# Patient Record
Sex: FEMALE | Race: WHITE | NOT HISPANIC OR LATINO | Employment: OTHER | URBAN - NONMETROPOLITAN AREA
[De-identification: names, ages, dates, MRNs, and addresses within clinical notes are randomized per-mention and may not be internally consistent; named-entity substitution may affect disease eponyms.]

---

## 2017-04-12 ENCOUNTER — AMBULATORY - GICH (OUTPATIENT)
Dept: RADIOLOGY | Facility: OTHER | Age: 22
End: 2017-04-12

## 2017-04-12 DIAGNOSIS — G89.29 OTHER CHRONIC PAIN: ICD-10-CM

## 2017-04-12 DIAGNOSIS — M54.16 RADICULOPATHY OF LUMBAR REGION: ICD-10-CM

## 2017-04-12 DIAGNOSIS — M51.26 OTHER INTERVERTEBRAL DISC DISPLACEMENT, LUMBAR REGION: ICD-10-CM

## 2017-04-13 ENCOUNTER — HOSPITAL ENCOUNTER (OUTPATIENT)
Dept: RADIOLOGY | Facility: OTHER | Age: 22
End: 2017-04-13

## 2017-04-13 DIAGNOSIS — M51.26 OTHER INTERVERTEBRAL DISC DISPLACEMENT, LUMBAR REGION: ICD-10-CM

## 2017-04-13 DIAGNOSIS — M54.16 RADICULOPATHY OF LUMBAR REGION: ICD-10-CM

## 2017-04-13 DIAGNOSIS — G89.29 OTHER CHRONIC PAIN: ICD-10-CM

## 2017-05-03 ENCOUNTER — AMBULATORY - GICH (OUTPATIENT)
Dept: RADIOLOGY | Facility: OTHER | Age: 22
End: 2017-05-03

## 2017-05-03 DIAGNOSIS — M48.061 SPINAL STENOSIS OF LUMBAR REGION: ICD-10-CM

## 2017-05-04 ENCOUNTER — AMBULATORY - GICH (OUTPATIENT)
Dept: RADIOLOGY | Facility: OTHER | Age: 22
End: 2017-05-04

## 2017-05-04 ENCOUNTER — HOSPITAL ENCOUNTER (OUTPATIENT)
Dept: RADIOLOGY | Facility: OTHER | Age: 22
End: 2017-05-04

## 2017-05-04 DIAGNOSIS — M48.061 SPINAL STENOSIS OF LUMBAR REGION: ICD-10-CM

## 2017-07-12 ENCOUNTER — AMBULATORY - GICH (OUTPATIENT)
Dept: RADIOLOGY | Facility: OTHER | Age: 22
End: 2017-07-12

## 2017-07-12 DIAGNOSIS — M54.50 LOW BACK PAIN: ICD-10-CM

## 2017-07-12 DIAGNOSIS — M54.16 RADICULOPATHY OF LUMBAR REGION: ICD-10-CM

## 2018-01-04 NOTE — PROGRESS NOTES
Patient Information     Patient Name MRN Maribell Pires [id number] Female 1995      Progress Notes by Carina Ocampo at 2017 12:52 PM     Author:  Carina Ocampo Service:  (none) Author Type:  (none)     Filed:  2017 12:52 PM Date of Service:  2017 12:52 PM Status:  Signed     :  Carina Ocampo            Falls Risk Criteria:    Age 65 and older or under age 4        Sensory deficits    Poor vision    Use of ambulatory aides    Impaired judgment    Unable to walk independently    Meets High Risk criteria for falls:  no

## 2018-01-04 NOTE — PROGRESS NOTES
Patient Information     Patient Name MRN Sex Maribell JENNINGS [id number] Female 1995      Progress Notes by Carina Ocampo at 2017 12:52 PM     Author:  Carina Ocampo Service:  (none) Author Type:  (none)     Filed:  2017 12:52 PM Date of Service:  2017 12:52 PM Status:  Signed     :  Carina Ocampo            [place] Protocol    A. Pre-procedure verification complete yes  1-relevant information / documentation available, reviewed and properly matched to the patient; 2-consent accurate and complete, 3-equipment and supplies available    B. Site marking complete Yes  Site marked if not in continuous attendance with patient    C. TIME OUT completed yes  Time Out was conducted just prior to starting procedure to verify the eight required elements: 1-patient identity, 2-consent accurate and complete, 3-position, 4-correct side/site marked (if applicable), 5-procedure, 6-relevant images / results properly labeled and displayed (if applicable), 7-antibiotics / irrigation fluids (if applicable), 8-safety precautions.

## 2018-01-04 NOTE — PROGRESS NOTES
Patient Information     Patient Name MRN Maribell Pires [id number] Female 1995      Progress Notes by Carina Ocampo at 2017 12:52 PM     Author:  Carina Ocampo Service:  (none) Author Type:  (none)     Filed:  2017 12:52 PM Date of Service:  2017 12:52 PM Status:  Signed     :  Carina Ocampo            RECOVERY TIME  You may experience numbness and/or relief of your pain for up to 4-6 hours after the injection.  Your usual symptoms may return the night of the procedure and may possible be more severe than usual a day or two following.  Please keep track of your pain over the next several days and report how long the relief lasts to the doctor who referred you for this procedure.    The beneficial effects of the steroids usually require 2 to 3 days to take effect, buy may take as long as 5 to 7 days.  If there is no change in the pain, then investigation can be focused on other possible sources of your pain.  In either case, the information is useful to the doctor who referred you for this procedure.    POSSIBLE SIDE EFFECTS  Facial flushing (redness), occasional low grade fevers of 99.5F or less, hiccups, insomnia, headaches, increased heart rate, abdominal cramping, and/or a bloating feeling are side effects of the steroid medications and will go away 3 to 4 days after the injection.    Diabetic Patients  The steroids you have received may significantly increase your blood sugar levels.  Monitor your blood sugar level closely (4-6 times per day) for a period of 4 days or until your blood sugar level normalizes.  If your blood sugar level elevates significantly or you experience confusion, dizziness, sweating, please notify our primary physician and make him/her aware that you have received steroids.

## 2018-01-04 NOTE — PROGRESS NOTES
Patient Information     Patient Name MRN Maribell Pires [id number] Female 1995      Progress Notes by Whitney Estrada at 2017  5:44 PM     Author:  Whitney Estrada Service:  (none) Author Type:  Other Clinical Staff     Filed:  2017  5:44 PM Date of Service:  2017  5:44 PM Status:  Signed     :  Whitney Estrada (Other Clinical Staff)            Falls Risk Criteria:    Age 65 and older or under age 4        Sensory deficits    Poor vision    Use of ambulatory aides    Impaired judgment    Unable to walk independently    Meets High Risk criteria for falls:  no

## 2018-02-15 ENCOUNTER — DOCUMENTATION ONLY (OUTPATIENT)
Dept: FAMILY MEDICINE | Facility: OTHER | Age: 23
End: 2018-02-15

## 2018-02-15 RX ORDER — NORGESTIMATE AND ETHINYL ESTRADIOL 7DAYSX3 28
1 KIT ORAL DAILY
COMMUNITY
Start: 2015-03-13 | End: 2024-08-01

## 2018-02-16 ENCOUNTER — COMMUNICATION - GICH (OUTPATIENT)
Dept: CARDIAC REHAB | Facility: OTHER | Age: 23
End: 2018-02-16

## 2018-07-24 NOTE — PROGRESS NOTES
Patient Information     Patient Name  Maribell HAYS MRN  [id number] Sex  Female   1995      Letter by Carlito Guillen at      Author:  Carlito Guillen Service:  (none) Author Type:  (none)    Filed:   Encounter Date:  2018 Status:  (Other)       2018    Dear Ms. HAYS,    We have received your [employer] application. However, our records show you already have a [employer] account.    To access your existing [employer] account:    1. Retrieve your MyChart ID    Go to [employer]    Click on Forgot [employer] ID and enter the requested information.    Your [employer] ID will be sent to your email address on file.  2. Retrieve your password    Go to [employer]    Click on Forgot Password and enter the requested information.    Follow the steps to reset your password.    For questions or technical assistance, call [phone].     Thank you for choosing [employer]!

## 2021-05-25 ENCOUNTER — HOSPITAL ENCOUNTER (OUTPATIENT)
Dept: GENERAL RADIOLOGY | Facility: OTHER | Age: 26
Discharge: HOME | End: 2021-05-25
Attending: NURSE PRACTITIONER | Admitting: FAMILY MEDICINE
Payer: COMMERCIAL

## 2021-05-25 DIAGNOSIS — M46.1 SACROILIITIS (H): ICD-10-CM

## 2021-05-25 PROCEDURE — 255N000002 HC RX 255 OP 636: Performed by: RADIOLOGY

## 2021-05-25 PROCEDURE — 27096 INJECT SACROILIAC JOINT: CPT | Mod: LT

## 2021-05-25 PROCEDURE — 250N000011 HC RX IP 250 OP 636: Performed by: RADIOLOGY

## 2021-05-25 PROCEDURE — 250N000009 HC RX 250: Performed by: RADIOLOGY

## 2021-05-25 RX ORDER — LIDOCAINE HYDROCHLORIDE 10 MG/ML
2 INJECTION, SOLUTION INFILTRATION; PERINEURAL ONCE
Status: COMPLETED | OUTPATIENT
Start: 2021-05-25 | End: 2021-05-25

## 2021-05-25 RX ORDER — BUPIVACAINE HYDROCHLORIDE 5 MG/ML
3 INJECTION, SOLUTION EPIDURAL; INTRACAUDAL ONCE
Status: COMPLETED | OUTPATIENT
Start: 2021-05-25 | End: 2021-05-25

## 2021-05-25 RX ORDER — TRIAMCINOLONE ACETONIDE 40 MG/ML
40 INJECTION, SUSPENSION INTRA-ARTICULAR; INTRAMUSCULAR ONCE
Status: COMPLETED | OUTPATIENT
Start: 2021-05-25 | End: 2021-05-25

## 2021-05-25 RX ADMIN — LIDOCAINE HYDROCHLORIDE 2 ML: 10 INJECTION, SOLUTION INFILTRATION; PERINEURAL at 15:53

## 2021-05-25 RX ADMIN — TRIAMCINOLONE ACETONIDE 40 MG: 40 INJECTION, SUSPENSION INTRA-ARTICULAR; INTRAMUSCULAR at 15:53

## 2021-05-25 RX ADMIN — BUPIVACAINE HYDROCHLORIDE 3 ML: 5 INJECTION, SOLUTION EPIDURAL; INTRACAUDAL at 15:53

## 2021-05-25 RX ADMIN — IOHEXOL 2 ML: 240 INJECTION, SOLUTION INTRATHECAL; INTRAVASCULAR; INTRAVENOUS; ORAL at 15:53

## 2022-02-17 ENCOUNTER — HOSPITAL ENCOUNTER (OUTPATIENT)
Dept: GENERAL RADIOLOGY | Facility: OTHER | Age: 27
Discharge: HOME | End: 2022-02-17
Attending: SPECIALIST | Admitting: SPECIALIST
Payer: COMMERCIAL

## 2022-02-17 DIAGNOSIS — M54.50 LOW BACK PAIN: ICD-10-CM

## 2022-02-17 PROCEDURE — 255N000002 HC RX 255 OP 636: Performed by: RADIOLOGY

## 2022-02-17 PROCEDURE — 64483 NJX AA&/STRD TFRM EPI L/S 1: CPT

## 2022-02-17 PROCEDURE — 250N000011 HC RX IP 250 OP 636: Performed by: RADIOLOGY

## 2022-02-17 PROCEDURE — 250N000009 HC RX 250: Performed by: RADIOLOGY

## 2022-02-17 RX ORDER — LIDOCAINE HYDROCHLORIDE 10 MG/ML
1 INJECTION, SOLUTION INFILTRATION; PERINEURAL ONCE
Status: COMPLETED | OUTPATIENT
Start: 2022-02-17 | End: 2022-02-17

## 2022-02-17 RX ORDER — LIDOCAINE HYDROCHLORIDE 10 MG/ML
2 INJECTION, SOLUTION EPIDURAL; INFILTRATION; INTRACAUDAL; PERINEURAL ONCE
Status: COMPLETED | OUTPATIENT
Start: 2022-02-17 | End: 2022-02-17

## 2022-02-17 RX ORDER — DEXAMETHASONE SODIUM PHOSPHATE 10 MG/ML
10 INJECTION, SOLUTION INTRAMUSCULAR; INTRAVENOUS ONCE
Status: COMPLETED | OUTPATIENT
Start: 2022-02-17 | End: 2022-02-17

## 2022-02-17 RX ADMIN — DEXAMETHASONE SODIUM PHOSPHATE 10 MG: 10 INJECTION, SOLUTION INTRAMUSCULAR; INTRAVENOUS at 10:43

## 2022-02-17 RX ADMIN — LIDOCAINE HYDROCHLORIDE 1 ML: 10 INJECTION, SOLUTION EPIDURAL; INFILTRATION; INTRACAUDAL; PERINEURAL at 10:43

## 2022-02-17 RX ADMIN — LIDOCAINE HYDROCHLORIDE 1 ML: 10 INJECTION, SOLUTION INFILTRATION; PERINEURAL at 10:43

## 2022-02-17 RX ADMIN — IOHEXOL 1 ML: 240 INJECTION, SOLUTION INTRATHECAL; INTRAVASCULAR; INTRAVENOUS; ORAL at 10:42

## 2024-07-31 ENCOUNTER — APPOINTMENT (OUTPATIENT)
Dept: GENERAL RADIOLOGY | Facility: CLINIC | Age: 29
End: 2024-07-31
Attending: FAMILY MEDICINE
Payer: MEDICAID

## 2024-07-31 ENCOUNTER — APPOINTMENT (OUTPATIENT)
Dept: ULTRASOUND IMAGING | Facility: CLINIC | Age: 29
End: 2024-07-31
Payer: MEDICAID

## 2024-07-31 ENCOUNTER — HOSPITAL ENCOUNTER (INPATIENT)
Facility: CLINIC | Age: 29
LOS: 2 days | Discharge: HOME | End: 2024-08-02
Attending: FAMILY MEDICINE | Admitting: STUDENT IN AN ORGANIZED HEALTH CARE EDUCATION/TRAINING PROGRAM
Payer: MEDICAID

## 2024-07-31 DIAGNOSIS — E79.0 ELEVATED URIC ACID IN BLOOD: ICD-10-CM

## 2024-07-31 DIAGNOSIS — R53.1 WEAKNESS GENERALIZED: ICD-10-CM

## 2024-07-31 DIAGNOSIS — N17.9 AKI (ACUTE KIDNEY INJURY) (H): ICD-10-CM

## 2024-07-31 DIAGNOSIS — K70.31 ALCOHOLIC CIRRHOSIS OF LIVER WITH ASCITES (H): ICD-10-CM

## 2024-07-31 DIAGNOSIS — L03.115 CELLULITIS OF RIGHT LOWER EXTREMITY: ICD-10-CM

## 2024-07-31 DIAGNOSIS — D72.829 LEUKOCYTOSIS, UNSPECIFIED TYPE: ICD-10-CM

## 2024-07-31 DIAGNOSIS — K74.69 OTHER CIRRHOSIS OF LIVER (H): Primary | ICD-10-CM

## 2024-07-31 DIAGNOSIS — M79.604 PAIN OF RIGHT LOWER EXTREMITY: ICD-10-CM

## 2024-07-31 DIAGNOSIS — D64.9 ANEMIA, UNSPECIFIED TYPE: ICD-10-CM

## 2024-07-31 LAB
ALBUMIN SERPL BCG-MCNC: 2.9 G/DL (ref 3.5–5.2)
ALP SERPL-CCNC: 119 U/L (ref 40–150)
ALT SERPL W P-5'-P-CCNC: 7 U/L (ref 0–50)
AMMONIA PLAS-SCNC: 28 UMOL/L (ref 11–51)
AMMONIA PLAS-SCNC: NORMAL UMOL/L
ANION GAP SERPL CALCULATED.3IONS-SCNC: 14 MMOL/L (ref 7–15)
ANION GAP SERPL CALCULATED.3IONS-SCNC: 16 MMOL/L (ref 7–15)
APTT PPP: 37 SECONDS (ref 22–38)
AST SERPL W P-5'-P-CCNC: 87 U/L (ref 0–45)
ATRIAL RATE - MUSE: 59 BPM
BASOPHILS # BLD AUTO: 0.1 10E3/UL (ref 0–0.2)
BASOPHILS NFR BLD AUTO: 1 %
BILIRUB DIRECT SERPL-MCNC: 2.4 MG/DL (ref 0–0.3)
BILIRUB SERPL-MCNC: 3.7 MG/DL
BUN SERPL-MCNC: 47 MG/DL (ref 6–20)
BUN SERPL-MCNC: 50.1 MG/DL (ref 6–20)
CALCIUM SERPL-MCNC: 8 MG/DL (ref 8.8–10.4)
CALCIUM SERPL-MCNC: 8.3 MG/DL (ref 8.8–10.4)
CHLORIDE SERPL-SCNC: 106 MMOL/L (ref 98–107)
CHLORIDE SERPL-SCNC: 110 MMOL/L (ref 98–107)
CREAT SERPL-MCNC: 1.69 MG/DL (ref 0.51–0.95)
CREAT SERPL-MCNC: 1.92 MG/DL (ref 0.51–0.95)
CRP SERPL-MCNC: 38.7 MG/L
DIASTOLIC BLOOD PRESSURE - MUSE: NORMAL MMHG
EGFRCR SERPLBLD CKD-EPI 2021: 36 ML/MIN/1.73M2
EGFRCR SERPLBLD CKD-EPI 2021: 41 ML/MIN/1.73M2
EOSINOPHIL # BLD AUTO: 0.6 10E3/UL (ref 0–0.7)
EOSINOPHIL NFR BLD AUTO: 4 %
ERYTHROCYTE [DISTWIDTH] IN BLOOD BY AUTOMATED COUNT: 19.4 % (ref 10–15)
ETHANOL SERPL-MCNC: <0.01 G/DL
FERRITIN SERPL-MCNC: 254 NG/ML (ref 6–175)
FOLATE SERPL-MCNC: >40 NG/ML (ref 4.6–34.8)
GLUCOSE SERPL-MCNC: 117 MG/DL (ref 70–99)
GLUCOSE SERPL-MCNC: 207 MG/DL (ref 70–99)
HAPTOGLOB SERPL-MCNC: 70 MG/DL (ref 30–200)
HCG SERPL QL: NEGATIVE
HCO3 SERPL-SCNC: 16 MMOL/L (ref 22–29)
HCO3 SERPL-SCNC: 17 MMOL/L (ref 22–29)
HCT VFR BLD AUTO: 22.8 % (ref 35–47)
HGB BLD-MCNC: 7.3 G/DL (ref 11.7–15.7)
HOLD SPECIMEN: NORMAL
IMM GRANULOCYTES # BLD: 0.1 10E3/UL
IMM GRANULOCYTES NFR BLD: 1 %
INR PPP: 1.85 (ref 0.85–1.15)
INTERPRETATION ECG - MUSE: NORMAL
IRON BINDING CAPACITY (ROCHE): 141 UG/DL (ref 240–430)
IRON SATN MFR SERPL: 43 % (ref 15–46)
IRON SERPL-MCNC: 60 UG/DL (ref 37–145)
LACTATE SERPL-SCNC: 1.7 MMOL/L (ref 0.7–2)
LACTATE SERPL-SCNC: 2.3 MMOL/L (ref 0.7–2)
LACTATE SERPL-SCNC: 2.4 MMOL/L (ref 0.7–2)
LDH SERPL L TO P-CCNC: 332 U/L (ref 0–250)
LIPASE SERPL-CCNC: 35 U/L (ref 13–60)
LYMPHOCYTES # BLD AUTO: 2.6 10E3/UL (ref 0.8–5.3)
LYMPHOCYTES NFR BLD AUTO: 14 %
MAGNESIUM SERPL-MCNC: 1.9 MG/DL (ref 1.7–2.3)
MCH RBC QN AUTO: 32.9 PG (ref 26.5–33)
MCHC RBC AUTO-ENTMCNC: 32 G/DL (ref 31.5–36.5)
MCV RBC AUTO: 103 FL (ref 78–100)
MONOCYTES # BLD AUTO: 1.2 10E3/UL (ref 0–1.3)
MONOCYTES NFR BLD AUTO: 7 %
NEUTROPHILS # BLD AUTO: 13.8 10E3/UL (ref 1.6–8.3)
NEUTROPHILS NFR BLD AUTO: 73 %
NRBC # BLD AUTO: 0 10E3/UL
NRBC BLD AUTO-RTO: 0 /100
NT-PROBNP SERPL-MCNC: 5164 PG/ML (ref 0–450)
P AXIS - MUSE: 40 DEGREES
PHOSPHATE SERPL-MCNC: 5.5 MG/DL (ref 2.5–4.5)
PLATELET # BLD AUTO: 277 10E3/UL (ref 150–450)
POTASSIUM SERPL-SCNC: 3 MMOL/L (ref 3.4–5.3)
POTASSIUM SERPL-SCNC: 3.7 MMOL/L (ref 3.4–5.3)
PR INTERVAL - MUSE: 142 MS
PROT SERPL-MCNC: 5.4 G/DL (ref 6.4–8.3)
QRS DURATION - MUSE: 66 MS
QT - MUSE: 574 MS
QTC - MUSE: 568 MS
R AXIS - MUSE: 88 DEGREES
RBC # BLD AUTO: 2.22 10E6/UL (ref 3.8–5.2)
RETICS # AUTO: 0.11 10E6/UL (ref 0.03–0.1)
RETICS/RBC NFR AUTO: 5 % (ref 0.5–2)
SARS-COV-2 RNA RESP QL NAA+PROBE: NEGATIVE
SODIUM SERPL-SCNC: 139 MMOL/L (ref 135–145)
SODIUM SERPL-SCNC: 140 MMOL/L (ref 135–145)
SYSTOLIC BLOOD PRESSURE - MUSE: NORMAL MMHG
T AXIS - MUSE: 94 DEGREES
T4 FREE SERPL-MCNC: 1.42 NG/DL (ref 0.9–1.7)
TROPONIN T SERPL HS-MCNC: 16 NG/L
TROPONIN T SERPL HS-MCNC: 17 NG/L
TSH SERPL DL<=0.005 MIU/L-ACNC: 5.11 UIU/ML (ref 0.3–4.2)
URATE SERPL-MCNC: 11 MG/DL (ref 2.4–5.7)
VENTRICULAR RATE- MUSE: 59 BPM
VIT B12 SERPL-MCNC: 1404 PG/ML (ref 232–1245)
WBC # BLD AUTO: 18.5 10E3/UL (ref 4–11)

## 2024-07-31 PROCEDURE — 82728 ASSAY OF FERRITIN: CPT

## 2024-07-31 PROCEDURE — 99223 1ST HOSP IP/OBS HIGH 75: CPT | Mod: GC | Performed by: STUDENT IN AN ORGANIZED HEALTH CARE EDUCATION/TRAINING PROGRAM

## 2024-07-31 PROCEDURE — 93005 ELECTROCARDIOGRAM TRACING: CPT | Performed by: FAMILY MEDICINE

## 2024-07-31 PROCEDURE — 84484 ASSAY OF TROPONIN QUANT: CPT

## 2024-07-31 PROCEDURE — 83735 ASSAY OF MAGNESIUM: CPT | Performed by: FAMILY MEDICINE

## 2024-07-31 PROCEDURE — 120N000002 HC R&B MED SURG/OB UMMC

## 2024-07-31 PROCEDURE — 76705 ECHO EXAM OF ABDOMEN: CPT

## 2024-07-31 PROCEDURE — 83690 ASSAY OF LIPASE: CPT | Performed by: FAMILY MEDICINE

## 2024-07-31 PROCEDURE — 258N000003 HC RX IP 258 OP 636: Performed by: FAMILY MEDICINE

## 2024-07-31 PROCEDURE — 84484 ASSAY OF TROPONIN QUANT: CPT | Performed by: FAMILY MEDICINE

## 2024-07-31 PROCEDURE — 84100 ASSAY OF PHOSPHORUS: CPT

## 2024-07-31 PROCEDURE — 82746 ASSAY OF FOLIC ACID SERUM: CPT

## 2024-07-31 PROCEDURE — 84155 ASSAY OF PROTEIN SERUM: CPT | Performed by: FAMILY MEDICINE

## 2024-07-31 PROCEDURE — 86015 ACTIN ANTIBODY EACH: CPT

## 2024-07-31 PROCEDURE — 83880 ASSAY OF NATRIURETIC PEPTIDE: CPT | Performed by: FAMILY MEDICINE

## 2024-07-31 PROCEDURE — 83615 LACTATE (LD) (LDH) ENZYME: CPT

## 2024-07-31 PROCEDURE — 93970 EXTREMITY STUDY: CPT | Mod: 26 | Performed by: STUDENT IN AN ORGANIZED HEALTH CARE EDUCATION/TRAINING PROGRAM

## 2024-07-31 PROCEDURE — 93010 ELECTROCARDIOGRAM REPORT: CPT | Performed by: FAMILY MEDICINE

## 2024-07-31 PROCEDURE — 83010 ASSAY OF HAPTOGLOBIN QUANT: CPT

## 2024-07-31 PROCEDURE — 82140 ASSAY OF AMMONIA: CPT | Performed by: FAMILY MEDICINE

## 2024-07-31 PROCEDURE — 36415 COLL VENOUS BLD VENIPUNCTURE: CPT | Performed by: FAMILY MEDICINE

## 2024-07-31 PROCEDURE — 36415 COLL VENOUS BLD VENIPUNCTURE: CPT

## 2024-07-31 PROCEDURE — 82248 BILIRUBIN DIRECT: CPT

## 2024-07-31 PROCEDURE — 99285 EMERGENCY DEPT VISIT HI MDM: CPT | Performed by: FAMILY MEDICINE

## 2024-07-31 PROCEDURE — 85730 THROMBOPLASTIN TIME PARTIAL: CPT | Performed by: FAMILY MEDICINE

## 2024-07-31 PROCEDURE — 84439 ASSAY OF FREE THYROXINE: CPT

## 2024-07-31 PROCEDURE — 71046 X-RAY EXAM CHEST 2 VIEWS: CPT | Mod: 26 | Performed by: RADIOLOGY

## 2024-07-31 PROCEDURE — 86140 C-REACTIVE PROTEIN: CPT | Performed by: FAMILY MEDICINE

## 2024-07-31 PROCEDURE — 93970 EXTREMITY STUDY: CPT

## 2024-07-31 PROCEDURE — 84550 ASSAY OF BLOOD/URIC ACID: CPT | Performed by: FAMILY MEDICINE

## 2024-07-31 PROCEDURE — 82040 ASSAY OF SERUM ALBUMIN: CPT | Performed by: FAMILY MEDICINE

## 2024-07-31 PROCEDURE — 83550 IRON BINDING TEST: CPT

## 2024-07-31 PROCEDURE — 82077 ASSAY SPEC XCP UR&BREATH IA: CPT | Performed by: FAMILY MEDICINE

## 2024-07-31 PROCEDURE — 85045 AUTOMATED RETICULOCYTE COUNT: CPT | Performed by: FAMILY MEDICINE

## 2024-07-31 PROCEDURE — 73630 X-RAY EXAM OF FOOT: CPT | Mod: 26 | Performed by: RADIOLOGY

## 2024-07-31 PROCEDURE — 99418 PROLNG IP/OBS E/M EA 15 MIN: CPT | Mod: GC | Performed by: STUDENT IN AN ORGANIZED HEALTH CARE EDUCATION/TRAINING PROGRAM

## 2024-07-31 PROCEDURE — 250N000013 HC RX MED GY IP 250 OP 250 PS 637

## 2024-07-31 PROCEDURE — 83605 ASSAY OF LACTIC ACID: CPT | Performed by: FAMILY MEDICINE

## 2024-07-31 PROCEDURE — 96361 HYDRATE IV INFUSION ADD-ON: CPT | Performed by: FAMILY MEDICINE

## 2024-07-31 PROCEDURE — 83605 ASSAY OF LACTIC ACID: CPT

## 2024-07-31 PROCEDURE — 99285 EMERGENCY DEPT VISIT HI MDM: CPT | Mod: 25 | Performed by: FAMILY MEDICINE

## 2024-07-31 PROCEDURE — 96360 HYDRATION IV INFUSION INIT: CPT | Performed by: FAMILY MEDICINE

## 2024-07-31 PROCEDURE — 87040 BLOOD CULTURE FOR BACTERIA: CPT | Performed by: FAMILY MEDICINE

## 2024-07-31 PROCEDURE — 85025 COMPLETE CBC W/AUTO DIFF WBC: CPT | Performed by: FAMILY MEDICINE

## 2024-07-31 PROCEDURE — 85610 PROTHROMBIN TIME: CPT | Performed by: FAMILY MEDICINE

## 2024-07-31 PROCEDURE — 250N000013 HC RX MED GY IP 250 OP 250 PS 637: Performed by: FAMILY MEDICINE

## 2024-07-31 PROCEDURE — 73630 X-RAY EXAM OF FOOT: CPT | Mod: RT

## 2024-07-31 PROCEDURE — 84443 ASSAY THYROID STIM HORMONE: CPT | Performed by: FAMILY MEDICINE

## 2024-07-31 PROCEDURE — 87635 SARS-COV-2 COVID-19 AMP PRB: CPT

## 2024-07-31 PROCEDURE — 85060 BLOOD SMEAR INTERPRETATION: CPT | Performed by: STUDENT IN AN ORGANIZED HEALTH CARE EDUCATION/TRAINING PROGRAM

## 2024-07-31 PROCEDURE — 250N000011 HC RX IP 250 OP 636: Performed by: FAMILY MEDICINE

## 2024-07-31 PROCEDURE — 82607 VITAMIN B-12: CPT

## 2024-07-31 PROCEDURE — 84703 CHORIONIC GONADOTROPIN ASSAY: CPT | Performed by: FAMILY MEDICINE

## 2024-07-31 PROCEDURE — 76705 ECHO EXAM OF ABDOMEN: CPT | Mod: 26 | Performed by: STUDENT IN AN ORGANIZED HEALTH CARE EDUCATION/TRAINING PROGRAM

## 2024-07-31 PROCEDURE — 71046 X-RAY EXAM CHEST 2 VIEWS: CPT

## 2024-07-31 PROCEDURE — 86038 ANTINUCLEAR ANTIBODIES: CPT

## 2024-07-31 RX ORDER — OXYCODONE HYDROCHLORIDE 5 MG/1
5 TABLET ORAL EVERY 6 HOURS PRN
Status: DISCONTINUED | OUTPATIENT
Start: 2024-07-31 | End: 2024-08-01

## 2024-07-31 RX ORDER — ESCITALOPRAM OXALATE 10 MG/1
10 TABLET ORAL DAILY
Status: DISCONTINUED | OUTPATIENT
Start: 2024-08-01 | End: 2024-08-02 | Stop reason: HOSPADM

## 2024-07-31 RX ORDER — LIDOCAINE 40 MG/G
CREAM TOPICAL
Status: DISCONTINUED | OUTPATIENT
Start: 2024-07-31 | End: 2024-08-02 | Stop reason: HOSPADM

## 2024-07-31 RX ORDER — POTASSIUM CHLORIDE 1.5 G/1.58G
40 POWDER, FOR SOLUTION ORAL ONCE
Status: COMPLETED | OUTPATIENT
Start: 2024-07-31 | End: 2024-07-31

## 2024-07-31 RX ORDER — LIDOCAINE 40 MG/G
CREAM TOPICAL
Status: DISCONTINUED | OUTPATIENT
Start: 2024-07-31 | End: 2024-07-31

## 2024-07-31 RX ORDER — FOLIC ACID 1 MG/1
1 TABLET ORAL DAILY
Status: DISCONTINUED | OUTPATIENT
Start: 2024-08-01 | End: 2024-08-02 | Stop reason: HOSPADM

## 2024-07-31 RX ORDER — OXYCODONE HYDROCHLORIDE 5 MG/1
5 TABLET ORAL ONCE
Status: COMPLETED | OUTPATIENT
Start: 2024-07-31 | End: 2024-07-31

## 2024-07-31 RX ORDER — CALCIUM CARBONATE 500 MG/1
1000 TABLET, CHEWABLE ORAL 4 TIMES DAILY PRN
Status: DISCONTINUED | OUTPATIENT
Start: 2024-07-31 | End: 2024-08-02 | Stop reason: HOSPADM

## 2024-07-31 RX ADMIN — SODIUM CHLORIDE, POTASSIUM CHLORIDE, SODIUM LACTATE AND CALCIUM CHLORIDE 1000 ML: 600; 310; 30; 20 INJECTION, SOLUTION INTRAVENOUS at 15:48

## 2024-07-31 RX ADMIN — POTASSIUM CHLORIDE 40 MEQ: 1.5 POWDER, FOR SOLUTION ORAL at 13:07

## 2024-07-31 RX ADMIN — PANTOPRAZOLE SODIUM 80 MG: 40 INJECTION, POWDER, FOR SOLUTION INTRAVENOUS at 15:48

## 2024-07-31 RX ADMIN — SODIUM CHLORIDE 1000 ML: 9 INJECTION, SOLUTION INTRAVENOUS at 12:43

## 2024-07-31 RX ADMIN — OXYCODONE HYDROCHLORIDE 5 MG: 5 TABLET ORAL at 23:25

## 2024-07-31 ASSESSMENT — ACTIVITIES OF DAILY LIVING (ADL)
ADLS_ACUITY_SCORE: 35
ADLS_ACUITY_SCORE: 33
ADLS_ACUITY_SCORE: 35

## 2024-07-31 ASSESSMENT — COLUMBIA-SUICIDE SEVERITY RATING SCALE - C-SSRS
6. HAVE YOU EVER DONE ANYTHING, STARTED TO DO ANYTHING, OR PREPARED TO DO ANYTHING TO END YOUR LIFE?: NO
2. HAVE YOU ACTUALLY HAD ANY THOUGHTS OF KILLING YOURSELF IN THE PAST MONTH?: NO
1. IN THE PAST MONTH, HAVE YOU WISHED YOU WERE DEAD OR WISHED YOU COULD GO TO SLEEP AND NOT WAKE UP?: NO

## 2024-07-31 NOTE — H&P
[facility]    History and Physical - Medicine Service, [facility] TEAM 1       Date of Admission:  7/31/2024    Assessment & Plan      Maribell Leon is a 29 year old female admitted on 7/31/2024. She has a history of cirrhosis, reported alcohol use disorder, chronic pancreatitis who was sent to the ED after abnormal lab values at outpatient follow up visit.    # Cirrhosis  # Elevated INR  # Hypoalbuminemia   Work up during prior hospitalization largely negative. TTG, A1A, Hep B, Hep C all negative. Ceruloplasmin was low at 19.2. PETH negative during last hospitalization. She does endorse alcohol use and potential past acetaminophen overuse. No family history of liver disease. Has had past epistaxis but no current issues with bleeding. She has not had a paracentesis or EGD to assess for varices. No biopsy upon initial chart review. CT at outside hospital noted diffuse hepatic steatosis with 1.3cm hypodense lesion in R hepatic lobe. She is certainly volume up on exam but no signs of HE. We will repeat autoimmune hepatitis labs.    - Will hold off on lactulose  and spironolactone-HCTZ at this time  - Was given midodrine on discharge at last hospitalization, BP okay for now, will hold off   - GI consult, appreciate their recommendations as far as any further work up needed    # Anemia  Hg 7.3. Macrocytic anemia with elevated reticulocytes. Elevated total bili. Concern for past hemolysis but per chart review was prescribed B12/folate supplements although not recently taking. No indication for transfusion at this point.  - B12  - Folate  - Iron studies  - Haptoglobin  - LDH   - Direct Bili  - Peripheral smear    # CHANTELL   Need more information to fully evaluate but is already improving. Would favor pre-renal over hepatorenal syndrome or intrarenal causes at this point given recent serious infection. No clear GI losses but possible poor PO intake. No other evidence of  decompensated cirrhosis so would be surprising to have HRS. BUN/Cr is >20.    - CTM  - UA with microscopy    # Lower extremity edema  Likely due to cirrhosis. Will rule out cardiac contribution given elevated NT pro BNP and DVT.  - Echo ordered  - LE venous doppler bilateral    # Elevated bilirubin  Possibly due to hemolysis. Will eval for biliary disease.   - RUQ ultrasound    # Elevated uric acid  # Concern for gout  # RLE ankle pain  RLE xray negative. Given CHANTELL would not give colchicine/NSAIDs.   - Ultrasound will look for joint effusion  - If effusion, would give steroids    # Leukocytosis  No clinical signs of infection. Could be inflammation secondary to gout. CXR read below. Recent PNA, no respiratory symptoms or dyspnea.   -CTM with daily CBC  - UA pending        Diet:  NPO at midnight  DVT Prophylaxis: Pneumatic Compression Devices  [place] Catheter: Not present  Fluids: None  Lines: None     Cardiac Monitoring: None  Code Status:  Full    Clinically Significant Risk Factors Present on Admission        # Hypokalemia: Lowest K = 3 mmol/L in last 2 days, will replace as needed       # Hypoalbuminemia: Lowest albumin = 2.9 g/dL at 7/31/2024 10:53 AM, will monitor as appropriate  # Coagulation Defect: INR = 1.85 (Ref range: 0.85 - 1.15) and/or PTT = 37 Seconds (Ref range: 22 - 38 Seconds), will monitor for bleeding       # Anemia: based on hgb <11                  Disposition Plan      Expected Discharge Date: 08/02/2024                The patient's care was discussed with the Attending Physician, Dr. Douglas .      Aries Vasquez MD  Medicine Service, [facility] TEAM 1  [facility]  Securely message with [employer] (more info)  Text page via [facility] Paging/Directory   See signed in provider for up to date coverage information  ______________________________________________________________________    Chief Complaint   Sent after abnormal lab values concerning for  CHANTELL    History is obtained from the patient and the patient's parent(s)    History of Present Illness   Maribell Leon is a 29 year old female who presents with The patient was previously hospitalized in [facility] from 7/9/24 to 7/22. She initially presented with R foot pain, lower extremity swelling, epistaxis, and diarrhea. CT abdomen showed increasing ascites in the abdomen and pelvis. Not paracentesis was performed not enough fluid per patient. During work up was wound to have mycoplasma PNA and eventually developed AHRF requiring intubation and thoracentesis which showed a transudative pleural effusion. Upon discharge, she was sent out on a course of doxycycline, which she finished.    She presented at the request of the provider, as she has not developed any new symptoms that concern her. She denies URI symptoms, cough, change in bowel movements, shortness of breath, chest pain, urinary symptoms, recurrent epistaxis, or other episodes of bleeding. She says the swelling in her legs is not markedly different from discharge but does endorse swelling in her legs and abdomen.     Past Medical History    No past medical history on file.    Past Surgical History   Past Surgical History:   Procedure Laterality Date    TONSILLECTOMY      8/05       Prior to Admission Medications   Prior to Admission Medications   Prescriptions Last Dose Informant Patient Reported? Taking?   norgestim-eth estrad triphasic (ORTHO TRI-CYCLEN, TRI-SPRINTEC) 0.18/0.215/0.25 MG-35 MCG per tablet   Yes No   Sig: Take 1 tablet by mouth daily   omeprazole (PRILOSEC) 20 MG CR capsule   Yes No   Sig: Take 20 mg by mouth daily      Facility-Administered Medications: None           Physical Exam   Vital Signs: Temp: 98.2  F (36.8  C) Temp src: Oral BP: 98/55 Pulse: 60   Resp: 18 SpO2: 100 % O2 Device: None (Room air)    Weight: 0 lbs 0 oz    Constitutional: awake, alert, cooperative, no apparent distress, and appears stated age  Eyes:  icteric bilaterally  Respiratory: No increased work of breathing, decreased breath sounds at the base bilaterally  Cardiovascular: normal S1 and S2, no murmurs, pitting edema present bilaterally  GI: Soft, non-tender, distended  Skin: Jaundiced, no rashes  Musculoskeletal: Tenderness of RLE ankle  Neuo: no asterixis    Medical Decision Making             Data     I have personally reviewed the following data over the past 24 hrs:    18.5 (H)  \   7.3 (L)   / 277     139 106 50.1 (H) /  207 (H)   3.0 (L) 17 (L) 1.92 (H) \     ALT: 7 AST: 87 (H) AP: 119 TBILI: 3.7 (H)   ALB: 2.9 (L) TOT PROTEIN: 5.4 (L) LIPASE: 35     Trop: 17 (H) BNP: 5,164 (H)     TSH: 5.11 (H) T4: N/A A1C: N/A     Procal: N/A CRP: 38.70 (H) Lactic Acid: 1.7       INR:  1.85 (H) PTT:  37   D-dimer:  N/A Fibrinogen:  N/A     Ferritin:  N/A % Retic:  5.0 (H) LDH:  N/A       Imaging results reviewed over the past 24 hrs:   Recent Results (from the past 24 hour(s))   Foot  XR, G/E 3 views, right    Narrative    3 views right foot radiographs 7/31/2024 1:57 PM    History: pain and swelling  include ankle     Comparison: None    Findings:    Nonweightbearing AP, oblique, and lateral  views of the right foot  were obtained.     No acute osseous abnormality.      Lisfranc articulation alignment is congruent on this non-weight  bearing study.    No substantial degenerative change. Os peroneus.    Dorsal soft tissue swelling.      Impression    Impression:  1. No acute osseous abnormality.  2. No substantial degenerative change.    PAUL ONEILL MD (Joe)         SYSTEM ID:  [id number]   XR Chest 2 Views    Narrative    Exam: XR CHEST 2 VIEWS, 7/31/2024 1:46 PM    Comparison: None    History: weakness recent pneumonia    Findings:  PA and lateral views of the chest. Trachea is midline.  Cardiomediastinal silhouette is partially cleared. Elevation of the  right hemidiaphragm. Suspected small-moderate right pleural effusion.  Patchy perihilar and left  basilar opacities. Opacification of the  right lower lung field. No pneumothorax. No acute osseous abnormality.      Impression    Impression:   1. Opacification of the right lower lung field with patchy perihilar  and left basilar opacities. Most likely edema versus atelectasis.  Cannot exclude postinflammatory change.  2. Suspected small-moderate right pleural effusion.     I have personally reviewed the examination and initial interpretation  and I agree with the findings.    GRACIELA YU MD         SYSTEM ID:  [id number]

## 2024-07-31 NOTE — ED PROVIDER NOTES
[place] EMERGENCY DEPARTMENT ([facility])    7/31/24       ED PROVIDER NOTE    History     Chief Complaint   Patient presents with    Abnormal Labs     The history is provided by the patient and medical records.     Maribell Leon is a 29 year old female history of severe alcohol use disorder complicated by liver cirrhosis, alcohol related neuropathy, chronic pancreatitis who presents to the Emergency Department for further evaluation of abnormal labwork and further workup of possible gout or leukemia. Patient had significant hospitalization from 7/9-7/22/24 for severe sepsis due to Mycoplasma pneumonia, hospitalizations summarized in a section below. During that hospitalization she was noted to have elevated WBCs despite treatment with broad spectrum IV antibiotics. Hematology consult was obtained and plan was made for outpatient bone marrow biopsy. She spoke with her doctors today who reviewed her recent labwork. They were concerned given acutely elevated creatinine of 2.96 and GFR of 21 on labs yesterday (prior creatinine 0.81 and GFR of 101 from 7/22/24). She presented to triage today stating that she had an appointment with [facility] hematology/oncology Dr. Abran Cannon ([phone]). There were concerns for possible leukemia, gout and advised patient come to the Emergency Department for evaluation. Here patient reports generalized weakness, increasing right foot pain over past few days, as well as bilateral lower extremity swelling. She states she was up all night, feels sleepy.  Here looks pale and jaundiced. No abdominal pain, hematemesis, epistaxis.     Hospitalization [facility] 7/9/2024 to 7/13/24   Presented to [place] ED with right foot pain, bilateral lower extremity swelling, multiple episodes of epistaxis, multiple episodes of diarrhea and increased weight gain after she stopped drinking alcohol.     Labs: WBC 33.3, CRP 75.6, procalcitonin 5.06, Fungitell is positive, positive  mycoplasma IgM, Sodium on admission 130. Uric acid 6.1. Alpha 1 Antitrypsin was pending at time of discharge but resulted elevated to 217.     Imaging: CT chest showed new patchy consolidation throughout both lungs with enlarged mediastinal or hilar lymph nodes as well as bilateral pleural effusions.  CT abdomen showed increasing ascites in the abdomen and pelvis as well as worsening gastroenteritis and colitis. Nonspecific hepatomegaly with perihepatic free fluid. MRI RLE:Nonspecific subcutaneous edema at the dorsal forefoot. Negative for fluid collection. No gross acute findings or abnormal enhancement within the osseous structures.     Hospitalization [facility] from 7/13/2024 to 07/22/24. Was transferred from [facility] after she developed worsening respiratory status. Underwent thoracentesis on 7/14, transudative. She was intubated 7/16 and admitted to ICU for acute hypoxemic respiratory failure and severe sepsis secondary to mycoplasma pneumonia. Postintubation patient underwent emergent bronc with BAL from right middle lobe and left lower lobe. Was extubated on 7/18/24.     Labs: mycoplasma pneumoniae IgM antibody positive as well ad IgG.  MELD 3.0: 24. Was also noted to have worsening WBC, with left shift and bands, peripheral smear and Hematology sought. Sputum culture 7/14 with gram positive clusters.     Peripheral blood smear review:  - Moderate leukocytosis secondary to absolute neutrophilia, monocytosis, lymphocytosis and eosinophilia with no left shift, toxic granulation, dysplastic forms or circulating blasts consistent with leukemoid reaction.  - Moderate macrocytic normochromic anemia with anisopoikilocytosis including elliptocytes and target cells without polychromasia.    - Unremarkable platelet count, size and granularity.    Imaging: CT chest 07/15/2024 showed bilateral ground-glass and consolidative opacities with interlobular septal thickening, moderate bilateral  nonloculated pleural effusions, diffuse hepatic steatosis.   Consults: Pulmonology, infectious disease, Hematology, Gastroenterology  Treatment: Levaquin, vancomycin, cefepime, doxycycline, zosyn, Bactrim, metronidazole, albumin, Bumex and Lasix for pleural effusion, multiple doses of diazepam for agitation, oxycodone, gabapentin and Fentanyl for severe pain, methylprednisolone, norepinephrine.     Past Medical History  No past medical history on file.  Past Surgical History:   Procedure Laterality Date    TONSILLECTOMY      8/05     norgestim-eth estrad triphasic (ORTHO TRI-CYCLEN, TRI-SPRINTEC) 0.18/0.215/0.25 MG-35 MCG per tablet  omeprazole (PRILOSEC) 20 MG CR capsule      No Known Allergies  Family History  Family History   Problem Relation Age of Onset    Allergy (Severe) Mother         Allergies,Environmental allergies, celiac disease    Family History Negative Father         Good Health    Cancer Maternal Grandfather         Cancer,Kidney cancer    Diabetes Maternal Grandmother         Diabetes,Type 2    Heart Disease Paternal Grandfather         Heart Disease,Cardiomyopathy    Asthma Brother         Asthma,history of intermittent asthma    Other - See Comments Brother         Enuresis    Family History Negative Sister         Good Health    Family History Negative Sister         Good Health     Social History   Social History     Tobacco Use    Smoking status: Some Days     Current packs/day: 0.10     Types: Cigarettes    Smokeless tobacco: Never   Substance Use Topics    Alcohol use: No    Drug use: Unknown     Types: Other     Comment: Drug use: No      A complete review of systems was performed with pertinent positives and negatives noted in the HPI, and all other systems negative.    Physical Exam   BP: 92/57  Pulse: 79  Temp: 98.4  F (36.9  C)  Resp: 16  SpO2: 95 %  Physical Exam  Vitals and nursing note reviewed.   Constitutional:       General: She is in acute distress.      Appearance: Normal  appearance. She is well-developed. She is ill-appearing. She is not toxic-appearing.      Comments: Patient ill-appearing here mother present also gives history on the patient.   HENT:      Head: Normocephalic and atraumatic.      Nose: Nose normal. No congestion or rhinorrhea.      Comments: No bleeding noted     Mouth/Throat:      Mouth: Mucous membranes are moist.      Pharynx: Oropharynx is clear.   Eyes:      General: Scleral icterus present.      Extraocular Movements: Extraocular movements intact.      Pupils: Pupils are equal, round, and reactive to light.   Cardiovascular:      Rate and Rhythm: Normal rate and regular rhythm.   Pulmonary:      Effort: Pulmonary effort is normal. No respiratory distress.      Breath sounds: No stridor. Rales present. No wheezing.      Comments: Few crackles in the bases no respiratory distress  Abdominal:      General: Abdomen is flat. There is distension.      Palpations: Abdomen is soft.      Tenderness: There is no abdominal tenderness. There is no guarding.   Musculoskeletal:         General: Swelling and tenderness present.      Cervical back: Normal range of motion and neck supple. No rigidity.      Comments: Edema of the lower extremities bilateral slight increasing on the right but no erythema noted.  Patient had ongoing evaluation MRI x-rays etc. at outside hospital without acute findings   Skin:     General: Skin is warm and dry.      Capillary Refill: Capillary refill takes less than 2 seconds.      Coloration: Skin is jaundiced and pale.      Findings: No bruising, lesion or rash.   Neurological:      General: No focal deficit present.      Mental Status: She is alert and oriented to person, place, and time. Mental status is at baseline.   Psychiatric:      Comments: Flat affect here in the ER           ED Course, Procedures, & Data     Records reviewed in epic.  As noted conversation with outside oncologist concerning for possible CML.  Chart reviewed see HPI  also.    In the ER history per mother also.  Patient had an IV established.  He received a liter normal saline here in the ER.  Had an EKG done  Chest x-ray done interpreted by myself also reveals right elevated hemidiaphragm some haziness in the left lower lung field.  No pneumothorax seen.  EKG sinus bradycardia noted.    Labs reviewed with an INR 1.85.  Lipase was 35.  CRP is 38.7 uric acid 11 TSH 5.11.  White count compared to previous is down to 18.5.  Hemoglobin is down from 9.1 down to 7.3.  Platelets noted to be 277.  Lactic acid was 2.3 and 2.2.  Ammonia 28.  BNP is 5100.  Sodium 139 potassium is 3 bicarb 17 gap is 16.  BUN is 50 creatinine 1.92 which is improved from previous yesterday.  Glucose 207 LFTs reveal AST of 87.  Albumin noted to be 2.9.  Alcohol negative.  Troponin 16.    As noted patient with anemia down I did type and screen the patient at this point.  Had talked to medicine regarding patient's finding we will hold off on transfusion patient I had talked to malignant heme also initially concern for CML after I talk to the patient heme-onc doctor at an outside clinic but at this point white count is coming down feel less likely this is CML can be consulted if needed with hospitalization.    Patient received a liter of normal saline here in the ER.  Protonix 80 mg IV x 1 reviewing records there is been no EGD done yet as no reports of varices patient noted stool to be slightly darker but no gross blood noted.    Discussed with medicine will plan to admit to medical bed for ongoing management of patient's complex alcoholic cirrhosis with recent pneumonia ICU admission with anemia ongoing would recommend GI to see patient also while here in the hospital family agrees with plan patient to reassess stable      ED Course as of 07/31/24 2219 Wed Jul 31, 2024   1308 Case discussed with Heme Onc.   1315 Called and spoke to lab, they could not perform add-on as the purple top is already spun down.   This will be reordered as a new draw.  Peripheral smear also ordered.  Margarita will have lab collect.     Procedures            EKG Interpretation:      Interpreted by Malvin Rocha MD  Time reviewed: 1225  Symptoms at time of EKG: fatigue   Rhythm: normal sinus veronica  Rate: normal  Axis: normal  Ectopy: none  Conduction: normal  ST Segments/ T Waves: No hyperacute ST-T wave changes  Q Waves: none  Comparison to prior: No old EKG available    Clinical Impression: sinus bradycardia       Elevated Lactate of 2.4 and no sepsis note found - Delete this reminder and enter the sepsis note or '.edcms' before signing chart.>>>     Results for orders placed or performed during the hospital encounter of 07/31/24   XR Chest 2 Views     Status: None    Narrative    Exam: XR CHEST 2 VIEWS, 7/31/2024 1:46 PM    Comparison: None    History: weakness recent pneumonia    Findings:  PA and lateral views of the chest. Trachea is midline.  Cardiomediastinal silhouette is partially cleared. Elevation of the  right hemidiaphragm. Suspected small-moderate right pleural effusion.  Patchy perihilar and left basilar opacities. Opacification of the  right lower lung field. No pneumothorax. No acute osseous abnormality.      Impression    Impression:   1. Opacification of the right lower lung field with patchy perihilar  and left basilar opacities. Most likely edema versus atelectasis.  Cannot exclude postinflammatory change.  2. Suspected small-moderate right pleural effusion.     I have personally reviewed the examination and initial interpretation  and I agree with the findings.    GRACIELA YU MD         SYSTEM ID:  [id number]   Foot  XR, G/E 3 views, right     Status: None    Narrative    3 views right foot radiographs 7/31/2024 1:57 PM    History: pain and swelling  include ankle     Comparison: None    Findings:    Nonweightbearing AP, oblique, and lateral  views of the right foot  were obtained.     No acute osseous  abnormality.      Lisfranc articulation alignment is congruent on this non-weight  bearing study.    No substantial degenerative change. Os peroneus.    Dorsal soft tissue swelling.      Impression    Impression:  1. No acute osseous abnormality.  2. No substantial degenerative change.    PAUL ONEILL MD (Joe)         SYSTEM ID:  [id number]   US Abdomen Limited     Status: None (Preliminary result)    Impression    RESIDENT PRELIMINARY INTERPRETATION  IMPRESSION:   1.  Cirrhotic liver morphology, with perihepatic ascites.  2.  Cholelithiasis without cholecystitis  3.  Small right pleural effusion   US Lower Extremity Venous Duplex Bilateral     Status: None (Preliminary result)    Impression    RESIDENT PRELIMINARY INTERPRETATION  IMPRESSION:.  No deep vein thrombosis in the right or left lower extremity.   Anna Draw     Status: None    Narrative    The following orders were created for panel order Anna Draw.  Procedure                               Abnormality         Status                     ---------                               -----------         ------                     Extra Blue Top Tube[[id number]]                              Final result               Extra Red Top Tube[[id number]]                               Final result               Extra Green Top (Lithium...[[id number]]                      Final result               Extra Purple Top Tube[[id number]]                            Final result                 Please view results for these tests on the individual orders.   Extra Blue Top Tube     Status: None   Result Value Ref Range    Hold Specimen JIC    Extra Red Top Tube     Status: None   Result Value Ref Range    Hold Specimen JIC    Extra Green Top (Lithium Heparin) Tube     Status: None   Result Value Ref Range    Hold Specimen JIC    Extra Purple Top Tube     Status: None   Result Value Ref Range    Hold Specimen JIC    Partial thromboplastin time     Status: Normal   Result Value  Ref Range    aPTT 37 22 - 38 Seconds   INR     Status: Abnormal   Result Value Ref Range    INR 1.85 (H) 0.85 - 1.15   CRP inflammation     Status: Abnormal   Result Value Ref Range    CRP Inflammation 38.70 (H) <5.00 mg/L   Comprehensive metabolic panel     Status: Abnormal   Result Value Ref Range    Sodium 139 135 - 145 mmol/L    Potassium 3.0 (L) 3.4 - 5.3 mmol/L    Carbon Dioxide (CO2) 17 (L) 22 - 29 mmol/L    Anion Gap 16 (H) 7 - 15 mmol/L    Urea Nitrogen 50.1 (H) 6.0 - 20.0 mg/dL    Creatinine 1.92 (H) 0.51 - 0.95 mg/dL    GFR Estimate 36 (L) >60 mL/min/1.73m2    Calcium 8.3 (L) 8.8 - 10.4 mg/dL    Chloride 106 98 - 107 mmol/L    Glucose 207 (H) 70 - 99 mg/dL    Alkaline Phosphatase 119 40 - 150 U/L    AST 87 (H) 0 - 45 U/L    ALT 7 0 - 50 U/L    Protein Total 5.4 (L) 6.4 - 8.3 g/dL    Albumin 2.9 (L) 3.5 - 5.2 g/dL    Bilirubin Total 3.7 (H) <=1.2 mg/dL   Magnesium     Status: Normal   Result Value Ref Range    Magnesium 1.9 1.7 - 2.3 mg/dL   Lactic Acid Whole Blood with 1X Repeat in 2 HR when >2     Status: Abnormal   Result Value Ref Range    Lactic Acid, Initial 2.3 (H) 0.7 - 2.0 mmol/L   Troponin T, High Sensitivity     Status: Abnormal   Result Value Ref Range    Troponin T, High Sensitivity 17 (H) <=14 ng/L   Lipase     Status: Normal   Result Value Ref Range    Lipase 35 13 - 60 U/L   Nt probnp inpatient (BNP)     Status: Abnormal   Result Value Ref Range    N terminal Pro BNP Inpatient 5,164 (H) 0 - 450 pg/mL   TSH     Status: Abnormal   Result Value Ref Range    TSH 5.11 (H) 0.30 - 4.20 uIU/mL   HCG qualitative Blood     Status: Normal   Result Value Ref Range    hCG Serum Qualitative Negative Negative   Uric acid     Status: Abnormal   Result Value Ref Range    Uric Acid 11.0 (H) 2.4 - 5.7 mg/dL   Ammonia (on ice)     Status: None   Result Value Ref Range    Ammonia     Lactic acid whole blood     Status: Abnormal   Result Value Ref Range    Lactic Acid 2.4 (H) 0.7 - 2.0 mmol/L   CBC with  platelets and differential     Status: Abnormal   Result Value Ref Range    WBC Count 18.5 (H) 4.0 - 11.0 10e3/uL    RBC Count 2.22 (L) 3.80 - 5.20 10e6/uL    Hemoglobin 7.3 (L) 11.7 - 15.7 g/dL    Hematocrit 22.8 (L) 35.0 - 47.0 %     (H) 78 - 100 fL    MCH 32.9 26.5 - 33.0 pg    MCHC 32.0 31.5 - 36.5 g/dL    RDW 19.4 (H) 10.0 - 15.0 %    Platelet Count 277 150 - 450 10e3/uL    % Neutrophils 73 %    % Lymphocytes 14 %    % Monocytes 7 %    % Eosinophils 4 %    % Basophils 1 %    % Immature Granulocytes 1 %    NRBCs per 100 WBC 0 <1 /100    Absolute Neutrophils 13.8 (H) 1.6 - 8.3 10e3/uL    Absolute Lymphocytes 2.6 0.8 - 5.3 10e3/uL    Absolute Monocytes 1.2 0.0 - 1.3 10e3/uL    Absolute Eosinophils 0.6 0.0 - 0.7 10e3/uL    Absolute Basophils 0.1 0.0 - 0.2 10e3/uL    Absolute Immature Granulocytes 0.1 <=0.4 10e3/uL    Absolute NRBCs 0.0 10e3/uL   Reticulocyte count     Status: Abnormal   Result Value Ref Range    % Reticulocyte 5.0 (H) 0.5 - 2.0 %    Absolute Reticulocyte 0.111 (H) 0.025 - 0.095 10e6/uL   Alcohol     Status: Normal   Result Value Ref Range    Alcohol ethyl <0.01 <=0.01 g/dL   Ammonia     Status: Normal   Result Value Ref Range    Ammonia 28 11 - 51 umol/L   Bilirubin direct     Status: Abnormal   Result Value Ref Range    Bilirubin Direct 2.40 (H) 0.00 - 0.30 mg/dL   Extra Tube     Status: None    Narrative    The following orders were created for panel order Extra Tube.  Procedure                               Abnormality         Status                     ---------                               -----------         ------                     Extra Green Top (Lithium...[[id number]]                      Final result                 Please view results for these tests on the individual orders.   Extra Green Top (Lithium Heparin) Tube     Status: None   Result Value Ref Range    Hold Specimen [facility]    Phosphorus     Status: Abnormal   Result Value Ref Range    Phosphorus 5.5 (H) 2.5 - 4.5 mg/dL    Vitamin B12     Status: Abnormal   Result Value Ref Range    Vitamin B12 1,404 (H) 232 - 1,245 pg/mL   Folate     Status: Abnormal   Result Value Ref Range    Folic Acid >40.0 (H) 4.6 - 34.8 ng/mL   Iron and iron binding capacity     Status: Abnormal   Result Value Ref Range    Iron 60 37 - 145 ug/dL    Iron Binding Capacity 141 (L) 240 - 430 ug/dL    Iron Sat Index 43 15 - 46 %   Ferritin     Status: Abnormal   Result Value Ref Range    Ferritin 254 (H) 6 - 175 ng/mL   Lactic acid whole blood     Status: Normal   Result Value Ref Range    Lactic Acid 1.7 0.7 - 2.0 mmol/L   Basic metabolic panel     Status: Abnormal   Result Value Ref Range    Sodium 140 135 - 145 mmol/L    Potassium 3.7 3.4 - 5.3 mmol/L    Chloride 110 (H) 98 - 107 mmol/L    Carbon Dioxide (CO2) 16 (L) 22 - 29 mmol/L    Anion Gap 14 7 - 15 mmol/L    Urea Nitrogen 47.0 (H) 6.0 - 20.0 mg/dL    Creatinine 1.69 (H) 0.51 - 0.95 mg/dL    GFR Estimate 41 (L) >60 mL/min/1.73m2    Calcium 8.0 (L) 8.8 - 10.4 mg/dL    Glucose 117 (H) 70 - 99 mg/dL   Lactate Dehydrogenase     Status: Abnormal   Result Value Ref Range    Lactate Dehydrogenase 332 (H) 0 - 250 U/L   Haptoglobin     Status: Normal   Result Value Ref Range    Haptoglobin 70 30 - 200 mg/dL   T4 free     Status: Normal   Result Value Ref Range    Free T4 1.42 0.90 - 1.70 ng/dL   Troponin T, High Sensitivity     Status: Abnormal   Result Value Ref Range    Troponin T, High Sensitivity 16 (H) <=14 ng/L   Asymptomatic COVID-19 Virus (Coronavirus) by PCR Nose     Status: Normal    Specimen: Nose; Swab   Result Value Ref Range    SARS CoV2 PCR Negative Negative    Narrative    Testing was performed using the [employer] Xpress SARS-CoV-2 Assay on the Cepheid Gene-Xpert Instrument Systems. Additional information about this assay can be found via the Test Directory. This [place] FDA cleared test should be ordered for the detection of SARS-CoV-2 in individuals with signs and symptoms of respiratory tract infection.  This test is for in vitro diagnostic use under the [place] FDA for laboratories certified under CLIA to perform high complexity testing. A negative result does not rule out the presence of PCR inhibitors in the specimen or target RNA concentration below the limit of detection for the assay. The possibility of a false negative should be considered if the patient's recent exposure or clinical presentation suggests COVID-19. This test was validated by [facility]. These Laboratories are certified under the  Clinical Laboratory Improvement Amendments (CLIA) as qualified to perform high complexity testing.   EKG 12-lead, tracing only     Status: None   Result Value Ref Range    Systolic Blood Pressure  mmHg    Diastolic Blood Pressure  mmHg    Ventricular Rate 59 BPM    Atrial Rate 59 BPM    WI Interval 142 ms    QRS Duration 66 ms     ms    QTc 568 ms    P Axis 40 degrees    R AXIS 88 degrees    T Axis 94 degrees    Interpretation ECG       Sinus bradycardia  Low voltage QRS  Cannot rule out Anterior infarct , age undetermined  Abnormal ECG    Unconfirmed report - interpretation of this ECG is computer generated - see medical record for final interpretation  Confirmed by - EMERGENCY ROOM, PHYSICIAN ([id number]),  EMBER BOUCHER ([id number]) on 7/31/2024 3:04:24 PM     CBC with platelets differential *Canceled*     Status: None ()    Narrative    The following orders were created for panel order CBC with platelets differential.  Procedure                               Abnormality         Status                     ---------                               -----------         ------                       Please view results for these tests on the individual orders.   ABO/Rh type and screen *Canceled*     Status: None ()    Narrative    The following orders were created for panel order ABO/Rh type and screen.  Procedure                               Abnormality         Status                     ---------                                -----------         ------                       Please view results for these tests on the individual orders.   ABO/Rh type and screen *Canceled*     Status: None ()    Narrative    The following orders were created for panel order ABO/Rh type and screen.  Procedure                               Abnormality         Status                     ---------                               -----------         ------                     Adult Type and Screen[[id number]]                                                         Please view results for these tests on the individual orders.   Lab Blood Morphology Pathologist Review     Status: Abnormal (In process)    Narrative    The following orders were created for panel order Lab Blood Morphology Pathologist Review.  Procedure                               Abnormality         Status                     ---------                               -----------         ------                     Bld morphology pathology...[[id number]]                      In process                 CBC with platelets and d...[[id number]]  Abnormal            Final result               Reticulocyte count[[id number]]           Abnormal            Final result               Morphology Tracking[[id number]]                              Final result                 Please view results for these tests on the individual orders.     Medications   sodium chloride (PF) 0.9% PF flush 3 mL (3 mLs Intracatheter $Given 7/31/24 1918)   sodium chloride (PF) 0.9% PF flush 3 mL (has no administration in time range)   lidocaine 1 % 0.1-1 mL (has no administration in time range)   lidocaine (LMX4) cream (has no administration in time range)   sodium chloride (PF) 0.9% PF flush 3 mL (3 mLs Intracatheter $Given 7/31/24 2009)   sodium chloride (PF) 0.9% PF flush 3 mL (has no administration in time range)   calcium carbonate (TUMS) chewable tablet 1,000 mg (has no administration in time range)    oxyCODONE (ROXICODONE) tablet 5 mg (has no administration in time range)   melatonin tablet 5 mg (has no administration in time range)   folic acid (FOLVITE) tablet 1 mg (has no administration in time range)   escitalopram (LEXAPRO) tablet 10 mg (has no administration in time range)   sodium chloride 0.9% BOLUS 1,000 mL (0 mLs Intravenous Stopped 7/31/24 1423)   potassium chloride (KLOR-CON) Packet 40 mEq (40 mEq Oral $Given 7/31/24 1307)   oxyCODONE (ROXICODONE) tablet 5 mg (5 mg Oral Not Given 7/31/24 1530)   pantoprazole (PROTONIX) IV push injection 80 mg (80 mg Intravenous $Given 7/31/24 1548)   lactated ringers BOLUS 1,000 mL (0 mLs Intravenous Stopped 7/31/24 1758)     Labs Ordered and Resulted from Time of ED Arrival to Time of ED Departure   INR - Abnormal       Result Value    INR 1.85 (*)    CRP INFLAMMATION - Abnormal    CRP Inflammation 38.70 (*)    COMPREHENSIVE METABOLIC PANEL - Abnormal    Sodium 139      Potassium 3.0 (*)     Carbon Dioxide (CO2) 17 (*)     Anion Gap 16 (*)     Urea Nitrogen 50.1 (*)     Creatinine 1.92 (*)     GFR Estimate 36 (*)     Calcium 8.3 (*)     Chloride 106      Glucose 207 (*)     Alkaline Phosphatase 119      AST 87 (*)     ALT 7      Protein Total 5.4 (*)     Albumin 2.9 (*)     Bilirubin Total 3.7 (*)    LACTIC ACID WHOLE BLOOD WITH 1X REPEAT IN 2 HR WHEN >2 - Abnormal    Lactic Acid, Initial 2.3 (*)    TROPONIN T, HIGH SENSITIVITY - Abnormal    Troponin T, High Sensitivity 17 (*)    NT PROBNP INPATIENT - Abnormal    N terminal Pro BNP Inpatient 5,164 (*)    TSH - Abnormal    TSH 5.11 (*)    URIC ACID - Abnormal    Uric Acid 11.0 (*)    LACTIC ACID WHOLE BLOOD - Abnormal    Lactic Acid 2.4 (*)    CBC WITH PLATELETS AND DIFFERENTIAL - Abnormal    WBC Count 18.5 (*)     RBC Count 2.22 (*)     Hemoglobin 7.3 (*)     Hematocrit 22.8 (*)      (*)     MCH 32.9      MCHC 32.0      RDW 19.4 (*)     Platelet Count 277      % Neutrophils 73      % Lymphocytes 14      %  Monocytes 7      % Eosinophils 4      % Basophils 1      % Immature Granulocytes 1      NRBCs per 100 WBC 0      Absolute Neutrophils 13.8 (*)     Absolute Lymphocytes 2.6      Absolute Monocytes 1.2      Absolute Eosinophils 0.6      Absolute Basophils 0.1      Absolute Immature Granulocytes 0.1      Absolute NRBCs 0.0     RETICULOCYTE COUNT - Abnormal    % Reticulocyte 5.0 (*)     Absolute Reticulocyte 0.111 (*)    BILIRUBIN DIRECT - Abnormal    Bilirubin Direct 2.40 (*)    PHOSPHORUS - Abnormal    Phosphorus 5.5 (*)    VITAMIN B12 - Abnormal    Vitamin B12 1,404 (*)    FOLATE - Abnormal    Folic Acid >40.0 (*)    IRON AND IRON BINDING CAPACITY - Abnormal    Iron 60      Iron Binding Capacity 141 (*)     Iron Sat Index 43     FERRITIN - Abnormal    Ferritin 254 (*)    BASIC METABOLIC PANEL - Abnormal    Sodium 140      Potassium 3.7      Chloride 110 (*)     Carbon Dioxide (CO2) 16 (*)     Anion Gap 14      Urea Nitrogen 47.0 (*)     Creatinine 1.69 (*)     GFR Estimate 41 (*)     Calcium 8.0 (*)     Glucose 117 (*)    LACTATE DEHYDROGENASE - Abnormal    Lactate Dehydrogenase 332 (*)    TROPONIN T, HIGH SENSITIVITY - Abnormal    Troponin T, High Sensitivity 16 (*)    PARTIAL THROMBOPLASTIN TIME - Normal    aPTT 37     MAGNESIUM - Normal    Magnesium 1.9     LIPASE - Normal    Lipase 35     HCG QUALITATIVE PREGNANCY - Normal    hCG Serum Qualitative Negative     ETHYL ALCOHOL LEVEL - Normal    Alcohol ethyl <0.01     AMMONIA - Normal    Ammonia 28     LACTIC ACID WHOLE BLOOD - Normal    Lactic Acid 1.7     HAPTOGLOBIN - Normal    Haptoglobin 70     T4 FREE - Normal    Free T4 1.42     COVID-19 VIRUS (CORONAVIRUS) BY PCR - Normal    SARS CoV2 PCR Negative     AMMONIA    Ammonia       MORPHOLOGY TRACKING   F ACTIN EIA WITH REFLEX   ANTI NUCLEAR AARON IGG BY IFA WITH REFLEX   ROUTINE UA WITH MICROSCOPIC REFLEX TO CULTURE   BLOOD MORPHOLOGY PATHOLOGIST REVIEW   BLOOD CULTURE   LAB BLOOD MORPHOLOGY PATHOLOGIST REVIEW      US Lower Extremity Venous Duplex Bilateral   Preliminary Result   RESIDENT PRELIMINARY INTERPRETATION   IMPRESSION:.   No deep vein thrombosis in the right or left lower extremity.      US Abdomen Limited   Preliminary Result   RESIDENT PRELIMINARY INTERPRETATION   IMPRESSION:    1.  Cirrhotic liver morphology, with perihepatic ascites.   2.  Cholelithiasis without cholecystitis   3.  Small right pleural effusion      XR Chest 2 Views   Final Result   Impression:    1. Opacification of the right lower lung field with patchy perihilar   and left basilar opacities. Most likely edema versus atelectasis.   Cannot exclude postinflammatory change.   2. Suspected small-moderate right pleural effusion.       I have personally reviewed the examination and initial interpretation   and I agree with the findings.      GRACIELA YU MD            SYSTEM ID:  [id number]      Foot  XR, G/E 3 views, right   Final Result   Impression:   1. No acute osseous abnormality.   2. No substantial degenerative change.      PAUL ONEILL MD (Joe)            SYSTEM ID:  [id number]      Echo Complete    (Results Pending)          Critical care was not performed.     Medical Decision Making  The patient's presentation was of high complexity (an acute health issue posing potential threat to life or bodily function).    The patient's evaluation involved:  an assessment requiring an independent historian (see separate area of note for details)  review of external note(s) from 3+ sources (see separate area of note for details)  review of 3+ test result(s) ordered prior to this encounter (see separate area of note for details)  ordering and/or review of 3+ test(s) in this encounter (see separate area of note for details)  discussion of management or test interpretation with another health professional (see separate area of note for details)    The patient's management necessitated high risk (a decision regarding  hospitalization).    Assessment & Plan   29-year-old female alcoholic cirrhosis recent hospitalization in [place] ICU required intubation with multifocal pneumonia along with marked elevation of white cells etc.  Some increasing creatinine also concerns.  Patient seen in clinic yesterday recommended to come to the hospital today as concerned about possible CML etc.  I talked to the heme-onc doctor also and then we reviewed labs.  Talk to our malignant heme people and medicine.  Her white count is down patient's hemoglobin though is 7.3 type and screen done given Protonix no history of EGD at this point patient unclear if GI blood loss versus other etiology.  Patient will be admitted though to medicine with GI consultation they can consult malignant heme but white count now down to 18 less likely that this is CML.  Patient does have swelling of the right lower foot greater than the left x-rays are negative.  Further evaluation no sign of infection seen.  Patient family agree       I have reviewed the nursing notes. I have reviewed the findings, diagnosis, plan and need for follow up with the patient.    New Prescriptions    No medications on file       Final diagnoses:   Anemia, unspecified type   CHANTELL (acute kidney injury) (H24)   Alcoholic cirrhosis of liver with ascites (H)   Leukocytosis, unspecified type   Weakness generalized   Elevated uric acid in blood   Pain of right lower extremity     I, Holly Palomares, am serving as a trained medical scribe to document services personally performed by Malvin Rocha MD based on the provider's statements to me on July 31, 2024.  This document has been checked and approved by the attending provider.    IMalvin MD, was physically present and have reviewed and verified the accuracy of this note documented by Holly Palomares, medical scribe.      Malvin Rocha MD   [facility] EMERGENCY DEPARTMENT  7/31/2024    This note was created at  least in part by the use of dragon voice dictation system. Inadvertent typographical errors may still exist.  Malvin Rocha MD.  Patient evaluated in the emergency department during the COVID-19 pandemic period. Careful attention to patients safety was addressed throughout the evaluation. Evaluation and treatment management was initiated with disposition made efficiently and appropriate as possible to minimize any risk of potential exposure to patient during this evaluation.                Malvin Rocha MD  07/31/24 6556

## 2024-07-31 NOTE — ED TRIAGE NOTES
PT arrives in w/c to triage c/o abnormal labs on follow up appointment with hematology/oncology Dr. Cannon ([phone]). PT was recently hospitalized in [place], [place] and intubated with discharge last Monday. Upon receiving labs hematology/oncology had concerns of leukemia dx and advised pt to be seen.     Triage Assessment (Adult)       Row Name 07/31/24 1049          Triage Assessment    Airway WDL WDL        Respiratory WDL    Respiratory WDL WDL        Skin Circulation/Temperature WDL    Skin Circulation/Temperature WDL WDL        Cardiac WDL    Cardiac WDL WDL        Peripheral/Neurovascular WDL    Peripheral Neurovascular WDL WDL        Cognitive/Neuro/Behavioral WDL    Cognitive/Neuro/Behavioral WDL WDL

## 2024-08-01 ENCOUNTER — APPOINTMENT (OUTPATIENT)
Dept: CT IMAGING | Facility: CLINIC | Age: 29
End: 2024-08-01
Payer: MEDICAID

## 2024-08-01 ENCOUNTER — APPOINTMENT (OUTPATIENT)
Dept: PHYSICAL THERAPY | Facility: CLINIC | Age: 29
End: 2024-08-01
Payer: MEDICAID

## 2024-08-01 ENCOUNTER — APPOINTMENT (OUTPATIENT)
Dept: CARDIOLOGY | Facility: CLINIC | Age: 29
End: 2024-08-01
Payer: MEDICAID

## 2024-08-01 LAB
ALBUMIN SERPL BCG-MCNC: 2.9 G/DL (ref 3.5–5.2)
ALBUMIN UR-MCNC: 30 MG/DL
ALP SERPL-CCNC: 114 U/L (ref 40–150)
ALT SERPL W P-5'-P-CCNC: 7 U/L (ref 0–50)
ANION GAP SERPL CALCULATED.3IONS-SCNC: 11 MMOL/L (ref 7–15)
APPEARANCE FLD: CLEAR
APPEARANCE UR: ABNORMAL
AST SERPL W P-5'-P-CCNC: 114 U/L (ref 0–45)
BASOPHILS # BLD AUTO: 0.2 10E3/UL (ref 0–0.2)
BASOPHILS # BLD AUTO: 0.2 10E3/UL (ref 0–0.2)
BASOPHILS NFR BLD AUTO: 1 %
BASOPHILS NFR BLD AUTO: 1 %
BILIRUB DIRECT SERPL-MCNC: 2.57 MG/DL (ref 0–0.3)
BILIRUB SERPL-MCNC: 4.1 MG/DL
BILIRUB UR QL STRIP: NEGATIVE
BUN SERPL-MCNC: 43.5 MG/DL (ref 6–20)
CALCIUM SERPL-MCNC: 8.4 MG/DL (ref 8.8–10.4)
CELL COUNT BODY FLUID SOURCE: NORMAL
CHLORIDE SERPL-SCNC: 110 MMOL/L (ref 98–107)
COLOR FLD: YELLOW
COLOR UR AUTO: YELLOW
CREAT SERPL-MCNC: 1.51 MG/DL (ref 0.51–0.95)
CRP SERPL-MCNC: 35.7 MG/L
CRYSTALS SNV MICRO: NORMAL
EGFRCR SERPLBLD CKD-EPI 2021: 47 ML/MIN/1.73M2
EOSINOPHIL # BLD AUTO: 0.8 10E3/UL (ref 0–0.7)
EOSINOPHIL # BLD AUTO: 1 10E3/UL (ref 0–0.7)
EOSINOPHIL NFR BLD AUTO: 5 %
EOSINOPHIL NFR BLD AUTO: 5 %
EOSINOPHIL NFR FLD MANUAL: 1 %
ERYTHROCYTE [DISTWIDTH] IN BLOOD BY AUTOMATED COUNT: 19.3 % (ref 10–15)
ERYTHROCYTE [DISTWIDTH] IN BLOOD BY AUTOMATED COUNT: 19.4 % (ref 10–15)
ERYTHROCYTE [SEDIMENTATION RATE] IN BLOOD BY WESTERGREN METHOD: 53 MM/HR (ref 0–20)
GLUCOSE SERPL-MCNC: 114 MG/DL (ref 70–99)
GLUCOSE UR STRIP-MCNC: NEGATIVE MG/DL
HCO3 SERPL-SCNC: 19 MMOL/L (ref 22–29)
HCT VFR BLD AUTO: 22.8 % (ref 35–47)
HCT VFR BLD AUTO: 23.5 % (ref 35–47)
HGB BLD-MCNC: 7 G/DL (ref 11.7–15.7)
HGB BLD-MCNC: 7.4 G/DL (ref 11.7–15.7)
HGB UR QL STRIP: NEGATIVE
IMM GRANULOCYTES # BLD: 0.1 10E3/UL
IMM GRANULOCYTES # BLD: 0.1 10E3/UL
IMM GRANULOCYTES NFR BLD: 1 %
IMM GRANULOCYTES NFR BLD: 1 %
INR PPP: 1.81 (ref 0.85–1.15)
KETONES UR STRIP-MCNC: NEGATIVE MG/DL
LEUKOCYTE ESTERASE UR QL STRIP: NEGATIVE
LVEF ECHO: NORMAL
LYMPHOCYTES # BLD AUTO: 3.1 10E3/UL (ref 0.8–5.3)
LYMPHOCYTES # BLD AUTO: 3.7 10E3/UL (ref 0.8–5.3)
LYMPHOCYTES NFR BLD AUTO: 17 %
LYMPHOCYTES NFR BLD AUTO: 20 %
LYMPHOCYTES NFR FLD MANUAL: 14 %
MCH RBC QN AUTO: 32 PG (ref 26.5–33)
MCH RBC QN AUTO: 32.6 PG (ref 26.5–33)
MCHC RBC AUTO-ENTMCNC: 30.7 G/DL (ref 31.5–36.5)
MCHC RBC AUTO-ENTMCNC: 31.5 G/DL (ref 31.5–36.5)
MCV RBC AUTO: 104 FL (ref 78–100)
MCV RBC AUTO: 104 FL (ref 78–100)
MONOCYTES # BLD AUTO: 1.3 10E3/UL (ref 0–1.3)
MONOCYTES # BLD AUTO: 1.3 10E3/UL (ref 0–1.3)
MONOCYTES NFR BLD AUTO: 7 %
MONOCYTES NFR BLD AUTO: 7 %
MONOS+MACROS NFR FLD MANUAL: 13 %
MRSA DNA SPEC QL NAA+PROBE: NEGATIVE
MUCOUS THREADS #/AREA URNS LPF: PRESENT /LPF
NEUTROPHILS # BLD AUTO: 12 10E3/UL (ref 1.6–8.3)
NEUTROPHILS # BLD AUTO: 12.7 10E3/UL (ref 1.6–8.3)
NEUTROPHILS NFR BLD AUTO: 66 %
NEUTROPHILS NFR BLD AUTO: 69 %
NEUTS BAND NFR FLD MANUAL: 72 %
NITRATE UR QL: NEGATIVE
NRBC # BLD AUTO: 0 10E3/UL
NRBC # BLD AUTO: 0 10E3/UL
NRBC BLD AUTO-RTO: 0 /100
NRBC BLD AUTO-RTO: 0 /100
PH UR STRIP: 5.5 [PH] (ref 5–7)
PLATELET # BLD AUTO: 239 10E3/UL (ref 150–450)
PLATELET # BLD AUTO: 239 10E3/UL (ref 150–450)
POTASSIUM SERPL-SCNC: 3.3 MMOL/L (ref 3.4–5.3)
PROT SERPL-MCNC: 5.5 G/DL (ref 6.4–8.3)
RBC # BLD AUTO: 2.19 10E6/UL (ref 3.8–5.2)
RBC # BLD AUTO: 2.27 10E6/UL (ref 3.8–5.2)
RBC URINE: 0 /HPF
SA TARGET DNA: NEGATIVE
SODIUM SERPL-SCNC: 140 MMOL/L (ref 135–145)
SP GR UR STRIP: 1.02 (ref 1–1.03)
SQUAMOUS EPITHELIAL: 8 /HPF
TRANSITIONAL EPI: 1 /HPF
UROBILINOGEN UR STRIP-MCNC: NORMAL MG/DL
WBC # BLD AUTO: 18.2 10E3/UL (ref 4–11)
WBC # BLD AUTO: 18.2 10E3/UL (ref 4–11)
WBC # FLD AUTO: 13 /UL
WBC URINE: 2 /HPF

## 2024-08-01 PROCEDURE — 85610 PROTHROMBIN TIME: CPT

## 2024-08-01 PROCEDURE — 81001 URINALYSIS AUTO W/SCOPE: CPT

## 2024-08-01 PROCEDURE — 89060 EXAM SYNOVIAL FLUID CRYSTALS: CPT

## 2024-08-01 PROCEDURE — 73700 CT LOWER EXTREMITY W/O DYE: CPT | Mod: 26 | Performed by: RADIOLOGY

## 2024-08-01 PROCEDURE — 250N000013 HC RX MED GY IP 250 OP 250 PS 637

## 2024-08-01 PROCEDURE — 87040 BLOOD CULTURE FOR BACTERIA: CPT

## 2024-08-01 PROCEDURE — 85025 COMPLETE CBC W/AUTO DIFF WBC: CPT

## 2024-08-01 PROCEDURE — 85652 RBC SED RATE AUTOMATED: CPT

## 2024-08-01 PROCEDURE — 97530 THERAPEUTIC ACTIVITIES: CPT | Mod: GP

## 2024-08-01 PROCEDURE — 97161 PT EVAL LOW COMPLEX 20 MIN: CPT | Mod: GP

## 2024-08-01 PROCEDURE — 80053 COMPREHEN METABOLIC PANEL: CPT

## 2024-08-01 PROCEDURE — 0S9F3ZZ DRAINAGE OF RIGHT ANKLE JOINT, PERCUTANEOUS APPROACH: ICD-10-PCS | Performed by: ORTHOPAEDIC SURGERY

## 2024-08-01 PROCEDURE — 82248 BILIRUBIN DIRECT: CPT

## 2024-08-01 PROCEDURE — 99255 IP/OBS CONSLTJ NEW/EST HI 80: CPT

## 2024-08-01 PROCEDURE — 99254 IP/OBS CNSLTJ NEW/EST MOD 60: CPT | Mod: GC | Performed by: INTERNAL MEDICINE

## 2024-08-01 PROCEDURE — 80321 ALCOHOLS BIOMARKERS 1OR 2: CPT | Performed by: NURSE PRACTITIONER

## 2024-08-01 PROCEDURE — 93306 TTE W/DOPPLER COMPLETE: CPT | Mod: 26 | Performed by: INTERNAL MEDICINE

## 2024-08-01 PROCEDURE — 89050 BODY FLUID CELL COUNT: CPT

## 2024-08-01 PROCEDURE — 36415 COLL VENOUS BLD VENIPUNCTURE: CPT

## 2024-08-01 PROCEDURE — 250N000011 HC RX IP 250 OP 636

## 2024-08-01 PROCEDURE — 87075 CULTR BACTERIA EXCEPT BLOOD: CPT

## 2024-08-01 PROCEDURE — 86140 C-REACTIVE PROTEIN: CPT

## 2024-08-01 PROCEDURE — 99222 1ST HOSP IP/OBS MODERATE 55: CPT | Mod: GC | Performed by: INTERNAL MEDICINE

## 2024-08-01 PROCEDURE — 93306 TTE W/DOPPLER COMPLETE: CPT

## 2024-08-01 PROCEDURE — 73700 CT LOWER EXTREMITY W/O DYE: CPT | Mod: RT

## 2024-08-01 PROCEDURE — 36415 COLL VENOUS BLD VENIPUNCTURE: CPT | Performed by: NURSE PRACTITIONER

## 2024-08-01 PROCEDURE — 120N000002 HC R&B MED SURG/OB UMMC

## 2024-08-01 PROCEDURE — 87641 MR-STAPH DNA AMP PROBE: CPT

## 2024-08-01 PROCEDURE — 97116 GAIT TRAINING THERAPY: CPT | Mod: GP

## 2024-08-01 PROCEDURE — 99233 SBSQ HOSP IP/OBS HIGH 50: CPT | Mod: GC | Performed by: STUDENT IN AN ORGANIZED HEALTH CARE EDUCATION/TRAINING PROGRAM

## 2024-08-01 PROCEDURE — 87205 SMEAR GRAM STAIN: CPT

## 2024-08-01 RX ORDER — ESCITALOPRAM OXALATE 10 MG/1
10 TABLET ORAL DAILY
Status: ON HOLD | COMMUNITY
End: 2024-08-21

## 2024-08-01 RX ORDER — ONDANSETRON 4 MG/1
4 TABLET, FILM COATED ORAL EVERY 8 HOURS PRN
COMMUNITY
Start: 2022-09-12 | End: 2024-08-02

## 2024-08-01 RX ORDER — MULTIVITAMIN
1 TABLET ORAL DAILY
COMMUNITY
End: 2024-08-02

## 2024-08-01 RX ORDER — METHOCARBAMOL 750 MG/1
750 TABLET, FILM COATED ORAL EVERY 6 HOURS PRN
COMMUNITY
Start: 2022-11-22 | End: 2024-08-02

## 2024-08-01 RX ORDER — DIAZEPAM 2 MG
2 TABLET ORAL PRN
COMMUNITY
End: 2024-08-02

## 2024-08-01 RX ORDER — DEXTROAMPHETAMINE SACCHARATE, AMPHETAMINE ASPARTATE, DEXTROAMPHETAMINE SULFATE AND AMPHETAMINE SULFATE 3.75; 3.75; 3.75; 3.75 MG/1; MG/1; MG/1; MG/1
15 TABLET ORAL 2 TIMES DAILY
COMMUNITY
End: 2024-08-02

## 2024-08-01 RX ORDER — CEFAZOLIN SODIUM 1 G/3ML
1 INJECTION, POWDER, FOR SOLUTION INTRAMUSCULAR; INTRAVENOUS EVERY 8 HOURS
Status: DISCONTINUED | OUTPATIENT
Start: 2024-08-01 | End: 2024-08-02 | Stop reason: HOSPADM

## 2024-08-01 RX ORDER — LANOLIN ALCOHOL/MO/W.PET/CERES
1000 CREAM (GRAM) TOPICAL DAILY
COMMUNITY
End: 2024-09-16

## 2024-08-01 RX ORDER — SPIRONOLACTONE AND HYDROCHLOROTHIAZIDE 25; 25 MG/1; MG/1
1 TABLET ORAL
COMMUNITY
Start: 2024-07-30 | End: 2024-08-02

## 2024-08-01 RX ORDER — LACTULOSE 10 G/15ML
30 SOLUTION ORAL 3 TIMES DAILY
COMMUNITY
Start: 2024-07-30 | End: 2024-08-02

## 2024-08-01 RX ORDER — POTASSIUM CHLORIDE 750 MG/1
40 TABLET, EXTENDED RELEASE ORAL ONCE
Status: COMPLETED | OUTPATIENT
Start: 2024-08-01 | End: 2024-08-01

## 2024-08-01 RX ORDER — MIDODRINE HYDROCHLORIDE 10 MG/1
10 TABLET ORAL 3 TIMES DAILY
COMMUNITY
Start: 2024-07-22 | End: 2024-08-02

## 2024-08-01 RX ORDER — OXYCODONE HYDROCHLORIDE 5 MG/1
5 TABLET ORAL EVERY 4 HOURS PRN
Status: DISCONTINUED | OUTPATIENT
Start: 2024-08-01 | End: 2024-08-02 | Stop reason: HOSPADM

## 2024-08-01 RX ORDER — CEPHALEXIN 500 MG/1
500 CAPSULE ORAL EVERY 6 HOURS SCHEDULED
Status: DISCONTINUED | OUTPATIENT
Start: 2024-08-01 | End: 2024-08-01

## 2024-08-01 RX ORDER — HYDROMORPHONE HYDROCHLORIDE 1 MG/ML
0.3 INJECTION, SOLUTION INTRAMUSCULAR; INTRAVENOUS; SUBCUTANEOUS ONCE
Status: COMPLETED | OUTPATIENT
Start: 2024-08-01 | End: 2024-08-01

## 2024-08-01 RX ORDER — GABAPENTIN 300 MG/1
2 CAPSULE ORAL 3 TIMES DAILY
COMMUNITY
Start: 2024-06-29 | End: 2024-08-02

## 2024-08-01 RX ORDER — SOD PHOS DI, MONO/K PHOS MONO 250 MG
250 TABLET ORAL 2 TIMES DAILY
COMMUNITY
Start: 2024-07-22 | End: 2024-08-02

## 2024-08-01 RX ORDER — POTASSIUM CHLORIDE 1.5 G/1.58G
40 POWDER, FOR SOLUTION ORAL ONCE
Status: DISCONTINUED | OUTPATIENT
Start: 2024-08-01 | End: 2024-08-01

## 2024-08-01 RX ORDER — MULTIVITAMIN WITH IRON
1 TABLET ORAL DAILY
COMMUNITY
End: 2024-08-02

## 2024-08-01 RX ORDER — PNV NO.95/FERROUS FUM/FOLIC AC 28MG-0.8MG
1 TABLET ORAL DAILY
COMMUNITY
End: 2024-08-02

## 2024-08-01 RX ADMIN — OXYCODONE HYDROCHLORIDE 5 MG: 5 TABLET ORAL at 03:28

## 2024-08-01 RX ADMIN — POTASSIUM CHLORIDE 40 MEQ: 750 TABLET, EXTENDED RELEASE ORAL at 10:06

## 2024-08-01 RX ADMIN — HYDROMORPHONE HYDROCHLORIDE 0.3 MG: 1 INJECTION, SOLUTION INTRAMUSCULAR; INTRAVENOUS; SUBCUTANEOUS at 16:19

## 2024-08-01 RX ADMIN — CEFAZOLIN 1 G: 1 INJECTION, POWDER, FOR SOLUTION INTRAMUSCULAR; INTRAVENOUS at 16:23

## 2024-08-01 RX ADMIN — OXYCODONE HYDROCHLORIDE 5 MG: 5 TABLET ORAL at 12:53

## 2024-08-01 RX ADMIN — ESCITALOPRAM OXALATE 10 MG: 10 TABLET ORAL at 08:03

## 2024-08-01 RX ADMIN — OXYCODONE HYDROCHLORIDE 5 MG: 5 TABLET ORAL at 18:17

## 2024-08-01 RX ADMIN — OXYCODONE HYDROCHLORIDE 5 MG: 5 TABLET ORAL at 22:23

## 2024-08-01 RX ADMIN — OXYCODONE HYDROCHLORIDE 5 MG: 5 TABLET ORAL at 08:03

## 2024-08-01 RX ADMIN — FOLIC ACID 1 MG: 1 TABLET ORAL at 08:03

## 2024-08-01 NOTE — PROCEDURES
PROCEDURE: Joint Aspiration, right ankle    After verbal informed consent was obtained, and the patient elected to proceed, a brief time out was held in accordance with hospital policy confirming the correct patient, procedure, site, and side. The right ankle was then prepped in the usual sterile fashion using chlorhexidine. The right ankle was then entered via an anteromedial approach with an 18 gauge needle. The joint was then aspirated with return of 1.5 cc of clear yellow fluid. The needle was withdrawn, and a sterile bandage was placed. The patient tolerated the procedure well and no immediate complications were observed. CMS was tested and intact following the aspiration.     Rogelio Serrano MD  Orthopaedic Surgery, PGY-4

## 2024-08-01 NOTE — MEDICATION SCRIBE - ADMISSION MEDICATION HISTORY
Medication Scribe Admission Medication History    Admission medication history is complete. The information provided in this note is only as accurate as the sources available at the time of the update.    Information Source(s): Patient via in-person    Pertinent Information: Pt reported taking medications on PTA medication list as directed, stated she has not started taking Lactulose 10 mg/15 ml and Spironolactone 25-25 mg tabs because she has not picked medications, but will do so upon discharged.     Changes made to PTA medication list:  Added: ALL  Deleted: Norgestim-eth estrad triphasic 0.18/0.215/0.25 mg mcg per tab, Omeprazole 20 mg caps.   Changed: None    Allergies reviewed with patient and updates made in EHR: yes    Medication History Completed By: Eleonora Au 8/1/2024 2:54 AM    PTA Med List   Medication Sig Last Dose    gabapentin (NEURONTIN) 300 MG capsule Take 2 capsules by mouth 3 times daily     K Phos [place]-Sod Phos Di & Mono (PHOSPHOROUS) 155-852-130 MG TABS Take 250 mg by mouth     lactulose (CHRONULAC) 10 GM/15ML solution Take 30 mLs by mouth     methocarbamol (ROBAXIN) 750 MG tablet Take 750 mg by mouth     midodrine (PROAMATINE) 10 MG tablet Take 10 mg by mouth     ondansetron (ZOFRAN) 4 MG tablet Take 4 mg by mouth     spironolactone-HCTZ (ALDACTAZIDE) 25-25 MG tablet Take 1 tablet by mouth

## 2024-08-01 NOTE — PROGRESS NOTES
[facility]    Progress Note - Medicine Service, [facility] TEAM 1       Date of Admission:  7/31/2024    Assessment & Plan   Maribell Leon is a 29 year old female admitted on 7/31/2024. She has a history of cirrhosis, reported alcohol use disorder, chronic pancreatitis who was sent to the ED after abnormal lab values at outpatient follow up visit concerning for recurrent CHANTELL.    Update:   - Lactate cleared, troponin downtrended  - UA unremarkable  - Free T4 WNL  - B12, folate okay. Iron studies most consistent with anemia of chronic disease  - LE ultrasound without DVT  - Echo normal  - RUQ showed cirrhotic liver w/ minimal perihepatic ascites, cholelithiasis without cholecystitis, small R pleural effusion  - CHANTELL continues to improve  - R ankle joint tap with ortho, will follow up labs, placed on cefazolin due to concerns for cellulitis       # Cirrhosis  # Elevated INR  # Hypoalbuminemia   Work up during prior hospitalization largely negative. TTG, A1A, Hep B, Hep C all negative. Ceruloplasmin was low at 19.2. PETH negative during last hospitalization. She does endorse alcohol use and potential past acetaminophen overuse. No family history of liver disease. Has had past epistaxis but no current issues with bleeding. She has not had a paracentesis or EGD to assess for varices. No biopsy upon initial chart review. CT at outside hospital noted diffuse hepatic steatosis with 1.3cm hypodense lesion in R hepatic lobe. She is certainly volume up on exam but no signs of HE. We will repeat autoimmune hepatitis labs.     - Will hold off on lactulose  and spironolactone-HCTZ at this time  - Was given midodrine on discharge at last hospitalization, BP okay for now, will hold off   - Follow up MIKEY, anti-F actin  - Will follow up GI recs     # Anemia  Hg 7.3. Macrocytic anemia with elevated reticulocytes. B12 and folate replete. Iron stores okay. Not consuming EtOH in recent past.  Haptoglobin normal and elevated direct bili not consistent with hemolysis. Consistent with anemia of chronic disease. No indication for transfusion at this point.  - Peripheral smear pending  - Follow up hematology recs    # CHANTELL   Need more information to fully evaluate but is already improving. Would favor pre-renal over hepatorenal syndrome or intrarenal causes at this point given recent serious infection. No clear GI losses but possible poor PO intake. No other evidence of decompensated cirrhosis so would be surprising to have HRS. BUN/Cr is >20.    - CTM   - Consider albumin pending GI recs     # Lower extremity edema  Secondary to cirrhosis. No DVT on bilateral LE ultrasound. Echo normal.  - Low sodium diet    # Elevated bilirubin  Elevated direct bili. RUQ showed cholelithiasis without cholecystitis. Likely secondary to cirrhosis  -CTM     # Elevated uric acid  # Concern for gout  # RLE ankle pain  RLE xray negative. Given CHANTELL would not give colchicine/NSAIDs.   - Will see if ortho can tap joint for diagnostic purposes  - Consider prednisone 40mg until symptoms resolve     # Leukocytosis  No clinical signs of infection. Could be inflammation secondary to gout. CXR read below. Recent PNA, no respiratory symptoms or dyspnea.   -CTM with daily CBC  - UA bland         Diet: 2 Gram Sodium Diet    DVT Prophylaxis: Pneumatic Compression Devices  [place] Catheter: Not present  Fluids: None  Lines: None     Cardiac Monitoring: None  Code Status: Full Code      Clinically Significant Risk Factors Present on Admission        # Hypokalemia: Lowest K = 3 mmol/L in last 2 days, will replace as needed       # Hypoalbuminemia: Lowest albumin = 2.9 g/dL at 8/1/2024  8:27 AM, will monitor as appropriate  # Coagulation Defect: INR = 1.81 (Ref range: 0.85 - 1.15) and/or PTT = 37 Seconds (Ref range: 22 - 38 Seconds), will monitor for bleeding       # Anemia: based on hgb <11                  Disposition Plan     Expected Discharge  Date: 08/02/2024                The patient's care was discussed with the Attending Physician, Dr. Douglas .    Aries Vasquez MD  Medicine Service, [facility] TEAM 1  [facility]  Securely message with "[employer]" (more info)  Text page via [email or web address] Paging/Directory   See signed in provider for up to date coverage information  ______________________________________________________________________    Interval History   No acute events overnight. Did received additional oxycodone for R ankle pain.     Physical Exam   Vital Signs: Temp: 98.5  F (36.9  C) Temp src: Oral BP: 111/67 Pulse: 90   Resp: 18 SpO2: 93 % O2 Device: None (Room air)      Constitutional: awake, alert, cooperative, no apparent distress, and appears stated age  Eyes: icteric   Respiratory: No increased work of breathing, decreased breath sounds at the base bilaterally  Cardiovascular: normal S1 and S2, no murmurs, pitting edema present bilaterally  GI: Soft, non-tender, distended  Skin: Jaundiced, no rashes  Musculoskeletal: Tenderness of RLE ankle with asymmetrical swelling, no overlying erythema  Neuro: no asterixis    Medical Decision Making       Please see A&P for additional details of medical decision making.      Data     I have personally reviewed the following data over the past 24 hrs:    18.2 (H)  \   7.0 (L)   / 239     140 110 (H) 43.5 (H) /  114 (H)   3.3 (L) 19 (L) 1.51 (H) \     ALT: 7 AST: 114 (H) AP: 114 TBILI: 4.1 (H)   ALB: 2.9 (L) TOT PROTEIN: 5.5 (L) LIPASE: N/A     Trop: 16 (H) BNP: N/A     TSH: N/A T4: 1.42 A1C: N/A     Procal: N/A CRP: N/A Lactic Acid: 1.7       INR:  1.81 (H) PTT:  N/A   D-dimer:  N/A Fibrinogen:  N/A     Ferritin:  254 (H) % Retic:  N/A LDH:  332 (H)

## 2024-08-01 NOTE — CONSULTS
[facility] Rheumatology IP Consultation    Assessment   Maribell Leon is a 29 year old female admitted on 7/31/2024. She has a history of AUD c/b cirrhosis and chronic pancreatitis with recent hospitalization for mycoplasma pneumonia requiring intubation and is admitted for further workup and management of right foot pain and leukocytosis.    Differential DIAGNOSIS:  Acute on chronic gout   Subcutaneous cellulitis  Complex Regional Pain Syndrome    RECOMMENDATIONS:  Patient with multiple month history of pain to right foot and ankle that has worsened in the past week since being discharged from the hospital for multifocal pneumonia. Patient struggling with ambulation and taking on and off compression socks due to this pain. Outpatient Heme/Onc provider had concern for gout given elevated uric acid to 11.0 on admission. Unclear if patient is having an acute on chronic flare of gout, though if so, it more likely involves the foot rather than the ankle, given no significant ankle effusion. Patient also reporting transient petechial rash in similar area to pain. Patient was prescribed diuretics from hospital on discharge on 7/22, but had not been taking, making this non-contributory to possible cause of gout. We ordered a dual energy CT scan of the right foot to evaluate for gout in the foot. This scan can demonstrate chronic crystal deposition in the affected area if it is present as arthrocentesis is not possible in this region. Orthopedics performed arthrocentesis of ankle joint with minimal fluid returned and no immediate concern for septic arthritis on visual inspection of the joint fluid. Septic joint also less likely given no fevers and chronic pain to this region. Previous CT and MRI's of the ankle without any concern for osteomyelitis.    Orthopedics had concern for cellulitis given previous MRI of foot. Would help to describe patient's intense sensations with this area and worsening pain. Agree with plan of  antibiotics to treat possible cellulitis. Less likely diagnosis of complex regional pain syndrome given history of trauma to foot (being stepped on) which seemed to start much of this foot pain. If other workup is negative, could consider getting a tri-phase bone scan for diagnosis. This could be accomplished outpatient as well.      Plan:  -- Dual Energy CT Scan of Right Foot (ordered for you)  -- Agree with antibiotics per orthopedics, appreciate their assistance with ankle arthrocentesis and management.  -- Could consider a tri-phase bone scan for workup of possible complex regional pain syndrome if workup for cellulitis or gout are non-contributory    I discussed the findings and recommendations with the patient.  I communicated the assessment and plan to the consulting team.    Case seen and discussed with Dr. Montaño, and the Fellow Dr. Reyes-Castro Justin Furedinson, MS4    The patient was discussed with the rheumatology team and the medical student and I agree with the findings and plan of care as documented in the note with the above changes/additions.       I have personally reviewed the labs and imaging reports. I was directly involved in the medical decision making and management of this patient.     Tiago Reyes-Castro, MD  PGY4 - Rheumatology Fellow.      Interval History   Patient reports that she had had ongoing pain in her right foot for the past few months. Says this mostly began after being stepped on. About a week after being stepped on she noticed intense pain to the area that is worse with activity or touch. Rates the pain very highly. Occasionally she will get shooting pain sensations from her knee down into the foot. She says that over time it has felt like this pain has spread from mid-leg into her ankle and now more so into her foot. She has also noticed a petechial rash that overlies the area, and goes away without any management. She was receiving massages to her foot which was helping the  edema, but now she cannot tolerate touch to the area. She has not noticed any fevers since leaving the hospital on the 22nd. She has no other significant joint pain. She completed her courses of antibiotics. She denies any oral ulcers, changes in hearing or vision, shortness of breath, chest pain, urinary changes, or any other significant joint pain.     Review of Systems    Associated symptoms:joint pain, muscle weakness, and rashes/photosensitive.   Denies: alopecia, fevers, oral ulcers, palpitations, and pleurisy.    Unless stated above, a 14-point review of systems was otherwise normal.      OBJECTIVE:  Physical Exam   PHYSICAL EXAM  /67   Pulse 90   Temp 98.5  F (36.9  C) (Oral)   Resp 18   SpO2 93%   Wt Readings from Last 4 Encounters:   03/30/15 48.8 kg (107 lb 8 oz)   03/10/14 49.2 kg (108 lb 6.4 oz) (14%, Z= -1.08)*   08/01/13 51.8 kg (114 lb 4 oz) (27%, Z= -0.60)*   07/10/13 51 kg (112 lb 6 oz) (24%, Z= -0.72)*     * Growth percentiles are based on CDC (Girls, 2-20 Years) data.     Physical Exam  Constitutional:       Appearance: Normal appearance.   HENT:      Head: Normocephalic.   Eyes:      General: Scleral icterus present.      Extraocular Movements: Extraocular movements intact.      Conjunctiva/sclera: Conjunctivae normal.   Cardiovascular:      Rate and Rhythm: Normal rate and regular rhythm.   Pulmonary:      Effort: Pulmonary effort is normal.   Musculoskeletal:         General: Tenderness present.      Comments: Exquisitely tender to palpation or soft touch on dorsal foot. Decreased ROM with ankle dorsiflexion, edema to bilateral feet and ankles with R > L. No significant joint effusion around R. Ankle or any other joint appreciated.  No tenderness to other joints.   Skin:     Coloration: Skin is jaundiced.      Findings: No rash.   Neurological:      General: No focal deficit present.      Mental Status: She is alert and oriented to person, place, and time.   Psychiatric:         Mood  and Affect: Mood normal.       Data:  Chart, labs and images reviewed and discussed with the rheumatology staff.

## 2024-08-01 NOTE — CONSULTS
Hematology Consult Note   Date of Service: 08/01/2024    Patient: Maribell Leon  MRN: [id number]  Admission Date: 7/31/2024  Hospital Day # Hospital Day: 2  Primary Outpatient Hematologist: Dr. Abran Hernandez ([facility])     Reason for Consult: Macrocytic anemia, persistent leukocytosis    Assessment & Plan:   Maribell Leon is a 29 year old female with a PMH of liver cirrhosis 2/2 alcohol use, alcohol-related polyneuropathy, chronic pancreatitis, and recent hospitalization (7/9-7/22) for severe sepsis 2/2 mycoplasma PNA c/b AHRF requiring intubation, admitted to [facility] on 7/31 at the recommendation of her outpatient hem/onc due to c/f acute gout and persistent abnormal cell counts for which hematology is consulted.     Recommendations:   - No additional workup of anemia or leukocytosis necessary from a hematology standpoint  - Monitor hemoglobin daily, transfuse hgb < 7  - Continue daily folate supplementation, can hold B12 and iron supplementation while admitted    Patient was seen and plan of care was discussed with attending physician Dr. Cogan.    We will continue to follow this patient. Please don't hesitate to contact the Fellow On-Call with questions.    # Acute on chronic macrocytic anemia   Hemoglobin today 7.0, down from 9.1 on 7/30. Baseline hemoglobin ~8-9 since 06/2024. Relatively stable throughout her most recent admission for PNA/AHRF. Suspect that her macrocytic anemia is primarily driven by bone marrow suppression from a combination of chronic illness, liver disease, and history of significant alcohol use; also consider frequent phlebotomy as source of blood loss. Peripheral smear on 7/30 with evidence of acanthocytes which is also indicative of liver-driven erythrocytic changes. Considered nutritional deficiencies, however B12 & folate are elevated this admission and she is on supplementation for both (Folate started 6/2024 d/t deficiency & B12 in 2021). Iron levels are also normal/she is on  iron supplement & would expect to cause a microcytic anemia. Her TSH was elevated however normal T4 makes anemia in hypothyroidism unlikely. She is also not on any medications that would impair DNA synthesis, such as zidovudine, 5-Follow-up, hydroxyurea. HIV negative. Negative hcg. Hemolysis labs unremarkable. She also denies any hematemesis, hematochezia, hematuria to suspect acute blood loss component. Would expect her anemia to gradually improve as she improves from both a medical & nutritional standpoint. Would continue iron/b12/folate supplementation at discharge.     # Decompensated EtOH liver cirrhosis c/b ascites  # Direct hyperbilirubinemia   # Factor XI deficiency   # Elevated INR   Per reports, hx of severe alcohol use disorder. MELD 3.0 is 24. Patient and mom report she was recently told she had cirrhosis on July admission. Recent PETH negative and EtOH on admission negative. Low factor 9 and elevated INR are c/w chronic liver disease.     # Leukocytosis  Persistent leukocytosis documented during [place] admission, high of 33. Had robust infectious workup completed, all negative except for positive mycoplasma IgG & IgM. Hematology consulted in [place] and set up with outpatient follow up d/t concern for possible leukemia & need for potential bone marrow biopsy given the persistently elevated leukocytosis. 7/22 peripheral smear negative for dysplastic wbcs or blasts, which points away from hematologic malignancy. Her differential is also reassuring and not consistent with malignancy. Further, her leukocytosis continues to downtrend over the last 2 weeks, now at 18.2. Elevation to 33 was likely iso of acute illness, pneumonia, intubation, and as evidenced by current downtrend, will improve with time though possibility of mild persistent elevation iso liver disease.     Anahi Sheppard, MS4  [employer]      Resident/Fellow Attestation   I, Evelyn Valerio DO, was present  with the medical/SABRINA student who participated in the service and in the documentation of the note.  I have verified the history and personally performed the physical exam and medical decision making.  I agree with the assessment and plan of care as documented in the note.      Evelyn Valerio DO  PGY2  Date of Service (when I saw the patient): 08/01/24     History of Present Illness:    Maribell Leon is a 29 year old female with a PMH of liver cirrhosis 2/2 alcohol use, alcohol-related polyneuropathy, chronic pancreatitis, and recent hospitalization (7/9-7/22) for severe sepsis 2/2 mycoplasma PNA c/b AHRF requiring intubation, admitted to [facility] on 7/31 as a direct referral from her outpatient hem/onc due to persistent abnormal cell counts, CHANTELL & c/f acute gout.     Per chart review, during recent hospitalization 7/9-7/22, patient was noted to have leukocytosis (elevated to 33) with left shift, normocytic/macrocytic anemia, intermittent thrombocytopenia. On discharge, she was set up with an outpatient hem/onc follow up for her leukocytosis with mention of possible bone marrow biopsy d/t leukemia concern. On 7/30 at hem/onc follow up with Shara Diaz, labs showing continued leukocytosis with left shift, normocytic anemia, hyperbilirubinemia, CHANTELL with significantly reduced GFR (21) & creatinine of 2.96, elevated uric acid. With her acute changes in kidney function, reports of R foot pain, elevated creatinine, and persistent leukocytosis/anemia, her hem/onc doc referred patient to ED for further workup. Patient & mom opted to travel to [facility] ED for comprehensive speciality care. Of note, she had a peripheral smear on 7/22 that showed leukocytosis 2/2 absolute neutrophilia, monocytosis, lymphocytosis, eosinophilia with NO left shift, toxic granulation, dysplastic forms or circulating blasts consistent with leukemoid reaction.     On presentation to [facility] ED, patient denied any new or overtly concerning symptoms. She  endorsed fatigue/generalized weakness, R foot pain, lower extremity swelling but otherwise felt OK.     Spoke with patient and mom in ED this afternoon. Discussed that her current labs, including CBC and peripheral smear from 7/22 are not consistent with leukemia & thus will not be pursuing biopsy. Reviewed causes of anemia, including nutritional, chronic illness, liver disease, medications. Informed that the most likely cause of her anemia is liver disease, chronic illness, alcohol use. During our visit, patient denies any hematuria, hematochezia, or other sources of active bleeding.     Review of Systems: Pertinent positive and negative systems described in HPI; the remainder of the 14 systems are negative    Past Medical History:  No past medical history on file.    Past Surgical History:  Past Surgical History:   Procedure Laterality Date    TONSILLECTOMY      8/05       Social History:  Social History     Socioeconomic History    Marital status:    Tobacco Use    Smoking status: Some Days     Current packs/day: 0.10     Types: Cigarettes    Smokeless tobacco: Never   Substance and Sexual Activity    Alcohol use: No    Drug use: Unknown     Types: Other     Comment: Drug use: No    Sexual activity: Yes     Partners: Male   Social History Narrative    No secondhand smoke exposure.      Attends [employer].  Dad is a counselor at [employer] and mom is .      Senior fall 2012    Works as PCA at [employer]    Involved in hockey cheerleading    Preloaded 03/22/2013     Social Determinants of Health     Financial Resource Strain: Patient Declined (7/12/2024)    Received from [facility]    Overall Financial Resource Strain (CARDIA)     Difficulty of Paying Living Expenses: Patient declined   Food Insecurity: No Food Insecurity (7/13/2024)    Received from [facility]    Hunger Vital Sign     Worried About Running Out of Food in the Last  Year: Never true     Ran Out of Food in the Last Year: Never true   Transportation Needs: No Transportation Needs (7/13/2024)    Received from [facility]    PRAPARE - Transportation     Lack of Transportation (Medical): No     Lack of Transportation (Non-Medical): No    Received from [facility]    Social Connections   Interpersonal Safety: Not At Risk (7/13/2024)    Received from [facility]    EH IP Custom IPV     Do you feel UNSAFE in any of your personal relationships with your family members or any other acquaintances?: No   Housing Stability: Low Risk  (7/13/2024)    Received from [facility]    Housing Stability Vital Sign     Unable to Pay for Housing in the Last Year: No     Number of Times Moved in the Last Year: 1     Homeless in the Last Year: No        Family History  Family History   Problem Relation Age of Onset    Allergy (Severe) Mother         Allergies,Environmental allergies, celiac disease    Family History Negative Father         Good Health    Cancer Maternal Grandfather         Cancer,Kidney cancer    Diabetes Maternal Grandmother         Diabetes,Type 2    Heart Disease Paternal Grandfather         Heart Disease,Cardiomyopathy    Asthma Brother         Asthma,history of intermittent asthma    Other - See Comments Brother         Enuresis    Family History Negative Sister         Good Health    Family History Negative Sister         Good Health       Outpatient Medications:  Current Facility-Administered Medications   Medication Dose Route Frequency Provider Last Rate Last Admin    calcium carbonate (TUMS) chewable tablet 1,000 mg  1,000 mg Oral 4x Daily PRN Aries Vasquez MD        escitalopram (LEXAPRO) tablet 10 mg  10 mg Oral Daily Aries Vasquez MD   10 mg at 08/01/24 0803    folic acid (FOLVITE) tablet 1 mg  1 mg Oral Daily Aries Vasquez MD   1  mg at 08/01/24 0803    lidocaine (LMX4) cream   Topical Q1H PRN Aries Vasquez MD        lidocaine 1 % 0.1-1 mL  0.1-1 mL Other Q1H PRN Aries Vasquez MD        melatonin tablet 5 mg  5 mg Oral At Bedtime PRN Aries Vasquez MD        oxyCODONE (ROXICODONE) tablet 5 mg  5 mg Oral Q4H PRN Kamryn Luis MD   5 mg at 08/01/24 0803    sodium chloride (PF) 0.9% PF flush 3 mL  3 mL Intracatheter Q8H Malvin Rocha MD   3 mL at 08/01/24 0551    sodium chloride (PF) 0.9% PF flush 3 mL  3 mL Intracatheter q1 min prn Malvin Rocha MD        sodium chloride (PF) 0.9% PF flush 3 mL  3 mL Intracatheter Q8H Aries Vasquez MD   3 mL at 08/01/24 0551    sodium chloride (PF) 0.9% PF flush 3 mL  3 mL Intracatheter q1 min prn Aries Vasquez MD         Current Outpatient Medications   Medication Sig Dispense Refill    amphetamine-dextroamphetamine (ADDERALL) 15 MG tablet Take 15 mg by mouth 2 times daily      cyanocobalamin (VITAMIN B-12) 1000 MCG tablet Take 1,000 mcg by mouth daily      diazepam (VALIUM) 2 MG tablet Take 2 mg by mouth as needed      escitalopram (LEXAPRO) 10 MG tablet Take 10 mg by mouth daily      gabapentin (NEURONTIN) 300 MG capsule Take 2 capsules by mouth 3 times daily      K Phos Phelps-Sod Phos Di & Mono (PHOSPHOROUS) 155-852-130 MG TABS Take 250 mg by mouth 2 times daily      lactulose (CHRONULAC) 10 GM/15ML solution Take 30 mLs by mouth 3 times daily      magnesium 250 MG tablet Take 1 tablet by mouth daily      methocarbamol (ROBAXIN) 750 MG tablet Take 750 mg by mouth every 6 hours as needed      midodrine (PROAMATINE) 10 MG tablet Take 10 mg by mouth 3 times daily      Multiple Vitamin (ONE-A-DAY ESSENTIAL) TABS Take 1 tablet by mouth daily      ondansetron (ZOFRAN) 4 MG tablet Take 4 mg by mouth every 8 hours as needed for vomiting or nausea      POTASSIUM PO Take 99 mg by mouth daily      Prenatal Vit-Fe Fumarate-FA (PRENATAL VITAMIN) 27-0.8 MG TABS Take 1 tablet by mouth daily       spironolactone-HCTZ (ALDACTAZIDE) 25-25 MG tablet Take 1 tablet by mouth      tiZANidine (ZANAFLEX) 4 MG tablet TAKE 1 TABLET BY MOUTH 3 TIMES A DAY AS NEEDED FOR PAIN OR MUSCLE SPASMS          Physical Exam:    /67   Pulse 90   Temp 98.5  F (36.9  C) (Oral)   Resp 18   SpO2 93%   Gen: NAD, sitting up in ED bed, chronically ill-appearing   HEENT: EOMI, PERRL, anicteric sclera   CV: Normal rate, regular rhythm   Pulm: Diminished right lower lobe breath sounds, normal work of breathing   Ext: Warm and well perfused. Generalized lower extremity edema, 1-2+ pitting edema.  Skin: No rash, cyanosis or petechial lesion on exposed skin. Poor skin turgor.  Neuro: Alert and answering all questions. Slower when responding to questions. CNII-XII grossly intact. Moving all extremities without issue or focal neurologic deficits.    Labs & Studies: I personally reviewed the following studies:  ROUTINE LABS (Last four results):  CMP  Recent Labs   Lab 08/01/24  0827 07/31/24  1913 07/31/24  1718 07/31/24  1053    140  --  139   POTASSIUM 3.3* 3.7  --  3.0*   CHLORIDE 110* 110*  --  106   CO2 19* 16*  --  17*   ANIONGAP 11 14  --  16*   * 117*  --  207*   BUN 43.5* 47.0*  --  50.1*   CR 1.51* 1.69*  --  1.92*   GFRESTIMATED 47* 41*  --  36*   SOSA 8.4* 8.0*  --  8.3*   MAG  --   --   --  1.9   PHOS  --   --  5.5*  --    PROTTOTAL 5.5*  --   --  5.4*   ALBUMIN 2.9*  --   --  2.9*   BILITOTAL 4.1*  --   --  3.7*   ALKPHOS 114  --   --  119   *  --   --  87*   ALT 7  --   --  7     CBC  Recent Labs   Lab 08/01/24  0551 07/31/24  1356   WBC 18.2* 18.5*   RBC 2.19* 2.22*   HGB 7.0* 7.3*   HCT 22.8* 22.8*   * 103*   MCH 32.0 32.9   MCHC 30.7* 32.0   RDW 19.4* 19.4*    277     INR  Recent Labs   Lab 08/01/24  0827 07/31/24  1053   INR 1.81* 1.85*

## 2024-08-01 NOTE — PROGRESS NOTES
"ED PT Eval     08/01/24 1700   Appointment Info   Signing Clinician's Name / Credentials (PT) Darryl Carvalho, PT, DPT   Rehab Comments (PT) R ankle pain, WBAT   Living Environment   People in Home parent(s)   Current Living Arrangements house   Living Environment Comments Will be going to stay with her mom after d/c with all needs met on main level of home. Endorses minimal stairs into home.   Self-Care   Usual Activity Tolerance good   Current Activity Tolerance fair   Activity/Exercise/Self-Care Comment IND baseline, ~1.5 month of ankle pain after foot was stepped on, recent ICU admission in [place]   General Information   Onset of Illness/Injury or Date of Surgery 08/01/24   Referring Physician Aries Vasquez MD   Patient/Family Therapy Goals Statement (PT) To return home   Pertinent History of Current Problem (include personal factors and/or comorbidities that impact the POC) Per EMR \"Maribell Leon is a 29 year old female admitted on 7/31/2024. She has a history of cirrhosis, reported alcohol use disorder, chronic pancreatitis who was sent to the ED after abnormal lab values at outpatient follow up visit concerning for recurrent CHANTELL.\"   Existing Precautions/Restrictions fall   Weight-Bearing Status - RLE weight-bearing as tolerated   General Observations activity: up ad estela   Cognition   Affect/Mental Status (Cognition) WFL   Range of Motion (ROM)   Range of Motion ROM is WFL   Strength (Manual Muscle Testing)   Strength (Manual Muscle Testing) strength is WFL   Bed Mobility   Bed Mobility sit-supine;supine-sit   Supine-Sit [place] (Bed Mobility) independent   Sit-Supine [place] (Bed Mobility) independent   Transfers   Transfers sit-stand transfer   Sit-Stand Transfer   Sit-Stand [place] (Transfers) modified independence   Assistive Device (Sit-Stand Transfers) walker, front-wheeled   Gait/Stairs (Locomotion)   [place] Level (Gait) modified independence   Assistive Device (Gait) walker, " front-wheeled   Comment, (Gait/Stairs) R step-to, antalgic but tolerated   Balance   Balance Comments IND sitting, IND standing   Clinical Impression   Criteria for Skilled Therapeutic Intervention Yes, treatment indicated   PT Diagnosis (PT) impaired functional mobility   Influenced by the following impairments decreased activity tolerance   Functional limitations due to impairments transfers, gait, stairs   Clinical Presentation (PT Evaluation Complexity) stable   Clinical Presentation Rationale clinical judgement   Clinical Decision Making (Complexity) low complexity   Planned Therapy Interventions (PT) balance training;bed mobility training;gait training;ROM (range of motion);stair training;stretching;strengthening;transfer training   Risk & Benefits of therapy have been explained evaluation/treatment results reviewed;care plan/treatment goals reviewed;risks/benefits reviewed;current/potential barriers reviewed;participants voiced agreement with care plan;participants included;patient   PT Total Evaluation Time   PT Eval, Low Complexity Minutes ([id number]) 10   Physical Therapy Goals   PT Frequency 3x/week   PT Predicted Duration/Target Date for Goal Attainment 08/15/24   PT Goals Bed Mobility;Transfers;Gait;Stairs   PT: Bed Mobility Independent;Supine to/from sit   PT: Transfers Independent;Sit to/from stand   PT: Gait Modified independent;Assistive device;Greater than 200 feet  (LRAD)   PT: Stairs Modified independent;Assistive device;Greater than 10 stairs   PT Discharge Planning   PT Plan gait with LRAD, follow-up on R ankle aspiration   PT Discharge Recommendation (DC Rec) home with assist   PT Rationale for DC Rec Patient mobilizing well despite deficits and will likely be appropraite for plan to return home with mother with support as needed.   PT Brief overview of current status mod I with FWW, encouraged ambulation in hallway   Total Session Time   Total Session Time (sum of timed and untimed services) 10          Darryl Carvalho, PT, DPT

## 2024-08-01 NOTE — CONSULTS
GASTROENTEROLOGY CONSULTATION      Date of Admission:  7/31/2024           Reason for Consultation:   We were asked  to evaluate this patient with new diagnosis of cirrhosis         ASSESSMENT AND RECOMMENDATIONS:   Assessment:  39-year-old female with history of alcohol use disorder and multiple previous episodes of pancreatitis, newly diagnosed decompensated cirrhosis presented for CHANTELL      # Decompensated cirrhosis  Etiology:Alcohol; Hep B/C negative  MELD 3.0: 24 at 8/1/2024  8:27 AM  MELD-Na: 22 at 8/1/2024  8:27 AM  Calculated from:  Serum Creatinine: 1.51 mg/dL at 8/1/2024  8:27 AM  Serum Sodium: 140 mmol/L (Using max of 137 mmol/L) at 8/1/2024  8:27 AM  Total Bilirubin: 4.1 mg/dL at 8/1/2024  8:27 AM  Serum Albumin: 2.9 g/dL at 8/1/2024  8:27 AM  INR(ratio): 1.81 at 8/1/2024  8:27 AM  Age at listing (hypothetical): 29 years  Sex: Female at 8/1/2024  8:27 AM    LFTs: albumin 2.9, , AALT 7, TB 4.1 (On 7/30: , TB 4.7)    Labs show CHANTELL w Cr 1.51 (downtrending)  Recommendations:  - Hepatic Encephalopathy: no concerns  - Esophageal Varices: Screening ordered at [facility] as patient lives closer to that area.   - Ascites: 2 gm sodium restricted   - Hepatocellular Carcinoma Screening             Imaging every 6 months with liver ultrasound: next due 1/11/2025             AFP every 3-6 months: next due 1/18/2025  -CHANTELL/HRS: likely from diuretics. Recommend albumin 1 gm/kg X 2 days  -Chem dependency consult while here      Gastroenterology outpatient follow up recommendations: to be scheduled on discharge  Needs Op EGD on discharge    Thank you for involving us in this patient's care. Please do not hesitate to contact the GI service with any questions or concerns.     Pt care plan discussed with Dr. Funk, GI staff physician.    Laron Sandoval MD  -------------------------------------------------------------------------------------------------------------------           History of Present  Illness:   39-year-old female with history of alcohol use disorder and multiple previous episodes of pancreatitis presented for further evaluation at the [facility].    She was previously hospitalized in [facility] from 7/9/24 to 7/22. She initially presented with mycoplasma pneumonia. She was diagnosed with alcohol associated liver disease likely cirrhosis MELD 3.0 of 27 based on serology from 7/14/2024 of with ascites and pleural effusions The patient was treated 7/9/2024-7/13/2024 for severe sepsis due to multifocal mycoplasma community-acquired pneumonia and acute hypoxemic respiratory failure.         She presented for outpatient labs showing CHANTELL and sent for further evaluation         Data:   Key relevant labs:     Key relevant imaging:           Previous Endoscopy:   None         Medications:     Current Facility-Administered Medications   Medication Dose Route Frequency Provider Last Rate Last Admin    calcium carbonate (TUMS) chewable tablet 1,000 mg  1,000 mg Oral 4x Daily PRN Aries Vasquez MD        escitalopram (LEXAPRO) tablet 10 mg  10 mg Oral Daily Aries Vasquez MD        folic acid (FOLVITE) tablet 1 mg  1 mg Oral Daily Aries Vasquez MD        lidocaine (LMX4) cream   Topical Q1H PRN Aries Vasquez MD        lidocaine 1 % 0.1-1 mL  0.1-1 mL Other Q1H PRN Aries Vasquez MD        melatonin tablet 5 mg  5 mg Oral At Bedtime PRN Aries Vasquez MD        oxyCODONE (ROXICODONE) tablet 5 mg  5 mg Oral Q4H PRN Kamryn Luis MD   5 mg at 08/01/24 0328    sodium chloride (PF) 0.9% PF flush 3 mL  3 mL Intracatheter Q8H Malvin Rocha MD   3 mL at 08/01/24 0551    sodium chloride (PF) 0.9% PF flush 3 mL  3 mL Intracatheter q1 min prn Malvin Rocha MD        sodium chloride (PF) 0.9% PF flush 3 mL  3 mL Intracatheter Q8H Aries Vasquez MD   3 mL at 08/01/24 0551    sodium chloride (PF) 0.9% PF flush 3 mL  3 mL Intracatheter q1 min prn Aries Vasquez MD         Current  Outpatient Medications   Medication Sig Dispense Refill    amphetamine-dextroamphetamine (ADDERALL) 15 MG tablet Take 15 mg by mouth 2 times daily      cyanocobalamin (VITAMIN B-12) 1000 MCG tablet Take 1,000 mcg by mouth daily      diazepam (VALIUM) 2 MG tablet Take 2 mg by mouth as needed      escitalopram (LEXAPRO) 10 MG tablet Take 10 mg by mouth daily      gabapentin (NEURONTIN) 300 MG capsule Take 2 capsules by mouth 3 times daily      [place]-Sod Phos Di & Mono (PHOSPHOROUS) 155-852-130 MG TABS Take 250 mg by mouth 2 times daily      lactulose (CHRONULAC) 10 GM/15ML solution Take 30 mLs by mouth 3 times daily      magnesium 250 MG tablet Take 1 tablet by mouth daily      methocarbamol (ROBAXIN) 750 MG tablet Take 750 mg by mouth every 6 hours as needed      midodrine (PROAMATINE) 10 MG tablet Take 10 mg by mouth 3 times daily      Multiple Vitamin (ONE-A-DAY ESSENTIAL) TABS Take 1 tablet by mouth daily      ondansetron (ZOFRAN) 4 MG tablet Take 4 mg by mouth every 8 hours as needed for vomiting or nausea      POTASSIUM PO Take 99 mg by mouth daily      Prenatal Vit-Fe Fumarate-FA (PRENATAL VITAMIN) 27-0.8 MG TABS Take 1 tablet by mouth daily      spironolactone-HCTZ (ALDACTAZIDE) 25-25 MG tablet Take 1 tablet by mouth      tiZANidine (ZANAFLEX) 4 MG tablet TAKE 1 TABLET BY MOUTH 3 TIMES A DAY AS NEEDED FOR PAIN OR MUSCLE SPASMS               Physical Exam:   /67   Pulse 78   Temp 98  F (36.7  C) (Oral)   Resp 18   SpO2 92%   Wt:   Wt Readings from Last 2 Encounters:   03/30/15 48.8 kg (107 lb 8 oz)   03/10/14 49.2 kg (108 lb 6.4 oz) (14%, Z= -1.08)*     * Growth percentiles are based on CDC (Girls, 2-20 Years) data.      Constitutional: cooperative, pleasant, not dyspneic/diaphoretic, no acute distress  Eyes: Sclera anicteric/injected  Ears/nose/mouth/throat: Normal oropharynx without ulcers or exudate, mucus membranes moist, hearing intact  Neck: supple, thyroid normal size  CV: No  edema  Respiratory: Unlabored breathing  Lymph: No axillary, submandibular, supraclavicular or inguinal lymphadenopathy  Abd:  Nondistended, +bs, no hepatosplenomegaly, nontender, no peritoneal signs  Skin: warm, perfused, no jaundice  Neuro: AAO x 3, No asterixis  Psych: Normal affect  MSK: No gross deformities          Past Medical History:   Reviewed and edited as appropriate  No past medical history on file.         Past Surgical History:   Reviewed and edited as appropriate   Past Surgical History:   Procedure Laterality Date    TONSILLECTOMY      8/05            Social History:   Alcohol use: see above  Smoking history: see above  Living situation: see above         Family History:   Reviewed and edited as appropriate  Family History   Problem Relation Age of Onset    Allergy (Severe) Mother         Allergies,Environmental allergies, celiac disease    Family History Negative Father         Good Health    Cancer Maternal Grandfather         Cancer,Kidney cancer    Diabetes Maternal Grandmother         Diabetes,Type 2    Heart Disease Paternal Grandfather         Heart Disease,Cardiomyopathy    Asthma Brother         Asthma,history of intermittent asthma    Other - See Comments Brother         Enuresis    Family History Negative Sister         Good Health    Family History Negative Sister         Good Health      No known history of colorectal cancer, liver disease, or inflammatory bowel disease.         Allergies:   Reviewed and edited as appropriate   No Known Allergies           Review of Systems:     A complete 10 point review of systems was performed and is negative except as noted in the HPI

## 2024-08-01 NOTE — PROGRESS NOTES
[facility]  CAPS NOTE  Date of Admission:  7/31/2024  Consult Requested by: Medicine  Reason for Consult: diagnostic evaluation of ascites    POC US GUIDE FOR PARACENTESIS     Impression  US Indication: decompensated liver disease    Limited abdominal ultrasound was performed to evaluate for ascites and need for paracentesis.    Views/Acquisition: hepatorenal/RUQ, right lower quadrant, parasplenic/LUQ, and left lower quadrant    Findings/Interpretation: No significant ascites    RONAK LOERA MD        Assessment and Plan:  Requested procedure was not performed.  No free fluids. Very very very minimal near the liver       MD LYDIA LEONE [facility]  Securely message with gopogo (more info)  Text page via [facility] Paging/Directory   See signed in provider for up to date coverage information

## 2024-08-01 NOTE — PROGRESS NOTES
S/B: Admitted 7/31 for right foot pain, workup for sepsis mycoplasma pneumonia      A: A&Ox4. VSS. SR. Afebrile. She c/o right foot pain prn Oxycodone 5 mg given every 4hrs Pt has been NPO since Midnight-Possible procedure today    No intake/output data recorded.    Temp:  [98  F (36.7  C)-98.7  F (37.1  C)] 98  F (36.7  C)  Pulse:  [] 78  Resp:  [16-18] 18  BP: ()/(55-78) 101/67  SpO2:  [91 %-100 %] 92 %     R: Continue with POC. Notify  Maroon 1 with changes.   -Possibly bronchoscopy today  -Manage pain   -

## 2024-08-01 NOTE — CONSULTS
[facility] Orthopedic Surgery Consultation    Maribell Leon MRN# [id number]   Age: 29 year old YOB: 1995   Date of Admission: 7/31/2024    Reason for consult: Right ankle pain   Requesting physician: Ricky Douglas MD   Level of consult: One-time consult to assist in determining a diagnosis, recommend an appropriate treatment plan and place orders          Assessment and Plan:   Assessment:  Maribell Leon is a 29 year old female with PMH significant for cirrhosis, alcohol use disorder and chronic pancreatitis who orthopedic surgery was consulted on regarding right ankle swelling and pain.      Patient history and clinical findings are less concerning for septic right ankle joint as she tolerates passive ROM of the ankle, tolerates bearing weight on the ankle, and her synovial fluid aspiration is clear with no signs of purulence. Gout is a possibility considering CHANTELL and high uric acid levels. Will follow synovial analysis results. If crystals positive, will defer management to rheumatology/primary teams.    Her history of right ankle trauma about a month ago does not seem concerning as she had an MRI done 7/10 with no acute findings and her foot XR on admission were unremarkable for bony injury.     Her MRI and clinical picture most consistent with cellulitis to the dorsum of the foot. There is no palpable fluctuance of fluid on MRI. The foot itself is benign appearing except for diffuse bilateral lower extremity edema related to her cirrhosis.     Complex Regional Pain Syndrome is on the differential, but less likely and would rule out the above first.       Recommendations:  - Follow up right ankle synovial fluid results  - Initiate broad spectrum abx per primary team for cellulitis and monitor response  - Plan for OR: No plan for operative intervention  - WBAT RLE.  - No orthopaedic follow up necessary at this time      Orthopaedic team will follow peripherally at this time pending aspiration  results.     --  Rogelio Serrano MD  Orthopedic Surgery PGY-4         History of Present Illness:   Maribell is a 29yoF with PMHx of cirrhosis, alcohol use disorder and chronic pancreatitis who was admitted on 7/31/24 after being sent from clinic with abnormal lab values. She was found to have elevated INR, anemia, CHANTELL, and leukocytosis. She reports that she had her right foot stepped on about a month ago and it has been hurting since then. She denies any pain in the left foot. She reports that the pain has gotten better at times, but overall not much different. She underwent workup in [place] and MRI was obtained of the right ankle/foot demonstrating soft tissue subcutaneous swelling along the dorsum of the foot with no other acute concerning findings. She has been able to bear weight and not requiring any assistive devices. Denies any numbness or tingling. Denies any other concerns at this time.       A 10 point review of systems was otherwise negative other than as noted in history above.         Past Medical History:   No past medical history on file.    Patient denies any personal history of bleeding disorders, clotting disorders, or adverse reactions to anesthesia.         Past Surgical History:     Past Surgical History:   Procedure Laterality Date    TONSILLECTOMY      8/05            Social History:     Social History     Socioeconomic History    Marital status:      Spouse name: Not on file    Number of children: Not on file    Years of education: Not on file    Highest education level: Not on file   Occupational History    Not on file   Tobacco Use    Smoking status: Some Days     Current packs/day: 0.10     Types: Cigarettes    Smokeless tobacco: Never   Substance and Sexual Activity    Alcohol use: No    Drug use: Unknown     Types: Other     Comment: Drug use: No    Sexual activity: Yes     Partners: Male   Other Topics Concern    Not on file   Social History Narrative    No secondhand smoke  exposure.      Attends [employer].  Dad is a counselor at [employer] and mom is .      Senior fall 2012    Works as PCA at [employer]    Involved in hockey cheerleading    Preloaded 03/22/2013     Social Determinants of Health     Financial Resource Strain: Patient Declined (7/12/2024)    Received from [facility]    Overall Financial Resource Strain (CARDIA)     Difficulty of Paying Living Expenses: Patient declined   Food Insecurity: No Food Insecurity (7/13/2024)    Received from [facility]    Hunger Vital Sign     Worried About Running Out of Food in the Last Year: Never true     Ran Out of Food in the Last Year: Never true   Transportation Needs: No Transportation Needs (7/13/2024)    Received from [facility]    PRAPARE - Transportation     Lack of Transportation (Medical): No     Lack of Transportation (Non-Medical): No   Physical Activity: Not on file   Stress: Not on file   Social Connections: Unknown (4/11/2023)    Received from [facility]    Social Connections     Frequency of Communication with Friends and Family: Not on file   Interpersonal Safety: Not At Risk (7/13/2024)    Received from [facility]    EH IP Custom IPV     Do you feel UNSAFE in any of your personal relationships with your family members or any other acquaintances?: No   Housing Stability: Low Risk  (7/13/2024)    Received from [facility]    Housing Stability Vital Sign     Unable to Pay for Housing in the Last Year: No     Number of Times Moved in the Last Year: 1     Homeless in the Last Year: No               Family History:     Family History   Problem Relation Age of Onset    Allergy (Severe) Mother         Allergies,Environmental allergies, celiac disease    Family History Negative Father         Good Health    Cancer  Maternal Grandfather         Cancer,Kidney cancer    Diabetes Maternal Grandmother         Diabetes,Type 2    Heart Disease Paternal Grandfather         Heart Disease,Cardiomyopathy    Asthma Brother         Asthma,history of intermittent asthma    Other - See Comments Brother         Enuresis    Family History Negative Sister         Good Health    Family History Negative Sister         Good Health       Patient denies known family history of bleeding, clotting, or anesthesia-related complications.            Allergies:   No Known Allergies          Medications:     Prior to Admission medications    Medication Sig Last Dose Taking? Auth Provider Long Term End Date   amphetamine-dextroamphetamine (ADDERALL) 15 MG tablet Take 15 mg by mouth 2 times daily Past Month Yes Reported, Patient     cyanocobalamin (VITAMIN B-12) 1000 MCG tablet Take 1,000 mcg by mouth daily 7/31/2024 Yes Reported, Patient     diazepam (VALIUM) 2 MG tablet Take 2 mg by mouth as needed Past Month Yes Reported, Patient     escitalopram (LEXAPRO) 10 MG tablet Take 10 mg by mouth daily 7/31/2024 Yes Reported, Patient     gabapentin (NEURONTIN) 300 MG capsule Take 2 capsules by mouth 3 times daily 7/31/2024 Yes Reported, Patient Yes    K Phos [place]-Sod Phos Di & Mono (PHOSPHOROUS) 155-852-130 MG TABS Take 250 mg by mouth 2 times daily 7/31/2024 Yes Reported, Patient     lactulose (CHRONULAC) 10 GM/15ML solution Take 30 mLs by mouth 3 times daily  at has not started Yes Reported, Patient     magnesium 250 MG tablet Take 1 tablet by mouth daily 7/31/2024 Yes Reported, Patient     methocarbamol (ROBAXIN) 750 MG tablet Take 750 mg by mouth every 6 hours as needed  at couple of wks ago Yes Reported, Patient     midodrine (PROAMATINE) 10 MG tablet Take 10 mg by mouth 3 times daily 7/31/2024 Yes Reported, Patient     Multiple Vitamin (ONE-A-DAY ESSENTIAL) TABS Take 1 tablet by mouth daily 7/31/2024 Yes Reported, Patient     ondansetron (ZOFRAN) 4 MG tablet  Take 4 mg by mouth every 8 hours as needed for vomiting or nausea 7/31/2024 Yes Reported, Patient     POTASSIUM PO Take 99 mg by mouth daily 7/31/2024 Yes Reported, Patient     Prenatal Vit-Fe Fumarate-FA (PRENATAL VITAMIN) 27-0.8 MG TABS Take 1 tablet by mouth daily 7/31/2024 Yes Reported, Patient     spironolactone-HCTZ (ALDACTAZIDE) 25-25 MG tablet Take 1 tablet by mouth  at has not started Yes Reported, Patient Yes 8/29/24   tiZANidine (ZANAFLEX) 4 MG tablet TAKE 1 TABLET BY MOUTH 3 TIMES A DAY AS NEEDED FOR PAIN OR MUSCLE SPASMS  at couple of wks ago  Reported, Patient                  Physical Exam:     Vitals:    08/01/24 0200 08/01/24 0400 08/01/24 0500 08/01/24 0800   BP: 137/78 113/63 101/67 111/67   Pulse: 94 74 78 90   Resp:       Temp:   98  F (36.7  C) 98.5  F (36.9  C)   TempSrc:   Oral Oral   SpO2: 93%  92% 93%       General: alert and oriented, answers questions appropriately,   Neuro: EOM grossly intact  HEENT: atraumatic  Lungs: breathing comfortably on RA  Heart/Cardiovascular: well perfused    Right Lower Extremity:   - No gross deformity, skin intact. No significant erythema noted. Diffuse swelling in the foot that is symmetric to contralateral side.  - No palpable fluctuance or concern for abscess  - TTP mainly to the dorsum of the midfoot. Mild TTP at the ankle joint  - No TTP over the lateral side of the foot or the base of the foot  -  Active ROM of the ankle with 10 PF. Neutral with no dorsiflexion.   - Passive ROM of ankle with 15 PF, 10 DF  - Motor intact distally TA, GSC, EHL, FHL   - SILT superficial peroneal, deep peroneal, saphenous, sural, and tibial nerve distributions.   - DP/PT pulses palpable, toes warm and well perfused.  -          Imaging:   All imaging independently reviewed.     Reviewed Right foot XR obtained on 7/31 and MRI of right foot obtained 7/10.     These demonstrate no acute bony abnormalities. There is cortical irregularity at the base of the 5th metatarsal.    Subcutaneous swelling to the dorsum of the foot. No ankle joint effusion.         Labs:   CBC:  Lab Results   Component Value Date    WBC 18.2 (H) 08/01/2024    HGB 7.0 (L) 08/01/2024     08/01/2024    INR 1.81 (H) 08/01/2024       BMP:  Lab Results   Component Value Date     08/01/2024    POTASSIUM 3.3 (L) 08/01/2024    CHLORIDE 110 (H) 08/01/2024    CO2 19 (L) 08/01/2024    BUN 43.5 (H) 08/01/2024    CR 1.51 (H) 08/01/2024    ANIONGAP 11 08/01/2024    SOSA 8.4 (L) 08/01/2024     (H) 08/01/2024       Inflammatory Markers:  Lab Results   Component Value Date    WBC 18.2 (H) 08/01/2024    SED 9 03/30/2015

## 2024-08-02 ENCOUNTER — APPOINTMENT (OUTPATIENT)
Dept: PHYSICAL THERAPY | Facility: CLINIC | Age: 29
End: 2024-08-02
Payer: MEDICAID

## 2024-08-02 VITALS
TEMPERATURE: 98.7 F | DIASTOLIC BLOOD PRESSURE: 79 MMHG | OXYGEN SATURATION: 99 % | HEART RATE: 93 BPM | SYSTOLIC BLOOD PRESSURE: 122 MMHG | RESPIRATION RATE: 18 BRPM

## 2024-08-02 PROBLEM — K74.69 OTHER CIRRHOSIS OF LIVER (H): Status: ACTIVE | Noted: 2024-08-02

## 2024-08-02 PROBLEM — L03.115 CELLULITIS OF RIGHT LOWER EXTREMITY: Status: ACTIVE | Noted: 2024-08-02

## 2024-08-02 LAB
ALBUMIN SERPL BCG-MCNC: 3.1 G/DL (ref 3.5–5.2)
ALP SERPL-CCNC: 114 U/L (ref 40–150)
ALT SERPL W P-5'-P-CCNC: 7 U/L (ref 0–50)
ANA SER QL IF: NEGATIVE
ANION GAP SERPL CALCULATED.3IONS-SCNC: 10 MMOL/L (ref 7–15)
AST SERPL W P-5'-P-CCNC: 124 U/L (ref 0–45)
BILIRUB SERPL-MCNC: 4.3 MG/DL
BUN SERPL-MCNC: 32.4 MG/DL (ref 6–20)
CALCIUM SERPL-MCNC: 8.7 MG/DL (ref 8.8–10.4)
CHLORIDE SERPL-SCNC: 111 MMOL/L (ref 98–107)
CREAT SERPL-MCNC: 1.14 MG/DL (ref 0.51–0.95)
EGFRCR SERPLBLD CKD-EPI 2021: 67 ML/MIN/1.73M2
ERYTHROCYTE [DISTWIDTH] IN BLOOD BY AUTOMATED COUNT: 19.6 % (ref 10–15)
GLUCOSE SERPL-MCNC: 110 MG/DL (ref 70–99)
HCO3 SERPL-SCNC: 20 MMOL/L (ref 22–29)
HCT VFR BLD AUTO: 25 % (ref 35–47)
HGB BLD-MCNC: 7.7 G/DL (ref 11.7–15.7)
MCH RBC QN AUTO: 32.6 PG (ref 26.5–33)
MCHC RBC AUTO-ENTMCNC: 30.8 G/DL (ref 31.5–36.5)
MCV RBC AUTO: 106 FL (ref 78–100)
PATH REPORT.COMMENTS IMP SPEC: NORMAL
PATH REPORT.COMMENTS IMP SPEC: NORMAL
PATH REPORT.FINAL DX SPEC: NORMAL
PATH REPORT.MICROSCOPIC SPEC OTHER STN: NORMAL
PATH REPORT.MICROSCOPIC SPEC OTHER STN: NORMAL
PATH REPORT.RELEVANT HX SPEC: NORMAL
PLATELET # BLD AUTO: 232 10E3/UL (ref 150–450)
POTASSIUM SERPL-SCNC: 3.8 MMOL/L (ref 3.4–5.3)
PROT SERPL-MCNC: 5.8 G/DL (ref 6.4–8.3)
RBC # BLD AUTO: 2.36 10E6/UL (ref 3.8–5.2)
SMA IGG SER-ACNC: 12 UNITS
SODIUM SERPL-SCNC: 141 MMOL/L (ref 135–145)
WBC # BLD AUTO: 18.8 10E3/UL (ref 4–11)

## 2024-08-02 PROCEDURE — 97116 GAIT TRAINING THERAPY: CPT | Mod: GP

## 2024-08-02 PROCEDURE — 250N000013 HC RX MED GY IP 250 OP 250 PS 637

## 2024-08-02 PROCEDURE — 85014 HEMATOCRIT: CPT

## 2024-08-02 PROCEDURE — L1930 AFO PLASTIC: HCPCS

## 2024-08-02 PROCEDURE — 36415 COLL VENOUS BLD VENIPUNCTURE: CPT

## 2024-08-02 PROCEDURE — 97530 THERAPEUTIC ACTIVITIES: CPT | Mod: GP

## 2024-08-02 PROCEDURE — 80053 COMPREHEN METABOLIC PANEL: CPT

## 2024-08-02 PROCEDURE — 250N000011 HC RX IP 250 OP 636

## 2024-08-02 PROCEDURE — P9047 ALBUMIN (HUMAN), 25%, 50ML: HCPCS

## 2024-08-02 PROCEDURE — 99239 HOSP IP/OBS DSCHRG MGMT >30: CPT | Mod: GC | Performed by: STUDENT IN AN ORGANIZED HEALTH CARE EDUCATION/TRAINING PROGRAM

## 2024-08-02 RX ORDER — OXYCODONE HYDROCHLORIDE 5 MG/1
5 TABLET ORAL EVERY 6 HOURS PRN
Qty: 12 TABLET | Refills: 0 | Status: SHIPPED | OUTPATIENT
Start: 2024-08-02 | End: 2024-08-05

## 2024-08-02 RX ORDER — FOLIC ACID 1 MG/1
1 TABLET ORAL DAILY
Qty: 30 TABLET | Refills: 0 | Status: SHIPPED | OUTPATIENT
Start: 2024-08-03 | End: 2024-09-16

## 2024-08-02 RX ORDER — ALBUMIN (HUMAN) 12.5 G/50ML
12.5 SOLUTION INTRAVENOUS ONCE
Status: COMPLETED | OUTPATIENT
Start: 2024-08-02 | End: 2024-08-02

## 2024-08-02 RX ORDER — OXYCODONE HYDROCHLORIDE 5 MG/1
5 TABLET ORAL EVERY 6 HOURS PRN
Qty: 6 TABLET | Refills: 0 | Status: CANCELLED | OUTPATIENT
Start: 2024-08-02 | End: 2024-08-05

## 2024-08-02 RX ORDER — METHOCARBAMOL 500 MG/1
500 TABLET, FILM COATED ORAL 4 TIMES DAILY PRN
Qty: 30 TABLET | Refills: 0 | Status: SHIPPED | OUTPATIENT
Start: 2024-08-02 | End: 2024-09-16

## 2024-08-02 RX ORDER — CEPHALEXIN 500 MG/1
500 CAPSULE ORAL 2 TIMES DAILY
Qty: 14 CAPSULE | Refills: 0 | Status: SHIPPED | OUTPATIENT
Start: 2024-08-02 | End: 2024-08-09

## 2024-08-02 RX ADMIN — ESCITALOPRAM OXALATE 10 MG: 10 TABLET ORAL at 07:47

## 2024-08-02 RX ADMIN — OXYCODONE HYDROCHLORIDE 5 MG: 5 TABLET ORAL at 06:42

## 2024-08-02 RX ADMIN — ALBUMIN HUMAN 12.5 G: 0.25 SOLUTION INTRAVENOUS at 13:39

## 2024-08-02 RX ADMIN — OXYCODONE HYDROCHLORIDE 5 MG: 5 TABLET ORAL at 02:46

## 2024-08-02 RX ADMIN — OXYCODONE HYDROCHLORIDE 5 MG: 5 TABLET ORAL at 10:50

## 2024-08-02 RX ADMIN — CEFAZOLIN 1 G: 1 INJECTION, POWDER, FOR SOLUTION INTRAMUSCULAR; INTRAVENOUS at 00:04

## 2024-08-02 RX ADMIN — FOLIC ACID 1 MG: 1 TABLET ORAL at 07:47

## 2024-08-02 RX ADMIN — CEFAZOLIN 1 G: 1 INJECTION, POWDER, FOR SOLUTION INTRAMUSCULAR; INTRAVENOUS at 07:47

## 2024-08-02 ASSESSMENT — ACTIVITIES OF DAILY LIVING (ADL)
ADLS_ACUITY_SCORE: 35

## 2024-08-02 NOTE — PROGRESS NOTES
[facility] Rheumatology IP Progress Note    Assessment   Maribell Leon is a 29 year old female admitted on 7/31/2024. She has a history of AUD c/b cirrhosis and chronic pancreatitis with recent hospitalization for mycoplasma pneumonia requiring intubation and is admitted for further workup and management of right foot pain and leukocytosis.     Given synovial fluid results showing 13 WBCs and no crystals we are not concerned for an acute gout of the ankle. Also with the DECT scan demonstrating no finding for acute gout in the foot. Overall no concern for gout being the underlying cause of her acute foot pain. Unclear significance of elevated uric acid, but it is unlikely that this is resulting in an acute gout flare in her R. Foot.    DIAGNOSIS:  Possible Cellulitis of R. Foot vs neuropathic pain     RECOMMENDATIONS:  -- No further management for gout  -- Rheumatology will sign off at this time. Ok to discharge from our perspective    I discussed the findings and recommendations with the patient.  I communicated the assessment and plan to the consulting team.    Case seen and discussed with Dr. Sabra Mahmood, MS4    The patient was discussed with the rheumatology team and the medical student and I agree with the findings and plan of care as documented in the note with the above changes/additions.       I have personally reviewed the labs and imaging reports. I was directly involved in the medical decision making and management of this patient.     Tiago Reyes-Castro, MD  PGY4 - Rheumatology Fellow.      Interval History   Still having pain in her foot. No new fevers.     Review of Systems    Associated symptoms:joint pain.   Denies: fatigue, fevers, and rashes/photosensitive.    Unless stated above, a 14-point review of systems was otherwise normal.      OBJECTIVE:  Physical Exam   PHYSICAL EXAM  /68   Pulse 102   Temp 98.7  F (37.1  C) (Oral)   Resp 18   SpO2 100%   Wt Readings from Last 4  Encounters:   03/30/15 48.8 kg (107 lb 8 oz)   03/10/14 49.2 kg (108 lb 6.4 oz) (14%, Z= -1.08)*   08/01/13 51.8 kg (114 lb 4 oz) (27%, Z= -0.60)*   07/10/13 51 kg (112 lb 6 oz) (24%, Z= -0.72)*     * Growth percentiles are based on CDC (Girls, 2-20 Years) data.     Physical Exam

## 2024-08-02 NOTE — PROGRESS NOTES
Brief orthopaedic progress note    Results of right ankle arthrocentesis demonstrating 13 cells with 72% neutrophils, which is inconsistent with a diagnosis of septic arthritis. Fluid analysis also negative for crystals, indicating acute gouty arthritis unlikely. No concern for septic or gouty arthritis. Orthopaedics will continue to follow cultures peripherally, however no further interventions indicated at this time.      Discussed with Rogelio Serrano MD, PGY-4    Val Beyer MD  N Orthopaedic Surgery, PGY-2  [phone]    Please page me with any questions between 6pm - 6am. If outside of those hours or weekends, please page orthopaedic resident on call.

## 2024-08-02 NOTE — PLAN OF CARE
Physical Therapy Discharge Summary    Reason for therapy discharge:    Discharged to home with outpatient therapy.    Progress towards therapy goal(s). See goals on Care Plan in [facility] electronic health record for goal details.  Goals partially met.  Barriers to achieving goals:   discharge from facility.    Therapy recommendation(s):    Continued therapy is recommended.  Rationale/Recommendations:  OP  PT at pain clinic for potential CRPS.

## 2024-08-02 NOTE — PROGRESS NOTES
S: patient seen today at  ED 52  for fitting of right medium Maramed AFO.   O/G: (prevent drop foot)(assist in ambulation)    A: patient seen today for fitting of Right Maramed AFO size medium. PATIENT DID NOT HAVE SHOES AT ED. Patient has been instructed on proper donning/doffing, use, care and break-in period.  Patient not observed in a walking trial But both the patient and I are satisfied with the fit and function of the AFO at this time.     P: patient has been instructed to contact us if any issues arise.   URBANO Parsons.

## 2024-08-02 NOTE — DISCHARGE SUMMARY
[facility]  Discharge Summary - Medicine & Pediatrics       Date of Admission:  7/31/2024  Date of Discharge:  8/2/2024  3:09 PM  Discharging Provider: Dr Shaw  Discharge Service: Medicine Service, SAVANNAH TEAM 1    Discharge Diagnoses   # Cirrhosis  # Hypoalbuminemia   # Anemia   # CHANTELL (Improving)  # Lower extremity edema   # Elevated bilirubin   # Elevated uric acid  # Concern for gout  # RLE ankle pain  # Leukocytosis     Clinically Significant Risk Factors          Follow-ups Needed After Discharge   Follow-up Appointments     Adult [facility] Follow-up and recommended labs and tests      Follow up with primary care provider as scheduled on 08/19/24 to evaluate   medication change, for hospital follow- up, and to follow up on results.    The following labs/tests are recommended: CBC, CMP .      Appointments on [place] and/or [facility] (with [facility] or [facility]   provider or service). Call [phone] if you haven't heard regarding   these appointments within 7 days of discharge.            Unresulted Labs Ordered in the Past 30 Days of this Admission       Date and Time Order Name Status Description    8/1/2024  3:17 PM Phosphatidylethanol (PEth), Whole Blood In process     8/1/2024  3:15 PM Blood Culture Peripheral Blood Preliminary     8/1/2024  2:26 PM Anaerobic Bacterial Culture Routine In process     8/1/2024  2:26 PM Synovial fluid Aerobic Bacterial Culture Routine With Gram Stain Preliminary     7/31/2024  6:52 PM F Actin EIA with reflex In process     7/31/2024 12:04 PM Blood Culture Peripheral Blood Preliminary         These results will be followed up by PCP (appointment scheduled for 08/19/24)     Discharge Disposition   Discharged to home  Condition at discharge: Stable    Hospital Course   Maribell Leon is a 29 year old female admitted on 7/31/2024. She has a history of cirrhosis, reported alcohol use disorder, chronic pancreatitis who was sent to the  ED after abnormal lab values at outpatient follow up visit concerning for recurrent CHANTELL.      # Cirrhosis  # Elevated INR  # Hypoalbuminemia   Work up during prior hospitalization largely negative. TTG, A1A, Hep B, Hep C all negative. Ceruloplasmin was low at 19.2. PETH negative during last hospitalization. She does endorse alcohol use and potential past acetaminophen overuse. No family history of liver disease. Has had past epistaxis but no current issues with bleeding. She has not had a paracentesis or EGD to assess for varices. No biopsy upon initial chart review. CT at outside hospital noted diffuse hepatic steatosis with 1.3cm hypodense lesion in R hepatic lobe. She is certainly volume up on exam but no signs of HE. We will repeat autoimmune hepatitis labs.     - Will hold off on lactulose  and spironolactone-HCTZ at this time  - Was given midodrine on discharge at last hospitalization, BP okay for now, therefore not started during this hospitalization   - Follow up MIKEY, anti-F actin with PCP  - Outpatient GI consulted, will need EGD for Variceal screening   - Echo normal      # Anemia  Hg 7.3. Macrocytic anemia with elevated reticulocytes. B12 and folate replete. Iron stores okay. Not consuming EtOH in recent past. Haptoglobin normal and elevated direct bili not consistent with hemolysis. Consistent with anemia of chronic disease. No indication for transfusion at this point.  - Peripheral smear with out concerns for malignancy   - Repeat CBC in 1 week with PCP follow up, if still elevated follow up with Heme onc   - Continue B12 and folate supplement     # CHANTELL   Need more information to fully evaluate but is already improving. Would favor pre-renal over hepatorenal syndrome or intrarenal causes at this point given recent serious infection. No clear GI losses but possible poor PO intake. No other evidence of decompensated cirrhosis so would be surprising to have HRS. BUN/Cr is >20.    - CTM  - Albumin x1 on  08/02/2024   - Repeat BMP in 1 week      # Lower extremity edema  Secondary to cirrhosis. No DVT on bilateral LE ultrasound. Echo normal.  - Low sodium diet  - LE ultrasound without DVT  - R ankle joint tap with ortho, will follow up labs, placed on cefazolin due to concerns for cellulitis transitioned to Keflex for 7 days treatment.   - Not concerning for septic joint or gout   - CT Foot No dual energy CT finding for right foot gout, Fx or dislocation       # Elevated bilirubin  Elevated direct bili. RUQ showed cholelithiasis without cholecystitis. Likely secondary to cirrhosis  -CTM     # Elevated uric acid  # RLE ankle pain  #Cellulitis   RLE xray negative. Given CHANTELL would not give colchicine/NSAIDs.   - R ankle joint tap with ortho, will follow up labs, placed on cefazolin due to concerns for cellulitis transitioned to Keflex for 7 days treatment.   - Not concerning for septic joint or gout      # Leukocytosis  No clinical signs of infection. Could be inflammation secondary to gout. CXR read below. Recent PNA, no respiratory symptoms or dyspnea.   -CTM with daily CBC  - UA bland   - 1 Week repeat CBC, follow up with Heme with still elevated         Diet: 2 Gram Sodium Diet    DVT Prophylaxis: Pneumatic Compression Devices  [place] Catheter: Not present  Fluids: None  Lines: None     Cardiac Monitoring: None  Code Status: Full Code      Consultations This Hospital Stay   GI HEPATOLOGY ADULT IP CONSULT  PHYSICAL THERAPY ADULT IP CONSULT  OCCUPATIONAL THERAPY ADULT IP CONSULT  HEMATOLOGY ADULT IP CONSULT  RHEUMATOLOGY IP CONSULT  INTERNAL MEDICINE PROCEDURE TEAM ADULT IP CONSULT [facility] - PARACENTESIS  ORTHOPAEDIC SURGERY ADULT/PEDS IP CONSULT    Code Status   Full Code       The patient was discussed with Dr. Valeria Nevarez MD  [address] EMERGENCY DEPARTMENT  [address] [facility] [address]  Phone:  [phone]  ______________________________________________________________________    Physical Exam   Vital Signs: Temp: 98.7  F (37.1  C) Temp src: Oral BP: 122/79 Pulse: 93   Resp: 18 SpO2: 99 % O2 Device: None (Room air)    Weight: 0 lbs 0 oz  Constitutional: awake, alert, cooperative, no apparent distress, and appears stated age  Eyes: icteric   Respiratory: No increased work of breathing, decreased breath sounds at the base bilaterally  Cardiovascular: normal S1 and S2, no murmurs, pitting edema present bilaterally  GI: Soft, non-tender, distended  Skin: Jaundiced, no rashes  Musculoskeletal: Tenderness of RLE ankle with asymmetrical swelling, no overlying erythema  Neuro: no asterixis      Primary Care Physician   Physician No Ref-Primary    Discharge Orders      CBC with platelets     Basic metabolic panel  (Ca, Cl, CO2, Creat, Gluc, K, Na, BUN)     Adult GI  Referral - Consult Only      Physical Therapy  Referral      Adult GI  Referral - Procedure Only      Reason for your hospital stay    You presented to the hospital due to concerns for abnormal labs and liver disease. You were seen by the GI doctors, Hematology (Blood) doctors, Rheumatology doctors and orthopedic doctors. You were started on antibiotic for possible skin infection. There were concerns for gout but lab work shows no concerns for it at this time. You will have a brace placed on the right ankle to help support the joint as well.     Activity    Your activity upon discharge: activity as tolerated     Monitor and record    weight every day     Discharge Instructions    You take Tylenol for pain but a maximum dose of 2000 mg in 24 hours. Please avoid excess tylenol use due to liver disease  Please follow up with the PCP appointment for any medication changes   Please complete lab work with in 1 week as scheduled   Please follow up with PCP as scheduled   Please follow up with GI doctors to establish care and  endoscopy for evaluation of varices   If kidney function is improved can potentially used NSAID (ibuprofen) for pain however would avoid atleast until the lab is completed and you have seen PCP.   Please continues oral antibiotics for skin infection as prescribed     Adult [facility] Follow-up and recommended labs and tests    Follow up with primary care provider as scheduled on 08/19/24 to evaluate medication change, for hospital follow- up, and to follow up on results.  The following labs/tests are recommended: CBC, CMP .      Appointments on [place] and/or [facility] (with [facility] or [facility] provider or service). Call [phone] if you haven't heard regarding these appointments within 7 days of discharge.     Diet    Follow this diet upon discharge: Orders Placed This Encounter      2 Gram Sodium Diet       Significant Results and Procedures   Most Recent 3 CBC's:  Recent Labs   Lab Test 08/02/24  0900 08/01/24  1527 08/01/24  0551   WBC 18.8* 18.2* 18.2*   HGB 7.7* 7.4* 7.0*   * 104* 104*    239 239     Most Recent 3 BMP's:  Recent Labs   Lab Test 08/02/24  0614 08/01/24  0827 07/31/24  1913    140 140   POTASSIUM 3.8 3.3* 3.7   CHLORIDE 111* 110* 110*   CO2 20* 19* 16*   BUN 32.4* 43.5* 47.0*   CR 1.14* 1.51* 1.69*   ANIONGAP 10 11 14   SOSA 8.7* 8.4* 8.0*   * 114* 117*     Most Recent 2 LFT's:  Recent Labs   Lab Test 08/02/24  0614 08/01/24  0827   * 114*   ALT 7 7   ALKPHOS 114 114   BILITOTAL 4.3* 4.1*     Most Recent 3 INR's:  Recent Labs   Lab Test 08/01/24  0827 07/31/24  1053   INR 1.81* 1.85*     7-Day Micro Results       Collected Updated Procedure Result Status      08/01/2024 1612 08/02/2024 0431 Blood Culture Peripheral Blood [[id number]]   Peripheral Blood    Preliminary result Component Value   Culture No growth after 12 hours  [P]                08/01/2024 1611 08/01/2024 1836 MRSA MSSA PCR, Nasal Swab [[id number]]    Swab from Nares, Bilateral    Final  result Component Value   MRSA Target DNA Negative   SA Target DNA Negative            08/01/2024 1447 08/01/2024 1816 Crystal ID Synovial Fluid [[id number]]   Synovial fluid from Ankle, Right    Final result Component Value   Crystals Analysis No clinically significant crystals seen.            08/01/2024 1447 08/01/2024 1816 Cell count with differential fluid [[id number]]    Synovial fluid from Ankle, Right    Final result Component Value   No component results            08/01/2024 1447 08/02/2024 1312 Synovial fluid Aerobic Bacterial Culture Routine With Gram Stain [[id number]]    Synovial fluid from Ankle, Right    Preliminary result Component Value   Culture No growth, less than 1 day  [P]    Gram Stain Result No organisms seen  [P]     No white blood cells seen  [P]                08/01/2024 1447 08/01/2024 1654 Anaerobic Bacterial Culture Routine [[id number]]   Synovial fluid from Ankle, Right    In process Component Value   No component results               08/01/2024 1447 08/01/2024 1816 Cell Count Body Fluid [[id number]]    Synovial fluid from Ankle, Right    Final result Component Value Units   Color Yellow    Clarity Clear    Cell Count Fluid Source Ankle, Right    Total Nucleated Cells 13 /uL            08/01/2024 1447 08/01/2024 1816 Differential Body Fluid [[id number]]    Synovial fluid from Ankle, Right    Final result Component Value Units   % Neutrophils 72 %   % Lymphocytes 14 %   % Monocyte/Macrophages 13 %   % Eosinophils 1 %            07/31/2024 1931 07/31/2024 2020 Asymptomatic COVID-19 Virus (Coronavirus) by PCR Nose [[id number]]    Swab from Nose    Final result Component Value   SARS CoV2 PCR Negative   NEGATIVE: SARS-CoV-2 (COVID-19) RNA not detected, presumed negative.            07/31/2024 1230 08/02/2024 1247 Blood Culture Peripheral Blood [[id number]]   Peripheral Blood    Preliminary result Component Value   Culture No growth after 2 days  [P]                       Most Recent Anemia Panel:  Recent Labs   Lab Test 08/02/24  0900 08/01/24  0551 07/31/24  1913 07/31/24  1718 07/31/24  1356   WBC 18.8*   < >  --   --  18.5*   HGB 7.7*   < >  --   --  7.3*   HCT 25.0*   < >  --   --  22.8*   *   < >  --   --  103*      < >  --   --  277   IRON  --   --   --  60  --    IRONSAT  --   --   --  43  --    RETICABSCT  --   --   --   --  0.111*   RETP  --   --   --   --  5.0*   FEB  --   --   --  141*  --    MARIE  --   --   --  254*  --    B12  --   --   --  1,404*  --    FOLIC  --   --  >40.0*  --   --     < > = values in this interval not displayed.   ,   Results for orders placed or performed during the hospital encounter of 07/31/24   XR Chest 2 Views    Narrative    Exam: XR CHEST 2 VIEWS, 7/31/2024 1:46 PM    Comparison: None    History: weakness recent pneumonia    Findings:  PA and lateral views of the chest. Trachea is midline.  Cardiomediastinal silhouette is partially cleared. Elevation of the  right hemidiaphragm. Suspected small-moderate right pleural effusion.  Patchy perihilar and left basilar opacities. Opacification of the  right lower lung field. No pneumothorax. No acute osseous abnormality.      Impression    Impression:   1. Opacification of the right lower lung field with patchy perihilar  and left basilar opacities. Most likely edema versus atelectasis.  Cannot exclude postinflammatory change.  2. Suspected small-moderate right pleural effusion.     I have personally reviewed the examination and initial interpretation  and I agree with the findings.    GRACIELA YU MD         SYSTEM ID:  [id number]   Foot  XR, G/E 3 views, right    Narrative    3 views right foot radiographs 7/31/2024 1:57 PM    History: pain and swelling  include ankle     Comparison: None    Findings:    Nonweightbearing AP, oblique, and lateral  views of the right foot  were obtained.     No acute osseous abnormality.      Lisfranc articulation alignment is congruent on  this non-weight  bearing study.    No substantial degenerative change. Os peroneus.    Dorsal soft tissue swelling.      Impression    Impression:  1. No acute osseous abnormality.  2. No substantial degenerative change.    PAUL ONEILL MD (Joe)         SYSTEM ID:  [id number]   US Abdomen Limited    Narrative    EXAMINATION: Limited Abdominal Ultrasound, 8/1/2024     COMPARISON: None.    HISTORY: Right upper quadrant evaluate for biliary disease/gallbladder    FINDINGS:     Fluid: Right pleural effusion and ascites    Liver: The liver demonstrates increased echogenicity, measuring 18.5  cm in craniocaudal dimension. There is no focal mass.     Gallbladder: Focal echogenicity in the gallbladder with questionable  shadowing. Mild circumferential wall thickening without gallbladder  distention There is no, pericholecystic fluid or reported positive  sonographic Martínez's sign     Bile Ducts: No intrahepatic biliary ductal dilatation demonstrated.  Visualized common bile duct measures 4 mm in diameter.    Pancreas: Pancreas not well seen.    Kidney: The right kidney measures 11.4 cm long. There is no  hydronephrosis or hydroureter, no shadowing renal calculi, cystic  lesion or mass.     Visualized proximal aorta and IVC are within normal limits.      Impression    IMPRESSION:     1. Increased hepatic echogenicity which may be seen with parenchymal  disease including steatosis.  2. Hepatomegaly.  3. Right pleural effusion  4. Ascites  5. Focal echogenicity in the gallbladder with questionable shadowing,  possibly calculus/sludge ball. Gallbladder wall thickening, without  additional signs of acute cholecystitis, nonspecific, likely secondary  to liver disease and ascites; however if clinical concern for  cholecystitis HIDA may be considered.    I have personally reviewed the examination and initial interpretation  and I agree with the findings.    SCOTT WATSON MD         SYSTEM ID:  [id number]   US Lower Extremity  Venous Duplex Bilateral    Narrative    EXAMINATION: US LOWER EXTREMITY VENOUS DUPLEX BILATERAL  7/31/2024  9:22 PM      CLINICAL HISTORY: Leg swelling    COMPARISON: None        PROCEDURE COMMENTS: Ultrasound was performed of the deep venous system  of the right and left lower extremity using grayscale, color, and  spectral Doppler.    FINDINGS:  The common femoral, greater saphenous origin, femoral, popliteal, and  deep calf veins are visualized and are patent. Venous waveforms are  normal. There is normal response to compression.      Impression    IMPRESSION:.  No deep vein thrombosis demonstrated in the right or left lower  extremity.    I have personally reviewed the examination and initial interpretation  and I agree with the findings.    SCOTT WATSON MD         SYSTEM ID:  [id number]   POC US GUIDE FOR PARACENTESIS    Impression    US Indication: decompensated liver disease    Limited abdominal ultrasound was performed to evaluate for ascites and need for paracentesis.     Views/Acquisition: hepatorenal/RUQ, right lower quadrant, parasplenic/LUQ, and left lower quadrant    Findings/Interpretation: No significant ascites    RONAK LOERA MD    CT Foot Right w/o Contrast    Narrative    EXAM: CT FOOT RIGHT W/O CONTRAST  LOCATION: [facility]  DATE: 8/1/2024    INDICATION: Right foot pain.  COMPARISON: None.  TECHNIQUE: Noncontrast. Dual energy CT. Axial, sagittal and coronal thin-section reconstruction. Dose reduction techniques were used.     FINDINGS:     BONES:  -Anatomic alignment right foot. No acute fracture identified. No significant joint space narrowing. No evidence for gout. No erosion. No sizable joint effusion.    SOFT TISSUES:  -Moderate distal leg, ankle and dorsal foot soft tissue swelling.      Impression    IMPRESSION:  1.  No dual energy CT finding for right foot gout.  2.  Moderate distal leg, ankle and dorsal foot soft tissue edema. No drainable  fluid collection.  3.  No acute fracture or dislocation.             Echo Complete     Value    LVEF  60-65%    [facility]    [id number]  [id number]  [id number]  [id number]^ANANDA^TRUE     [facility]  Echocardiography Laboratory  [address] [place] [address]     Name: VALORIE REYES  MRN: [id number]  : 1995  Study Date: 2024 09:14 AM  Age: 29 yrs  Gender: Female  Patient Location: [facility]  Reason For Study: Edema  Ordering Physician: TRUE MALAVE  Performed By: Charles Thayer     BSA: 1.4 m2  Height: 60 in  Weight: 107 lb  ______________________________________________________________________________  Procedure  Echocardiogram with two-dimensional, color and spectral Doppler performed.  ______________________________________________________________________________  Interpretation Summary  Left ventricular size, wall motion and function are normal. The ejection  fraction is 60-65%.  Right ventricular function, chamber size, wall motion, and thickness are  normal.  No significant valve abnormalities.  The inferior vena cava is normal.  No pericardial effusion.  There is no prior study for direct comparison.  ______________________________________________________________________________  Left Ventricle  Left ventricular size, wall motion and function are normal. The ejection  fraction is 60-65%. Left ventricular diastolic function is normal.     Right Ventricle  Right ventricular function, chamber size, wall motion, and thickness are  normal.     Atria  Mild biatrial enlargement is present.     Mitral Valve  The mitral valve is normal. Trace mitral insufficiency is present.     Aortic Valve  Aortic valve is normal in structure and function.     Tricuspid Valve  The tricuspid valve is normal. Trace tricuspid insufficiency is present.  Estimated pulmonary artery systolic pressure is 30 mmHg plus right atrial  pressure. Pulmonary artery systolic pressure is normal.      Pulmonic Valve  The pulmonic valve is normal.     Vessels  The aorta root is normal. The thoracic aorta cannot be assessed. The inferior  vena cava is normal.     Pericardium  No pericardial effusion is present.     Compared to Previous Study  There is no prior study for direct comparison.  ______________________________________________________________________________  Report approved by: Fabián Wilson 08/01/2024 12:23 PM     ______________________________________________________________________________          Discharge Medications   Discharge Medication List as of 8/2/2024  2:08 PM        START taking these medications    Details   cephALEXin (KEFLEX) 500 MG capsule Take 1 capsule (500 mg) by mouth 2 times daily for 7 days, Disp-14 capsule, R-0, E-Prescribe      folic acid (FOLVITE) 1 MG tablet Take 1 tablet (1 mg) by mouth daily, Disp-30 tablet, R-0, E-Prescribe      oxyCODONE (ROXICODONE) 5 MG tablet Take 1 tablet (5 mg) by mouth every 6 hours as needed for severe pain, Disp-6 tablet, R-0, E-Prescribe           CONTINUE these medications which have CHANGED    Details   methocarbamol (ROBAXIN) 500 MG tablet Take 1 tablet (500 mg) by mouth 4 times daily as needed for muscle spasms, Disp-30 tablet, R-0, E-Prescribe           CONTINUE these medications which have NOT CHANGED    Details   cyanocobalamin (VITAMIN B-12) 1000 MCG tablet Take 1,000 mcg by mouth daily, Historical      escitalopram (LEXAPRO) 10 MG tablet Take 10 mg by mouth daily, Historical           STOP taking these medications       amphetamine-dextroamphetamine (ADDERALL) 15 MG tablet Comments:   Reason for Stopping:         diazepam (VALIUM) 2 MG tablet Comments:   Reason for Stopping:         gabapentin (NEURONTIN) 300 MG capsule Comments:   Reason for Stopping:         K Phos [place]-Sod Phos Di & Mono (PHOSPHOROUS) 155-852-130 MG TABS Comments:   Reason for Stopping:         lactulose (CHRONULAC) 10 GM/15ML solution Comments:   Reason for  Stopping:         magnesium 250 MG tablet Comments:   Reason for Stopping:         midodrine (PROAMATINE) 10 MG tablet Comments:   Reason for Stopping:         Multiple Vitamin (ONE-A-DAY ESSENTIAL) TABS Comments:   Reason for Stopping:         ondansetron (ZOFRAN) 4 MG tablet Comments:   Reason for Stopping:         POTASSIUM PO Comments:   Reason for Stopping:         Prenatal Vit-Fe Fumarate-FA (PRENATAL VITAMIN) 27-0.8 MG TABS Comments:   Reason for Stopping:         spironolactone-HCTZ (ALDACTAZIDE) 25-25 MG tablet Comments:   Reason for Stopping:         tiZANidine (ZANAFLEX) 4 MG tablet Comments:   Reason for Stopping:             Allergies   No Known Allergies

## 2024-08-03 LAB
LABORATORY REPORT: NORMAL
PETH INTERPRETATION: NORMAL
PLPETH BLD-MCNC: <10 NG/ML
POPETH BLD-MCNC: <10 NG/ML

## 2024-08-05 LAB — BACTERIA BLD CULT: NO GROWTH

## 2024-08-06 LAB
BACTERIA BLD CULT: NO GROWTH
BACTERIA SNV CULT: NO GROWTH
GRAM STAIN RESULT: NORMAL
GRAM STAIN RESULT: NORMAL

## 2024-08-09 ENCOUNTER — APPOINTMENT (OUTPATIENT)
Dept: ULTRASOUND IMAGING | Facility: CLINIC | Age: 29
End: 2024-08-09
Attending: EMERGENCY MEDICINE
Payer: MEDICAID

## 2024-08-09 ENCOUNTER — LAB (OUTPATIENT)
Dept: LAB | Facility: CLINIC | Age: 29
End: 2024-08-09
Payer: MEDICAID

## 2024-08-09 ENCOUNTER — HOSPITAL ENCOUNTER (EMERGENCY)
Facility: CLINIC | Age: 29
Discharge: HOME | End: 2024-08-09
Attending: EMERGENCY MEDICINE | Admitting: EMERGENCY MEDICINE
Payer: MEDICAID

## 2024-08-09 ENCOUNTER — OFFICE VISIT (OUTPATIENT)
Dept: URGENT CARE | Facility: URGENT CARE | Age: 29
End: 2024-08-09
Payer: MEDICAID

## 2024-08-09 VITALS
RESPIRATION RATE: 16 BRPM | DIASTOLIC BLOOD PRESSURE: 84 MMHG | OXYGEN SATURATION: 96 % | SYSTOLIC BLOOD PRESSURE: 144 MMHG | TEMPERATURE: 98 F | HEART RATE: 62 BPM

## 2024-08-09 VITALS
SYSTOLIC BLOOD PRESSURE: 94 MMHG | TEMPERATURE: 98.3 F | DIASTOLIC BLOOD PRESSURE: 62 MMHG | OXYGEN SATURATION: 95 % | HEART RATE: 48 BPM

## 2024-08-09 DIAGNOSIS — N17.9 AKI (ACUTE KIDNEY INJURY) (H): ICD-10-CM

## 2024-08-09 DIAGNOSIS — R06.02 SOB (SHORTNESS OF BREATH): ICD-10-CM

## 2024-08-09 DIAGNOSIS — K74.69 OTHER CIRRHOSIS OF LIVER (H): ICD-10-CM

## 2024-08-09 DIAGNOSIS — J90 BILATERAL PLEURAL EFFUSION: ICD-10-CM

## 2024-08-09 DIAGNOSIS — R60.0 BILATERAL LEG EDEMA: Primary | ICD-10-CM

## 2024-08-09 DIAGNOSIS — R60.0 LOWER EXTREMITY EDEMA: ICD-10-CM

## 2024-08-09 DIAGNOSIS — D64.9 ANEMIA, UNSPECIFIED TYPE: ICD-10-CM

## 2024-08-09 DIAGNOSIS — K70.31 ALCOHOLIC CIRRHOSIS OF LIVER WITH ASCITES (H): ICD-10-CM

## 2024-08-09 LAB
ALBUMIN SERPL BCG-MCNC: 3.1 G/DL (ref 3.5–5.2)
ALP SERPL-CCNC: 114 U/L (ref 40–150)
ALT SERPL W P-5'-P-CCNC: 5 U/L (ref 0–50)
ANION GAP SERPL CALCULATED.3IONS-SCNC: 11 MMOL/L (ref 7–15)
ANION GAP SERPL CALCULATED.3IONS-SCNC: 11 MMOL/L (ref 7–15)
AST SERPL W P-5'-P-CCNC: ABNORMAL U/L
BASOPHILS # BLD AUTO: 0.1 10E3/UL (ref 0–0.2)
BASOPHILS NFR BLD AUTO: 1 %
BILIRUB SERPL-MCNC: 2.3 MG/DL
BUN SERPL-MCNC: 10.4 MG/DL (ref 6–20)
BUN SERPL-MCNC: 10.8 MG/DL (ref 6–20)
CALCIUM SERPL-MCNC: 8.3 MG/DL (ref 8.8–10.4)
CALCIUM SERPL-MCNC: 8.6 MG/DL (ref 8.8–10.4)
CHLORIDE SERPL-SCNC: 105 MMOL/L (ref 98–107)
CHLORIDE SERPL-SCNC: 107 MMOL/L (ref 98–107)
CREAT SERPL-MCNC: 0.58 MG/DL (ref 0.51–0.95)
CREAT SERPL-MCNC: 0.69 MG/DL (ref 0.51–0.95)
EGFRCR SERPLBLD CKD-EPI 2021: >90 ML/MIN/1.73M2
EGFRCR SERPLBLD CKD-EPI 2021: >90 ML/MIN/1.73M2
EOSINOPHIL # BLD AUTO: 0.8 10E3/UL (ref 0–0.7)
EOSINOPHIL NFR BLD AUTO: 5 %
ERYTHROCYTE [DISTWIDTH] IN BLOOD BY AUTOMATED COUNT: 18 % (ref 10–15)
ERYTHROCYTE [DISTWIDTH] IN BLOOD BY AUTOMATED COUNT: 18.4 % (ref 10–15)
GLUCOSE SERPL-MCNC: 146 MG/DL (ref 70–99)
GLUCOSE SERPL-MCNC: 188 MG/DL (ref 70–99)
HCO3 SERPL-SCNC: 14 MMOL/L (ref 22–29)
HCO3 SERPL-SCNC: 16 MMOL/L (ref 22–29)
HCT VFR BLD AUTO: 24.1 % (ref 35–47)
HCT VFR BLD AUTO: 26.3 % (ref 35–47)
HGB BLD-MCNC: 7.7 G/DL (ref 11.7–15.7)
HGB BLD-MCNC: 8.2 G/DL (ref 11.7–15.7)
IMM GRANULOCYTES # BLD: 0.1 10E3/UL
IMM GRANULOCYTES NFR BLD: 1 %
LYMPHOCYTES # BLD AUTO: 3.7 10E3/UL (ref 0.8–5.3)
LYMPHOCYTES NFR BLD AUTO: 23 %
MAGNESIUM SERPL-MCNC: 1.6 MG/DL (ref 1.7–2.3)
MCH RBC QN AUTO: 32.8 PG (ref 26.5–33)
MCH RBC QN AUTO: 33.2 PG (ref 26.5–33)
MCHC RBC AUTO-ENTMCNC: 31.2 G/DL (ref 31.5–36.5)
MCHC RBC AUTO-ENTMCNC: 32 G/DL (ref 31.5–36.5)
MCV RBC AUTO: 104 FL (ref 78–100)
MCV RBC AUTO: 105 FL (ref 78–100)
MONOCYTES # BLD AUTO: 1.1 10E3/UL (ref 0–1.3)
MONOCYTES NFR BLD AUTO: 7 %
NEUTROPHILS # BLD AUTO: 10.4 10E3/UL (ref 1.6–8.3)
NEUTROPHILS NFR BLD AUTO: 63 %
NRBC # BLD AUTO: 0 10E3/UL
NRBC BLD AUTO-RTO: 0 /100
NT-PROBNP SERPL-MCNC: 2232 PG/ML (ref 0–450)
PLATELET # BLD AUTO: 178 10E3/UL (ref 150–450)
PLATELET # BLD AUTO: 198 10E3/UL (ref 150–450)
POTASSIUM SERPL-SCNC: 3.2 MMOL/L (ref 3.4–5.3)
POTASSIUM SERPL-SCNC: 4.9 MMOL/L (ref 3.4–5.3)
PROT SERPL-MCNC: 6.2 G/DL (ref 6.4–8.3)
RBC # BLD AUTO: 2.32 10E6/UL (ref 3.8–5.2)
RBC # BLD AUTO: 2.5 10E6/UL (ref 3.8–5.2)
SODIUM SERPL-SCNC: 132 MMOL/L (ref 135–145)
SODIUM SERPL-SCNC: 132 MMOL/L (ref 135–145)
WBC # BLD AUTO: 13.3 10E3/UL (ref 4–11)
WBC # BLD AUTO: 16.3 10E3/UL (ref 4–11)

## 2024-08-09 PROCEDURE — 99283 EMERGENCY DEPT VISIT LOW MDM: CPT | Performed by: EMERGENCY MEDICINE

## 2024-08-09 PROCEDURE — 85025 COMPLETE CBC W/AUTO DIFF WBC: CPT

## 2024-08-09 PROCEDURE — 76705 ECHO EXAM OF ABDOMEN: CPT

## 2024-08-09 PROCEDURE — 83880 ASSAY OF NATRIURETIC PEPTIDE: CPT | Performed by: EMERGENCY MEDICINE

## 2024-08-09 PROCEDURE — 76705 ECHO EXAM OF ABDOMEN: CPT | Mod: 26 | Performed by: RADIOLOGY

## 2024-08-09 PROCEDURE — 80048 BASIC METABOLIC PNL TOTAL CA: CPT

## 2024-08-09 PROCEDURE — 36415 COLL VENOUS BLD VENIPUNCTURE: CPT | Performed by: EMERGENCY MEDICINE

## 2024-08-09 PROCEDURE — 99205 OFFICE O/P NEW HI 60 MIN: CPT | Performed by: PHYSICIAN ASSISTANT

## 2024-08-09 PROCEDURE — 93005 ELECTROCARDIOGRAM TRACING: CPT | Performed by: EMERGENCY MEDICINE

## 2024-08-09 PROCEDURE — 99284 EMERGENCY DEPT VISIT MOD MDM: CPT | Performed by: EMERGENCY MEDICINE

## 2024-08-09 PROCEDURE — 84155 ASSAY OF PROTEIN SERUM: CPT | Performed by: EMERGENCY MEDICINE

## 2024-08-09 PROCEDURE — 36415 COLL VENOUS BLD VENIPUNCTURE: CPT

## 2024-08-09 PROCEDURE — 93010 ELECTROCARDIOGRAM REPORT: CPT | Performed by: EMERGENCY MEDICINE

## 2024-08-09 PROCEDURE — 250N000013 HC RX MED GY IP 250 OP 250 PS 637: Performed by: EMERGENCY MEDICINE

## 2024-08-09 PROCEDURE — 85027 COMPLETE CBC AUTOMATED: CPT | Performed by: EMERGENCY MEDICINE

## 2024-08-09 PROCEDURE — 83735 ASSAY OF MAGNESIUM: CPT | Performed by: EMERGENCY MEDICINE

## 2024-08-09 RX ORDER — OXYCODONE HYDROCHLORIDE 5 MG/1
5 TABLET ORAL EVERY 6 HOURS PRN
Qty: 12 TABLET | Refills: 0 | Status: SHIPPED | OUTPATIENT
Start: 2024-08-09 | End: 2024-08-12

## 2024-08-09 RX ORDER — HYDROCHLOROTHIAZIDE 25 MG/1
25 TABLET ORAL ONCE
Status: COMPLETED | OUTPATIENT
Start: 2024-08-09 | End: 2024-08-09

## 2024-08-09 RX ORDER — SPIRONOLACTONE AND HYDROCHLOROTHIAZIDE 25; 25 MG/1; MG/1
1 TABLET ORAL DAILY
Qty: 30 TABLET | Refills: 0 | Status: ON HOLD | OUTPATIENT
Start: 2024-08-09 | End: 2024-08-21

## 2024-08-09 RX ORDER — SPIRONOLACTONE 25 MG/1
25 TABLET ORAL ONCE
Status: COMPLETED | OUTPATIENT
Start: 2024-08-09 | End: 2024-08-09

## 2024-08-09 RX ADMIN — HYDROCHLOROTHIAZIDE 25 MG: 25 TABLET ORAL at 21:04

## 2024-08-09 RX ADMIN — SPIRONOLACTONE 25 MG: 25 TABLET, FILM COATED ORAL at 21:04

## 2024-08-09 ASSESSMENT — COLUMBIA-SUICIDE SEVERITY RATING SCALE - C-SSRS
2. HAVE YOU ACTUALLY HAD ANY THOUGHTS OF KILLING YOURSELF IN THE PAST MONTH?: NO
1. IN THE PAST MONTH, HAVE YOU WISHED YOU WERE DEAD OR WISHED YOU COULD GO TO SLEEP AND NOT WAKE UP?: NO
6. HAVE YOU EVER DONE ANYTHING, STARTED TO DO ANYTHING, OR PREPARED TO DO ANYTHING TO END YOUR LIFE?: NO

## 2024-08-09 ASSESSMENT — ACTIVITIES OF DAILY LIVING (ADL)
ADLS_ACUITY_SCORE: 35
ADLS_ACUITY_SCORE: 33
ADLS_ACUITY_SCORE: 35
ADLS_ACUITY_SCORE: 35

## 2024-08-09 ASSESSMENT — ENCOUNTER SYMPTOMS
MYALGIAS: 0
WEAKNESS: 0
FEVER: 0
SHORTNESS OF BREATH: 1
NECK STIFFNESS: 0
VOMITING: 0
ALLERGIC/IMMUNOLOGIC NEGATIVE: 1
DIZZINESS: 0
RHINORRHEA: 0
CHILLS: 0
SORE THROAT: 0
BACK PAIN: 0
ARTHRALGIAS: 0
JOINT SWELLING: 0
MUSCULOSKELETAL NEGATIVE: 1
NECK PAIN: 0
DIARRHEA: 0
COUGH: 0
LIGHT-HEADEDNESS: 0
EYES NEGATIVE: 1
HEADACHES: 0
WOUND: 0
PALPITATIONS: 0
NAUSEA: 0
ENDOCRINE NEGATIVE: 1

## 2024-08-09 NOTE — ED PROVIDER NOTES
ED Provider Note  [facility]      History     Chief Complaint   Patient presents with    Leg Swelling     HPI  Maribell Leon is a 29 year old female with a history of severe alcohol use disorder complicated by liver cirrhosis, alcohol related neuropathy, chronic pancreatitis who presents for lower extremity swelling.  Patient was recently discharged from the hospital after a stay for pleural effusions, ascites, s/p intubation and thoracentesis and treatment for pneumonia, then referred to the emergency [place] with an upcoming appointment this upcoming Tuesday.  Patient had initially been prescribed combination diuretic with hydrochlorothiazide and spironolactone, however she was encouraged not to take it due to concerns for acute kidney injury.  She presents today with bilateral lower extremity edema, abdominal distention, concern for fluid overloaded state.  She denies difficulty breathing.  She denies fevers or chills.  Denies cough.  Denies chest pain.    Past Medical History  No past medical history on file.  Past Surgical History:   Procedure Laterality Date    TONSILLECTOMY      8/05     oxyCODONE (ROXICODONE) 5 MG tablet  spironolactone-HCTZ (ALDACTAZIDE) 25-25 MG tablet  cephALEXin (KEFLEX) 500 MG capsule  cyanocobalamin (VITAMIN B-12) 1000 MCG tablet  escitalopram (LEXAPRO) 10 MG tablet  folic acid (FOLVITE) 1 MG tablet  methocarbamol (ROBAXIN) 500 MG tablet      No Known Allergies  Family History  Family History   Problem Relation Age of Onset    Allergy (Severe) Mother         Allergies,Environmental allergies, celiac disease    Family History Negative Father         Good Health    Cancer Maternal Grandfather         Cancer,Kidney cancer    Diabetes Maternal Grandmother         Diabetes,Type 2    Heart Disease Paternal Grandfather         Heart Disease,Cardiomyopathy    Asthma Brother         Asthma,history of intermittent asthma    Other - See Comments Brother          Enuresis    Family History Negative Sister         Good Health    Family History Negative Sister         Good Health     Social History   Social History     Tobacco Use    Smoking status: Some Days     Current packs/day: 0.10     Types: Cigarettes    Smokeless tobacco: Never   Substance Use Topics    Alcohol use: No    Drug use: Unknown     Types: Other     Comment: Drug use: No      A medically appropriate review of systems was performed with pertinent positives and negatives noted in the HPI, and all other systems negative.    Physical Exam   BP: 99/65  Pulse: (!) 49  Temp: 98.1  F (36.7  C)  Resp: 16  SpO2: 94 %  Physical Exam  Vitals and nursing note reviewed.   Constitutional:       General: She is not in acute distress.     Appearance: Normal appearance. She is not diaphoretic.   HENT:      Head: Normocephalic and atraumatic.      Mouth/Throat:      Mouth: Mucous membranes are moist.      Pharynx: Oropharynx is clear.   Eyes:      General: No scleral icterus.     Conjunctiva/sclera: Conjunctivae normal.   Cardiovascular:      Rate and Rhythm: Normal rate and regular rhythm.   Pulmonary:      Effort: Pulmonary effort is normal. No respiratory distress.   Abdominal:      General: There is distension.      Palpations: Abdomen is soft.      Tenderness: There is no abdominal tenderness. There is no guarding or rebound.   Musculoskeletal:      Cervical back: Neck supple.      Right lower leg: Edema present.      Left lower leg: Edema present.   Skin:     General: Skin is warm and dry.      Coloration: Skin is pale.      Findings: No rash.   Neurological:      General: No focal deficit present.      Mental Status: She is alert and oriented to person, place, and time.   Psychiatric:         Mood and Affect: Mood normal.         Behavior: Behavior normal.           ED Course, Procedures, & Data      Procedures       A consult was attained from the gastroenterology service. The case was discussed with on call fellow  from that service. The consulting service's recommendations were provided promptly.          Results for orders placed or performed during the hospital encounter of 08/09/24   US Abdomen Limited     Status: None    Narrative    EXAMINATION: Limited Abdominal Ultrasound, 8/9/2024 5:51 PM     COMPARISON: Paracentesis ultrasound 8/1/2024, abdominal ultrasound  7/31/2024    HISTORY: RUQ pain, ascites, concern for liver failure    FINDINGS:   Fluid: Large right-sided pleural effusion and small to moderate  left-sided pleural effusion. Small volume ascites. Pericardial  effusion partially visualized.    Liver: The liver demonstrates mildly increased echogenicity, measuring  16.3 cm in craniocaudal dimension. There is no focal mass. The main  portal vein is patent with normal flow towards the liver.    Gallbladder: Diffuse wall thickening. Measuring 7 mm. Small  nonshadowing echogenic focus along the gallbladder wall, similar  compared to prior. Negative sonographic Martínez's sign.    Bile Ducts: Both the intra- and extrahepatic biliary system are of  normal caliber.  The common bile duct measures 6 mm in diameter.    Pancreas: Visualized portions of the head and body of the pancreas are  unremarkable.     Kidney: The right kidney measures 10.6 cm long. There is no  hydronephrosis or hydroureter, no shadowing renal calculi, cystic  lesion or mass. Prominent column of Emiliano, similar compared to prior.    Vessels: Normal appearance of the visualized IVC and aorta.      Impression    IMPRESSION:   1.  Large right and small-to-moderate left pleural effusions.  2.  Partially visualized pericardial effusion.  3.  Small volume ascites in the right lower quadrant.  4.  Similar appearance of gallbladder including gallbladder wall  thickening and echogenic focus along the wall, possibly adherent stone  versus small polyp.    Discussed with Dr. Leonard by Dr. Berry at 6:00 PM on 8/9/2024.    I have personally reviewed the  examination and initial interpretation  and I agree with the findings.    CONSUELO RUSSELL MD         SYSTEM ID:  [id number]   Comprehensive metabolic panel     Status: Abnormal   Result Value Ref Range    Sodium 132 (L) 135 - 145 mmol/L    Potassium 4.9 3.4 - 5.3 mmol/L    Carbon Dioxide (CO2) 14 (L) 22 - 29 mmol/L    Anion Gap 11 7 - 15 mmol/L    Urea Nitrogen 10.4 6.0 - 20.0 mg/dL    Creatinine 0.58 0.51 - 0.95 mg/dL    GFR Estimate >90 >60 mL/min/1.73m2    Calcium 8.3 (L) 8.8 - 10.4 mg/dL    Chloride 107 98 - 107 mmol/L    Glucose 146 (H) 70 - 99 mg/dL    Alkaline Phosphatase 114 40 - 150 U/L    AST      ALT 5 0 - 50 U/L    Protein Total 6.2 (L) 6.4 - 8.3 g/dL    Albumin 3.1 (L) 3.5 - 5.2 g/dL    Bilirubin Total 2.3 (H) <=1.2 mg/dL   BNP     Status: Abnormal   Result Value Ref Range    N terminal Pro BNP Inpatient 2,232 (H) 0 - 450 pg/mL   Magnesium     Status: Abnormal   Result Value Ref Range    Magnesium 1.6 (L) 1.7 - 2.3 mg/dL   CBC with platelets and differential     Status: Abnormal   Result Value Ref Range    WBC Count 16.3 (H) 4.0 - 11.0 10e3/uL    RBC Count 2.50 (L) 3.80 - 5.20 10e6/uL    Hemoglobin 8.2 (L) 11.7 - 15.7 g/dL    Hematocrit 26.3 (L) 35.0 - 47.0 %     (H) 78 - 100 fL    MCH 32.8 26.5 - 33.0 pg    MCHC 31.2 (L) 31.5 - 36.5 g/dL    RDW 18.0 (H) 10.0 - 15.0 %    Platelet Count 198 150 - 450 10e3/uL    % Neutrophils 63 %    % Lymphocytes 23 %    % Monocytes 7 %    % Eosinophils 5 %    % Basophils 1 %    % Immature Granulocytes 1 %    NRBCs per 100 WBC 0 <1 /100    Absolute Neutrophils 10.4 (H) 1.6 - 8.3 10e3/uL    Absolute Lymphocytes 3.7 0.8 - 5.3 10e3/uL    Absolute Monocytes 1.1 0.0 - 1.3 10e3/uL    Absolute Eosinophils 0.8 (H) 0.0 - 0.7 10e3/uL    Absolute Basophils 0.1 0.0 - 0.2 10e3/uL    Absolute Immature Granulocytes 0.1 <=0.4 10e3/uL    Absolute NRBCs 0.0 10e3/uL   EKG 12 lead     Status: None (Preliminary result)   Result Value Ref Range    Systolic Blood Pressure  mmHg     Diastolic Blood Pressure  mmHg    Ventricular Rate 46 BPM    Atrial Rate 300 BPM    UT Interval 152 ms    QRS Duration 72 ms     ms    QTc 491 ms    P Axis 22 degrees    R AXIS 32 degrees    T Axis 56 degrees    Interpretation ECG       Undetermined rhythm  Low voltage QRS  Cannot rule out Anterior infarct , age undetermined  Abnormal ECG     CBC with Platelets & Differential     Status: Abnormal    Narrative    The following orders were created for panel order CBC with Platelets & Differential.  Procedure                               Abnormality         Status                     ---------                               -----------         ------                     CBC with platelets and d...[[id number]]  Abnormal            Final result                 Please view results for these tests on the individual orders.   Results for orders placed or performed in visit on 08/09/24   CBC with platelets     Status: Abnormal   Result Value Ref Range    WBC Count 13.3 (H) 4.0 - 11.0 10e3/uL    RBC Count 2.32 (L) 3.80 - 5.20 10e6/uL    Hemoglobin 7.7 (LL) 11.7 - 15.7 g/dL    Hematocrit 24.1 (L) 35.0 - 47.0 %     (H) 78 - 100 fL    MCH 33.2 (H) 26.5 - 33.0 pg    MCHC 32.0 31.5 - 36.5 g/dL    RDW 18.4 (H) 10.0 - 15.0 %    Platelet Count 178 150 - 450 10e3/uL   Basic metabolic panel  (Ca, Cl, CO2, Creat, Gluc, K, Na, BUN)     Status: Abnormal   Result Value Ref Range    Sodium 132 (L) 135 - 145 mmol/L    Potassium 3.2 (L) 3.4 - 5.3 mmol/L    Chloride 105 98 - 107 mmol/L    Carbon Dioxide (CO2) 16 (L) 22 - 29 mmol/L    Anion Gap 11 7 - 15 mmol/L    Urea Nitrogen 10.8 6.0 - 20.0 mg/dL    Creatinine 0.69 0.51 - 0.95 mg/dL    GFR Estimate >90 >60 mL/min/1.73m2    Calcium 8.6 (L) 8.8 - 10.4 mg/dL    Glucose 188 (H) 70 - 99 mg/dL     Medications   hydrochlorothiazide (HYDRODIURIL) tablet 25 mg (25 mg Oral $Given 8/9/24 2104)   spironolactone (ALDACTONE) tablet 25 mg (25 mg Oral $Given 8/9/24 2104)     Labs Ordered and  Resulted from Time of ED Arrival to Time of ED Departure   COMPREHENSIVE METABOLIC PANEL - Abnormal       Result Value    Sodium 132 (*)     Potassium 4.9      Carbon Dioxide (CO2) 14 (*)     Anion Gap 11      Urea Nitrogen 10.4      Creatinine 0.58      GFR Estimate >90      Calcium 8.3 (*)     Chloride 107      Glucose 146 (*)     Alkaline Phosphatase 114      AST        ALT 5      Protein Total 6.2 (*)     Albumin 3.1 (*)     Bilirubin Total 2.3 (*)    NT PROBNP INPATIENT - Abnormal    N terminal Pro BNP Inpatient 2,232 (*)    MAGNESIUM - Abnormal    Magnesium 1.6 (*)    CBC WITH PLATELETS AND DIFFERENTIAL - Abnormal    WBC Count 16.3 (*)     RBC Count 2.50 (*)     Hemoglobin 8.2 (*)     Hematocrit 26.3 (*)      (*)     MCH 32.8      MCHC 31.2 (*)     RDW 18.0 (*)     Platelet Count 198      % Neutrophils 63      % Lymphocytes 23      % Monocytes 7      % Eosinophils 5      % Basophils 1      % Immature Granulocytes 1      NRBCs per 100 WBC 0      Absolute Neutrophils 10.4 (*)     Absolute Lymphocytes 3.7      Absolute Monocytes 1.1      Absolute Eosinophils 0.8 (*)     Absolute Basophils 0.1      Absolute Immature Granulocytes 0.1      Absolute NRBCs 0.0       US Abdomen Limited   Final Result   IMPRESSION:    1.  Large right and small-to-moderate left pleural effusions.   2.  Partially visualized pericardial effusion.   3.  Small volume ascites in the right lower quadrant.   4.  Similar appearance of gallbladder including gallbladder wall   thickening and echogenic focus along the wall, possibly adherent stone   versus small polyp.      Discussed with Dr. Leonard by Dr. Berry at 6:00 PM on 8/9/2024.      I have personally reviewed the examination and initial interpretation   and I agree with the findings.      CONSUELO RUSSELL MD            SYSTEM ID:  [id number]             Critical care was not performed.     Medical Decision Making  The patient's presentation was of moderate complexity (a chronic  illness mild to moderate exacerbation, progression, or side effect of treatment).    The patient's evaluation involved:  review of external note(s) from 1 sources (see separate area of note for details)  review of 3+ test result(s) ordered prior to this encounter (see separate area of note for details)  ordering and/or review of 3+ test(s) in this encounter (see separate area of note for details)  discussion of management or test interpretation with another health professional (gastroenterology)    The patient's management necessitated moderate risk (prescription drug management including medications given in the ED).    Assessment & Plan    Maribell Leon is a 29 year old female with a history of severe alcohol use disorder complicated by liver cirrhosis, alcohol related neuropathy, chronic pancreatitis who presents for lower extremity swelling.  Patient is not acutely toxic appearing on exam, though she is overall pale appearing and has bilateral lower extremity pitting edema as well as ascites on exam.  Chest x-ray obtained and showed bilateral pleural effusions.  Patient is not hypoxic and has no respiratory distress.  Lab work obtained and patient does not have CHANTELL at this time.  I did speak with the on-call gastroenterology fellow about patient's presentation, fluid overloaded state, and they recommended reinitiation of diuretic therapy which I gave today and prescribed again in case they were not able to find the prescription.  Patient has upcoming follow-up with gastroenterology on Tuesday.  We discussed follow-up plan, repeat labs prior to that visit and after initiation of the diuretic therapy, and strict return precautions.  Patient expressed understanding prior to discharge.    I have reviewed the nursing notes. I have reviewed the findings, diagnosis, plan and need for follow up with the patient.    Discharge Medication List as of 8/9/2024  9:10 PM        START taking these medications    Details    oxyCODONE (ROXICODONE) 5 MG tablet Take 1 tablet (5 mg) by mouth every 6 hours as needed for pain, Disp-12 tablet, R-0, Local Print      spironolactone-HCTZ (ALDACTAZIDE) 25-25 MG tablet Take 1 tablet by mouth daily for 30 days, Disp-30 tablet, R-0, Local Print             Final diagnoses:   Other cirrhosis of liver (H)   Lower extremity edema   Bilateral pleural effusion       Adria Ng MD  [facility] EMERGENCY DEPARTMENT  8/9/2024     Adria Ng MD  08/09/24 [id number]

## 2024-08-09 NOTE — PROGRESS NOTES
"Chief Complaint:    Chief Complaint   Patient presents with    Urgent Care    Leg Swelling     \"Severe edema\", both legs     Medical Decision Making:    Vital signs reviewed by Kenji Conner PA-C  BP 94/62 (BP Location: Right arm, Patient Position: Sitting, Cuff Size: Adult Regular)   Pulse (!) 48   Temp 98.3  F (36.8  C) (Oral)   SpO2 95%     Differential Diagnosis:  Cirrhosis of the liver, decompensated cirrhosis with ascites,       ASSESSMENT:     1. Bilateral leg edema    2. SOB (shortness of breath)    3. Alcoholic cirrhosis of liver with ascites (H)           PLAN:     Patient is in no acute distress.    Patient is borderline hypotensive with BP of 94/62 and bradycardic.    With worsening symptoms, can not rule out new onset CHF.    Patient instructed to go to the ED now for further evaluation, labs, imaging, and probable admission.    Patient instructed to follow up with PCP in the next week.  Patient and mother verbalized understanding and agreed with this plan.    Labs:     Results for orders placed or performed in visit on 08/09/24   CBC with platelets     Status: Abnormal   Result Value Ref Range    WBC Count 13.3 (H) 4.0 - 11.0 10e3/uL    RBC Count 2.32 (L) 3.80 - 5.20 10e6/uL    Hemoglobin 7.7 (LL) 11.7 - 15.7 g/dL    Hematocrit 24.1 (L) 35.0 - 47.0 %     (H) 78 - 100 fL    MCH 33.2 (H) 26.5 - 33.0 pg    MCHC 32.0 31.5 - 36.5 g/dL    RDW 18.4 (H) 10.0 - 15.0 %    Platelet Count 178 150 - 450 10e3/uL   Basic metabolic panel  (Ca, Cl, CO2, Creat, Gluc, K, Na, BUN)     Status: Abnormal   Result Value Ref Range    Sodium 132 (L) 135 - 145 mmol/L    Potassium 3.2 (L) 3.4 - 5.3 mmol/L    Chloride 105 98 - 107 mmol/L    Carbon Dioxide (CO2) 16 (L) 22 - 29 mmol/L    Anion Gap 11 7 - 15 mmol/L    Urea Nitrogen 10.8 6.0 - 20.0 mg/dL    Creatinine 0.69 0.51 - 0.95 mg/dL    GFR Estimate >90 >60 mL/min/1.73m2    Calcium 8.6 (L) 8.8 - 10.4 mg/dL    Glucose 188 (H) 70 - 99 mg/dL       Current " "Meds:    Current Outpatient Medications:     folic acid (FOLVITE) 1 MG tablet, Take 1 tablet (1 mg) by mouth daily, Disp: 30 tablet, Rfl: 0    methocarbamol (ROBAXIN) 500 MG tablet, Take 1 tablet (500 mg) by mouth 4 times daily as needed for muscle spasms, Disp: 30 tablet, Rfl: 0    cephALEXin (KEFLEX) 500 MG capsule, Take 1 capsule (500 mg) by mouth 2 times daily for 7 days (Patient not taking: Reported on 8/9/2024), Disp: 14 capsule, Rfl: 0    cyanocobalamin (VITAMIN B-12) 1000 MCG tablet, Take 1,000 mcg by mouth daily (Patient not taking: Reported on 8/9/2024), Disp: , Rfl:     escitalopram (LEXAPRO) 10 MG tablet, Take 10 mg by mouth daily (Patient not taking: Reported on 8/9/2024), Disp: , Rfl:     Allergies:  No Known Allergies    SUBJECTIVE    HPI: Maribell Leon is an 29 year old female with a history of alcoholic cirrhosis who presents for evaluation and treatment of  bilateral leg swelling. She has a history of severe alcohol use disorder complicated by liver cirrhosis, alcohol related neuropathy, and chronic pancreatitis.  Patient reports discharge from ED on 7/31 with close follow up with hepatology scheduled for 8/13. Over the course of the last several days, she has noticed increasing swelling to both of her feet and legs, today, she felt extremely uncomfortable as the skin on both her legs \"feels like it is going to split open.\" She has completed the course of antibiotics given to her on discharge, but has not been taking diuretics as she was instructed to wait until her hepatology visit. She endorses some SOB, mild abdominal swelling. Denies chest pain, palpitations, fever, cough, or any chills.     Patient is new to [facility].      ROS:      Review of Systems   Constitutional:  Negative for chills and fever.   HENT:  Negative for congestion, ear pain, rhinorrhea and sore throat.    Eyes: Negative.    Respiratory:  Positive for shortness of breath. Negative for cough.    Cardiovascular:  " Positive for leg swelling (Bilateral). Negative for chest pain and palpitations.   Gastrointestinal:  Negative for diarrhea, nausea and vomiting.   Endocrine: Negative.    Genitourinary: Negative.    Musculoskeletal: Negative.  Negative for arthralgias, back pain, joint swelling, myalgias, neck pain and neck stiffness.        Bilateral leg pain     Skin: Negative.  Negative for rash and wound.   Allergic/Immunologic: Negative.  Negative for immunocompromised state.   Neurological:  Negative for dizziness, weakness, light-headedness and headaches.        Family History   Family History   Problem Relation Age of Onset    Allergy (Severe) Mother         Allergies,Environmental allergies, celiac disease    Family History Negative Father         Good Health    Cancer Maternal Grandfather         Cancer,Kidney cancer    Diabetes Maternal Grandmother         Diabetes,Type 2    Heart Disease Paternal Grandfather         Heart Disease,Cardiomyopathy    Asthma Brother         Asthma,history of intermittent asthma    Other - See Comments Brother         Enuresis    Family History Negative Sister         Good Health    Family History Negative Sister         Good Health       Social History  Social History     Socioeconomic History    Marital status:      Spouse name: Not on file    Number of children: Not on file    Years of education: Not on file    Highest education level: Not on file   Occupational History    Not on file   Tobacco Use    Smoking status: Some Days     Current packs/day: 0.10     Types: Cigarettes    Smokeless tobacco: Never   Substance and Sexual Activity    Alcohol use: No    Drug use: Unknown     Types: Other     Comment: Drug use: No    Sexual activity: Yes     Partners: Male   Other Topics Concern    Not on file   Social History Narrative    No secondhand smoke exposure.      Attends [employer].  Dad is a counselor at [employer] and mom is .      Senior fall 2012    Works as PCA at  [employer]    Involved in hockey cheerleading    Preloaded 03/22/2013     Social Determinants of Health     Financial Resource Strain: Patient Declined (7/12/2024)    Received from [facility]    Overall Financial Resource Strain (CARDIA)     Difficulty of Paying Living Expenses: Patient declined   Food Insecurity: No Food Insecurity (7/13/2024)    Received from [facility]    Hunger Vital Sign     Worried About Running Out of Food in the Last Year: Never true     Ran Out of Food in the Last Year: Never true   Transportation Needs: No Transportation Needs (7/13/2024)    Received from [facility]    PRAPARE - Transportation     Lack of Transportation (Medical): No     Lack of Transportation (Non-Medical): No   Physical Activity: Not on file   Stress: Not on file   Social Connections: Unknown (4/11/2023)    Received from [facility]    Social Connections     Frequency of Communication with Friends and Family: Not on file   Interpersonal Safety: Not At Risk (7/13/2024)    Received from [facility]    EH IP Custom IPV     Do you feel UNSAFE in any of your personal relationships with your family members or any other acquaintances?: No   Housing Stability: Low Risk  (7/13/2024)    Received from [facility]    Housing Stability Vital Sign     Unable to Pay for Housing in the Last Year: No     Number of Times Moved in the Last Year: 1     Homeless in the Last Year: No        Surgical History:  Past Surgical History:   Procedure Laterality Date    TONSILLECTOMY      8/05        Problem List:  Patient Active Problem List   Diagnosis    Surveillance of previously prescribed contraceptive pill    Lyme disease    Menorrhagia    Weakness generalized    CHANTELL (acute kidney injury) (H24)    Alcoholic cirrhosis of liver with ascites (H)     Elevated uric acid in blood    Pain of right lower extremity    Anemia, unspecified type    Leukocytosis, unspecified type    Other cirrhosis of liver (H)    Cellulitis of right lower extremity      OBJECTIVE:     Vital signs noted and reviewed by Kenji Conner PA-C  BP 94/62 (BP Location: Right arm, Patient Position: Sitting, Cuff Size: Adult Regular)   Pulse (!) 48   Temp 98.3  F (36.8  C) (Oral)   SpO2 95%      PEFR:    Physical Exam  Vitals and nursing note reviewed.   Constitutional:       General: She is in acute distress.      Appearance: She is well-developed. She is toxic-appearing.   HENT:      Head: Normocephalic and atraumatic.      Right Ear: Tympanic membrane and external ear normal. No drainage, swelling or tenderness. Tympanic membrane is not perforated, erythematous, retracted or bulging.      Left Ear: Tympanic membrane and external ear normal. No drainage, swelling or tenderness. Tympanic membrane is not perforated, erythematous, retracted or bulging.      Nose: No mucosal edema, congestion or rhinorrhea.      Right Sinus: No maxillary sinus tenderness or frontal sinus tenderness.      Left Sinus: No maxillary sinus tenderness or frontal sinus tenderness.      Mouth/Throat:      Pharynx: No pharyngeal swelling, oropharyngeal exudate, posterior oropharyngeal erythema or uvula swelling.      Tonsils: No tonsillar abscesses.   Eyes:      General: Scleral icterus present.   Neck:      Trachea: Trachea normal.   Cardiovascular:      Rate and Rhythm: Bradycardia present.      Heart sounds: Normal heart sounds, S1 normal and S2 normal. No murmur heard.     No friction rub. No gallop.   Pulmonary:      Effort: Pulmonary effort is normal. No respiratory distress.      Breath sounds: Normal breath sounds. No decreased breath sounds, wheezing, rhonchi or rales.   Abdominal:      General: Bowel sounds are normal. There is distension (Mild).      Palpations: Abdomen is soft. Abdomen is not rigid. There  is fluid wave (Mild). There is no mass.      Tenderness: There is no abdominal tenderness. There is no guarding or rebound.   Musculoskeletal:      Cervical back: Full passive range of motion without pain, normal range of motion and neck supple.      Right lower leg: Edema present.      Left lower leg: Edema present.   Lymphadenopathy:      Cervical: No cervical adenopathy.   Skin:     General: Skin is warm.      Coloration: Skin is jaundiced.      Comments: Skin on bilateral legs appears tight to palpation. From both feet extending into the hips.    Neurological:      Mental Status: She is alert and oriented to person, place, and time.      Cranial Nerves: No cranial nerve deficit.      Deep Tendon Reflexes: Reflexes are normal and symmetric.   Psychiatric:         Behavior: Behavior normal. Behavior is cooperative.         Thought Content: Thought content normal.         Judgment: Judgment normal.             Kenji Conner PA-C  8/9/2024, 1:56 PM

## 2024-08-09 NOTE — ED NOTES
Lab called to notify CBC was clotted. Pt at US at the moment. Plan to redraw when pt. Returns.    Oumou Joe RN on 8/9/2024 at 5:21 PM

## 2024-08-09 NOTE — ED TRIAGE NOTES
Came in due to increased edema in legs where it is impeding with activity, bradycardic and hypotensive. Pt. States she is normally hypotensive.  Slight shortness of breath.     Triage Assessment (Adult)       Row Name 08/09/24 1511          Triage Assessment    Airway WDL WDL        Respiratory WDL    Respiratory WDL X;rhythm/pattern     Rhythm/Pattern, Respiratory shortness of breath        Skin Circulation/Temperature WDL    Skin Circulation/Temperature WDL WDL        Cardiac WDL    Cardiac WDL X  bradycardic, hypotensive, edema of lower legs        Peripheral/Neurovascular WDL    Peripheral Neurovascular WDL WDL        Cognitive/Neuro/Behavioral WDL    Cognitive/Neuro/Behavioral WDL WDL

## 2024-08-10 NOTE — DISCHARGE INSTRUCTIONS
Please follow up as scheduled with Hepatology. Return to the emergency department if you have worsening emergent concerns such as shortness of breath, fevers, or other concerns.

## 2024-08-12 ENCOUNTER — APPOINTMENT (OUTPATIENT)
Dept: CT IMAGING | Facility: CLINIC | Age: 29
End: 2024-08-12
Payer: MEDICAID

## 2024-08-12 ENCOUNTER — APPOINTMENT (OUTPATIENT)
Dept: GENERAL RADIOLOGY | Facility: CLINIC | Age: 29
End: 2024-08-12
Attending: PEDIATRICS
Payer: MEDICAID

## 2024-08-12 ENCOUNTER — HOSPITAL ENCOUNTER (INPATIENT)
Facility: CLINIC | Age: 29
LOS: 9 days | Discharge: HOME HEALTH | End: 2024-08-21
Attending: EMERGENCY MEDICINE | Admitting: STUDENT IN AN ORGANIZED HEALTH CARE EDUCATION/TRAINING PROGRAM
Payer: MEDICAID

## 2024-08-12 ENCOUNTER — APPOINTMENT (OUTPATIENT)
Dept: CT IMAGING | Facility: CLINIC | Age: 29
End: 2024-08-12
Attending: EMERGENCY MEDICINE
Payer: MEDICAID

## 2024-08-12 ENCOUNTER — APPOINTMENT (OUTPATIENT)
Dept: GENERAL RADIOLOGY | Facility: CLINIC | Age: 29
End: 2024-08-12
Attending: EMERGENCY MEDICINE
Payer: MEDICAID

## 2024-08-12 ENCOUNTER — DOCUMENTATION ONLY (OUTPATIENT)
Dept: GASTROENTEROLOGY | Facility: CLINIC | Age: 29
End: 2024-08-12

## 2024-08-12 DIAGNOSIS — R53.1 WEAKNESS GENERALIZED: ICD-10-CM

## 2024-08-12 DIAGNOSIS — F41.9 ANXIETY: ICD-10-CM

## 2024-08-12 DIAGNOSIS — J90 PLEURAL EFFUSION ON RIGHT: ICD-10-CM

## 2024-08-12 DIAGNOSIS — K70.31 ALCOHOLIC CIRRHOSIS OF LIVER WITH ASCITES (H): Primary | ICD-10-CM

## 2024-08-12 DIAGNOSIS — K70.31 ALCOHOLIC CIRRHOSIS OF LIVER WITH ASCITES (H): ICD-10-CM

## 2024-08-12 DIAGNOSIS — G59 MONONEUROPATHY DUE TO UNDERLYING DISEASE: ICD-10-CM

## 2024-08-12 DIAGNOSIS — M79.604 PAIN OF RIGHT LOWER EXTREMITY: ICD-10-CM

## 2024-08-12 DIAGNOSIS — J18.9 PNEUMONIA OF BOTH LUNGS DUE TO INFECTIOUS ORGANISM, UNSPECIFIED PART OF LUNG: Primary | ICD-10-CM

## 2024-08-12 LAB
ALBUMIN SERPL BCG-MCNC: 3.7 G/DL (ref 3.5–5.2)
ALBUMIN UR-MCNC: NEGATIVE MG/DL
ALP SERPL-CCNC: 126 U/L (ref 40–150)
ALT SERPL W P-5'-P-CCNC: 7 U/L (ref 0–50)
ANION GAP SERPL CALCULATED.3IONS-SCNC: 14 MMOL/L (ref 7–15)
APPEARANCE FLD: CLEAR
APPEARANCE UR: CLEAR
AST SERPL W P-5'-P-CCNC: ABNORMAL U/L
ATRIAL RATE - MUSE: 107 BPM
ATRIAL RATE - MUSE: 300 BPM
BACTERIA #/AREA URNS HPF: ABNORMAL /HPF
BASE EXCESS BLDV CALC-SCNC: -3 MMOL/L (ref -3–3)
BASE EXCESS BLDV CALC-SCNC: -3.9 MMOL/L (ref -3–3)
BASE EXCESS BLDV CALC-SCNC: -5.9 MMOL/L (ref -3–3)
BASOPHILS # BLD AUTO: 0.1 10E3/UL (ref 0–0.2)
BASOPHILS NFR BLD AUTO: 1 %
BILIRUB SERPL-MCNC: 2.7 MG/DL
BILIRUB UR QL STRIP: NEGATIVE
BUN SERPL-MCNC: 4 MG/DL (ref 6–20)
C PNEUM DNA SPEC QL NAA+PROBE: NOT DETECTED
CALCIUM SERPL-MCNC: 9.9 MG/DL (ref 8.8–10.4)
CELL COUNT BODY FLUID SOURCE: NORMAL
CHLORIDE SERPL-SCNC: 109 MMOL/L (ref 98–107)
COLOR FLD: YELLOW
COLOR UR AUTO: ABNORMAL
CREAT SERPL-MCNC: 0.5 MG/DL (ref 0.51–0.95)
DIASTOLIC BLOOD PRESSURE - MUSE: NORMAL MMHG
DIASTOLIC BLOOD PRESSURE - MUSE: NORMAL MMHG
EGFRCR SERPLBLD CKD-EPI 2021: >90 ML/MIN/1.73M2
EOSINOPHIL # BLD AUTO: 0.1 10E3/UL (ref 0–0.7)
EOSINOPHIL NFR BLD AUTO: 1 %
ERYTHROCYTE [DISTWIDTH] IN BLOOD BY AUTOMATED COUNT: 17.9 % (ref 10–15)
FLUAV H1 2009 PAND RNA SPEC QL NAA+PROBE: NOT DETECTED
FLUAV H1 RNA SPEC QL NAA+PROBE: NOT DETECTED
FLUAV H3 RNA SPEC QL NAA+PROBE: NOT DETECTED
FLUAV RNA SPEC QL NAA+PROBE: NEGATIVE
FLUAV RNA SPEC QL NAA+PROBE: NOT DETECTED
FLUBV RNA RESP QL NAA+PROBE: NEGATIVE
FLUBV RNA SPEC QL NAA+PROBE: NOT DETECTED
GLUCOSE SERPL-MCNC: 148 MG/DL (ref 70–99)
GLUCOSE UR STRIP-MCNC: NEGATIVE MG/DL
HADV DNA SPEC QL NAA+PROBE: NOT DETECTED
HCG UR QL: NEGATIVE
HCO3 BLDV-SCNC: 19 MMOL/L (ref 21–28)
HCO3 BLDV-SCNC: 21 MMOL/L (ref 21–28)
HCO3 BLDV-SCNC: 21 MMOL/L (ref 21–28)
HCO3 SERPL-SCNC: 19 MMOL/L (ref 22–29)
HCOV PNL SPEC NAA+PROBE: NOT DETECTED
HCT VFR BLD AUTO: 28.6 % (ref 35–47)
HGB BLD-MCNC: 8.8 G/DL (ref 11.7–15.7)
HGB UR QL STRIP: NEGATIVE
HMPV RNA SPEC QL NAA+PROBE: NOT DETECTED
HOLD SPECIMEN: NORMAL
HPIV1 RNA SPEC QL NAA+PROBE: NOT DETECTED
HPIV2 RNA SPEC QL NAA+PROBE: NOT DETECTED
HPIV3 RNA SPEC QL NAA+PROBE: NOT DETECTED
HPIV4 RNA SPEC QL NAA+PROBE: NOT DETECTED
IMM GRANULOCYTES # BLD: 0.2 10E3/UL
IMM GRANULOCYTES NFR BLD: 1 %
INR PPP: 1.74 (ref 0.85–1.15)
INTERPRETATION ECG - MUSE: NORMAL
INTERPRETATION ECG - MUSE: NORMAL
KETONES UR STRIP-MCNC: NEGATIVE MG/DL
L PNEUMO1 AG UR QL IA: NEGATIVE
LACTATE BLD-SCNC: 1.9 MMOL/L
LACTATE SERPL-SCNC: 1.8 MMOL/L (ref 0.7–2)
LD BODY BODY FLUID SOURCE: NORMAL
LDH FLD L TO P-CCNC: 87 U/L
LEUKOCYTE ESTERASE UR QL STRIP: NEGATIVE
LIPASE SERPL-CCNC: 16 U/L (ref 13–60)
LYMPHOCYTES # BLD AUTO: 2.2 10E3/UL (ref 0.8–5.3)
LYMPHOCYTES NFR BLD AUTO: 10 %
M PNEUMO DNA SPEC QL NAA+PROBE: NOT DETECTED
MCH RBC QN AUTO: 32.1 PG (ref 26.5–33)
MCHC RBC AUTO-ENTMCNC: 30.8 G/DL (ref 31.5–36.5)
MCV RBC AUTO: 104 FL (ref 78–100)
MONOCYTES # BLD AUTO: 1.2 10E3/UL (ref 0–1.3)
MONOCYTES NFR BLD AUTO: 5 %
MRSA DNA SPEC QL NAA+PROBE: NEGATIVE
NEUTROPHILS # BLD AUTO: 18.1 10E3/UL (ref 1.6–8.3)
NEUTROPHILS NFR BLD AUTO: 82 %
NITRATE UR QL: NEGATIVE
NRBC # BLD AUTO: 0 10E3/UL
NRBC BLD AUTO-RTO: 0 /100
NT-PROBNP SERPL-MCNC: ABNORMAL PG/ML (ref 0–450)
O2/TOTAL GAS SETTING VFR VENT: 0 %
O2/TOTAL GAS SETTING VFR VENT: 50 %
OXYHGB MFR BLDV: 61 % (ref 70–75)
OXYHGB MFR BLDV: 64 % (ref 70–75)
P AXIS - MUSE: 22 DEGREES
P AXIS - MUSE: 41 DEGREES
PCO2 BLDV: 30 MM HG (ref 40–50)
PCO2 BLDV: 33 MM HG (ref 40–50)
PCO2 BLDV: 38 MM HG (ref 40–50)
PH BLDV: 7.36 [PH] (ref 7.32–7.43)
PH BLDV: 7.37 [PH] (ref 7.32–7.43)
PH BLDV: 7.45 [PH] (ref 7.32–7.43)
PH UR STRIP: 5.5 [PH] (ref 5–7)
PLATELET # BLD AUTO: 227 10E3/UL (ref 150–450)
PO2 BLDV: 27 MM HG (ref 25–47)
PO2 BLDV: 36 MM HG (ref 25–47)
PO2 BLDV: 38 MM HG (ref 25–47)
POTASSIUM SERPL-SCNC: 3.3 MMOL/L (ref 3.4–5.3)
PR INTERVAL - MUSE: 152 MS
PR INTERVAL - MUSE: 154 MS
PROCALCITONIN SERPL IA-MCNC: 0.52 NG/ML
PROT FLD-MCNC: 2.3 G/DL
PROT SERPL-MCNC: 7.1 G/DL (ref 6.4–8.3)
PROTEIN BODY FLUID SOURCE: NORMAL
QRS DURATION - MUSE: 72 MS
QRS DURATION - MUSE: 72 MS
QT - MUSE: 298 MS
QT - MUSE: 562 MS
QTC - MUSE: 397 MS
QTC - MUSE: 491 MS
R AXIS - MUSE: 23 DEGREES
R AXIS - MUSE: 32 DEGREES
RBC # BLD AUTO: 2.74 10E6/UL (ref 3.8–5.2)
RBC URINE: <1 /HPF
RSV RNA SPEC NAA+PROBE: NEGATIVE
RSV RNA SPEC QL NAA+PROBE: NOT DETECTED
RSV RNA SPEC QL NAA+PROBE: NOT DETECTED
RV+EV RNA SPEC QL NAA+PROBE: NOT DETECTED
S PNEUM AG SPEC QL: NEGATIVE
SA TARGET DNA: NEGATIVE
SAO2 % BLDV: 56 % (ref 70–75)
SAO2 % BLDV: 62.2 % (ref 70–75)
SAO2 % BLDV: 65.5 % (ref 70–75)
SARS-COV-2 RNA RESP QL NAA+PROBE: NEGATIVE
SARS-COV-2 RNA RESP QL NAA+PROBE: NEGATIVE
SODIUM SERPL-SCNC: 142 MMOL/L (ref 135–145)
SP GR UR STRIP: 1.01 (ref 1–1.03)
SYSTOLIC BLOOD PRESSURE - MUSE: NORMAL MMHG
SYSTOLIC BLOOD PRESSURE - MUSE: NORMAL MMHG
T AXIS - MUSE: 40 DEGREES
T AXIS - MUSE: 56 DEGREES
TROPONIN T SERPL HS-MCNC: 52 NG/L
TROPONIN T SERPL HS-MCNC: 59 NG/L
TROPONIN T SERPL HS-MCNC: 61 NG/L
UROBILINOGEN UR STRIP-MCNC: NORMAL MG/DL
VENTRICULAR RATE- MUSE: 107 BPM
VENTRICULAR RATE- MUSE: 46 BPM
WBC # BLD AUTO: 21.9 10E3/UL (ref 4–11)
WBC # FLD AUTO: 503 /UL
WBC URINE: 1 /HPF

## 2024-08-12 PROCEDURE — 87385 HISTOPLASMA CAPSUL AG IA: CPT | Performed by: INTERNAL MEDICINE

## 2024-08-12 PROCEDURE — 94660 CPAP INITIATION&MGMT: CPT

## 2024-08-12 PROCEDURE — 120N000002 HC R&B MED SURG/OB UMMC

## 2024-08-12 PROCEDURE — 81025 URINE PREGNANCY TEST: CPT | Performed by: EMERGENCY MEDICINE

## 2024-08-12 PROCEDURE — 32555 ASPIRATE PLEURA W/ IMAGING: CPT | Performed by: EMERGENCY MEDICINE

## 2024-08-12 PROCEDURE — 84145 PROCALCITONIN (PCT): CPT

## 2024-08-12 PROCEDURE — 87040 BLOOD CULTURE FOR BACTERIA: CPT

## 2024-08-12 PROCEDURE — 99223 1ST HOSP IP/OBS HIGH 75: CPT | Mod: 25 | Performed by: STUDENT IN AN ORGANIZED HEALTH CARE EDUCATION/TRAINING PROGRAM

## 2024-08-12 PROCEDURE — 36415 COLL VENOUS BLD VENIPUNCTURE: CPT | Performed by: PEDIATRICS

## 2024-08-12 PROCEDURE — 74177 CT ABD & PELVIS W/CONTRAST: CPT

## 2024-08-12 PROCEDURE — 999N000157 HC STATISTIC RCP TIME EA 10 MIN

## 2024-08-12 PROCEDURE — 82803 BLOOD GASES ANY COMBINATION: CPT

## 2024-08-12 PROCEDURE — 82040 ASSAY OF SERUM ALBUMIN: CPT | Performed by: EMERGENCY MEDICINE

## 2024-08-12 PROCEDURE — 85025 COMPLETE CBC W/AUTO DIFF WBC: CPT | Performed by: EMERGENCY MEDICINE

## 2024-08-12 PROCEDURE — 250N000013 HC RX MED GY IP 250 OP 250 PS 637

## 2024-08-12 PROCEDURE — 71045 X-RAY EXAM CHEST 1 VIEW: CPT | Mod: 26 | Performed by: RADIOLOGY

## 2024-08-12 PROCEDURE — 32555 ASPIRATE PLEURA W/ IMAGING: CPT | Performed by: PEDIATRICS

## 2024-08-12 PROCEDURE — 83690 ASSAY OF LIPASE: CPT | Performed by: EMERGENCY MEDICINE

## 2024-08-12 PROCEDURE — 250N000011 HC RX IP 250 OP 636: Performed by: STUDENT IN AN ORGANIZED HEALTH CARE EDUCATION/TRAINING PROGRAM

## 2024-08-12 PROCEDURE — 250N000011 HC RX IP 250 OP 636

## 2024-08-12 PROCEDURE — 84145 PROCALCITONIN (PCT): CPT | Performed by: EMERGENCY MEDICINE

## 2024-08-12 PROCEDURE — 99291 CRITICAL CARE FIRST HOUR: CPT | Performed by: EMERGENCY MEDICINE

## 2024-08-12 PROCEDURE — 96375 TX/PRO/DX INJ NEW DRUG ADDON: CPT | Performed by: EMERGENCY MEDICINE

## 2024-08-12 PROCEDURE — 96365 THER/PROPH/DIAG IV INF INIT: CPT | Performed by: EMERGENCY MEDICINE

## 2024-08-12 PROCEDURE — 258N000003 HC RX IP 258 OP 636: Performed by: EMERGENCY MEDICINE

## 2024-08-12 PROCEDURE — 84484 ASSAY OF TROPONIN QUANT: CPT | Performed by: EMERGENCY MEDICINE

## 2024-08-12 PROCEDURE — 87205 SMEAR GRAM STAIN: CPT | Performed by: PEDIATRICS

## 2024-08-12 PROCEDURE — 87640 STAPH A DNA AMP PROBE: CPT | Performed by: EMERGENCY MEDICINE

## 2024-08-12 PROCEDURE — 87075 CULTR BACTERIA EXCEPT BLOOD: CPT | Performed by: EMERGENCY MEDICINE

## 2024-08-12 PROCEDURE — 250N000009 HC RX 250

## 2024-08-12 PROCEDURE — 71275 CT ANGIOGRAPHY CHEST: CPT | Mod: 26 | Performed by: RADIOLOGY

## 2024-08-12 PROCEDURE — 71045 X-RAY EXAM CHEST 1 VIEW: CPT

## 2024-08-12 PROCEDURE — 250N000009 HC RX 250: Performed by: EMERGENCY MEDICINE

## 2024-08-12 PROCEDURE — 87581 M.PNEUMON DNA AMP PROBE: CPT

## 2024-08-12 PROCEDURE — 84484 ASSAY OF TROPONIN QUANT: CPT

## 2024-08-12 PROCEDURE — 999N000065 XR CHEST PORT 1 VIEW

## 2024-08-12 PROCEDURE — 84157 ASSAY OF PROTEIN OTHER: CPT | Performed by: EMERGENCY MEDICINE

## 2024-08-12 PROCEDURE — 87635 SARS-COV-2 COVID-19 AMP PRB: CPT

## 2024-08-12 PROCEDURE — 94640 AIRWAY INHALATION TREATMENT: CPT | Mod: 76

## 2024-08-12 PROCEDURE — 93010 ELECTROCARDIOGRAM REPORT: CPT | Performed by: EMERGENCY MEDICINE

## 2024-08-12 PROCEDURE — 81001 URINALYSIS AUTO W/SCOPE: CPT | Performed by: EMERGENCY MEDICINE

## 2024-08-12 PROCEDURE — 82805 BLOOD GASES W/O2 SATURATION: CPT

## 2024-08-12 PROCEDURE — 83605 ASSAY OF LACTIC ACID: CPT

## 2024-08-12 PROCEDURE — 89050 BODY FLUID CELL COUNT: CPT | Performed by: EMERGENCY MEDICINE

## 2024-08-12 PROCEDURE — 36415 COLL VENOUS BLD VENIPUNCTURE: CPT

## 2024-08-12 PROCEDURE — 93005 ELECTROCARDIOGRAM TRACING: CPT | Performed by: EMERGENCY MEDICINE

## 2024-08-12 PROCEDURE — 83615 LACTATE (LD) (LDH) ENZYME: CPT | Performed by: EMERGENCY MEDICINE

## 2024-08-12 PROCEDURE — 0W9B3ZZ DRAINAGE OF LEFT PLEURAL CAVITY, PERCUTANEOUS APPROACH: ICD-10-PCS | Performed by: PEDIATRICS

## 2024-08-12 PROCEDURE — 83880 ASSAY OF NATRIURETIC PEPTIDE: CPT | Performed by: EMERGENCY MEDICINE

## 2024-08-12 PROCEDURE — 85610 PROTHROMBIN TIME: CPT | Performed by: PEDIATRICS

## 2024-08-12 PROCEDURE — 94640 AIRWAY INHALATION TREATMENT: CPT | Performed by: EMERGENCY MEDICINE

## 2024-08-12 PROCEDURE — 71275 CT ANGIOGRAPHY CHEST: CPT

## 2024-08-12 PROCEDURE — 36415 COLL VENOUS BLD VENIPUNCTURE: CPT | Performed by: EMERGENCY MEDICINE

## 2024-08-12 PROCEDURE — 99291 CRITICAL CARE FIRST HOUR: CPT | Mod: 25 | Performed by: EMERGENCY MEDICINE

## 2024-08-12 PROCEDURE — 250N000009 HC RX 250: Performed by: STUDENT IN AN ORGANIZED HEALTH CARE EDUCATION/TRAINING PROGRAM

## 2024-08-12 PROCEDURE — 87899 AGENT NOS ASSAY W/OPTIC: CPT

## 2024-08-12 PROCEDURE — 87641 MR-STAPH DNA AMP PROBE: CPT | Performed by: EMERGENCY MEDICINE

## 2024-08-12 PROCEDURE — 250N000011 HC RX IP 250 OP 636: Performed by: EMERGENCY MEDICINE

## 2024-08-12 PROCEDURE — 74177 CT ABD & PELVIS W/CONTRAST: CPT | Mod: 26 | Performed by: RADIOLOGY

## 2024-08-12 PROCEDURE — 87637 SARSCOV2&INF A&B&RSV AMP PRB: CPT | Performed by: EMERGENCY MEDICINE

## 2024-08-12 RX ORDER — MULTIVITAMIN WITH IRON
1 TABLET ORAL DAILY
COMMUNITY

## 2024-08-12 RX ORDER — OXYCODONE HYDROCHLORIDE 5 MG/1
5 TABLET ORAL ONCE
Status: DISCONTINUED | OUTPATIENT
Start: 2024-08-12 | End: 2024-08-12

## 2024-08-12 RX ORDER — METHYLPREDNISOLONE SODIUM SUCCINATE 125 MG/2ML
125 INJECTION, POWDER, LYOPHILIZED, FOR SOLUTION INTRAMUSCULAR; INTRAVENOUS ONCE
Status: COMPLETED | OUTPATIENT
Start: 2024-08-12 | End: 2024-08-12

## 2024-08-12 RX ORDER — GUAIFENESIN/DEXTROMETHORPHAN 100-10MG/5
10 SYRUP ORAL EVERY 4 HOURS PRN
Status: DISCONTINUED | OUTPATIENT
Start: 2024-08-12 | End: 2024-08-21 | Stop reason: HOSPADM

## 2024-08-12 RX ORDER — IOPAMIDOL 755 MG/ML
76 INJECTION, SOLUTION INTRAVASCULAR ONCE
Status: COMPLETED | OUTPATIENT
Start: 2024-08-12 | End: 2024-08-12

## 2024-08-12 RX ORDER — LIDOCAINE 40 MG/G
CREAM TOPICAL
Status: DISCONTINUED | OUTPATIENT
Start: 2024-08-12 | End: 2024-08-21 | Stop reason: HOSPADM

## 2024-08-12 RX ORDER — HYDROMORPHONE HYDROCHLORIDE 1 MG/ML
0.3 INJECTION, SOLUTION INTRAMUSCULAR; INTRAVENOUS; SUBCUTANEOUS EVERY 6 HOURS PRN
Status: DISCONTINUED | OUTPATIENT
Start: 2024-08-12 | End: 2024-08-13

## 2024-08-12 RX ORDER — ONDANSETRON 2 MG/ML
4 INJECTION INTRAMUSCULAR; INTRAVENOUS EVERY 30 MIN PRN
Status: COMPLETED | OUTPATIENT
Start: 2024-08-12 | End: 2024-08-12

## 2024-08-12 RX ORDER — FENTANYL CITRATE 50 UG/ML
75 INJECTION, SOLUTION INTRAMUSCULAR; INTRAVENOUS ONCE
Status: COMPLETED | OUTPATIENT
Start: 2024-08-12 | End: 2024-08-12

## 2024-08-12 RX ORDER — PIPERACILLIN SODIUM, TAZOBACTAM SODIUM 4; .5 G/20ML; G/20ML
4.5 INJECTION, POWDER, LYOPHILIZED, FOR SOLUTION INTRAVENOUS EVERY 6 HOURS
Status: DISCONTINUED | OUTPATIENT
Start: 2024-08-12 | End: 2024-08-19

## 2024-08-12 RX ORDER — HYDROMORPHONE HYDROCHLORIDE 1 MG/ML
0.3 INJECTION, SOLUTION INTRAMUSCULAR; INTRAVENOUS; SUBCUTANEOUS ONCE
Status: COMPLETED | OUTPATIENT
Start: 2024-08-12 | End: 2024-08-12

## 2024-08-12 RX ORDER — AMOXICILLIN 250 MG
2 CAPSULE ORAL 2 TIMES DAILY PRN
Status: DISCONTINUED | OUTPATIENT
Start: 2024-08-12 | End: 2024-08-21 | Stop reason: HOSPADM

## 2024-08-12 RX ORDER — FUROSEMIDE 10 MG/ML
40 INJECTION INTRAMUSCULAR; INTRAVENOUS ONCE
Status: COMPLETED | OUTPATIENT
Start: 2024-08-12 | End: 2024-08-12

## 2024-08-12 RX ORDER — IOPAMIDOL 755 MG/ML
52 INJECTION, SOLUTION INTRAVASCULAR ONCE
Status: COMPLETED | OUTPATIENT
Start: 2024-08-12 | End: 2024-08-12

## 2024-08-12 RX ORDER — HYDROMORPHONE HCL IN WATER/PF 6 MG/30 ML
0.2 PATIENT CONTROLLED ANALGESIA SYRINGE INTRAVENOUS ONCE
Status: COMPLETED | OUTPATIENT
Start: 2024-08-12 | End: 2024-08-12

## 2024-08-12 RX ORDER — PIPERACILLIN SODIUM, TAZOBACTAM SODIUM 4; .5 G/20ML; G/20ML
4.5 INJECTION, POWDER, LYOPHILIZED, FOR SOLUTION INTRAVENOUS ONCE
Status: COMPLETED | OUTPATIENT
Start: 2024-08-12 | End: 2024-08-12

## 2024-08-12 RX ORDER — LANOLIN ALCOHOL/MO/W.PET/CERES
3 CREAM (GRAM) TOPICAL
Status: DISCONTINUED | OUTPATIENT
Start: 2024-08-12 | End: 2024-08-21 | Stop reason: HOSPADM

## 2024-08-12 RX ORDER — OXYCODONE HYDROCHLORIDE 5 MG/1
5 TABLET ORAL ONCE
Status: COMPLETED | OUTPATIENT
Start: 2024-08-12 | End: 2024-08-12

## 2024-08-12 RX ORDER — IPRATROPIUM BROMIDE AND ALBUTEROL SULFATE 2.5; .5 MG/3ML; MG/3ML
3 SOLUTION RESPIRATORY (INHALATION) ONCE
Status: COMPLETED | OUTPATIENT
Start: 2024-08-12 | End: 2024-08-12

## 2024-08-12 RX ORDER — VANCOMYCIN HYDROCHLORIDE
1250 EVERY 12 HOURS SCHEDULED
Status: DISCONTINUED | OUTPATIENT
Start: 2024-08-12 | End: 2024-08-13

## 2024-08-12 RX ORDER — CALCIUM CARBONATE 500 MG/1
1000 TABLET, CHEWABLE ORAL 4 TIMES DAILY PRN
Status: DISCONTINUED | OUTPATIENT
Start: 2024-08-12 | End: 2024-08-21 | Stop reason: HOSPADM

## 2024-08-12 RX ORDER — AMOXICILLIN 250 MG
1 CAPSULE ORAL 2 TIMES DAILY PRN
Status: DISCONTINUED | OUTPATIENT
Start: 2024-08-12 | End: 2024-08-21 | Stop reason: HOSPADM

## 2024-08-12 RX ORDER — IPRATROPIUM BROMIDE AND ALBUTEROL SULFATE 2.5; .5 MG/3ML; MG/3ML
3 SOLUTION RESPIRATORY (INHALATION)
Status: DISCONTINUED | OUTPATIENT
Start: 2024-08-12 | End: 2024-08-14

## 2024-08-12 RX ORDER — METHYLPREDNISOLONE SODIUM SUCCINATE 125 MG/2ML
INJECTION, POWDER, LYOPHILIZED, FOR SOLUTION INTRAMUSCULAR; INTRAVENOUS
Status: COMPLETED
Start: 2024-08-12 | End: 2024-08-12

## 2024-08-12 RX ORDER — ENOXAPARIN SODIUM 100 MG/ML
40 INJECTION SUBCUTANEOUS EVERY 24 HOURS
Status: DISCONTINUED | OUTPATIENT
Start: 2024-08-12 | End: 2024-08-21 | Stop reason: HOSPADM

## 2024-08-12 RX ORDER — MAGNESIUM HYDROXIDE/ALUMINUM HYDROXICE/SIMETHICONE 120; 1200; 1200 MG/30ML; MG/30ML; MG/30ML
15 SUSPENSION ORAL ONCE
Status: COMPLETED | OUTPATIENT
Start: 2024-08-12 | End: 2024-08-12

## 2024-08-12 RX ADMIN — ENOXAPARIN SODIUM 40 MG: 40 INJECTION SUBCUTANEOUS at 20:18

## 2024-08-12 RX ADMIN — IOPAMIDOL 52 ML: 755 INJECTION, SOLUTION INTRAVENOUS at 14:01

## 2024-08-12 RX ADMIN — METHYLPREDNISOLONE SODIUM SUCCINATE 125 MG: 125 INJECTION INTRAMUSCULAR; INTRAVENOUS at 14:54

## 2024-08-12 RX ADMIN — IPRATROPIUM BROMIDE AND ALBUTEROL SULFATE 3 ML: .5; 3 SOLUTION RESPIRATORY (INHALATION) at 14:38

## 2024-08-12 RX ADMIN — GUAIFENESIN AND DEXTROMETHORPHAN 10 ML: 100; 10 SYRUP ORAL at 13:39

## 2024-08-12 RX ADMIN — VANCOMYCIN HYDROCHLORIDE 1250 MG: 10 INJECTION, POWDER, LYOPHILIZED, FOR SOLUTION INTRAVENOUS at 13:13

## 2024-08-12 RX ADMIN — IOPAMIDOL 76 ML: 755 INJECTION, SOLUTION INTRAVENOUS at 23:50

## 2024-08-12 RX ADMIN — SODIUM CHLORIDE, PRESERVATIVE FREE 69 ML: 5 INJECTION INTRAVENOUS at 23:57

## 2024-08-12 RX ADMIN — PIPERACILLIN AND TAZOBACTAM 4.5 G: 4; .5 INJECTION, POWDER, LYOPHILIZED, FOR SOLUTION INTRAVENOUS at 17:23

## 2024-08-12 RX ADMIN — ONDANSETRON 4 MG: 2 INJECTION INTRAMUSCULAR; INTRAVENOUS at 13:41

## 2024-08-12 RX ADMIN — HYDROMORPHONE HYDROCHLORIDE 0.3 MG: 1 INJECTION, SOLUTION INTRAMUSCULAR; INTRAVENOUS; SUBCUTANEOUS at 13:51

## 2024-08-12 RX ADMIN — PIPERACILLIN AND TAZOBACTAM 4.5 G: 4; .5 INJECTION, POWDER, FOR SOLUTION INTRAVENOUS at 10:52

## 2024-08-12 RX ADMIN — FUROSEMIDE 40 MG: 10 INJECTION, SOLUTION INTRAVENOUS at 15:32

## 2024-08-12 RX ADMIN — HYDROMORPHONE HYDROCHLORIDE 0.2 MG: 0.2 INJECTION, SOLUTION INTRAMUSCULAR; INTRAVENOUS; SUBCUTANEOUS at 15:32

## 2024-08-12 RX ADMIN — ONDANSETRON 4 MG: 2 INJECTION INTRAMUSCULAR; INTRAVENOUS at 09:52

## 2024-08-12 RX ADMIN — FENTANYL CITRATE 75 MCG: 50 INJECTION, SOLUTION INTRAMUSCULAR; INTRAVENOUS at 10:18

## 2024-08-12 RX ADMIN — IPRATROPIUM BROMIDE AND ALBUTEROL SULFATE 3 ML: .5; 3 SOLUTION RESPIRATORY (INHALATION) at 19:28

## 2024-08-12 RX ADMIN — IPRATROPIUM BROMIDE AND ALBUTEROL SULFATE 3 ML: .5; 3 SOLUTION RESPIRATORY (INHALATION) at 14:58

## 2024-08-12 RX ADMIN — HYDROMORPHONE HYDROCHLORIDE 0.3 MG: 1 INJECTION, SOLUTION INTRAMUSCULAR; INTRAVENOUS; SUBCUTANEOUS at 20:23

## 2024-08-12 RX ADMIN — IPRATROPIUM BROMIDE AND ALBUTEROL SULFATE 3 ML: .5; 3 SOLUTION RESPIRATORY (INHALATION) at 10:52

## 2024-08-12 RX ADMIN — AZITHROMYCIN 500 MG: 500 INJECTION, POWDER, LYOPHILIZED, FOR SOLUTION INTRAVENOUS at 12:05

## 2024-08-12 RX ADMIN — VANCOMYCIN HYDROCHLORIDE 1250 MG: 10 INJECTION, POWDER, LYOPHILIZED, FOR SOLUTION INTRAVENOUS at 22:02

## 2024-08-12 RX ADMIN — ONDANSETRON 4 MG: 2 INJECTION INTRAMUSCULAR; INTRAVENOUS at 17:23

## 2024-08-12 ASSESSMENT — ACTIVITIES OF DAILY LIVING (ADL)
ADLS_ACUITY_SCORE: 35
ADLS_ACUITY_SCORE: 35
ADLS_ACUITY_SCORE: 33
ADLS_ACUITY_SCORE: 35

## 2024-08-12 ASSESSMENT — COLUMBIA-SUICIDE SEVERITY RATING SCALE - C-SSRS
6. HAVE YOU EVER DONE ANYTHING, STARTED TO DO ANYTHING, OR PREPARED TO DO ANYTHING TO END YOUR LIFE?: NO
1. IN THE PAST MONTH, HAVE YOU WISHED YOU WERE DEAD OR WISHED YOU COULD GO TO SLEEP AND NOT WAKE UP?: NO
2. HAVE YOU ACTUALLY HAD ANY THOUGHTS OF KILLING YOURSELF IN THE PAST MONTH?: NO

## 2024-08-12 NOTE — H&P
[facility]    History and Physical - Hospitalist Service, GOLD TEAM        Date of Admission:  8/12/2024    Assessment & Plan      Maribell Leon is a 29 year old female admitted on 8/12/2024. She has a history of cirrhosis, alcohol use disorder, neuropathy, chronic pancreatitis who presented with acute dyspnea and cough found to have AHRF likely 2/2 CAP.      # Acute hypoxic respiratory failure secondary to community acquired PNA  Acute onset, not hypotensive or with other end-organ damage, lactate WNL, less suspicion for sepsis. Does have recent admission for AHRF 2/2 mycoplasma pneumoniae requiring intubation in last 30 days. Hypoxemia, without signs of hypercarbia on VBG. Unclear if pulmonary edema significant contributor. Got one dose of furosemide 40mg.     Work up:   RPP, flu, covid, RSV negative.   Legionella, strep pneumo antigen negative.   MRSA nares negative.     - Continue vanc, zosyn, azithro  - BiPAP, wean FiO2 as able  - Duonebs QID  - Consider repeat lasix tomorrow, holding off for now  - Follow up ID recs  - Follow up pulm rescs  - Follow up sputum, blood cultures  - Follow up thoracentesis labs and culture    # Chest pain  Associated with coughing, deep inspiration. CT PE negative. Troponin stable, will continue to trend. EKG with sinus tach. NT pro BNP elevated, hypervolemic on exam. Recent echo normal. POCUS with grossly normal systolic function.    - Trend troponin until peak    # RLE pain  RLE xray negative. R ankle joint tap with ortho,no septic arthritis. Dual energy CT without gout findings despite elevated uric acid. Was placed on cefazolin due to concerns for cellulitis transitioned to Keflex for 7 days treatment after last admission.     - Dilaudid 0.3mg q6h    # LE swelling  # Volume overload  S/p furosemide 40mg IV x1. Initiation of diuretic with significant recent weight loss and improvement in swelling. Recent LE ultrasound without  DVT.     - Hold home spironolactone-hydrochlorothiazide for now  - No further diuresis at this point, would consider tomorrow if clinically more stable  - Lymphedema consult    # Cirrhosis   # Hypoalbuminemia  # Elevated INR  Likely secondary to alcohol use. Not decompensated. Not enough ascites last week for paracentesis.     - Low sodium diet   - 2L fluid restriction  - Hold home spironolactone-hydrochlorothiazide for now    # Chronic anemia  Hg 7.3. Macrocytic anemia with elevated reticulocytes. B12 and folate replete. Iron stores okay. Not consuming EtOH in recent past. Haptoglobin normal and elevated direct bili not consistent with hemolysis. Consistent with anemia of chronic disease. No indication for transfusion at this point.     - Continue folate supplement when tolerating PO       Diet: Fluid restriction 2000 ML FLUID  2 Gram Sodium Diet    DVT Prophylaxis: Lovenox ppx  [place] Catheter: Not present  Fluids: N/A  Lines: None     Cardiac Monitoring: ACTIVE order. Indication: Electrolyte Imbalance (24 hours)- Magnesium <1.3 mg/ml; Potassium < =2.8 or > 5.5 mg/ml  Code Status: Full Code      Clinically Significant Risk Factors Present on Admission        # Hypokalemia: Lowest K = 3.3 mmol/L in last 2 days, will replace as needed        # Coagulation Defect: INR = 1.74 (Ref range: 0.85 - 1.15) and/or PTT = 37 Seconds (Ref range: 22 - 38 Seconds), will monitor for bleeding       # Anemia: based on hgb <11                    Disposition Plan      Expected Discharge Date: 08/14/2024                The patient's care was discussed with the Attending Physician, Dr. Daigle .      Aries Vasquez MD  Hospitalist Service, [facility]  Securely message with Weplay (more info)  Text page via [facility] Paging/Directory   See signed in provider for up to date coverage information  ______________________________________________________________________    Chief Complaint    Dyspnea, cough    History is obtained from the patient and the patient's mother at beside    History of Present Illness   Maribell Leon is a 29 year old female who presents with shortness of breath and cough of 1 days duration.     Of note, the patient has a recent hospital stay for AHRF 2/2 mycoplasma pneumonia in July with intubation. She was then admitted here briefly 7/31-8/2 for CHANTELL and lower extremity swelling. Work up for LE swelling was largely unremarkable including negative LE dopplers, normal echo, CT dual energy of foot negative for gout, RLE x ray negative. She had leukocytosis at that without signs of infeciton. She subsequently returned to the ED on 8/9 for lower extremity swelling. She was initiated on terry-hydrochlorothiazide 25-25 at that time. CXR at that time with bilateral pleural effusions. She was set to follow up with GI as outpatient on Tuesday.    Today she returns for new onset shortness of breath and cough which started last night/this morning. She states that she has been taking her diuretic and her weight is down significantly, about 10 pounds in last 3-4 days. She has not had measured fevers at home when checked. She does endorse a productive cough, with sputum that is thick and green. She has chest pain which is worsened with deep breaths and also tender when pressed. Denies chills. She feels better when upright due to coughing but does not feel like the shortness of breath is positional or associated with activity. Does have rhinorrhea but only after increased coughing fits. No sick contacts. No chills, myalgias. No new rashes, diarrhea. No blood in her sputum.     Upon arrival to the ED, her vitals were significant for tachycardia and new oxygen requirement. Labs notable for white count of 21/9 (chronic elevation last 16.3), Hg 8.8, Na 142, K 3.3, Cr 0.5, BUN 4, CO2 19. LFTs normal, Tbili 2.7. Lipase WNL. Procalcitonin elevated to 0.52. NTproBNP 16997 (last 2232), Troponin T 52 >  60. CXR showed moderate R pleural effusion, radiographic evidence of pulmonary edema. Thoracentesis performed with 1.6L clear yellow fluid removed. VBG with lactic acid 1.9, bicarb 21, pO2 27, O2 sat 56%, pCO2 30, pH 7.45. No major change in symptoms after thoracentesis. She was started on vanc/zosyn. CT PE negative for pulmonary embolus, showed pleural effusions bilaterally, dense consolidation of RLL, LLL, and RML with less dense consolidation sin both upper lobes. POCUS with grossly normal systolic function. UA negative. Legionella and strep pnuemo urinary antigen negative. COVID, flu, RSV negative. Sputum and blood cultures collected and pending. Beta HCG negative. RPP negative.    She continued to have worsening cough, increased work of breathing and began desatting into the 80s even on 9L NC. Mask without major improvement. Placed on BiPAP, given duonebs, methypred with improvement in her respiratory distress.       Past Medical History    Past Medical History:   Diagnosis Date    Acute pancreatitis     Cirrhosis of liver (H)        Past Surgical History   Past Surgical History:   Procedure Laterality Date    TONSILLECTOMY      8/05       Prior to Admission Medications   Prior to Admission Medications   Prescriptions Last Dose Informant Patient Reported? Taking?   cyanocobalamin (VITAMIN B-12) 1000 MCG tablet 8/12/2024 Self Yes Yes   Sig: Take 1,000 mcg by mouth daily   escitalopram (LEXAPRO) 10 MG tablet  Self Yes No   Sig: Take 10 mg by mouth daily   Patient not taking: Reported on 8/9/2024   folic acid (FOLVITE) 1 MG tablet 8/12/2024  No Yes   Sig: Take 1 tablet (1 mg) by mouth daily   magnesium 250 MG tablet 8/12/2024  Yes Yes   Sig: Take 1 tablet by mouth daily   methocarbamol (ROBAXIN) 500 MG tablet 8/11/2024  No Yes   Sig: Take 1 tablet (500 mg) by mouth 4 times daily as needed for muscle spasms   oxyCODONE (ROXICODONE) 5 MG tablet   No No   Sig: Take 1 tablet (5 mg) by mouth every 6 hours as needed for  pain   spironolactone-HCTZ (ALDACTAZIDE) 25-25 MG tablet 8/11/2024  No Yes   Sig: Take 1 tablet by mouth daily for 30 days      Facility-Administered Medications: None      2023 UPDATE: these sections are not required for  billing, but can be added when medically relevant     Physical Exam   Vital Signs: Temp: 98.3  F (36.8  C) Temp src: Oral BP: (!) 130/96 Pulse: (!) 130   Resp: 18 SpO2: 91 % O2 Device: None (Room air)    Weight: 123 lbs 0 oz    General Appearance: Pleasant, pallid, chronically-ill appearing in distress  Respiratory: Increased work of breathing, bilateral wheezing, decreased lung sounds at both bases  Cardiovascular: Tachycardiac, normal S1, S2, no murmurs  GI: Abdomen mildly distended, soft, non tender  Extremities: Bilateral 2+ pitting edema, tenderness of RLE with palpation    Medical Decision Making       Please see A&P for additional details of medical decision making.      Data     I have personally reviewed the following data over the past 24 hrs:    21.9 (H)  \   8.8 (L)   / 227     142 109 (H) 4.0 (L) /  148 (H)   3.3 (L) 19 (L) 0.50 (L) \     ALT: 7 AST: N/A AP: 126 TBILI: 2.7 (H)   ALB: 3.7 TOT PROTEIN: 7.1 LIPASE: 16     Trop: 61 (H) BNP: 13,100 (H)     Procal: 0.52 (H) CRP: N/A Lactic Acid: 1.9       INR:  1.74 (H) PTT:  N/A   D-dimer:  N/A Fibrinogen:  N/A       Imaging results reviewed over the past 24 hrs:   Recent Results (from the past 24 hour(s))   XR Chest Port 1 View    Narrative    EXAM: XR CHEST PORT 1 VIEW  8/12/2024 9:51 AM      HISTORY: sob, cough    COMPARISON: Chest x-ray 7/31/2024.    FINDINGS: Single view of the chest. The trachea is midline. No  pneumothorax. Right-sided costophrenic angle is blunted with fluid  tracking into the pulmonary fissures are representing a moderate-sized  pleural effusion. Left-sided costophrenic angle is well defined, no  pleural effusion is seen. Bilateral diffuse interstitial opacities  with mild peribronchial cuffing which could  represent pulmonary edema.  No acute bony abnormalities. Visualized upper abdomen is unremarkable.      Impression    IMPRESSION:   1. Moderate right-sided pleural effusion.  2. Radiographic evidence of pulmonary edema.    I have personally reviewed the examination and initial interpretation  and I agree with the findings.    GRACIELA YU MD         SYSTEM ID:  [id number]   POC US Guide for Thorcentesis    Impression    Limited chest ultrasound was performed and demonstrated an adequate fluid collection on the right hemithorax.     Thoracentesis Indication:pleural effusion and dyspnea    Doppler of the skin demonstrated an area at this site without significant vasculature.  A thoracentesis at this site was subsequently performed.    Ultrasound revealed normal lung sliding after the procedure.  Wellington Campbell,      XR Chest Port 1 View    Narrative    Portable chest 8/12/2024 at 1213 hours    INDICATION: Follow-up postthoracentesis    COMPARISON: 0945 hours earlier today    FINDINGS: Heart size normal. Decreased right costo phrenic angle  meniscus formation and edema. No evidence of pneumothorax. Left lung  appears unchanged. Bony structures appear grossly intact.      Impression    IMPRESSION: Decreased right pleural effusion and lung base edema  without pneumothorax.    GRACIELA YU MD         SYSTEM ID:  [id number]   CT Chest Pulmonary Embolism w Contrast    Narrative    CT chest pulmonary angiogram with contrast    INDICATION: Chest pain, short of breath, pleural effusion    COMPARISON: None    FINDINGS: Prominent upper thoracic kyphosis. Moderate bilateral  pleural effusions with consolidative opacities with air bronchograms  in the right middle lobe and bilateral lower lobes. No obviously  abnormal breast tissues. Calcifications of the pancreas may indicate  chronic calcified pancreatitis. Left atrium is mildly enlarged. No  pericardial effusion.  No enlarged lymph nodes.  Bone detail shows good  mineralization throughout no suspicious  sclerotic or lytic/destructive lesion.  Detail of the lungs shows no discrete solid nodule. There is mild  patchy upper lobe similar consolidative opacities just less dense than  the lower lobe and right middle lobe opacities. Major airways appear  nicely patent.  Pulmonary artery system was well-opacified with contrast. No hypodense  filling defect in the pulmonary artery system to indicate evidence of  embolus. No obviously anomalous pulmonary venous return to the left  atrium.      Impression    IMPRESSION:  1. No CT evidence of pulmonary embolus.  2. Pleural effusions bilaterally.  3. Dense consolidation right lower lobe, left lower lobe and right  middle lobe with less dense consolidations in both upper lobes.  Differential could include infection comparison dense pulmonary  hemorrhage. Cannot exclude subtle edema superimposed on the  consolidative/atelectatic opacities.    GRACIELA YU MD         SYSTEM ID:  [id number]

## 2024-08-12 NOTE — ED NOTES
Patient oxygen needs continued to increase after returning from CT. Providers at bedside. Placed to mask as noted. Patient transferred to STAB room and care transferred to new team as noted. William Hassan RN

## 2024-08-12 NOTE — PHARMACY-VANCOMYCIN DOSING SERVICE
"Pharmacy Vancomycin Initial Note  Date of Service 2024  Patient's  1995  29 year old, female    Indication: Healthcare-Associated Pneumonia    Current estimated CrCl = Estimated Creatinine Clearance: 112.1 mL/min (based on SCr of 0.58 mg/dL).    Creatinine for last 3 days  2024:  4:31 PM Creatinine 0.58 mg/dL    Recent Vancomycin Level(s) for last 3 days  No results found for requested labs within last 3 days.      Vancomycin IV Administrations (past 72 hours)        No vancomycin orders with administrations in past 72 hours.                    Nephrotoxins and other renal medications (From now, onward)      Start     Dose/Rate Route Frequency Ordered Stop    24 1130  vancomycin (VANCOCIN) 1,250 mg in 0.9% NaCl 250 mL intermittent infusion         1,250 mg  166.7 mL/hr over 90 Minutes Intravenous EVERY 12 HOURS SCHEDULED 24 1103      24 1100  piperacillin-tazobactam (ZOSYN) 4.5 g vial to attach to  mL bag        Note to Pharmacy: For [facility], [facility] and [facility]: For Zosyn-naive patients, use the \"Zosyn initial dose + extended infusion\" order panel.    4.5 g  over 30 Minutes Intravenous ONCE 24 1042              Contrast Orders - past 72 hours (72h ago, onward)      None        [employer] Prediction of Planned Initial Vancomycin Regimen  Loading dose: N/A  Regimen: 1250 mg IV every 12 hours.  Start time: 10:52 on 2024  Exposure target: AUC24 (range)400-600 mg/L.hr   AUC24,ss: 531 mg/L.hr  Probability of AUC24 > 400: 77 %  Ctrough,ss: 14.5 mg/L  Probability of Ctrough,ss > 20: 27 %  Probability of nephrotoxicity (Lodise LOLIS ): 10 %        Plan:  Start vancomycin  1250 mg IV q12h.   Vancomycin monitoring method: AUC  Vancomycin therapeutic monitoring goal: 400-600 mg*h/L  Pharmacy will check vancomycin levels as appropriate in 1-3 Days.    Serum creatinine levels will be ordered daily for the first week of therapy and at least twice weekly for subsequent weeks.  "     Goyo Correia, [facility]

## 2024-08-12 NOTE — ED NOTES
Thoracentesis completed, 1.5L of fluid removed. Team to send lab samples per verbal confirmation. William Hassan RN

## 2024-08-12 NOTE — PROCEDURES
[facility]  CAPS PROCEDURE NOTE  Date of Admission:  8/12/2024  Consult Requested by: Medicine  Reason for Consult: ----------Thoracentesis------------, diagnostic evaluation of pleural effusion, and management of symptomatic pleural effusion    Indication/HPI: pleural effusion, cough    Pre-Procedure Diagnosis: Right Pleural Effusion    Post-Procedure Diagnosis: Right Pleural Effusion    Risk Assessment: Average risk, Technically straightforward; patient's anticoagulation has been held according to guidelines based on the agent and platelets and coags are within guidelines    Procedure Outcome:  Diagnostic thoracentesis performed and Therapeutic thoracentesis performed with 1.1 liters removed.   Patient began to complain of chest pain so drainage stopped with some fluid left in pleural space. Quite a bit of coughing post procedure. Lung apex on right demonstrated good sliding with normal M Mode. Ordered follow up CXR.  See additional procedure details below.    The primary covering service should follow up and address any lab results as appropriate.    Wellington Campbell DO  [facility]  Securely message with Vocera (more info)  Text page via [employer] Paging/Directory   See signed in provider for up to date coverage information      [facility]    Thoracentesis at Bedside    Date/Time: 8/12/2024 12:08 PM    Performed by: Wellington Campbell DO  Authorized by: Wellington Campbell DO    Risks, benefits and alternatives discussed.    ANESTHESIA (see MAR for exact dosages):     Anesthesia method:  Local infiltration    Local anesthetic:  Lidocaine 1% w/o epi    PROCEDURE DETAILS      Patient position:  Sitting    Location:  R midscapular line    Puncture method:  Over-the-needle catheter    Ultrasound guidance: yes      Catheter size:  6 Fr    Number of attempts:  1    Drainage  characteristics:  Serosanguinous    POST PROCEDURE DETAILS     Chest x-ray performed: yes      Chest x-ray findings:  Pleural effusion improved    Patient tolerance of procedure:  Patient tolerated the procedure well with no immediate complications      PROCEDURE    Patient Tolerance:  Patient tolerated the procedure well with no immediate complications   Stopped short of complete drainage due to pain. May have left a few hundred mL in the chest cavity.      POC US Guide for Thorcentesis     Impression  Limited chest ultrasound was performed and demonstrated an adequate fluid collection on the right hemithorax.    Thoracentesis Indication:pleural effusion and dyspnea    Doppler of the skin demonstrated an area at this site without significant vasculature.  A thoracentesis at this site was subsequently performed.    Ultrasound revealed normal lung sliding after the procedure.  Wellington Campbell DO

## 2024-08-12 NOTE — ED TRIAGE NOTES
29 year old female with history of liver cirrhosis and pancreatitis, presents with concerns of chest pain, productive cough, and bilateral lower extremity edema and foot pain.  Patient was seen in this ED on Friday, given diuretics and sent home.  Renal functions looked better at that time.      Triage Assessment (Adult)       Row Name 08/12/24 0825          Triage Assessment    Airway WDL WDL        Respiratory WDL    Respiratory WDL cough;X     Cough Frequency frequent     Cough Type productive  yellow sputum        Skin Circulation/Temperature WDL    Skin Circulation/Temperature WDL WDL        Cardiac WDL    Cardiac WDL X;rhythm     Cardiac Rhythm ST        Peripheral/Neurovascular WDL    Peripheral Neurovascular WDL WDL        Cognitive/Neuro/Behavioral WDL    Cognitive/Neuro/Behavioral WDL WDL

## 2024-08-12 NOTE — ED PROVIDER NOTES
"    [place] EMERGENCY DEPARTMENT ([facility])    8/12/24       ED PROVIDER NOTE       History     Chief Complaint   Patient presents with    Shortness of Breath     HPI  Maribell Leon is a 29 year old female with a history of severe alcohol use disorder complicated by liver cirrhosis, alcohol related neuropathy, chronic pancreatitis who presents for shortness of breath.     Patient reports a productive cough since last night with gradual onset of chest pain that she noticed around 5 AM. Patient states the cough feels similar to when she had pneumonia. The chest pain has been constant since 5 AM. Patient states it feels like \"her heart is going to pound out of her chest\". Nothing makes it better or worse, it does not fluctuate with activity. She reports shortness of breath and nausea with this. Patient does not feel more fluid overloaded. Patient's mom reports the patient weighed 137 lbs on 8/9/24 and this morning was 127 lbs. She has been taking her diuretic since 8/9/24 and did not have her diuretic this morning.  Denies history of similar episode. Denies fevers, body aches, or chills. Denies history of asthma. Denies substance use.     Patient was recently discharged from the hospital on 8/2/24 after a stay for pleural effusions, ascites, s/p intubation and thoracentesis and treatment for pneumonia. Patient had initially been prescribed combination diuretic with hydrochlorothiazide and spironolactone, however she was encouraged not to take it due to concerns for acute kidney injury. She was seen in the Emergency Department on 8/9/21 for bilateral lower extremity edema, abdominal distention, concern for fluid overloaded state. At that time she denied difficulty breathing, fevers, cough, or chest pain. Imaging obtained showed bilateral pleural effusions. Lab work obtained did not show to have CHANTELL at that time. Spoke with the on-call gastroenterology fellow about the patient's presentation, fluid overloaded " state, and they recommended reinitiation of diuretic therapy which was given on 8/9/24 and prescribed again in case they were not able to find the prescription. Patient has a follow up with gastroenterology tomorrow.     Limited Abdominal Ultrasound, 8/9/2024  IMPRESSION:   1.  Large right and small-to-moderate left pleural effusions.  2.  Partially visualized pericardial effusion.  3.  Small volume ascites in the right lower quadrant.  4.  Similar appearance of gallbladder including gallbladder wall  thickening and echogenic focus along the wall, possibly adherent stone  versus small polyp.    Past Medical History  Past Medical History:   Diagnosis Date    Acute pancreatitis     Cirrhosis of liver (H)      Past Surgical History:   Procedure Laterality Date    TONSILLECTOMY      8/05     cyanocobalamin (VITAMIN B-12) 1000 MCG tablet  folic acid (FOLVITE) 1 MG tablet  magnesium 250 MG tablet  methocarbamol (ROBAXIN) 500 MG tablet  spironolactone-HCTZ (ALDACTAZIDE) 25-25 MG tablet  escitalopram (LEXAPRO) 10 MG tablet  oxyCODONE (ROXICODONE) 5 MG tablet      No Known Allergies  Family History  Family History   Problem Relation Age of Onset    Allergy (Severe) Mother         Allergies,Environmental allergies, celiac disease    Family History Negative Father         Good Health    Cancer Maternal Grandfather         Cancer,Kidney cancer    Diabetes Maternal Grandmother         Diabetes,Type 2    Heart Disease Paternal Grandfather         Heart Disease,Cardiomyopathy    Asthma Brother         Asthma,history of intermittent asthma    Other - See Comments Brother         Enuresis    Family History Negative Sister         Good Health    Family History Negative Sister         Good Health     Social History   Social History     Tobacco Use    Smoking status: Some Days     Current packs/day: 0.10     Types: Cigarettes    Smokeless tobacco: Never   Substance Use Topics    Alcohol use: No    Drug use: Unknown     Types: Other      Comment: Drug use: No      Past medical history, past surgical history, medications, allergies, family history, and social history were reviewed with the patient. No additional pertinent items.     A complete review of systems was performed with pertinent positives and negatives noted in the HPI, and all other systems negative.    Physical Exam   BP: 124/88  Pulse: 113  Temp: 98.3  F (36.8  C)  Resp: 18  Height: 152.4 cm (5')  Weight: 55.8 kg (123 lb)  SpO2: 97 %  Physical Exam  Vitals and nursing note reviewed.   Constitutional:       General: She is not in acute distress.     Appearance: She is well-developed. She is ill-appearing. She is not toxic-appearing or diaphoretic.   HENT:      Head: Normocephalic and atraumatic.      Nose: Nose normal.      Mouth/Throat:      Mouth: Mucous membranes are dry.   Eyes:      General: No scleral icterus.     Conjunctiva/sclera: Conjunctivae normal.   Cardiovascular:      Rate and Rhythm: Tachycardia present.   Pulmonary:      Effort: Pulmonary effort is normal. Tachypnea present. No respiratory distress.      Breath sounds: No stridor.   Abdominal:      General: Abdomen is protuberant. There is distension.      Palpations: Abdomen is soft. There is hepatomegaly.      Tenderness: There is no abdominal tenderness. There is no guarding or rebound.   Musculoskeletal:         General: No deformity or signs of injury. Normal range of motion.      Cervical back: Normal range of motion and neck supple. No rigidity.      Right lower leg: Edema present.      Left lower leg: Edema present.   Skin:     General: Skin is warm and dry.      Coloration: Skin is not jaundiced or pale.      Findings: No erythema or rash.   Neurological:      General: No focal deficit present.      Mental Status: She is alert and oriented to person, place, and time.   Psychiatric:         Attention and Perception: Attention normal.         Mood and Affect: Affect is tearful.         Behavior: Behavior normal.            ED Course, Procedures, & Data      Procedures         EKG Interpretation:      Interpreted by Tanisha Sanders MD  Time reviewed: 913  Symptoms at time of EKG: CP   Rhythm: sinus tachycardia  Rate: Tachycardia  Axis: Normal  Ectopy: none  Conduction: normal  ST Segments/ T Waves: No acute ischemic changes  Q Waves: nonspecific  Comparison to prior: increased rate    Clinical Impression: sinus tachycardia                  Results for orders placed or performed during the hospital encounter of 08/12/24   XR Chest Port 1 View     Status: None    Narrative    EXAM: XR CHEST PORT 1 VIEW  8/12/2024 9:51 AM      HISTORY: sob, cough    COMPARISON: Chest x-ray 7/31/2024.    FINDINGS: Single view of the chest. The trachea is midline. No  pneumothorax. Right-sided costophrenic angle is blunted with fluid  tracking into the pulmonary fissures are representing a moderate-sized  pleural effusion. Left-sided costophrenic angle is well defined, no  pleural effusion is seen. Bilateral diffuse interstitial opacities  with mild peribronchial cuffing which could represent pulmonary edema.  No acute bony abnormalities. Visualized upper abdomen is unremarkable.      Impression    IMPRESSION:   1. Moderate right-sided pleural effusion.  2. Radiographic evidence of pulmonary edema.    I have personally reviewed the examination and initial interpretation  and I agree with the findings.    GRACIELA YU MD         SYSTEM ID:  [id number]   POC US Guide for Thorcentesis     Status: None    Impression    Limited chest ultrasound was performed and demonstrated an adequate fluid collection on the right hemithorax.     Thoracentesis Indication:pleural effusion and dyspnea    Doppler of the skin demonstrated an area at this site without significant vasculature.  A thoracentesis at this site was subsequently performed.    Ultrasound revealed normal lung sliding after the procedure.  Wellington Campbell DO     Comprehensive metabolic  panel     Status: Abnormal   Result Value Ref Range    Sodium 142 135 - 145 mmol/L    Potassium 3.3 (L) 3.4 - 5.3 mmol/L    Carbon Dioxide (CO2) 19 (L) 22 - 29 mmol/L    Anion Gap 14 7 - 15 mmol/L    Urea Nitrogen 4.0 (L) 6.0 - 20.0 mg/dL    Creatinine 0.50 (L) 0.51 - 0.95 mg/dL    GFR Estimate >90 >60 mL/min/1.73m2    Calcium 9.9 8.8 - 10.4 mg/dL    Chloride 109 (H) 98 - 107 mmol/L    Glucose 148 (H) 70 - 99 mg/dL    Alkaline Phosphatase 126 40 - 150 U/L    AST      ALT 7 0 - 50 U/L    Protein Total 7.1 6.4 - 8.3 g/dL    Albumin 3.7 3.5 - 5.2 g/dL    Bilirubin Total 2.7 (H) <=1.2 mg/dL   Lipase     Status: Normal   Result Value Ref Range    Lipase 16 13 - 60 U/L   Procalcitonin     Status: Abnormal   Result Value Ref Range    Procalcitonin 0.52 (H) <0.50 ng/mL   Nt probnp inpatient (BNP)     Status: Abnormal   Result Value Ref Range    N terminal Pro BNP Inpatient 13,100 (H) 0 - 450 pg/mL   Troponin T, High Sensitivity     Status: Abnormal   Result Value Ref Range    Troponin T, High Sensitivity 52 (H) <=14 ng/L   CBC with platelets and differential     Status: Abnormal   Result Value Ref Range    WBC Count 21.9 (H) 4.0 - 11.0 10e3/uL    RBC Count 2.74 (L) 3.80 - 5.20 10e6/uL    Hemoglobin 8.8 (L) 11.7 - 15.7 g/dL    Hematocrit 28.6 (L) 35.0 - 47.0 %     (H) 78 - 100 fL    MCH 32.1 26.5 - 33.0 pg    MCHC 30.8 (L) 31.5 - 36.5 g/dL    RDW 17.9 (H) 10.0 - 15.0 %    Platelet Count 227 150 - 450 10e3/uL    % Neutrophils 82 %    % Lymphocytes 10 %    % Monocytes 5 %    % Eosinophils 1 %    % Basophils 1 %    % Immature Granulocytes 1 %    NRBCs per 100 WBC 0 <1 /100    Absolute Neutrophils 18.1 (H) 1.6 - 8.3 10e3/uL    Absolute Lymphocytes 2.2 0.8 - 5.3 10e3/uL    Absolute Monocytes 1.2 0.0 - 1.3 10e3/uL    Absolute Eosinophils 0.1 0.0 - 0.7 10e3/uL    Absolute Basophils 0.1 0.0 - 0.2 10e3/uL    Absolute Immature Granulocytes 0.2 <=0.4 10e3/uL    Absolute NRBCs 0.0 10e3/uL   iStat Gases (lactate) venous, POCT      Status: Abnormal   Result Value Ref Range    Lactic Acid POCT 1.9 <=2.0 mmol/L    Bicarbonate Venous POCT 21 21 - 28 mmol/L    O2 Sat, Venous POCT 56 (L) 70 - 75 %    pCO2 Venous POCT 30 (L) 40 - 50 mm Hg    pH Venous POCT 7.45 (H) 7.32 - 7.43    pO2 Venous POCT 27 25 - 47 mm Hg    Base Excess/Deficit (+/-) POCT -3.0 -3.0 - 3.0 mmol/L   Extra Tube (Mallie Draw)     Status: None    Narrative    The following orders were created for panel order Extra Tube (Mallie Draw).  Procedure                               Abnormality         Status                     ---------                               -----------         ------                     Extra Green Top (Lithium...[[id number]]                      Final result                 Please view results for these tests on the individual orders.   Extra Green Top (Lithium Heparin) Tube     Status: None   Result Value Ref Range    Hold Specimen [facility]    EKG 12-lead, tracing only     Status: None (Preliminary result)   Result Value Ref Range    Systolic Blood Pressure  mmHg    Diastolic Blood Pressure  mmHg    Ventricular Rate 107 BPM    Atrial Rate 107 BPM    CO Interval 154 ms    QRS Duration 72 ms     ms    QTc 397 ms    P Axis 41 degrees    R AXIS 23 degrees    T Axis 40 degrees    Interpretation ECG       Sinus tachycardia  Possible Left atrial enlargement  Low voltage QRS  Cannot rule out Inferior infarct , age undetermined  Cannot rule out Anterior infarct , age undetermined  Abnormal ECG     CBC with platelets differential     Status: Abnormal    Narrative    The following orders were created for panel order CBC with platelets differential.  Procedure                               Abnormality         Status                     ---------                               -----------         ------                     CBC with platelets and d...[[id number]]  Abnormal            Final result                 Please view results for these tests on the individual orders.    Cell count with differential fluid     Status: None (In process)    Narrative    The following orders were created for panel order Cell count with differential fluid.  Procedure                               Abnormality         Status                     ---------                               -----------         ------                     Cell Count Body Fluid[[id number]]                            In process                 Differential Body Fluid[[id number]]                          In process                   Please view results for these tests on the individual orders.     Medications   ondansetron (ZOFRAN) injection 4 mg (4 mg Intravenous $Given 8/12/24 9061)   azithromycin (ZITHROMAX) 500 mg in sodium chloride 0.9 % 250 mL intermittent infusion (500 mg Intravenous $New Bag 8/12/24 1205)   pharmacy alert - intermittent dosing (has no administration in time range)   vancomycin (VANCOCIN) 1,250 mg in 0.9% NaCl 250 mL intermittent infusion (has no administration in time range)   oxyCODONE (ROXICODONE) tablet 5 mg (has no administration in time range)   alum & mag hydroxide-simethicone (MAALOX) suspension 15 mL (has no administration in time range)   lidocaine 1 % 0.1-1 mL (has no administration in time range)   lidocaine (LMX4) cream (has no administration in time range)   sodium chloride (PF) 0.9% PF flush 3 mL (has no administration in time range)   sodium chloride (PF) 0.9% PF flush 3 mL (has no administration in time range)   senna-docusate (SENOKOT-S/PERICOLACE) 8.6-50 MG per tablet 1 tablet (has no administration in time range)     Or   senna-docusate (SENOKOT-S/PERICOLACE) 8.6-50 MG per tablet 2 tablet (has no administration in time range)   calcium carbonate (TUMS) chewable tablet 1,000 mg (has no administration in time range)   fentaNYL (PF) (SUBLIMAZE) injection 75 mcg (75 mcg Intravenous $Given 8/12/24 1018)   ipratropium - albuterol 0.5 mg/2.5 mg/3 mL (DUONEB) neb solution 3 mL (3 mLs Nebulization  $Given 8/12/24 1052)   piperacillin-tazobactam (ZOSYN) 4.5 g vial to attach to  mL bag (0 g Intravenous Stopped 8/12/24 1122)     Labs Ordered and Resulted from Time of ED Arrival to Time of ED Departure   COMPREHENSIVE METABOLIC PANEL - Abnormal       Result Value    Sodium 142      Potassium 3.3 (*)     Carbon Dioxide (CO2) 19 (*)     Anion Gap 14      Urea Nitrogen 4.0 (*)     Creatinine 0.50 (*)     GFR Estimate >90      Calcium 9.9      Chloride 109 (*)     Glucose 148 (*)     Alkaline Phosphatase 126      AST        ALT 7      Protein Total 7.1      Albumin 3.7      Bilirubin Total 2.7 (*)    PROCALCITONIN - Abnormal    Procalcitonin 0.52 (*)    NT PROBNP INPATIENT - Abnormal    N terminal Pro BNP Inpatient 13,100 (*)    TROPONIN T, HIGH SENSITIVITY - Abnormal    Troponin T, High Sensitivity 52 (*)    CBC WITH PLATELETS AND DIFFERENTIAL - Abnormal    WBC Count 21.9 (*)     RBC Count 2.74 (*)     Hemoglobin 8.8 (*)     Hematocrit 28.6 (*)      (*)     MCH 32.1      MCHC 30.8 (*)     RDW 17.9 (*)     Platelet Count 227      % Neutrophils 82      % Lymphocytes 10      % Monocytes 5      % Eosinophils 1      % Basophils 1      % Immature Granulocytes 1      NRBCs per 100 WBC 0      Absolute Neutrophils 18.1 (*)     Absolute Lymphocytes 2.2      Absolute Monocytes 1.2      Absolute Eosinophils 0.1      Absolute Basophils 0.1      Absolute Immature Granulocytes 0.2      Absolute NRBCs 0.0     ISTAT GASES LACTATE VENOUS POCT - Abnormal    Lactic Acid POCT 1.9      Bicarbonate Venous POCT 21      O2 Sat, Venous POCT 56 (*)     pCO2 Venous POCT 30 (*)     pH Venous POCT 7.45 (*)     pO2 Venous POCT 27      Base Excess/Deficit (+/-) POCT -3.0     LIPASE - Normal    Lipase 16     INFLUENZA A/B, RSV, & SARS-COV2 PCR   ROUTINE UA WITH MICROSCOPIC REFLEX TO CULTURE   HCG QUALITATIVE URINE   LACTATE DEHYDROGENASE FLUID   PROTEIN FLUID   INR   COVID-19 VIRUS (CORONAVIRUS) BY PCR   CELL COUNT BODY FLUID    DIFERENTIAL BODY FLUID   ANAEROBIC BACTERIAL CULTURE ROUTINE   MRSA MSSA PCR, NASAL SWAB   CELL COUNT WITH DIFFERENTIAL FLUID     POC US Guide for Thorcentesis   Final Result   Limited chest ultrasound was performed and demonstrated an adequate fluid collection on the right hemithorax.       Thoracentesis Indication:pleural effusion and dyspnea      Doppler of the skin demonstrated an area at this site without significant vasculature.   A thoracentesis at this site was subsequently performed.      Ultrasound revealed normal lung sliding after the procedure.   Wellington Campbell,          XR Chest Port 1 View   Final Result   IMPRESSION:    1. Moderate right-sided pleural effusion.   2. Radiographic evidence of pulmonary edema.      I have personally reviewed the examination and initial interpretation   and I agree with the findings.      GRACIELA YU MD            SYSTEM ID:  [id number]      CT Chest Pulmonary Embolism w Contrast    (Results Pending)   XR Chest Port 1 View    (Results Pending)          Critical care was not performed.     Medical Decision Making  The patient's presentation was of high complexity (an acute health issue posing potential threat to life or bodily function).    The patient's evaluation involved:  an assessment requiring an independent historian (see separate area of note for details)  review of external note(s) from 3+ sources (see separate area of note for details)  review of 3+ test result(s) ordered prior to this encounter (see separate area of note for details)  ordering and/or review of 3+ test(s) in this encounter (see separate area of note for details)  independent interpretation of testing performed by another health professional (see separate area of note for details)  discussion of management or test interpretation with another health professional (see separate area of note for details)    The patient's management necessitated moderate risk (prescription drug management  including medications given in the ED), moderate risk (a decision regarding minor procedure (thoracentesis) with risk factors of high risk features of the procedure site ), moderate risk (IV contrast administration), and high risk (a decision regarding hospitalization).    Assessment & Plan    Maribell Leon is a 29 year old female with a history of severe alcohol use disorder complicated by liver cirrhosis, alcohol related neuropathy, chronic pancreatitis who presents for shortness of breath.     Ddx: Pleural effusion, volume overload, progressive ascites, pneumonia, COVID, pericarditis, empyema, sepsis    Patient appears uncomfortable on arrival with rapid shallow breathing and complaint of chest pain.  Tachypneic but satting 97% on room air.  Normal blood pressure.  Afebrile.  Per the patient's mother, she does not appear more swollen in her legs or abdomen than her baseline and is actually been losing weight.  She is currently on an outpatient diuretic.  Patient given Zofran, 75 mcg fentanyl for chest pain, duo nebulization.  Procalcitonin resulted 0.52.   Lactate normal.  pCO2 30.Blood cultures sent.  BNP elevated to 13,000's up from 2000 on the ninth.  Troponin elevated to 52.  EKG with low voltage QRS but no acute ischemic changes.  It does show sinus tachycardia.  White count elevated to 21.9.  Hemoglobin 8.8 which is baseline.  Platelets normal.  Chest x-ray shows moderate right-sided pleural effusion and pulmonary edema.  I suspect this is the cause for patient's chest pain, and shortness of breath.  Patient will require admission so I put in an order for Thoracentesis for Both Diagnostic and Therapeutic Purposes Though This Does Not Need to Take Place Emergently.  The Patient's Creatinine Resulted 0.5.  Liver Enzymes and Alk Phos Normal but Bilirubin 2.7 Which Is Stable from the Ninth.  Will Order a CT PE Study to Rule out Pulmonary Embolism As Well As Further Evaluate the Pleural Effusion.  Patient Has a  History of Pneumonia and May Be Developing a Parapneumonic Effusion or Empyema which would be better evaluated on CT scan.  Since patient was recently hospitalized and intubated at an outside hospital we will cover patient for ventilator associated pneumonia.  Given vancomycin, Zosyn, azithromycin to cover for atypicals.  Patient appears to be primarily retaining fluid in the lungs so will defer diuretics to inpatient team.  Handoff provided to hospitalist.  Patient will stay on [facility] with inpatient hepatology consult.       I have reviewed the nursing notes. I have reviewed the findings, diagnosis, plan and need for follow up with the patient.    New Prescriptions    No medications on file       Final diagnoses:   Pleural effusion on right   Alcoholic cirrhosis of liver with ascites (H)   ILory, am serving as a trained medical scribe to document services personally performed by Tanisha Sanders MD based on the provider's statements to me on August 12, 2024.  This document has been checked and approved by the attending provider.    ITanisha MD, was physically present and have reviewed and verified the accuracy of this note documented by Lory Becerril, medical scribe.      Tanisha Sanders MD   [facility] EMERGENCY DEPARTMENT  8/12/2024     Tanisha Sanders MD  08/12/24 6396     107

## 2024-08-12 NOTE — ED PROVIDER NOTES
ED ADDENDUM:  Subsequent Critical Care Addendum (Modifier -25)  This patient had an Emergency Department evaluation and management performed by myself at an earlier time that the patient did not require critical care. Subsequently, the patient's state changed such that critical care was medically necessary. The critical care provided was separate and distinct from the Emergency Department evaluation and management performed prior.     Patient was seen and admitted but was still on our floor pending transfer upstairs. I was called to bedside by nursing staff when the patient became hypoxic, wheezing, and tachycardic. She had already received 2 breathing treatments at this time. Patient reports feeling tired. She was moved from room 22 into room 2, one of our stab rooms and given Solumedrol and a Duo neb. Spoke with inpatient medical team who is at bedside.  They had completed the diagnostic and therapeutic thoracentesis removing about a liter of pleural fluid from the right chest cavity.  The patient did not notice any sniffing and improvement after the procedure but also did not demonstrate any signs of decompensation at that time.  She subsequently went to CT scan which did not show a pneumothorax.  The final read showed no pulmonary embolus.  She had bilateral pleural effusions.  She also was noted to have a dense consolidation in the right lower lobe, left lower lobe, and right middle lobe with less dense consolidations in both upper lobes.  The differential included infection versus pulmonary hemorrhage.  Cannot exclude subtle edema superimposed on the consolidative or atelectatic opacities.  Patient was actively coughing up green sputum.  She did not look to be tiring out and looks similar on my evaluation to when I had initially seen her in the emergency department.  She was able to talk in complete sentences.  The inpatient team reported that they had noticed diffuse wheezing on their examination and a lot of  coughing.  I think it is possible that she has developed reexpansion pulmonary edema or is experiencing some shunting.  Since she is wheezing we will give her IV methylprednisolone and another duo nebulization.  We placed her on BiPAP with pressure to recruit some airspace.  The patient tolerated the BiPAP well and actually reported improvement in her work of breathing.  Her oxygen saturations went from the 80s on 6 L Ventimask to 100% on 60% FiO2 through the BiPAP.  We will start weaning the patient down on FiO2.  Inpatient team to order Lasix diuresis.  We will continue to follow closely but for now the patient can continue with her admission to intermediate level care on BiPAP.  She confirms that she is full code and would like to be intubated if she does not improve on BiPAP.  I have transitioned care back to the inpatient medicine team and will be available, as needed, if the patient decompensates further requiring advanced airway management.    Based on my evaluation of the patient, Maribell Leon has respiratory distress, which requires immediate intervention, and therefore she is critically ill.     The care team initiated medication therapy with methylprednisolone and initiated intensive non-invasive respiratory support to provide stabilization care. Due to the critical nature of this patient, I reassessed nursing observations, vital signs, physical exam, review of cardiac rhythm monitor, mental status, and respiratory status multiple times prior to her disposition.     Time also spent performing documentation, discussion with family to obtain medical information for decision making, reviewing test results, discussion with consultants, and coordination of care.     Critical care time (excluding teaching time and procedures): 30 minutes.      MD Tommy Posada Jill C, MD  08/12/24 0625

## 2024-08-12 NOTE — CONSULTS
See procedure note for CAPS consult.    Wellington Campbell DO  Hospitalist    Medicine and Pediatrics  [email or web address]  Pager: [phone]  Available on Alex and Ani

## 2024-08-12 NOTE — MEDICATION SCRIBE - ADMISSION MEDICATION HISTORY
Medication Scribe Admission Medication History    Admission medication history is complete. The information provided in this note is only as accurate as the sources available at the time of the update.    Information Source(s): Patient, Family member, and CareEverywhere/SureScripts via in-person    Pertinent Information:   -Pt's family member helped with medication list because Pt recently moved into her home  -family member stated that Pt has not had her lexapro 10 mg since she moved in because her prescription is where Pt was living    Changes made to PTA medication list:  Added: None  Deleted: Oxycodone 5 mg  Changed: None    Allergies reviewed with patient and updates made in EHR: yes    Medication History Completed By: Nelli Reese 8/12/2024 3:32 PM    PTA Med List   Medication Sig Last Dose    cyanocobalamin (VITAMIN B-12) 1000 MCG tablet Take 1,000 mcg by mouth daily 8/12/2024 at am    escitalopram (LEXAPRO) 10 MG tablet Take 10 mg by mouth daily Unknown at unknown    folic acid (FOLVITE) 1 MG tablet Take 1 tablet (1 mg) by mouth daily 8/12/2024 at am    magnesium 250 MG tablet Take 1 tablet by mouth daily 8/12/2024 at am    methocarbamol (ROBAXIN) 500 MG tablet Take 1 tablet (500 mg) by mouth 4 times daily as needed for muscle spasms 8/11/2024 at unknown    spironolactone-HCTZ (ALDACTAZIDE) 25-25 MG tablet Take 1 tablet by mouth daily for 30 days 8/11/2024 at unknown

## 2024-08-13 ENCOUNTER — APPOINTMENT (OUTPATIENT)
Dept: GENERAL RADIOLOGY | Facility: CLINIC | Age: 29
End: 2024-08-13
Payer: MEDICAID

## 2024-08-13 LAB
% LINING CELLS, BODY FLUID: 2 %
ALBUMIN SERPL BCG-MCNC: 3.1 G/DL (ref 3.5–5.2)
ALLEN'S TEST: YES
ALP SERPL-CCNC: 114 U/L (ref 40–150)
ALT SERPL W P-5'-P-CCNC: 10 U/L (ref 0–50)
ANION GAP SERPL CALCULATED.3IONS-SCNC: 14 MMOL/L (ref 7–15)
AST SERPL W P-5'-P-CCNC: 70 U/L (ref 0–45)
BASE EXCESS BLDA CALC-SCNC: -0.8 MMOL/L (ref -3–3)
BASE EXCESS BLDV CALC-SCNC: -1.3 MMOL/L (ref -3–3)
BASOPHILS # BLD AUTO: 0.1 10E3/UL (ref 0–0.2)
BASOPHILS NFR BLD AUTO: 0 %
BASOPHILS NFR FLD MANUAL: 1 %
BILIRUB SERPL-MCNC: 2.4 MG/DL
BUN SERPL-MCNC: 5.3 MG/DL (ref 6–20)
CALCIUM SERPL-MCNC: 8.6 MG/DL (ref 8.8–10.4)
CHLORIDE SERPL-SCNC: 111 MMOL/L (ref 98–107)
COHGB MFR BLD: 51 % (ref 96–97)
CREAT SERPL-MCNC: 0.59 MG/DL (ref 0.51–0.95)
EGFRCR SERPLBLD CKD-EPI 2021: >90 ML/MIN/1.73M2
EOSINOPHIL # BLD AUTO: 0 10E3/UL (ref 0–0.7)
EOSINOPHIL NFR BLD AUTO: 0 %
EOSINOPHIL NFR FLD MANUAL: 1 %
ERYTHROCYTE [DISTWIDTH] IN BLOOD BY AUTOMATED COUNT: 17.6 % (ref 10–15)
GLUCOSE SERPL-MCNC: 184 MG/DL (ref 70–99)
HCO3 BLD-SCNC: 24 MMOL/L (ref 21–28)
HCO3 BLDV-SCNC: 23 MMOL/L (ref 21–28)
HCO3 SERPL-SCNC: 18 MMOL/L (ref 22–29)
HCT VFR BLD AUTO: 29.2 % (ref 35–47)
HGB BLD-MCNC: 8.5 G/DL (ref 11.7–15.7)
IMM GRANULOCYTES # BLD: 0.3 10E3/UL
IMM GRANULOCYTES NFR BLD: 1 %
LYMPHOCYTES # BLD AUTO: 3.5 10E3/UL (ref 0.8–5.3)
LYMPHOCYTES NFR BLD AUTO: 11 %
LYMPHOCYTES NFR FLD MANUAL: 56 %
MCH RBC QN AUTO: 32.8 PG (ref 26.5–33)
MCHC RBC AUTO-ENTMCNC: 29.1 G/DL (ref 31.5–36.5)
MCV RBC AUTO: 113 FL (ref 78–100)
MONOCYTES # BLD AUTO: 1.3 10E3/UL (ref 0–1.3)
MONOCYTES NFR BLD AUTO: 4 %
MONOS+MACROS NFR FLD MANUAL: 31 %
NEUTROPHILS # BLD AUTO: 26.2 10E3/UL (ref 1.6–8.3)
NEUTROPHILS NFR BLD AUTO: 84 %
NEUTS BAND NFR FLD MANUAL: 9 %
NRBC # BLD AUTO: 0 10E3/UL
NRBC BLD AUTO-RTO: 0 /100
O2/TOTAL GAS SETTING VFR VENT: 30 %
O2/TOTAL GAS SETTING VFR VENT: 40 %
OTHER CELLS FLD MANUAL: 2 %
OXYHGB MFR BLDV: 50 % (ref 70–75)
PATH REV: NORMAL
PCO2 BLD: 39 MM HG (ref 35–45)
PCO2 BLDV: 38 MM HG (ref 40–50)
PH BLD: 7.4 [PH] (ref 7.35–7.45)
PH BLDV: 7.4 [PH] (ref 7.32–7.43)
PLATELET # BLD AUTO: 270 10E3/UL (ref 150–450)
PO2 BLD: 30 MM HG (ref 80–105)
PO2 BLDV: 31 MM HG (ref 25–47)
POTASSIUM SERPL-SCNC: 3.5 MMOL/L (ref 3.4–5.3)
PROCALCITONIN SERPL IA-MCNC: 0.28 NG/ML
PROT SERPL-MCNC: 6.3 G/DL (ref 6.4–8.3)
RBC # BLD AUTO: 2.59 10E6/UL (ref 3.8–5.2)
SAO2 % BLDA: 50 % (ref 92–100)
SAO2 % BLDV: 50.8 % (ref 70–75)
SODIUM SERPL-SCNC: 143 MMOL/L (ref 135–145)
WBC # BLD AUTO: 31.4 10E3/UL (ref 4–11)

## 2024-08-13 PROCEDURE — 85025 COMPLETE CBC W/AUTO DIFF WBC: CPT

## 2024-08-13 PROCEDURE — 999N000157 HC STATISTIC RCP TIME EA 10 MIN

## 2024-08-13 PROCEDURE — 99233 SBSQ HOSP IP/OBS HIGH 50: CPT | Mod: GC | Performed by: STUDENT IN AN ORGANIZED HEALTH CARE EDUCATION/TRAINING PROGRAM

## 2024-08-13 PROCEDURE — 94640 AIRWAY INHALATION TREATMENT: CPT | Mod: 76

## 2024-08-13 PROCEDURE — 82805 BLOOD GASES W/O2 SATURATION: CPT

## 2024-08-13 PROCEDURE — 99223 1ST HOSP IP/OBS HIGH 75: CPT | Mod: GC | Performed by: INTERNAL MEDICINE

## 2024-08-13 PROCEDURE — 120N000002 HC R&B MED SURG/OB UMMC

## 2024-08-13 PROCEDURE — 87449 NOS EACH ORGANISM AG IA: CPT | Performed by: STUDENT IN AN ORGANIZED HEALTH CARE EDUCATION/TRAINING PROGRAM

## 2024-08-13 PROCEDURE — 36415 COLL VENOUS BLD VENIPUNCTURE: CPT

## 2024-08-13 PROCEDURE — 94640 AIRWAY INHALATION TREATMENT: CPT

## 2024-08-13 PROCEDURE — 250N000011 HC RX IP 250 OP 636

## 2024-08-13 PROCEDURE — 250N000009 HC RX 250

## 2024-08-13 PROCEDURE — 258N000003 HC RX IP 258 OP 636: Performed by: EMERGENCY MEDICINE

## 2024-08-13 PROCEDURE — 94660 CPAP INITIATION&MGMT: CPT

## 2024-08-13 PROCEDURE — 84295 ASSAY OF SERUM SODIUM: CPT

## 2024-08-13 PROCEDURE — 36415 COLL VENOUS BLD VENIPUNCTURE: CPT | Performed by: STUDENT IN AN ORGANIZED HEALTH CARE EDUCATION/TRAINING PROGRAM

## 2024-08-13 PROCEDURE — 87205 SMEAR GRAM STAIN: CPT | Performed by: INTERNAL MEDICINE

## 2024-08-13 PROCEDURE — 87102 FUNGUS ISOLATION CULTURE: CPT | Performed by: INTERNAL MEDICINE

## 2024-08-13 PROCEDURE — 87449 NOS EACH ORGANISM AG IA: CPT | Performed by: INTERNAL MEDICINE

## 2024-08-13 PROCEDURE — 87106 FUNGI IDENTIFICATION YEAST: CPT | Performed by: INTERNAL MEDICINE

## 2024-08-13 PROCEDURE — 71045 X-RAY EXAM CHEST 1 VIEW: CPT

## 2024-08-13 PROCEDURE — 71045 X-RAY EXAM CHEST 1 VIEW: CPT | Mod: 26 | Performed by: RADIOLOGY

## 2024-08-13 PROCEDURE — 250N000011 HC RX IP 250 OP 636: Performed by: EMERGENCY MEDICINE

## 2024-08-13 PROCEDURE — 99255 IP/OBS CONSLTJ NEW/EST HI 80: CPT | Performed by: STUDENT IN AN ORGANIZED HEALTH CARE EDUCATION/TRAINING PROGRAM

## 2024-08-13 PROCEDURE — 84145 PROCALCITONIN (PCT): CPT | Performed by: INTERNAL MEDICINE

## 2024-08-13 PROCEDURE — 5A09357 ASSISTANCE WITH RESPIRATORY VENTILATION, LESS THAN 24 CONSECUTIVE HOURS, CONTINUOUS POSITIVE AIRWAY PRESSURE: ICD-10-PCS | Performed by: STUDENT IN AN ORGANIZED HEALTH CARE EDUCATION/TRAINING PROGRAM

## 2024-08-13 PROCEDURE — 250N000013 HC RX MED GY IP 250 OP 250 PS 637

## 2024-08-13 RX ORDER — HYDROMORPHONE HYDROCHLORIDE 1 MG/ML
0.3 INJECTION, SOLUTION INTRAMUSCULAR; INTRAVENOUS; SUBCUTANEOUS
Status: DISCONTINUED | OUTPATIENT
Start: 2024-08-13 | End: 2024-08-13

## 2024-08-13 RX ORDER — CARBOXYMETHYLCELLULOSE SODIUM 5 MG/ML
1 SOLUTION/ DROPS OPHTHALMIC
Status: DISCONTINUED | OUTPATIENT
Start: 2024-08-13 | End: 2024-08-21 | Stop reason: HOSPADM

## 2024-08-13 RX ORDER — HYDROMORPHONE HYDROCHLORIDE 1 MG/ML
0.5 INJECTION, SOLUTION INTRAMUSCULAR; INTRAVENOUS; SUBCUTANEOUS ONCE
Status: COMPLETED | OUTPATIENT
Start: 2024-08-13 | End: 2024-08-13

## 2024-08-13 RX ORDER — HYDROMORPHONE HYDROCHLORIDE 1 MG/ML
0.3 INJECTION, SOLUTION INTRAMUSCULAR; INTRAVENOUS; SUBCUTANEOUS
Status: DISCONTINUED | OUTPATIENT
Start: 2024-08-13 | End: 2024-08-14

## 2024-08-13 RX ORDER — ONDANSETRON 2 MG/ML
4 INJECTION INTRAMUSCULAR; INTRAVENOUS EVERY 6 HOURS PRN
Status: DISCONTINUED | OUTPATIENT
Start: 2024-08-13 | End: 2024-08-21 | Stop reason: HOSPADM

## 2024-08-13 RX ADMIN — AZITHROMYCIN 500 MG: 500 INJECTION, POWDER, LYOPHILIZED, FOR SOLUTION INTRAVENOUS at 12:54

## 2024-08-13 RX ADMIN — HYDROMORPHONE HYDROCHLORIDE 0.5 MG: 1 INJECTION, SOLUTION INTRAMUSCULAR; INTRAVENOUS; SUBCUTANEOUS at 20:07

## 2024-08-13 RX ADMIN — PIPERACILLIN AND TAZOBACTAM 4.5 G: 4; .5 INJECTION, POWDER, LYOPHILIZED, FOR SOLUTION INTRAVENOUS at 00:00

## 2024-08-13 RX ADMIN — ENOXAPARIN SODIUM 40 MG: 40 INJECTION SUBCUTANEOUS at 20:13

## 2024-08-13 RX ADMIN — IPRATROPIUM BROMIDE AND ALBUTEROL SULFATE 3 ML: .5; 3 SOLUTION RESPIRATORY (INHALATION) at 08:10

## 2024-08-13 RX ADMIN — HYDROMORPHONE HYDROCHLORIDE 0.3 MG: 1 INJECTION, SOLUTION INTRAMUSCULAR; INTRAVENOUS; SUBCUTANEOUS at 12:26

## 2024-08-13 RX ADMIN — PIPERACILLIN AND TAZOBACTAM 4.5 G: 4; .5 INJECTION, POWDER, LYOPHILIZED, FOR SOLUTION INTRAVENOUS at 10:36

## 2024-08-13 RX ADMIN — IPRATROPIUM BROMIDE AND ALBUTEROL SULFATE 3 ML: .5; 3 SOLUTION RESPIRATORY (INHALATION) at 13:10

## 2024-08-13 RX ADMIN — HYDROMORPHONE HYDROCHLORIDE 0.3 MG: 1 INJECTION, SOLUTION INTRAMUSCULAR; INTRAVENOUS; SUBCUTANEOUS at 15:59

## 2024-08-13 RX ADMIN — Medication 3 MG: at 20:13

## 2024-08-13 RX ADMIN — HYDROMORPHONE HYDROCHLORIDE 0.3 MG: 1 INJECTION, SOLUTION INTRAMUSCULAR; INTRAVENOUS; SUBCUTANEOUS at 03:52

## 2024-08-13 RX ADMIN — HYDROMORPHONE HYDROCHLORIDE 0.3 MG: 1 INJECTION, SOLUTION INTRAMUSCULAR; INTRAVENOUS; SUBCUTANEOUS at 22:56

## 2024-08-13 RX ADMIN — IPRATROPIUM BROMIDE AND ALBUTEROL SULFATE 3 ML: .5; 3 SOLUTION RESPIRATORY (INHALATION) at 19:43

## 2024-08-13 RX ADMIN — PIPERACILLIN AND TAZOBACTAM 4.5 G: 4; .5 INJECTION, POWDER, LYOPHILIZED, FOR SOLUTION INTRAVENOUS at 17:18

## 2024-08-13 RX ADMIN — GUAIFENESIN AND DEXTROMETHORPHAN 10 ML: 100; 10 SYRUP ORAL at 10:45

## 2024-08-13 RX ADMIN — HYDROMORPHONE HYDROCHLORIDE 0.3 MG: 1 INJECTION, SOLUTION INTRAMUSCULAR; INTRAVENOUS; SUBCUTANEOUS at 00:49

## 2024-08-13 RX ADMIN — HYDROMORPHONE HYDROCHLORIDE 0.3 MG: 1 INJECTION, SOLUTION INTRAMUSCULAR; INTRAVENOUS; SUBCUTANEOUS at 18:51

## 2024-08-13 RX ADMIN — VANCOMYCIN HYDROCHLORIDE 1250 MG: 10 INJECTION, POWDER, LYOPHILIZED, FOR SOLUTION INTRAVENOUS at 09:21

## 2024-08-13 RX ADMIN — IPRATROPIUM BROMIDE AND ALBUTEROL SULFATE 3 ML: .5; 3 SOLUTION RESPIRATORY (INHALATION) at 16:50

## 2024-08-13 RX ADMIN — HYDROMORPHONE HYDROCHLORIDE 0.3 MG: 1 INJECTION, SOLUTION INTRAMUSCULAR; INTRAVENOUS; SUBCUTANEOUS at 06:54

## 2024-08-13 RX ADMIN — PIPERACILLIN AND TAZOBACTAM 4.5 G: 4; .5 INJECTION, POWDER, LYOPHILIZED, FOR SOLUTION INTRAVENOUS at 04:23

## 2024-08-13 RX ADMIN — GUAIFENESIN AND DEXTROMETHORPHAN 10 ML: 100; 10 SYRUP ORAL at 14:32

## 2024-08-13 RX ADMIN — HYDROMORPHONE HYDROCHLORIDE 0.3 MG: 1 INJECTION, SOLUTION INTRAMUSCULAR; INTRAVENOUS; SUBCUTANEOUS at 09:36

## 2024-08-13 RX ADMIN — GUAIFENESIN AND DEXTROMETHORPHAN 10 ML: 100; 10 SYRUP ORAL at 00:05

## 2024-08-13 ASSESSMENT — ACTIVITIES OF DAILY LIVING (ADL)
ADLS_ACUITY_SCORE: 35
ADLS_ACUITY_SCORE: 35
ADLS_ACUITY_SCORE: 44
ADLS_ACUITY_SCORE: 44
ADLS_ACUITY_SCORE: 35
ADLS_ACUITY_SCORE: 44
ADLS_ACUITY_SCORE: 44
ADLS_ACUITY_SCORE: 35
ADLS_ACUITY_SCORE: 35
ADLS_ACUITY_SCORE: 44
ADLS_ACUITY_SCORE: 35
ADLS_ACUITY_SCORE: 44
ADLS_ACUITY_SCORE: 44
ADLS_ACUITY_SCORE: 35
ADLS_ACUITY_SCORE: 44
ADLS_ACUITY_SCORE: 35
ADLS_ACUITY_SCORE: 44
ADLS_ACUITY_SCORE: 44

## 2024-08-13 NOTE — CONSULTS
[facility] New Consult    Patient Name: Maribell Leon  YOB: 1995  Gender Identity: female  MRN: [id number]  Primary Attending: Jonathan Daigle    Today's Date: 08/13/24  Admission Date: 8/12/2024    Assessment:   Acute hypoxic respiratory failure  Multifocal pneumonia of unclear etiology, likely recurrrent from July  -She presented on 8/12 with new onset shortness of breath and cough which started the evening prior.  She had been taking her diuretic and her weight had been down significantly about 10 pounds in the prior 3 to 4 days.  She not measured fevers at home she did endorse a productive cough of sputum that is thick and green.  She noted chest pain that was worse with deep breathing and when palpated.  She denied any chills.  She felt better when upright in terms of her cough but shortness of breath did not improve or change with any particular position.  She did note rhinorrhea after she has been coughing particular.  She did not notice any sick contacts.  No rash, diarrhea, myalgias.  -WBC 21.9 on presentation, 31.4 on 8/13.  No eosinophils. 84% PMNs.   -Procalcitonin 0.52, on 8/13 0.28  -Chest x-ray moderate right-sided pleural effusion.  -CT chest: Negative for PE, dense consolidations of the right lower lobe, left lower lobe, right middle lobe with less dense consolidation of both upper lobes  -Started on vancomycin plus piperacillin/tazobactam  -Negative urine Legionella antigen and strep pneumo antigen  -Negative PCR for influenza, RSV, COVID.  -Negative full RVP  -Sputum and blood cultures pending  -S/p thoracentesis, not c/w infection  -only animal exposure healthy dogs, no outdoors activities, travel only back from [facility]  -States no h/o vaping    Recent hospitalization with acute hypoxic resp failure of unclear etiology (Mycoplasma pneumoniae and EVALI among considerations)  Recent persistent leukocytosis (June 2024)  In that July  hospitalization she was admitted from 7/13-7/22 at [facility] (initially at [facility] from 7/10-13). There was uncertainty about whether her respiratory failure was due to Mycoplasma vs EVALI vs other. She had positive M pneumoniae IgG/M. She had a positive beta D glucan during that hospitalization as well (1.78, positive is >0.74 by this assay). By 7/22 ID felt infection wasn't likely  and stopped antimicrobials.  She had a BAL that was negative for infection including fungal cultures on 7/16. Negative histo/blasto combination Ag, histo Ab's, tularemia Abs, blasto and cocci Abs, HIV, HCV, Syphilis, HBV Sag, RVP (including Mycoplasma), Aspergillus galactomannan, lyme Ab testing. Had WBCs >30 on a number of accasions, Abs Eos were generally >1000.  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 Sending beta D glucan.  -Agree with pulmonary consultation to consider non-infectious possibilities as well as well as to consider bronchoscopy if the patient is stable enough to undergo the procedure.   -Would stop vancomycin and azithromycin (ordered)  -Agree with pip/tazo  -Watch for antimicrobial toxicities     Thank you for the consultation.     Abran Sanches MD    Division of Infectious Diseases and International Medicine      History of Present Illness   Maribell Leon is a 29 year old F with history of cirrhosis, chronic pancreatitis, and a recent hospitalization in July 2024 for mycoplasma pneumonia that required intubation.      In that July hospitalization she was admitted from 7/13-7/22 at [facility] (initially at [facility]  from 7/10-13). There was uncertainty about whether her respiratory failure was due to Mycoplasma vs EVALI vs other. She had positive M pneumoniae IgG/M. She had a positive beta D glucan during that hospitalization as well (1.78, positive is >0.74 by this assay). By 7/22 ID felt infection wasn't likely  and stopped antimicrobials.  She had a BAL that was negative for infection including fungal cultures on 7/16. Negative histo/blasto combination Ag, histo Ab's, tularemia Abs, blasto and cocci Abs, HIV, HCV, Syphilis, HBV Sag, RVP (including Mycoplasma), Aspergillus galactomannan, lyme Ab testing. Had WBCs >30 on a number of accasions, Abs Eos were generally >1000.    She was admitted again from 7/31 - 8/2 for CHANTELL and lower extremity swelling.  Leukocytosis was noted at that time without any sign of infection otherwise.  She was in the emergency department on 8/9 with lower extremity swelling and bilateral pleural effusions    She presented on 8/12 with new onset shortness of breath and cough which started the evening prior.  She had been taking her diuretic and her weight had been down significantly about 10 pounds in the prior 3 to 4 days.  She not measured fevers at home she did endorse a productive cough of sputum that is thick and green.  She noted chest pain that was worse with deep breathing and when palpated.  She denied any chills.  She felt better when upright in terms of her cough but shortness of breath did not improve or change with any particular position.  She did note rhinorrhea after she has been coughing particular.  She did not notice any sick contacts.  No rash, diarrhea, myalgias.    WBC 21.9 on presentation, 31.4 on 8/13.  No eosinophils. 84% PMNs.   Of note, there is mention of chronic loose leukocytosis be possible in the chart however from the records I am finding this is only since mid June 2024, prior that when checked (last September 2023) she had normal white blood cell count  Procalcitonin  0.52, f/unit(s) on 8/13 0.28  Chest x-ray moderate right-sided pleural effusion.  CT chest: Negative for PE, dense consolidations of the right lower lobe, left lower lobe, right middle lobe with less dense consolidation of both upper lobes  Started on vancomycin plus piperacillin/tazobactam  Negative urine Legionella antigen and strep pneumo antigen  Negative PCR for influenza, RSV, COVID.  Negative full RVP  Sputum and blood cultures pending  S/p thoracentesis, not c/w infection     Bipap 40% FI02, 12/5  S/p 250 methylpred in ED on 8/12  Afebrile     Today she states that her breathing has improved a bit but still quite difficult, ongoing cough of yellow sputum.  No headache, no fevers chills diaphoresis.  She denies vaping at all including prior to the last hospitalization.  She notes that she has exposure to dogs who are healthy, no other animal closures.  No gardening or other outdoor activities involved to get injured or exposure to water sprayed at her.  She denies any new rash.  She denies any arthralgias or myalgias.  She has tattoos that are all professionally done.  Denies any neck stiffness photophobia.  Denies any nausea or vomiting. Having loose stools. No travel outside of [place] to the [facility] after her last hospitalization.        Antimicrobial Start date End date    pip/tazo 8/12 active   vanc 8/12 active   azithromycin 8/12 active                                         Past Medical History     Past Medical History:   Diagnosis Date    Acute pancreatitis     Cirrhosis of liver (H)      Past Surgical History     Past Surgical History:   Procedure Laterality Date    TONSILLECTOMY      8/05        Social History     Social History     Socioeconomic History    Marital status:      Spouse name: Not on file    Number of children: Not on file    Years of education: Not on file    Highest education level: Not on file   Occupational History    Not on file    Tobacco Use    Smoking status: Some Days     Current packs/day: 0.10     Types: Cigarettes    Smokeless tobacco: Never   Substance and Sexual Activity    Alcohol use: No    Drug use: Unknown     Types: Other     Comment: Drug use: No    Sexual activity: Yes     Partners: Male   Other Topics Concern    Not on file   Social History Narrative    No secondhand smoke exposure.      Attends [employer] [place].  Dad is a counselor at [employer] and mom is .      Senior fall 2012    Works as PCA at [employer]    Involved in [employer] cheerleading    Preloaded 03/22/2013     Social Determinants of Health     Financial Resource Strain: Patient Declined (7/12/2024)    Received from [facility]    Overall Financial Resource Strain (CARDIA)     Difficulty of Paying Living Expenses: Patient declined   Food Insecurity: No Food Insecurity (7/13/2024)    Received from [facility]    Hunger Vital Sign     Worried About Running Out of Food in the Last Year: Never true     Ran Out of Food in the Last Year: Never true   Transportation Needs: No Transportation Needs (7/13/2024)    Received from [facility]    PRAPARE - Transportation     Lack of Transportation (Medical): No     Lack of Transportation (Non-Medical): No   Physical Activity: Not on file   Stress: Not on file   Social Connections: Unknown (4/11/2023)    Received from [facility]    Social Connections     Frequency of Communication with Friends and Family: Not on file   Interpersonal Safety: Not At Risk (7/13/2024)    Received from [facility]    EH IP Custom IPV     Do you feel UNSAFE in any of your personal relationships with your family members or any other acquaintances?: No   Housing Stability: Low Risk  (7/13/2024)    Received from [facility]    Housing Stability Vital  Sign     Unable to Pay for Housing in the Last Year: No     Number of Times Moved in the Last Year: 1     Homeless in the Last Year: No         Family History     Family History   Problem Relation Age of Onset    Allergy (Severe) Mother         Allergies,Environmental allergies, celiac disease    Family History Negative Father         Good Health    Cancer Maternal Grandfather         Cancer,Kidney cancer    Diabetes Maternal Grandmother         Diabetes,Type 2    Heart Disease Paternal Grandfather         Heart Disease,Cardiomyopathy    Asthma Brother         Asthma,history of intermittent asthma    Other - See Comments Brother         Enuresis    Family History Negative Sister         Good Health    Family History Negative Sister         Good Health         Review of Systems    Full 10 system ROS completed and negative unless otherwise noted above.       Medications     Current Facility-Administered Medications:     azithromycin (ZITHROMAX) 500 mg in sodium chloride 0.9 % 250 mL intermittent infusion, 500 mg, Intravenous, Q24H, Tanisha Sanders MD, Stopped at 08/12/24 1504    calcium carbonate (TUMS) chewable tablet 1,000 mg, 1,000 mg, Oral, 4x Daily PRN, Aries Vasquez MD    carboxymethylcellulose PF (REFRESH PLUS) 0.5 % ophthalmic solution 1 drop, 1 drop, Both Eyes, Q1H PRN, Curry Small MD    enoxaparin ANTICOAGULANT (LOVENOX) injection 40 mg, 40 mg, Subcutaneous, Q24H, Aries Vasquez MD, 40 mg at 08/12/24 2018    guaiFENesin-dextromethorphan (ROBITUSSIN DM) 100-10 MG/5ML syrup 10 mL, 10 mL, Oral, Q4H PRN, Noreen Nevarez MD, 10 mL at 08/13/24 0005    HOLD: All Oral Medications, , Does not apply, HOLD, Curry Small MD    HYDROmorphone (PF) (DILAUDID) injection 0.3 mg, 0.3 mg, Intravenous, Q3H PRN, Curry Small MD, 0.3 mg at 08/13/24 0936    ipratropium - albuterol 0.5 mg/2.5 mg/3 mL (DUONEB) neb solution 3 mL, 3 mL, Nebulization, 4x daily, Noreen Nevarez MD, 3 mL at  08/13/24 0810    lidocaine (LMX4) cream, , Topical, Q1H PRN, Aries Vasquez MD    lidocaine 1 % 0.1-1 mL, 0.1-1 mL, Other, Q1H PRN, Aries Vasquez MD    melatonin tablet 3 mg, 3 mg, Oral, At Bedtime PRN, Aries Vasquez MD    No lozenges or gum should be given while patient on BIPAP/AVAPS/AVAPS AE, , Does not apply, Continuous PRN, Curry Small MD    piperacillin-tazobactam (ZOSYN) 4.5 g vial to attach to  mL bag, 4.5 g, Intravenous, Q6H, Aries Vasquez MD, Stopped at 08/13/24 0503    senna-docusate (SENOKOT-S/PERICOLACE) 8.6-50 MG per tablet 1 tablet, 1 tablet, Oral, BID PRN **OR** senna-docusate (SENOKOT-S/PERICOLACE) 8.6-50 MG per tablet 2 tablet, 2 tablet, Oral, BID PRN, Aries Vasquez MD    sodium chloride (PF) 0.9% PF flush 3 mL, 3 mL, Intracatheter, Q8H, Aries Vasquez MD    sodium chloride (PF) 0.9% PF flush 3 mL, 3 mL, Intracatheter, q1 min prn, Aries Vasquez MD    vancomycin (VANCOCIN) 1,250 mg in 0.9% NaCl 250 mL intermittent infusion, 1,250 mg, Intravenous, Q12H [facility] (08[facility]), Tanisha Sanders MD, Last Rate: 166.7 mL/hr at 08/13/24 0921, 1,250 mg at 08/13/24 0921    Current Outpatient Medications:     cyanocobalamin (VITAMIN B-12) 1000 MCG tablet, Take 1,000 mcg by mouth daily, Disp: , Rfl:     escitalopram (LEXAPRO) 10 MG tablet, Take 10 mg by mouth daily, Disp: , Rfl:     folic acid (FOLVITE) 1 MG tablet, Take 1 tablet (1 mg) by mouth daily, Disp: 30 tablet, Rfl: 0    magnesium 250 MG tablet, Take 1 tablet by mouth daily, Disp: , Rfl:     methocarbamol (ROBAXIN) 500 MG tablet, Take 1 tablet (500 mg) by mouth 4 times daily as needed for muscle spasms, Disp: 30 tablet, Rfl: 0    spironolactone-HCTZ (ALDACTAZIDE) 25-25 MG tablet, Take 1 tablet by mouth daily for 30 days, Disp: 30 tablet, Rfl: 0     Allergies    No Known Allergies        Physical Examination                           Vital Signs   Temp: 97.9  F (36.6  C) Temp  Min: 97.9  F (36.6  C)  Max: 98.8  F (37.1  C)  Resp: 18  Resp  Min: 12  Max: 40  SpO2: 100 % SpO2  Min: 80 %  Max: 100 %  Pulse: 103 Pulse  Min: 103  Max: 144    No data recorded  BP: 111/83 Systolic (24hrs), Av , Min:104 , Max:136   Diastolic (24hrs), Av, Min:73, Max:99       General appearance: alert, oriented to situation, NAD  HENT: normocephalic, atraumatic, no sinus tenderness, bipap mask on,unable to examine oral cavity  Eyes: EOM grossly intact, Conj nl  Neck: supple  Lungs: decreased throughout  Heart: regular rhythm, rtachycardic no murmur, rub, gallop  Abdomen: soft, non-tender, mildly distended  Ext: + LE edema bilaterally, L foot with a couple of healing abrasions  Skin: no rashes/lesions, multiple tattoos, all appear professional with defined borders and no s/o infection, multiple piercings on face, no s/o infection     Lines: PIV     Lab Review   Labs reviewed, brief summary below.     CBC RESULTS:   Recent Labs   Lab Test 24  0642   WBC 31.4*   RBC 2.59*   HGB 8.5*   HCT 29.2*   *   MCH 32.8   MCHC 29.1*   RDW 17.6*        Recent Labs   Lab Test 24  0642   NEUTROPHIL 84   LYMPH 11   MONOCYTE 4   EOSINOPHIL 0   BASOPHIL 0       Lab Results   Component Value Date    CR 0.59 2024    CR 0.62 2013     Serum creatinine: 0.59 mg/dL 24 0642  Estimated creatinine clearance: 110.2 mL/min  Liver Function Studies -   Recent Labs   Lab Test 24  0642   BILITOTAL 2.4*   ALKPHOS 114   AST 70*   ALT 10         Microbiology and Radiology Review   Microbiology data reviewed.     Pertinent radiology images viewed.

## 2024-08-13 NOTE — PROGRESS NOTES
[facility]    History and Physical - Medicine Service, MAROON TEAM 1       Date of Admission:  8/12/2024    Assessment & Plan      Maribell Leon is a 29 year old female admitted on 8/12/2024. She has a history of cirrhosis, alcohol use disorder, neuropathy, chronic pancreatitis who presented with acute dyspnea and cough found to have AHRF likely 2/2 CAP.      # Leukocytosis, worsening   Unclear etiology at this time. Ddx including worsening or atypical CAP vs intra-abdominal process such as SBP vs colitis vs appendicitis vs other infection including SSTI etc.   CT A/P last night with e/o inflammatory changes to ascending colitis and ileum without defined etiology. Did evaluate patient today with SBP in mind but on US she does not have a pocket appropriate for diagnostic paracentesis, nor is she having any further abdominal pain or fevering. Will CTM from this standpoint. Re: other etiologies, she has no TTP on exam including negative McBurney's, no significant increase in WoB or SoB, procalcitonin is wnl. ID and pulm are both following this pt -- BDglucan, Blastomyces, Histoplasmosis, fungal or yeast culture, and respiratory aerobic culture pending. I am concerned for possible colitis given inflammation in addition to recent antibiotic use with hospital stay in [place], so will add on C Diff and enteric stool panel. UA negative at admission.   -- ID recommended discontinue vancomycin + azithro, continue Zosyn  -- ID CTM and appreciate recommendations   -- qAM CBC   -- Follow up cultures     # Acute hypoxic respiratory failure secondary to community acquired PNA  Acute onset, not hypotensive or with other end-organ damage, lactate WNL, less suspicion for sepsis. Does have recent admission for AHRF 2/2 mycoplasma pneumoniae requiring intubation in last 30 days. Hypoxemia, without signs of hypercarbia on VBG. Unclear if pulmonary edema significant contributor. Got one  dose of furosemide 40mg.   Work up:   RPP, flu, covid, RSV negative.   Legionella, strep pneumo antigen negative.   MRSA nares negative.   -- Continue zosyn, azithromycin and vanc discontinued per ID   -- Transitioned to HFNC with no e/o CO2 retention on VBG today. Pulmonary in agreement. Follow up ABG pending.   -- Duonebs QID  -- Consider repeat lasix tomorrow, holding off for now  -- Follow up ID recs  -- Follow up pulm rescs  -- Follow up sputum, blood cultures  -- Follow up thoracentesis labs and culture   --> aerobic bacteria neg    --> anaerobic pending    --> 9 PMNs, LDH 87, protein 2.3    # Chest pain, resolving   # Elevated troponin, resolved   Associated with coughing, deep inspiration. CT PE negative. Troponin stable, will continue to trend. EKG with sinus tach. NT pro BNP elevated, hypervolemic on exam. Recent echo normal. POCUS with grossly normal systolic function.  -- Peak troponin at 61, came down to 59 on recheck ECG wnl. No further testing at this time. Likely 2/2 ischemic demand rather than cardiac in etiology given burden of pulmonary disease.     # RLE pain  RLE xray negative. R ankle joint tap with ortho,no septic arthritis. Dual energy CT without gout findings despite elevated uric acid. Was placed on cefazolin due to concerns for cellulitis transitioned to Keflex for 7 days treatment after last admission.   -- Dilaudid 0.3mg q6h, consider expanding medications in AM 8/14 -- was on Gabapentin at admission but dc'd 2/2 CHANTELL    # LE swelling  # Volume overload  S/p furosemide 40mg IV x1. Initiation of diuretic with significant recent weight loss and improvement in swelling. Recent LE ultrasound without DVT.   -- Hold home spironolactone-hydrochlorothiazide for now  -- No further diuresis at this point, would consider tomorrow if clinically more stable  -- Lymphedema consult    # Cirrhosis   # Hypoalbuminemia  # Elevated INR  Likely secondary to alcohol use. Not decompensated. Not enough ascites  8/13 for paracentesis.   -- Low sodium diet   -- 2L fluid restriction  -- Hold home spironolactone-hydrochlorothiazide for now    # Chronic anemia  Hg 7.3. Macrocytic anemia with elevated reticulocytes. B12 and folate replete. Iron stores okay. Not consuming EtOH in recent past. Haptoglobin normal and elevated direct bili not consistent with hemolysis. Consistent with anemia of chronic disease. No indication for transfusion at this point.   -- Continue folate supplement when tolerating PO       Diet: Fluid restriction 2000 ML FLUID  NPO for Medical/Clinical Reasons Except for: No Exceptions    DVT Prophylaxis: Lovenox ppx  [place] Catheter: Not present  Fluids: N/A  Lines: None     Cardiac Monitoring: None  Code Status: Full Code       The patient's care was discussed with the Attending Physician, Dr. Car .      Latonya Olivo MD  Medicine Service, [address]  Securely message with [employer] (more info)  Text page via [employer] Paging/Directory   See signed in provider for up to date coverage information  ______________________________________________________________________    Overnight events:   Pt has been experiencing ongoing pain in RLE without new swelling or overlying skin changes. Does have some wounds from her previous hospital stay to dorsal aspect of foot but no increase in warmth to the area, wounds are healing. Denies fevers, chills, n/v overnight. Is still having ongoing increased work of breathing and cough. Denies any abdominal pain today but confirms that most of her pain was in her RLQ and LLQ overnight. This has dissipated entirely today. No other acute concerns.       Physical Exam   Vital Signs: Temp: 98.1  F (36.7  C) Temp src: Axillary BP: 108/84 Pulse: 111   Resp: 18 SpO2: 98 % O2 Device: BiPAP/CPAP    Weight: 123 lbs 0 oz    General Appearance: Pleasant, pallid, chronically-ill appearing in distress  Respiratory: Increased work of  breathing, bilateral wheezing, decreased lung sounds at both bases, in BiPAP  Cardiovascular: Tachycardiac, normal S1, S2, no murmurs  GI: Abdomen mildly distended, soft, non tender. POC US without increased ascites on abdominal exam.   Extremities: Bilateral 2+ pitting edema, tenderness of RLE with palpation. Well healing small, circumferential wounds to dorsal aspect of RLE on foot and ankle. No surrounding erythema, warmth, drainage.       Please see A&P for additional details of medical decision making.      Data     I have personally reviewed the following data over the past 24 hrs:    31.4 (H)  \   8.5 (L)   / 270     143 111 (H) 5.3 (L) /  184 (H)   3.5 18 (L) 0.59 \     ALT: 10 AST: 70 (H) AP: 114 TBILI: 2.4 (H)   ALB: 3.1 (L) TOT PROTEIN: 6.3 (L) LIPASE: N/A     Trop: 59 (H) BNP: N/A     Procal: 0.28 CRP: N/A Lactic Acid: 1.8         Imaging results reviewed over the past 24 hrs:   Recent Results (from the past 24 hour(s))   CT Abdomen Pelvis w Contrast    Narrative    EXAM: CT ABDOMEN PELVIS W CONTRAST  LOCATION: [facility]  DATE: 8/13/2024    INDICATION: LLQ and RLQ pain and tenderness  COMPARISON: None.  TECHNIQUE: CT scan of the abdomen and pelvis was performed following injection of IV contrast. Multiplanar reformats were obtained. Dose reduction techniques were used.  CONTRAST: 76ml isovue 370    FINDINGS:   LOWER CHEST: Small bilateral pleural effusions. Near complete loss of volume right lung. Moderate left compressive atelectasis.    HEPATOBILIARY: Small low-attenuation focus in the right liver (series 3 image 16) has a benign appearance. No follow-up needed. No significant mass or bile duct dilatation. No calcified gallstones. Gallbladder wall thickening or pericholecystic fluid.   Mild ascites adjacent to the liver and extending in the pelvic cul-de-sac.    PANCREAS: Numerous pancreatic calcifications consistent with chronic pancreatitis changes. No  pancreatic masses. No significant pancreatic ductal dilatation.    SPLEEN: Normal.    ADRENAL GLANDS: Normal.    KIDNEYS/BLADDER: The bilateral kidneys enhance symmetrically without evidence for hydronephrosis or pyelonephritis. No renal masses or calculi noted. The bilateral ureters and urinary bladder are unremarkable.    BOWEL: Colonic diverticula without evidence for diverticulitis. Nonspecific nonobstructive bowel gas pattern. Thickening of the ascending colon and terminal ileum may be secondary to adjacent moderate ascites. Close clinical correlation recommended.   Appendix not seen.    LYMPH NODES: Normal.    VASCULATURE: Normal.    PELVIC ORGANS: Moderate ascites and free fluid in the pelvis. No pelvic masses. Uterus unremarkable.    MUSCULOSKELETAL: Postoperative changes of intervertebral strut graft placement at L5-S1.. Anasarca changes about the abdominal and pelvic walls. No inguinal or ventral hernias.      Impression    IMPRESSION:   1.  Moderate ascites and free fluid in the pelvis. Anasarca changes about the abdominal and pelvic walls.  2.  Small bilateral pleural effusions with near complete loss of volume right lung and moderate compressive atelectasis left lung base.  3.  Gallbladder wall thickening or pericholecystic fluid. No calcified gallstones.  4.  Numerous pancreatic calcifications consistent with chronic pancreatitis changes. No acute pancreatitis changes.  5.  Colonic diverticula without evidence for diverticulitis.  6.  Thickening of the ascending colon and terminal ileum may be secondary to adjacent moderate ascites.

## 2024-08-13 NOTE — CONSULTS
[facility]   Pulmonary Consult Note - Initial    Maribell Leon MRN: [id number]  Date of Admission:8/12/2024  Date of Service: 08/13/2024    Reason for consult: Management of AHRF and CAP requiring BiPAP in the context of a recent hospitalization in July 2/2 to mycoplasma pneumonia that requires intubation.   Primary service: Gold team      ASSESSMENT:   # Acute hypoxic respiratory failure secondary to community acquired PNA     Maribell Leon is a 29 year female with a past medical history of cirrhosis, alcohol use disorder, who presented with AHRF secondary to pneumonia.  The potential differentials for the pneumonia are that it could be bacterial, fungal, or aspiration pneumonitis.     Infectious disease recommendations regarding antibiotics and infectious disease workup are appreciated as the exact cause of this pneumonia is currently unknown. Fungal workup is recommended considering that she has a previous episode of potential mycoplasma pneumonia along with histo and blasto considering that the xray and CT imaging show a pattern of consolidation that matches the latter two organisms. Regarding bacterial workup, collecting a sputum sample would be recommended. At this moment, a bronchoscopy would not be recommended due to her high oxygen requirement.     It is recommended to change the BiPap to a high flow nasal cannula, as it has a higher safety profile.     Airway clearance and hygiene is also recommended. She should be continued on duonebs.     A daily chest x-ray is recommended to track the disease and make sure it is not worsening.     RECOMMENDATIONS:    - Daily chest x-ray  - Antibiotics and infectious workup per infectious disease recommendations. Fungal is still on the differential so pulm recommends a workup of potential fungal causes.   - Airway clearance and hygiene   - Change BiPAP to high flow   - Continue duonebs.   - Collect ABG    Pulmonary will follow     Patient seen and discussed  with Dr. Mann López, MS4      I was present with the medical student during the history and exam.  I discussed the case and have made changes to the assessment/plan above accordingly.     Latrice Meléndez MD   Pulm/Crit Fellow     Pulmonary Attending Attestation  I saw and examined the patient with Dr. Rika Glynn, confirming lou aspects of the history and exam.  I personally reviewed the recent Xrays and other labs.  The fellow s note reflects our detailed discussion of the findings, assessment and plan.    30 yo woman with underlying alcoholic cirrhosis presented to General Medicine on 8/12 (yesterday) with 1 day cough & SOB whom we were asked to see for hypoxemia, CAP and BiPAP requirement.  She was admitted 7/13 in [place] with acute hypoxic respiratory failure attributed to Mycoplasma pneumonia, requiring transfer to [place] with intubation x 2 days.  Had bronchoscopy during with PMN 78% and Macrophages 17%. Neg cytology with lipid laden macrophages. Blood Beta-D-Glucan positive x2 there.  Negative Histo / Blasto / PJP.  Thoracentesis revealed transudate.  She recovered within 3-4 days and was discharged.  Readmitted 7/31 for CHANTELL and RLE swelling with  patchy perihilar and left basilar opaicies plus small-mod R pleural effusion. Negative LE doppler and CT (?angio) said to exclude PE. Discharged 8/2 on antibiotic with uncertain cause of episode.  Readmitted again 8/9 for worsening RLE swelling with bilateral pleural effusion. She was diuresed with spironolactone and hydrochlorothiazide with 13 lb diuresis.  Past 24 hours has had increased SOB with low home O2 sats.  New cough is productive of white cloudy sputum.    PMH includes decompensated alcoholic cirrhosis, pancreatitis,  No known past GI bleeds; Past h/o ascites and lactulose use. Neuropathic LE pain.    Meds: spironolactone,  hydrochlorothiazide, oxycodon, robaxin, Mg, actelopram,  SH lives with mom; reportedly not drinking now and quit  smoking 6 months ago  Exam notable for nl temp  (with pain); RR 12; 120/91; BiPAP 12/5 with 92% O2 sat. Mod distress from leg pain while lying down.  Scant bibasilar posterior crackles with good air entry.  Tachy regular nl heart sounds. No m/g/r.  Woody edema and swelling on R  Hgb 8.5;  WBC 31.4; Na 143 K 3.5 HCO3 18  5/0.59  Bili 2.4 total; INR 1.74; troponin  VBG 7.36/38/38 peripheral;  Neg RVP / MRSA / Legionella / Pneumococcal Ag  CXR R pleural effusion improved post thoracentesis. R lung multiple areas of consolidatoin with R main air bronchogram.  A&P:    Two issues:  Acute hypoxic respiratory failure management:  Sincere there is no hypercapnia, High flow nasal cannula is a much better treatment than BiPAP.  Treatment with HFNC has lower likelihood of intubation requirement and lower mortality.  BiPAP has HIGHER mortality than oxygen by face mask and is not appropriate Rx. HFNC also has better patient comfort and less dyspnea ([place]ark TURCIOS 2015)  Pneumonia Diagnosis & Treatment:  The original picture is quite atypical for Mycoplasma (but can rarely cause ARDS).  Aspiration pnemonitis (with or without infection) is in the differential diagnosis also.  May not be safe to perform bronchoscopy with BAL @ present. Agree with empiric treatment for severe Community acquired pneumonia including steroid Rx    Recommend:  Switch to HFNC, not BiPAP.  Continue attempts to identify infectious organism in sputum & pleural fluid  Check ABG (not VBG).  IF PaO2:FiO2 ratio is < 300 with FiO2 > 50% then continued hydrocortisone 200 mgs iv/day for 4-7 days is reasonable.  Otherwise steroids could be discontinued.    Dre Darnell MD               HPI:   The patient started experiencing a cough that was productive of white sputum, and SOB one day before the current admission. The SOB is not associated with any change in position, and the patient has not had any contact with sick persons. She denies any chills, myalgias.      Since she has been in the hospital, she has been destatting to the 80's on 9L NC. She was placed on BiPap, duonebs, solumedrol which results in improvement of her breathing.     The patient has a recent medical history of multiple hospitalizations. On July 13, 2024, the patient was admitted for AHRF secondary to presumed mycoplasma pneumonia. She required intubation for approximately two days, and then became stable enough to discharge. She returned to her baseline. This hospitalization occurred in [place] and [place]. The patient was then hospitalized on 7/31-8/2 due to CHANTELL and LE swelling. She had a negative workup for gou, a negative LE doppler ruling out DVT, normal echo, and a normal CT. She discharged with her swelling returning near her baseline but not completely resolving. The hospitalization just previous to the most recent one occurred on 8/9. Her right LE started swelling and bilateral pleural effusions were found. She was discharged with hydrochlorothiazide and spironlactone.     ROS: As stated in HPI      PMHx/PSHx:  Past Medical History:   Diagnosis Date    Acute pancreatitis     Cirrhosis of liver (H)      Past Surgical History:   Procedure Laterality Date    TONSILLECTOMY      8/05       Social Hx:   - Tobacco: Quit smoking 1 month ago.   - EtOH: Former drinker. Quit 3 months ago  - Illicits: None    Outpatient Medications:   Current Facility-Administered Medications   Medication Dose Route Frequency Provider Last Rate Last Admin    calcium carbonate (TUMS) chewable tablet 1,000 mg  1,000 mg Oral 4x Daily PRN Aries Vasquez MD        carboxymethylcellulose PF (REFRESH PLUS) 0.5 % ophthalmic solution 1 drop  1 drop Both Eyes Q1H PRN Curry Small MD        enoxaparin ANTICOAGULANT (LOVENOX) injection 40 mg  40 mg Subcutaneous Q24H Aries Vasquez MD   40 mg at 08/12/24 2018    guaiFENesin-dextromethorphan (ROBITUSSIN DM) 100-10 MG/5ML syrup 10 mL  10 mL Oral Q4H PRN Noreen Nevarez MD    10 mL at 08/13/24 1432    HOLD: All Oral Medications   Does not apply HOLD Curry Small MD        HYDROmorphone (PF) (DILAUDID) injection 0.3 mg  0.3 mg Intravenous Q3H PRN Curry Small MD   0.3 mg at 08/13/24 1559    ipratropium - albuterol 0.5 mg/2.5 mg/3 mL (DUONEB) neb solution 3 mL  3 mL Nebulization 4x daily Noreen Nevarez MD   3 mL at 08/13/24 1650    lidocaine (LMX4) cream   Topical Q1H PRN Aries Vasquez MD        lidocaine 1 % 0.1-1 mL  0.1-1 mL Other Q1H PRN Aries Vasquez MD        melatonin tablet 3 mg  3 mg Oral At Bedtime PRN Aries Vasquez MD        No lozenges or gum should be given while patient on BIPAP/AVAPS/AVAPS AE   Does not apply Continuous PRN Curry Small MD        ondansetron (ZOFRAN) injection 4 mg  4 mg Intravenous Q6H PRN Latonya Olivo MD        piperacillin-tazobactam (ZOSYN) 4.5 g vial to attach to  mL bag  4.5 g Intravenous Q6H Aries Vasquez MD   Stopped at 08/13/24 1110    senna-docusate (SENOKOT-S/PERICOLACE) 8.6-50 MG per tablet 1 tablet  1 tablet Oral BID PRN Aries Vasquez MD        Or    senna-docusate (SENOKOT-S/PERICOLACE) 8.6-50 MG per tablet 2 tablet  2 tablet Oral BID PRN Aries Vasquez MD        sodium chloride (PF) 0.9% PF flush 3 mL  3 mL Intracatheter Q8H Aries Vasquez MD        sodium chloride (PF) 0.9% PF flush 3 mL  3 mL Intracatheter q1 min prn Aries Vasquez MD   3 mL at 08/13/24 1601     Current Outpatient Medications   Medication Sig Dispense Refill    cyanocobalamin (VITAMIN B-12) 1000 MCG tablet Take 1,000 mcg by mouth daily      escitalopram (LEXAPRO) 10 MG tablet Take 10 mg by mouth daily      folic acid (FOLVITE) 1 MG tablet Take 1 tablet (1 mg) by mouth daily 30 tablet 0    magnesium 250 MG tablet Take 1 tablet by mouth daily      methocarbamol (ROBAXIN) 500 MG tablet Take 1 tablet (500 mg) by mouth 4 times daily as needed for muscle spasms 30 tablet 0    spironolactone-HCTZ (ALDACTAZIDE) 25-25  MG tablet Take 1 tablet by mouth daily for 30 days 30 tablet 0       Allergies:   No Known Allergies    Family Hx:   Family History   Problem Relation Age of Onset    Allergy (Severe) Mother         Allergies,Environmental allergies, celiac disease    Family History Negative Father         Good Health    Cancer Maternal Grandfather         Cancer,Kidney cancer    Diabetes Maternal Grandmother         Diabetes,Type 2    Heart Disease Paternal Grandfather         Heart Disease,Cardiomyopathy    Asthma Brother         Asthma,history of intermittent asthma    Other - See Comments Brother         Enuresis    Family History Negative Sister         Good Health    Family History Negative Sister         Good Health         Physical Exam:   /84 (BP Location: Left arm)   Pulse 111   Temp 98.1  F (36.7  C) (Axillary)   Resp 18   Ht 1.524 m (5')   Wt 55.8 kg (123 lb)   SpO2 98%   BMI 24.02 kg/m    - Gen: Aox3, NAD   - CV: Tachycardic, no m/r/g  - Lung: Crackles in lower lobes, no increased WOB  - Abd: NTND, +BS  - Ext: No BLE swelling  - Skin: No major rashes or lesions  - Neuro: CN grossly intact     Images/Studies:   Xray chest(8/13)  IMPRESSION: Worsening probable consolidation versus increased pleural  effusion an atelectasis in the right lower lung zone    Labs:   ABG  No lab results found in last 7 days.  CBC  Recent Labs   Lab 08/13/24  0642 08/12/24  0931 08/09/24  1855 08/09/24  0902   WBC 31.4* 21.9* 16.3* 13.3*   HGB 8.5* 8.8* 8.2* 7.7*    227 198 178     BMP  Recent Labs   Lab 08/13/24  0642 08/12/24  0931 08/09/24  1631 08/09/24  0902    142 132* 132*   POTASSIUM 3.5 3.3* 4.9 3.2*   CHLORIDE 111* 109* 107 105   CO2 18* 19* 14* 16*   BUN 5.3* 4.0* 10.4 10.8   CR 0.59 0.50* 0.58 0.69   * 148* 146* 188*     LFT  Recent Labs   Lab 08/13/24  0642 08/12/24  1204 08/12/24  0931 08/09/24  1631   AST 70*  --   --   --    ALT 10  --  7 5   ALKPHOS 114  --  126 114   BILITOTAL 2.4*  --  2.7*  2.3*   ALBUMIN 3.1*  --  3.7 3.1*   INR  --  1.74*  --   --      Coagulation Profile  Recent Labs   Lab 08/12/24  1204   INR 1.74*

## 2024-08-13 NOTE — PLAN OF CARE
Goal Outcome Evaluation:         Pt has remained stable, alert and oriented, still on bipap with FiO2 at 40%. BGV done this shift. Per primary team, pt needs to remain on bipap and NPO for now. Dilaudid IV and Robitussin for leg pain and coughs. Pt still has beto LE edema. No diuretics given this shift. Pt still on multiple IV antibx, tolerating well. Up to commode with SBA. Pt reported LBM was on 08/12, normal stool. Unable to collect C-diff sample so far.

## 2024-08-14 ENCOUNTER — APPOINTMENT (OUTPATIENT)
Dept: GENERAL RADIOLOGY | Facility: CLINIC | Age: 29
End: 2024-08-14
Payer: MEDICAID

## 2024-08-14 LAB
ALBUMIN SERPL BCG-MCNC: 3.2 G/DL (ref 3.5–5.2)
ALLEN'S TEST: ABNORMAL
ALLEN'S TEST: YES
ALLEN'S TEST: YES
ALP SERPL-CCNC: 86 U/L (ref 40–150)
ALT SERPL W P-5'-P-CCNC: <5 U/L (ref 0–50)
ANION GAP SERPL CALCULATED.3IONS-SCNC: 14 MMOL/L (ref 7–15)
AST SERPL W P-5'-P-CCNC: 48 U/L (ref 0–45)
BASE EXCESS BLDA CALC-SCNC: -2.2 MMOL/L (ref -3–3)
BASE EXCESS BLDA CALC-SCNC: -2.3 MMOL/L (ref -3–3)
BASE EXCESS BLDA CALC-SCNC: -2.4 MMOL/L (ref -3–3)
BASE EXCESS BLDV CALC-SCNC: -1.7 MMOL/L (ref -3–3)
BASOPHILS # BLD AUTO: 0.1 10E3/UL (ref 0–0.2)
BASOPHILS NFR BLD AUTO: 0 %
BILIRUB SERPL-MCNC: 2.5 MG/DL
BUN SERPL-MCNC: 8.5 MG/DL (ref 6–20)
CALCIUM SERPL-MCNC: 8.3 MG/DL (ref 8.8–10.4)
CHLORIDE SERPL-SCNC: 112 MMOL/L (ref 98–107)
COHGB MFR BLD: 74.6 % (ref 96–97)
COHGB MFR BLD: 74.7 % (ref 96–97)
COHGB MFR BLD: 98.1 % (ref 96–97)
CREAT SERPL-MCNC: 0.6 MG/DL (ref 0.51–0.95)
CRP SERPL-MCNC: 27.9 MG/L
CRYPTOC AG SPEC QL: NEGATIVE
EGFRCR SERPLBLD CKD-EPI 2021: >90 ML/MIN/1.73M2
EOSINOPHIL # BLD AUTO: 0 10E3/UL (ref 0–0.7)
EOSINOPHIL NFR BLD AUTO: 0 %
ERYTHROCYTE [DISTWIDTH] IN BLOOD BY AUTOMATED COUNT: 17.5 % (ref 10–15)
ERYTHROCYTE [SEDIMENTATION RATE] IN BLOOD BY WESTERGREN METHOD: 28 MM/HR (ref 0–20)
GLUCOSE SERPL-MCNC: 140 MG/DL (ref 70–99)
H CAPSUL AG UR QL IA: NOT DETECTED
H CAPSUL AG UR-MCNC: NOT DETECTED NG/ML
HCO3 BLD-SCNC: 22 MMOL/L (ref 21–28)
HCO3 BLDV-SCNC: 23 MMOL/L (ref 21–28)
HCO3 SERPL-SCNC: 19 MMOL/L (ref 22–29)
HCT VFR BLD AUTO: 26.5 % (ref 35–47)
HGB BLD-MCNC: 8.2 G/DL (ref 11.7–15.7)
HOLD SPECIMEN: NORMAL
IMM GRANULOCYTES # BLD: 0.3 10E3/UL
IMM GRANULOCYTES NFR BLD: 1 %
LYMPHOCYTES # BLD AUTO: 3 10E3/UL (ref 0.8–5.3)
LYMPHOCYTES NFR BLD AUTO: 11 %
MAGNESIUM SERPL-MCNC: 1.5 MG/DL (ref 1.7–2.3)
MAGNESIUM SERPL-MCNC: 3.1 MG/DL (ref 1.7–2.3)
MCH RBC QN AUTO: 32.4 PG (ref 26.5–33)
MCHC RBC AUTO-ENTMCNC: 30.9 G/DL (ref 31.5–36.5)
MCV RBC AUTO: 105 FL (ref 78–100)
MONOCYTES # BLD AUTO: 1.8 10E3/UL (ref 0–1.3)
MONOCYTES NFR BLD AUTO: 7 %
NEUTROPHILS # BLD AUTO: 22.1 10E3/UL (ref 1.6–8.3)
NEUTROPHILS NFR BLD AUTO: 81 %
NRBC # BLD AUTO: 0 10E3/UL
NRBC BLD AUTO-RTO: 0 /100
O2/TOTAL GAS SETTING VFR VENT: 100 %
O2/TOTAL GAS SETTING VFR VENT: 100 %
O2/TOTAL GAS SETTING VFR VENT: 21 %
O2/TOTAL GAS SETTING VFR VENT: 70 %
OXYHGB MFR BLDV: 75 % (ref 70–75)
PCO2 BLD: 34 MM HG (ref 35–45)
PCO2 BLD: 34 MM HG (ref 35–45)
PCO2 BLD: 38 MM HG (ref 35–45)
PCO2 BLDV: 35 MM HG (ref 40–50)
PH BLD: 7.38 [PH] (ref 7.35–7.45)
PH BLD: 7.42 [PH] (ref 7.35–7.45)
PH BLD: 7.42 [PH] (ref 7.35–7.45)
PH BLDV: 7.42 [PH] (ref 7.32–7.43)
PHOSPHATE SERPL-MCNC: 2.7 MG/DL (ref 2.5–4.5)
PLATELET # BLD AUTO: 222 10E3/UL (ref 150–450)
PO2 BLD: 41 MM HG (ref 80–105)
PO2 BLD: 42 MM HG (ref 80–105)
PO2 BLD: 90 MM HG (ref 80–105)
PO2 BLDV: 42 MM HG (ref 25–47)
POTASSIUM BLD-SCNC: 2.6 MMOL/L (ref 3.4–5.3)
POTASSIUM SERPL-SCNC: 3 MMOL/L (ref 3.4–5.3)
POTASSIUM SERPL-SCNC: 3 MMOL/L (ref 3.4–5.3)
PROCALCITONIN SERPL IA-MCNC: 0.58 NG/ML
PROT SERPL-MCNC: 6 G/DL (ref 6.4–8.3)
RBC # BLD AUTO: 2.53 10E6/UL (ref 3.8–5.2)
SAO2 % BLDA: 73 % (ref 92–100)
SAO2 % BLDA: 73 % (ref 92–100)
SAO2 % BLDA: 96 % (ref 92–100)
SAO2 % BLDV: 76.7 % (ref 70–75)
SODIUM SERPL-SCNC: 145 MMOL/L (ref 135–145)
WBC # BLD AUTO: 27.3 10E3/UL (ref 4–11)

## 2024-08-14 PROCEDURE — 83735 ASSAY OF MAGNESIUM: CPT | Performed by: INTERNAL MEDICINE

## 2024-08-14 PROCEDURE — 82805 BLOOD GASES W/O2 SATURATION: CPT

## 2024-08-14 PROCEDURE — 250N000011 HC RX IP 250 OP 636

## 2024-08-14 PROCEDURE — 99233 SBSQ HOSP IP/OBS HIGH 50: CPT | Mod: GC | Performed by: STUDENT IN AN ORGANIZED HEALTH CARE EDUCATION/TRAINING PROGRAM

## 2024-08-14 PROCEDURE — 71045 X-RAY EXAM CHEST 1 VIEW: CPT

## 2024-08-14 PROCEDURE — 36415 COLL VENOUS BLD VENIPUNCTURE: CPT | Performed by: INTERNAL MEDICINE

## 2024-08-14 PROCEDURE — 71045 X-RAY EXAM CHEST 1 VIEW: CPT | Mod: 26 | Performed by: RADIOLOGY

## 2024-08-14 PROCEDURE — 36415 COLL VENOUS BLD VENIPUNCTURE: CPT

## 2024-08-14 PROCEDURE — 94640 AIRWAY INHALATION TREATMENT: CPT | Mod: 76

## 2024-08-14 PROCEDURE — 84100 ASSAY OF PHOSPHORUS: CPT | Performed by: INTERNAL MEDICINE

## 2024-08-14 PROCEDURE — 999N000157 HC STATISTIC RCP TIME EA 10 MIN

## 2024-08-14 PROCEDURE — 85652 RBC SED RATE AUTOMATED: CPT

## 2024-08-14 PROCEDURE — 94640 AIRWAY INHALATION TREATMENT: CPT

## 2024-08-14 PROCEDURE — 94660 CPAP INITIATION&MGMT: CPT

## 2024-08-14 PROCEDURE — 99233 SBSQ HOSP IP/OBS HIGH 50: CPT | Performed by: STUDENT IN AN ORGANIZED HEALTH CARE EDUCATION/TRAINING PROGRAM

## 2024-08-14 PROCEDURE — 250N000013 HC RX MED GY IP 250 OP 250 PS 637: Performed by: STUDENT IN AN ORGANIZED HEALTH CARE EDUCATION/TRAINING PROGRAM

## 2024-08-14 PROCEDURE — 87899 AGENT NOS ASSAY W/OPTIC: CPT | Performed by: STUDENT IN AN ORGANIZED HEALTH CARE EDUCATION/TRAINING PROGRAM

## 2024-08-14 PROCEDURE — 120N000002 HC R&B MED SURG/OB UMMC

## 2024-08-14 PROCEDURE — 250N000013 HC RX MED GY IP 250 OP 250 PS 637: Performed by: INTERNAL MEDICINE

## 2024-08-14 PROCEDURE — 80053 COMPREHEN METABOLIC PANEL: CPT

## 2024-08-14 PROCEDURE — 87385 HISTOPLASMA CAPSUL AG IA: CPT | Performed by: STUDENT IN AN ORGANIZED HEALTH CARE EDUCATION/TRAINING PROGRAM

## 2024-08-14 PROCEDURE — 86140 C-REACTIVE PROTEIN: CPT

## 2024-08-14 PROCEDURE — 36415 COLL VENOUS BLD VENIPUNCTURE: CPT | Performed by: STUDENT IN AN ORGANIZED HEALTH CARE EDUCATION/TRAINING PROGRAM

## 2024-08-14 PROCEDURE — 250N000013 HC RX MED GY IP 250 OP 250 PS 637

## 2024-08-14 PROCEDURE — 250N000009 HC RX 250: Performed by: INTERNAL MEDICINE

## 2024-08-14 PROCEDURE — 250N000011 HC RX IP 250 OP 636: Performed by: INTERNAL MEDICINE

## 2024-08-14 PROCEDURE — 99233 SBSQ HOSP IP/OBS HIGH 50: CPT | Mod: GC | Performed by: INTERNAL MEDICINE

## 2024-08-14 PROCEDURE — 85025 COMPLETE CBC W/AUTO DIFF WBC: CPT

## 2024-08-14 PROCEDURE — 86698 HISTOPLASMA ANTIBODY: CPT | Performed by: STUDENT IN AN ORGANIZED HEALTH CARE EDUCATION/TRAINING PROGRAM

## 2024-08-14 PROCEDURE — 83735 ASSAY OF MAGNESIUM: CPT

## 2024-08-14 PROCEDURE — 999N000185 HC STATISTIC TRANSPORT TIME EA 15 MIN

## 2024-08-14 PROCEDURE — 84132 ASSAY OF SERUM POTASSIUM: CPT | Performed by: INTERNAL MEDICINE

## 2024-08-14 PROCEDURE — 250N000009 HC RX 250

## 2024-08-14 RX ORDER — ALBUTEROL SULFATE 0.83 MG/ML
2.5 SOLUTION RESPIRATORY (INHALATION)
Status: DISCONTINUED | OUTPATIENT
Start: 2024-08-14 | End: 2024-08-19

## 2024-08-14 RX ORDER — POTASSIUM CHLORIDE 750 MG/1
20 TABLET, EXTENDED RELEASE ORAL ONCE
Status: COMPLETED | OUTPATIENT
Start: 2024-08-14 | End: 2024-08-14

## 2024-08-14 RX ORDER — HYDROMORPHONE HYDROCHLORIDE 1 MG/ML
0.5 INJECTION, SOLUTION INTRAMUSCULAR; INTRAVENOUS; SUBCUTANEOUS
Status: DISCONTINUED | OUTPATIENT
Start: 2024-08-14 | End: 2024-08-15

## 2024-08-14 RX ORDER — FUROSEMIDE 10 MG/ML
40 INJECTION INTRAMUSCULAR; INTRAVENOUS ONCE
Status: COMPLETED | OUTPATIENT
Start: 2024-08-14 | End: 2024-08-15

## 2024-08-14 RX ORDER — FUROSEMIDE 10 MG/ML
40 INJECTION INTRAMUSCULAR; INTRAVENOUS ONCE
Status: COMPLETED | OUTPATIENT
Start: 2024-08-14 | End: 2024-08-14

## 2024-08-14 RX ORDER — GUAIFENESIN 600 MG/1
600 TABLET, EXTENDED RELEASE ORAL 2 TIMES DAILY
Status: DISCONTINUED | OUTPATIENT
Start: 2024-08-14 | End: 2024-08-21 | Stop reason: HOSPADM

## 2024-08-14 RX ORDER — POTASSIUM CHLORIDE 750 MG/1
40 TABLET, EXTENDED RELEASE ORAL ONCE
Status: COMPLETED | OUTPATIENT
Start: 2024-08-14 | End: 2024-08-14

## 2024-08-14 RX ORDER — BENZONATATE 100 MG/1
100 CAPSULE ORAL 3 TIMES DAILY PRN
Status: DISCONTINUED | OUTPATIENT
Start: 2024-08-14 | End: 2024-08-21 | Stop reason: HOSPADM

## 2024-08-14 RX ORDER — MAGNESIUM SULFATE HEPTAHYDRATE 40 MG/ML
4 INJECTION, SOLUTION INTRAVENOUS ONCE
Status: COMPLETED | OUTPATIENT
Start: 2024-08-14 | End: 2024-08-14

## 2024-08-14 RX ORDER — POTASSIUM CHLORIDE 750 MG/1
20 TABLET, EXTENDED RELEASE ORAL ONCE
Status: COMPLETED | OUTPATIENT
Start: 2024-08-15 | End: 2024-08-14

## 2024-08-14 RX ADMIN — POTASSIUM CHLORIDE 40 MEQ: 750 TABLET, EXTENDED RELEASE ORAL at 11:37

## 2024-08-14 RX ADMIN — PIPERACILLIN AND TAZOBACTAM 4.5 G: 4; .5 INJECTION, POWDER, LYOPHILIZED, FOR SOLUTION INTRAVENOUS at 18:36

## 2024-08-14 RX ADMIN — HYDROMORPHONE HYDROCHLORIDE 0.5 MG: 1 INJECTION, SOLUTION INTRAMUSCULAR; INTRAVENOUS; SUBCUTANEOUS at 06:54

## 2024-08-14 RX ADMIN — GUAIFENESIN AND DEXTROMETHORPHAN 10 ML: 100; 10 SYRUP ORAL at 17:06

## 2024-08-14 RX ADMIN — GUAIFENESIN 600 MG: 600 TABLET ORAL at 19:58

## 2024-08-14 RX ADMIN — GUAIFENESIN 600 MG: 600 TABLET ORAL at 10:42

## 2024-08-14 RX ADMIN — HYDROMORPHONE HYDROCHLORIDE 0.3 MG: 1 INJECTION, SOLUTION INTRAMUSCULAR; INTRAVENOUS; SUBCUTANEOUS at 00:58

## 2024-08-14 RX ADMIN — GUAIFENESIN AND DEXTROMETHORPHAN 10 ML: 100; 10 SYRUP ORAL at 10:42

## 2024-08-14 RX ADMIN — HYDROCORTISONE SODIUM SUCCINATE 50 MG: 100 INJECTION, POWDER, FOR SOLUTION INTRAMUSCULAR; INTRAVENOUS at 18:20

## 2024-08-14 RX ADMIN — FUROSEMIDE 40 MG: 10 INJECTION, SOLUTION INTRAVENOUS at 15:45

## 2024-08-14 RX ADMIN — HYDROMORPHONE HYDROCHLORIDE 0.5 MG: 1 INJECTION, SOLUTION INTRAMUSCULAR; INTRAVENOUS; SUBCUTANEOUS at 18:19

## 2024-08-14 RX ADMIN — HYDROMORPHONE HYDROCHLORIDE 0.5 MG: 1 INJECTION, SOLUTION INTRAMUSCULAR; INTRAVENOUS; SUBCUTANEOUS at 20:46

## 2024-08-14 RX ADMIN — IPRATROPIUM BROMIDE AND ALBUTEROL SULFATE 3 ML: .5; 3 SOLUTION RESPIRATORY (INHALATION) at 07:46

## 2024-08-14 RX ADMIN — MAGNESIUM SULFATE IN WATER 4 G: 40 INJECTION, SOLUTION INTRAVENOUS at 19:53

## 2024-08-14 RX ADMIN — PIPERACILLIN AND TAZOBACTAM 4.5 G: 4; .5 INJECTION, POWDER, LYOPHILIZED, FOR SOLUTION INTRAVENOUS at 00:57

## 2024-08-14 RX ADMIN — PIPERACILLIN AND TAZOBACTAM 4.5 G: 4; .5 INJECTION, POWDER, LYOPHILIZED, FOR SOLUTION INTRAVENOUS at 22:37

## 2024-08-14 RX ADMIN — ALBUTEROL SULFATE 2.5 MG: 2.5 SOLUTION RESPIRATORY (INHALATION) at 20:18

## 2024-08-14 RX ADMIN — HYDROMORPHONE HYDROCHLORIDE 0.5 MG: 1 INJECTION, SOLUTION INTRAMUSCULAR; INTRAVENOUS; SUBCUTANEOUS at 10:38

## 2024-08-14 RX ADMIN — POTASSIUM CHLORIDE 40 MEQ: 750 TABLET, EXTENDED RELEASE ORAL at 22:35

## 2024-08-14 RX ADMIN — HYDROMORPHONE HYDROCHLORIDE 0.3 MG: 1 INJECTION, SOLUTION INTRAMUSCULAR; INTRAVENOUS; SUBCUTANEOUS at 03:30

## 2024-08-14 RX ADMIN — FUROSEMIDE 40 MG: 10 INJECTION, SOLUTION INTRAMUSCULAR; INTRAVENOUS at 23:57

## 2024-08-14 RX ADMIN — PIPERACILLIN AND TAZOBACTAM 4.5 G: 4; .5 INJECTION, POWDER, LYOPHILIZED, FOR SOLUTION INTRAVENOUS at 11:40

## 2024-08-14 RX ADMIN — PIPERACILLIN AND TAZOBACTAM 4.5 G: 4; .5 INJECTION, POWDER, LYOPHILIZED, FOR SOLUTION INTRAVENOUS at 05:10

## 2024-08-14 RX ADMIN — HYDROMORPHONE HYDROCHLORIDE 0.5 MG: 1 INJECTION, SOLUTION INTRAMUSCULAR; INTRAVENOUS; SUBCUTANEOUS at 15:45

## 2024-08-14 RX ADMIN — POTASSIUM CHLORIDE 20 MEQ: 750 TABLET, EXTENDED RELEASE ORAL at 23:58

## 2024-08-14 RX ADMIN — HYDROCORTISONE SODIUM SUCCINATE 50 MG: 100 INJECTION, POWDER, FOR SOLUTION INTRAMUSCULAR; INTRAVENOUS at 11:40

## 2024-08-14 RX ADMIN — HYDROMORPHONE HYDROCHLORIDE 0.5 MG: 1 INJECTION, SOLUTION INTRAMUSCULAR; INTRAVENOUS; SUBCUTANEOUS at 13:43

## 2024-08-14 RX ADMIN — ALBUTEROL SULFATE 2.5 MG: 2.5 SOLUTION RESPIRATORY (INHALATION) at 11:16

## 2024-08-14 RX ADMIN — ENOXAPARIN SODIUM 40 MG: 40 INJECTION SUBCUTANEOUS at 19:58

## 2024-08-14 ASSESSMENT — ACTIVITIES OF DAILY LIVING (ADL)
ADLS_ACUITY_SCORE: 44

## 2024-08-14 NOTE — PROGRESS NOTES
[facility]    Progress Note - Medicine Service, SAVANNAH TEAM 1       Date of Admission:  8/12/2024    Updates 8/14:  -- Moved to HFNC: ongoing c/f ARDS vs worsening CAP +/- hepatic hydrothorax   --> pt intermittently removing HFNC given discomfort. D/w pt that she does not need BiPAP and it is better for her improvement to be on HFNC. Understanding, but still intermittently removing which is impacting ABG results unfortunately.   -- Restarted solu-Cortef q6hrs per pulm  -- Started Lasix 40mg q6hrs x2 doses given c/f hepatic hydrothorax   -- Supportive cares for coughing ordered   -- Regular adult diet now that she is on HFNC, NPO while on BiPAP. Also with 2000mL fluid restriction   -- qAM CXR per pulm      Assessment & Plan      Maribell Leon is a 29 year old female admitted on 8/12/2024. She has a history of cirrhosis, alcohol use disorder, neuropathy, chronic pancreatitis who presented with acute dyspnea and cough found to have AHRF likely 2/2 CAP.      # Leukocytosis, improving   # Pleural effusions, bilateral   Likely iso worsening CAP vs increased pleural effusions (?hepatic hydrothorax). No other etiologies apparent at this time. XR today with almost entirely opaque R lung, worse from admission. Bedside US per pulm c/f worsening effusions. Medicine team bedside US of abdomen without good pocket of fluid to tap still, less concerned for SBP.   -- C. Diff test pending given recent ax, increased leukocytosis, intermittent abdominal pain  -- Diuretics: Lasix 40mg q6hrs x2 doses per pulm for effusions   -- qAM CBC   -- Follow up cultures     # Acute hypoxic respiratory failure secondary to community acquired PNA, worsening vs in conjunction with growing effusions  As noted above, c/f worsening of PNA burden vs effusions + known PNA. Diuresing per pulm as above, likely why leukocytosis increased yesterday 8/13 and is now improving.   Restarted solu-Cortef q6hrs per  pulm recommendations. Pt currently tolerating HFNC well but ABG with ongoing c/f ARDS given Arterial O2 to FiO2 ratio. On evaluation, pt repeatedly comfortable, sleeping, sating in the low to mid-90s on HFNC despite this. Per pt, mother, and ED RN caring for pt, she is intermittently removing HFNC as it causes her intranasal discomfort. D/w pt the importance of keeping this in place for accurate and effective treatment in addition to the fact that she did not require BiPAP per recent blood gases for hypercapnia; the BiPAP ultimately won't be of benefit to her at this time. She vocalized understanding and yet continues to intermittently remove HFNC given discomfort. Reviewed this with pulm team who is also aware. Overnight, recommending BiPAP prn for comfort but otherwise with goal to stay on HFNC as much as possible with instructions for STAT ABG, lactate, CXR and paging pulm with c/f worsening mentation, hypoxia, etc.   Work up:   RPP, flu, covid, RSV negative.   Legionella, strep pneumo antigen negative.   MRSA nares negative.   -- Continue zosyn   -- Continue HFNC, does not need BiPAP. May give overnight for comfort if necessary, however.   -- Duonebs QID  -- Lasix 40mg q6hrs x2 doses today with c/f hepatic hydrothorax worsening hypoxic respiratory failure and WoB   -- Follow up sputum, blood cultures  -- Follow up thoracentesis labs and culture   --> aerobic bacteria neg    --> anaerobic pending    --> 9 PMNs, LDH 87, protein 2.3    # Hypokalemia   Likely 2/2 diuresis in the ED and while inpatient given use of Lasix. No CHANTELL, no vomiting. Some diarrhea could be contributing as well.   -- Replete per protocol, CTM qAM     # RLE pain  RLE xray negative. R ankle joint tap with ortho,no septic arthritis. Dual energy CT without gout findings despite elevated uric acid. Was placed on cefazolin due to concerns for cellulitis transitioned to Keflex for 7 days treatment after last admission.   -- Dilaudid 0.3mg q6h,  consider expanding medications in AM 8/14 -- was on Gabapentin at admission but dc'd 2/2 decreased eGFR at admission    # LE swelling  # Volume overload  S/p furosemide 40mg IV x1 in ED with ~1L output. Given c/f re-collection of pleural effusions/hepatic hydrothorax, diuresing this PM with Lasix 40mg q6hrs x2 doses and will monitor output 8/15. Anticipate that this will also improve her respiratory status.   -- Hold home spironolactone-hydrochlorothiazide for now  -- Lymphedema consult    # Cirrhosis   # Hypoalbuminemia  # Elevated INR  Likely secondary to alcohol use. Not decompensated. Not enough ascites 8/13 for paracentesis.   -- Low sodium diet   -- 2L fluid restriction  -- Hold home spironolactone-hydrochlorothiazide for now    # Chronic anemia  Stable, no further cares.   -- Continue folate supplement when tolerating PO       Diet: Fluid restriction 2000 ML FLUID  Regular Diet Adult    DVT Prophylaxis: Lovenox ppx  [place] Catheter: Not present  Fluids: N/A  Lines: None     Cardiac Monitoring: None  Code Status: Full Code       The patient's care was discussed with the Attending Physician, Dr. Car .      Latonya Olivo MD  Medicine Service, [facility] TEAM [address] [place] [address]  Securely message with [employer] (more info)  Text page via [employer] Paging/Directory   See signed in provider for up to date coverage information  ______________________________________________________________________    Overnight events:   Pt with increased cough today and concerns about comfort while on HFNC. Is amenable to wearing but wants to work with team on comfort while doing so. Is wondering about possibility of BiPAP at night still. States that her pain is better controlled today with the increase in pain medications overnight. Denies chest pain, fevering, chills, nausea, increased abdominal pain, new leg pain.       Physical Exam   Vital Signs: Temp: 98  F (36.7  C) Temp src: Oral BP: 117/86  Pulse: 107   Resp: 20 SpO2: 91 % O2 Device: High Flow Nasal Cannula (HFNC) Oxygen Delivery: 35 LPM  Weight: 123 lbs 0 oz    General Appearance: Pleasant, pallid, chronically-ill appearing in distress  Respiratory: Increased work of breathing but less wheezing than previous. Intermittent cough, mucous rattling in upper airways. Still with decreased lung sounds at both bases, on HFNC  Cardiovascular: Tachycardiac, normal S1, S2, no murmurs  GI: Abdomen mildly distended, soft, non tender. POC US without increased ascites on repeat abdominal exam compared to 8/13.   Extremities: Bilateral 2+ pitting edema, tenderness of RLE with palpation. Well healing small, circumferential wounds to dorsal aspect of RLE on foot and ankle. No surrounding erythema, warmth, drainage.       Please see A&P for additional details of medical decision making.      Data     I have personally reviewed the following data over the past 24 hrs:    27.3 (H)  \   8.2 (L)   / 222     145 112 (H) 8.5 /  140 (H)   2.6 (LL) 19 (L) 0.60 \     ALT: <5 AST: 48 (H) AP: 86 TBILI: 2.5 (H)   ALB: 3.2 (L) TOT PROTEIN: 6.0 (L) LIPASE: N/A     Procal: N/A CRP: 27.90 (H) Lactic Acid: N/A         Imaging results reviewed over the past 24 hrs:   Recent Results (from the past 24 hour(s))   XR Chest Port 1 View    Narrative    Portable chest    INDICATION: Severe pneumonia    COMPARISON: 8/12/2024 plain films. CT pulmonary angiogram 8/12/2024.    Findings: Increased opacification right lower lung zone concerning for  worsening consolidation versus pleural effusion. No definite  pneumothorax. Right heart border remains mostly obscured by the dense  opacity in the right lower lung zone. Streaky an patchy opacities in  the left lower lung zone are also slightly increased. Air bronchograms  in the right lower lung zone.      Impression    IMPRESSION: Worsening probable consolidation versus increased pleural  effusion an atelectasis in the right lower lung zone    GRACIELA FREEMAN  MD G[facility]         SYSTEM ID:  [id number]   XR Chest Port 1 View    Narrative    EXAM: XR CHEST PORT 1 VIEW  LOCATION: [facility]  DATE: 8/14/2024    INDICATION: Severe PNA, orders per pulm  COMPARISON: 8/13/2024      Impression    IMPRESSION: Interval worsening of now near complete dense consolidation of the right lung. Similar patchy consolidation in the left lung. The cardiac silhouette is obscured. No pneumothorax. Bilateral pleural effusions.

## 2024-08-14 NOTE — PROGRESS NOTES
PROVIDER  Notified  (Dennise)  pt removing HFNC and probe.would they like a VBG. Education was provided about the risks.

## 2024-08-14 NOTE — PROGRESS NOTES
@1648 provider notified about pt being nauseated  @1704 provider notified about the pt states the dilaudid is not really helping. Its only having a short relief effect on pt. Sharp pain at a 8 right side, which was the same throbbing and burning pain.    @1827 RT was called because pt was desat to 78 since the switch from bipap to HFNC    Fi02 was increased to 50 and saturations went up to 96 and 97.

## 2024-08-14 NOTE — PROGRESS NOTES
[facility]   Pulmonary Consult Note - Follow Up     Maribell Leon MRN: [id number]  Date of Admission:8/12/2024  Date of Service: 08/14/2024    Reason for consult: Management of AHRF and CAP requiring BiPAP in the context of a recent hospitalization in July 2/2 to mycoplasma pneumonia that requires intubation.   Primary service: Gold team     Pulmonary Attending Attestation  I saw and examined the patient with Dr. Gretta Meléndez, confirming key aspects of the history and exam.  I personally reviewed the recent Xrays and other labs.  The fellow s note reflects our detailed discussion of the findings, assessment and plan.  Switched to HFNC 30L 60% last night and had some desaturations to low 80% range. She c/o some nostril burning on HFNC.  Did not tolerate mask due to desaturation.  Afebrile RR 20; SpO2 on HFNC 65%  35LPM with O2 sat most of the time in low 90% range. No increased work of breathing.  Diffuse crackles and R bronchial BS.  Tachycardic with nl HS. RLE  with edema.  Coughing a lot with some production with aerobika, metanebs and scheduled albuterol nebs.  Bedside U/S shows bilateral pleural fluid with patchy lung consolidation.  CXR shows some worsening consolidation at LLL cardiac apex and R upper lung zone.  Labs notable for  Hgb remains low in 8 range; WBC  27K from 31K 3.0;  HCO3 19; Mg TBD;  PO4 TBD;    ABG 7.38/38/90 on HFNC 65% but very low PaO2 on 2 subsequent gases.  Pleural fluid is a transudate with neg cells, and gram stain. Sputum gm stain = 2+ mixed karrie    A&P:    Acute Hypoxic Resp Failure - worsening in past 24 hours (by CXR & oxygenation requirement) in spite of Zosyn with addition of steroids for pneumonia severity this am.  Overall she is at high risk of worsening and requiring intubation  Per most recent ATS/IDSA Hospital-Acquired Pneumonia guidelines (Clin ID 2016) for patients with high risk of mortality or treatment with iv antibiotics in last 90 days  (Table 4; Page c64), would strongly consider double antibiotic coverage given major underlying morbidity.    Recommend:  Favor broader and more intense antibiotic Rx given density and extent of consolidation and uncertain etiology of presumed bacterial pneumonia.  She is too ill to tolerate bronchoscopy.    Dre Darnell MD      ASSESSMENT:     # AHRF 2/2 severe HAP v fungal - worsening   # B/l R > L effusions, hepatic hydrothorax  # Decompensated EtOH cirrhosis     29F with h/o ?mycoplasma PNA requiring intubation (7/16-7/18/24 at OSH) now admitted for AHRF 2/2 severe R side PNA. CT chest showed predominant RML and RLL consolidation. Ddx include HAP (recently hospitalized in July and August) v fungal (fungitell at OSH positive x2 but work up including BAL negative) v aspiration (heavy EtOH use). Sputum cultures and serologies are pending. ID is following as well.     In the past 24 hours, patient with worsening respiratory failure. O2 needs going up, the ABG PaO2 of 48 is likely real. She keeps pulling off her HFNC and unable to tolerate. We trialed her on oximaks 15L but her SpO2 was in the 80's. If she cannot tolerate HFNC then may need CPAP. CXR today with worsening R opacity, persistent air bronchogram, and new LLL opacity. Bedside US was performed which showed large R effusion and small L effusion, probably hepatic hydrothorax (again transudate on 8/12). She did receive a thora 8/12 with significantly improved post thora CXR that has now re accumulated. With her cirrhosis, would recommend diuresis rather than repeat thora's. Additionally, progressive atelectasis (not taking deep breaths) and infection are contributing.     Concern is that patient is headed toward ARDS/is at the beginning stages of it. She is optimized from a respiratory treatment standpoint, although could consider double gram negative coverage per IDSA guidelines.    RECOMMENDATIONS:      - Continue Solucortef 50 q6H for severe PNA  -  Aggressive IV diuresis for pleural effusion (hepatic hydrothorax). Goal of net negative 1 to 2L  - If not tolerate HFNC would switch to CPAP (does not need the bilevel)  - Airway clearance: Flutter valve, albuterol neb + volera BID   - Daily chest x-ray     Pulmonary will continue to follow    Patient seen and discussed with Dr. Mann López, MS4    I was present with the medical student during the history and exam.  I discussed the case and have made changes to the assessment/plan above accordingly.     Latrice Meléndez MD   Pulm/Crit Fellow            24H/SUBJECTIVE:    - having an easier time breathing  - complains of burning in her nostrils due to the high flow  - No acute events overnight     Physical Exam:   BP (!) 122/96   Pulse (!) 121   Temp 98.1  F (36.7  C) (Oral)   Resp 20   Ht 1.524 m (5')   Wt 55.8 kg (123 lb)   SpO2 96%   BMI 24.02 kg/m    - Gen: Aox3, NAD   - CV: RRR, no m/r/g  - Lung: Fine crackles diffusely, no increased work of breathing   - Abd: NTND, +BS  - Ext: Right BLE edema and tenderness  - Skin: No major rashes or lesions  - Neuro: CN grossly intact     Images/Studies:     Chest x-ray(1-view)  Interval worsening of now near complete dense consolidation of the right lung. Similar patchy consolidation in the left lung. The cardiac silhouette is obscured. No pneumothorax. Bilateral pleural effusions.     Labs:   ABG  Recent Labs   Lab 08/14/24  0753 08/13/24  1709   PH 7.38 7.40   PCO2 38 39   PO2 90 30*   HCO3 22 24     CBC  Recent Labs   Lab 08/14/24  0823 08/13/24  0642 08/12/24  0931 08/09/24  1855   WBC 27.3* 31.4* 21.9* 16.3*   HGB 8.2* 8.5* 8.8* 8.2*    270 227 198     BMP  Recent Labs   Lab 08/14/24  0823 08/13/24  0642 08/12/24  0931 08/09/24  1631    143 142 132*   POTASSIUM 3.0* 3.5 3.3* 4.9   CHLORIDE 112* 111* 109* 107   CO2 19* 18* 19* 14*   BUN 8.5 5.3* 4.0* 10.4   CR 0.60 0.59 0.50* 0.58   * 184* 148* 146*     LFT  Recent Labs   Lab  08/14/24  0823 08/13/24  0642 08/12/24  1204 08/12/24  0931 08/09/24  1631   AST 48* 70*  --   --   --    ALT <5 10  --  7 5   ALKPHOS 86 114  --  126 114   BILITOTAL 2.5* 2.4*  --  2.7* 2.3*   ALBUMIN 3.2* 3.1*  --  3.7 3.1*   INR  --   --  1.74*  --   --      Coagulation Profile  Recent Labs   Lab 08/12/24  1204   INR 1.74*       Medications:   Current Facility-Administered Medications   Medication Dose Route Frequency Provider Last Rate Last Admin    albuterol (PROVENTIL) neb solution 2.5 mg  2.5 mg Nebulization 2 times daily Latrice Farmer MD        benzonatate (TESSALON) capsule 100 mg  100 mg Oral TID PRN Latonya Olivo MD        calcium carbonate (TUMS) chewable tablet 1,000 mg  1,000 mg Oral 4x Daily PRN Aries Vasquez MD        carboxymethylcellulose PF (REFRESH PLUS) 0.5 % ophthalmic solution 1 drop  1 drop Both Eyes Q1H PRN Curry Small MD        enoxaparin ANTICOAGULANT (LOVENOX) injection 40 mg  40 mg Subcutaneous Q24H Aries Vasquez MD   40 mg at 08/13/24 2013    guaiFENesin (MUCINEX) 12 hr tablet 600 mg  600 mg Oral BID Latonya Olivo MD   600 mg at 08/14/24 1042    guaiFENesin-dextromethorphan (ROBITUSSIN DM) 100-10 MG/5ML syrup 10 mL  10 mL Oral Q4H PRN Noreen Nevarez MD   10 mL at 08/14/24 1042    HOLD: All Oral Medications   Does not apply HOLD Curry Small MD        hydrocortisone sodium succinate PF (solu-CORTEF) injection 50 mg  50 mg Intravenous Q6H [facility] Latrice Farmer MD        HYDROmorphone (PF) (DILAUDID) injection 0.5 mg  0.5 mg Intravenous Q2H PRN Del Shaw MD   0.5 mg at 08/14/24 1038    lidocaine (LMX4) cream   Topical Q1H PRN Aries Vasquez MD        lidocaine 1 % 0.1-1 mL  0.1-1 mL Other Q1H PRN Aries Vasquez MD        melatonin tablet 3 mg  3 mg Oral At Bedtime PRN Aries Vasquez MD   3 mg at 08/13/24 2013    No lozenges or gum should be given while patient on BIPAP/AVAPS/AVAPS AE   Does not apply Continuous PRN Geovanny,  Curry Edwards MD        ondansetron (ZOFRAN) injection 4 mg  4 mg Intravenous Q6H PRN Latonya Olivo MD        piperacillin-tazobactam (ZOSYN) 4.5 g vial to attach to  mL bag  4.5 g Intravenous Q6H Aries Vasquez MD   Stopped at 08/14/24 0752    senna-docusate (SENOKOT-S/PERICOLACE) 8.6-50 MG per tablet 1 tablet  1 tablet Oral BID PRN Aries Vasquez MD        Or    senna-docusate (SENOKOT-S/PERICOLACE) 8.6-50 MG per tablet 2 tablet  2 tablet Oral BID PRN Aries Vasquez MD        sodium chloride (PF) 0.9% PF flush 3 mL  3 mL Intracatheter Q8H Aries Vasquez MD   3 mL at 08/13/24 1852    sodium chloride (PF) 0.9% PF flush 3 mL  3 mL Intracatheter q1 min prn Aries Vasquez MD   3 mL at 08/13/24 1601     Current Outpatient Medications   Medication Sig Dispense Refill    cyanocobalamin (VITAMIN B-12) 1000 MCG tablet Take 1,000 mcg by mouth daily      escitalopram (LEXAPRO) 10 MG tablet Take 10 mg by mouth daily      folic acid (FOLVITE) 1 MG tablet Take 1 tablet (1 mg) by mouth daily 30 tablet 0    magnesium 250 MG tablet Take 1 tablet by mouth daily      methocarbamol (ROBAXIN) 500 MG tablet Take 1 tablet (500 mg) by mouth 4 times daily as needed for muscle spasms 30 tablet 0    spironolactone-HCTZ (ALDACTAZIDE) 25-25 MG tablet Take 1 tablet by mouth daily for 30 days 30 tablet 0

## 2024-08-14 NOTE — PROGRESS NOTES
[facility] Progress Note    Patient Name: Maribell Leon  YOB: 1995  Gender Identity: female  MRN: [id number]  Primary Attending: Jonathan Daigle    Today's Date: 08/13/24  Admission Date: 8/12/2024    Assessment:   Acute hypoxic respiratory failure  Multifocal pneumonia of unclear etiology, likely recurrrent from July  -She presented on 8/12 with new onset shortness of breath and cough which started the evening prior.  She had been taking her diuretic and her weight had been down significantly about 10 pounds in the prior 3 to 4 days.  She not measured fevers at home she did endorse a productive cough of sputum that is thick and green.  She noted chest pain that was worse with deep breathing and when palpated.  She denied any chills.  She felt better when upright in terms of her cough but shortness of breath did not improve or change with any particular position.  She did note rhinorrhea after she has been coughing particular.  She did not notice any sick contacts.  No rash, diarrhea, myalgias.  -WBC 21.9 on presentation, 31.4 on 8/13.  No eosinophils. 84% PMNs.   -Procalcitonin 0.52, on 8/13 0.28  -Chest x-ray moderate right-sided pleural effusion.  -CT chest: Negative for PE, dense consolidations of the right lower lobe, left lower lobe, right middle lobe with less dense consolidation of both upper lobes  -Started on vancomycin plus piperacillin/tazobactam  -Negative urine Legionella antigen and strep pneumo antigen  -Negative PCR for influenza, RSV, COVID.  -Negative full RVP  -Sputum and blood cultures pending  -S/p thoracentesis, not c/w infection  -only animal exposure healthy dogs, no outdoors activities, travel only back from [facility]  -States no h/o vaping    Discussion: Unclear etiology I suspect we have underlying diagnosis that both caused the first episode of acute hypoxic respiratory failure and this current episode.  She states that she improved a  lot in between and then rapidly worsened.  This could be infectious or noninfectious.  Pulmonology brings up recurrent aspiration is a possibility, certainly possible, also fungal diseases would make sense potentially although it is interesting to see the improvement in between episodes if that is the case.  Could be possible that there is some effect from previous steroids that calm down inflammation and that those have now been off prior to this hospitalization and she worsened.  I would also keep in mind noninfectious causes and certainly having pulmonary on board to consider that was very important.  She is on quite a bit of oxygen right now and her breathing remains tenuous.  Understandably bronchoscopy was not deemed safe in the initial pulmonary consultation at that moment, hopefully in the future that can be done because I do think it has a chance to be helpful.    Recent hospitalization with acute hypoxic resp failure of unclear etiology (Mycoplasma pneumoniae and EVALI among considerations though pt notes no h/o vaping and no smoking in >6 mo)  Recent persistent leukocytosis (June 2024)  In that July hospitalization she was admitted from 7/13-7/22 at [facility] (initially at [facility] from 7/10-13). There was uncertainty about whether her respiratory failure was due to Mycoplasma vs EVALI vs other. She had positive M pneumoniae IgG/M. She had a positive beta D glucan during that hospitalization as well (1.78, positive is >0.74 by this assay). By 7/22 ID felt infection wasn't likely  and stopped antimicrobials.  She had a BAL that was negative for infection including fungal cultures on 7/16. Negative histo/blasto combination Ag, histo Ab's, tularemia Abs, blasto and cocci Abs, HIV, HCV, Syphilis, HBV Sag, RVP (including Mycoplasma), Aspergillus galactomannan, lyme Ab testing. Had WBCs >30 on a number of accasions, Abs Eos were generally >1000.  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   -Adding on histo serum Ag and Ab, crypto Ag  -Bronchoscopy could be helpful at some point when/if deemed safe to do by pulmonary   -Agree with pip/tazo  -Watch for antimicrobial toxicities     Thank you for the consultation.     Abran Sanches MD    Division of Infectious Diseases and International Medicine      History of Present Illness   Maribell Leon is a 29 year old F with history of cirrhosis, chronic pancreatitis, and a recent hospitalization in July 2024 for acute hypoxic respiratory failure of uncertain etiology that required intubation.    See in initial consultation for more detailed history (8/13)    Procalcitonin 0.52, f/unit(s) on 8/13 0.28  CT chest: Negative for PE, dense consolidations of the right lower lobe, left lower lobe, right middle lobe with less dense consolidation of both upper lobes  Negative urine Legionella antigen and strep pneumo antigen  Negative PCR for influenza, RSV, COVID.  Negative full RVP  Sputum and blood cultures pending  S/p thoracentesis, not c/w infection     Afebrile, tachycardic  Back on bipap, 60% Fi02, 12/5  WBC today slightly down to 27.3 from 31.4.  81% neutrophils.  She appears to be in a fair amount of respiratory distress but she states she actually feels okay breathing.  Ongoing cough with some sputum.  She does not have any chest pain.  No headache, no fevers chills diaphoresis. She denies any new rash.  She denies any arthralgias or myalgias.  Denies any neck stiffness photophobia.  Denies any nausea or vomiting.      Antimicrobial Start date End date    pip/tazo 8/12 active   vanc 8/12 8/13   azithromycin  8/12 8/13                                      Medications     Current Facility-Administered Medications:     calcium carbonate (TUMS) chewable tablet 1,000 mg, 1,000 mg, Oral, 4x Daily PRN, Aries Vasquez MD    carboxymethylcellulose PF (REFRESH PLUS) 0.5 % ophthalmic solution 1 drop, 1 drop, Both Eyes, Q1H PRN, Curry Small MD    enoxaparin ANTICOAGULANT (LOVENOX) injection 40 mg, 40 mg, Subcutaneous, Q24H, Aries Vasquez MD, 40 mg at 08/13/24 2013    guaiFENesin-dextromethorphan (ROBITUSSIN DM) 100-10 MG/5ML syrup 10 mL, 10 mL, Oral, Q4H PRN, Noreen Nevarez MD, 10 mL at 08/13/24 1432    HOLD: All Oral Medications, , Does not apply, HOLD, Curry Small MD    HYDROmorphone (PF) (DILAUDID) injection 0.5 mg, 0.5 mg, Intravenous, Q2H PRN, Del Shaw MD    ipratropium - albuterol 0.5 mg/2.5 mg/3 mL (DUONEB) neb solution 3 mL, 3 mL, Nebulization, 4x daily, Noreen Nevarez MD, 3 mL at 08/13/24 1943    lidocaine (LMX4) cream, , Topical, Q1H PRN, Aries Vasquez MD    lidocaine 1 % 0.1-1 mL, 0.1-1 mL, Other, Q1H PRN, Aries Vasquez MD    melatonin tablet 3 mg, 3 mg, Oral, At Bedtime PRN, Aries Vasquez MD, 3 mg at 08/13/24 2013    No lozenges or gum should be given while patient on BIPAP/AVAPS/AVAPS AE, , Does not apply, Continuous PRN, Curry Small MD    ondansetron (ZOFRAN) injection 4 mg, 4 mg, Intravenous, Q6H PRN, Latonya Olivo MD    piperacillin-tazobactam (ZOSYN) 4.5 g vial to attach to  mL bag, 4.5 g, Intravenous, Q6H, Aries Vasquez MD, Last Rate: 0 mL/hr at 08/13/24 1931, 4.5 g at 08/14/24 0510    senna-docusate (SENOKOT-S/PERICOLACE) 8.6-50 MG per tablet 1 tablet, 1 tablet, Oral, BID PRN **OR** senna-docusate (SENOKOT-S/PERICOLACE) 8.6-50 MG per tablet 2 tablet, 2 tablet, Oral, BID PRN, Aries Vasquez MD    sodium chloride (PF) 0.9% PF flush 3 mL, 3 mL, Intracatheter, Q8H, Aries Vasquez MD, 3 mL at 08/13/24 1852    sodium chloride (PF) 0.9% PF flush  3 mL, 3 mL, Intracatheter, q1 min prn, Aries Vasquez MD, 3 mL at 24 1601    Current Outpatient Medications:     cyanocobalamin (VITAMIN B-12) 1000 MCG tablet, Take 1,000 mcg by mouth daily, Disp: , Rfl:     escitalopram (LEXAPRO) 10 MG tablet, Take 10 mg by mouth daily, Disp: , Rfl:     folic acid (FOLVITE) 1 MG tablet, Take 1 tablet (1 mg) by mouth daily, Disp: 30 tablet, Rfl: 0    magnesium 250 MG tablet, Take 1 tablet by mouth daily, Disp: , Rfl:     methocarbamol (ROBAXIN) 500 MG tablet, Take 1 tablet (500 mg) by mouth 4 times daily as needed for muscle spasms, Disp: 30 tablet, Rfl: 0    spironolactone-HCTZ (ALDACTAZIDE) 25-25 MG tablet, Take 1 tablet by mouth daily for 30 days, Disp: 30 tablet, Rfl: 0     Allergies    No Known Allergies     Physical Examination                           Vital Signs   Temp: 98.1  F (36.7  C) Temp  Min: 97.9  F (36.6  C)  Max: 98.1  F (36.7  C)  Resp: 20 Resp  Min: 18  Max: 20  SpO2: 96 % SpO2  Min: 96 %  Max: 100 %  Pulse: 112 Pulse  Min: 111  Max: 112    No data recorded  BP: 108/84 Systolic (24hrs), Av , Min:108 , Max:111   Diastolic (24hrs), Av, Min:83, Max:84       General appearance: alert, oriented to situation, NAD  HENT: normocephalic, atraumatic, no sinus tenderness, bipap mask on,unable to examine oral cavity  Eyes: EOM grossly intact, Conj nl  Neck: supple  Lungs: decreased throughout, some crackles appreciated in the upper lobes, no wheezing  Heart: regular rhythm, tachycardic no murmur, rub, gallop  Abdomen: soft, non-tender, mildly distended  Ext: + LE edema bilaterally, L foot with a couple of healing abrasions  Skin: no rashes/lesions, multiple tattoos, all appear professional with defined borders and no s/o infection, multiple piercings on face, no s/o infection     Lines: PIV     Lab Review   Labs reviewed    Microbiology and Radiology Review   Microbiology data reviewed.     Pertinent radiology images viewed.

## 2024-08-14 NOTE — PLAN OF CARE
Hours of Care 2300 -0730    Neuro: A&Ox4.   Cardiac: SR/ST VSS.     Respiratory: Was on HFNC, 40-60% FiO2, 30-50 LPM. Now on BIPAP due to discomfort and desaturation    GI/: Adequate urine output. BM X1    Pain: Treated per MAR. Complains of constant severe pain. Provider notified and dose adjusted.  Skin: No new deficits noted.  LDA's: Bilateral PIVS    Drip IV Abx    Plan: Continue with POC. Notify primary team with changes.      Goal Outcome Evaluation:    Problem: Adult Inpatient Plan of Care  Goal: Optimal Comfort and Wellbeing  Outcome: Progressing  Intervention: Monitor Pain and Promote Comfort  Recent Flowsheet Documentation  Taken 8/14/2024 0108 by Jeovany Walker, RN  Pain Management Interventions: medication (see MAR)     Problem: Adult Inpatient Plan of Care  Goal: Absence of Hospital-Acquired Illness or Injury  Intervention: Prevent Skin Injury  Recent Flowsheet Documentation  Taken 8/14/2024 0108 by Jeovany Walker, RN  Body Position: position changed independently

## 2024-08-15 ENCOUNTER — APPOINTMENT (OUTPATIENT)
Dept: GENERAL RADIOLOGY | Facility: CLINIC | Age: 29
End: 2024-08-15
Payer: MEDICAID

## 2024-08-15 LAB
1,3 BETA GLUCAN SER-MCNC: 143 PG/ML
ALBUMIN SERPL BCG-MCNC: 3.5 G/DL (ref 3.5–5.2)
ALLEN'S TEST: YES
ALLEN'S TEST: YES
ALP SERPL-CCNC: 95 U/L (ref 40–150)
ALT SERPL W P-5'-P-CCNC: 8 U/L (ref 0–50)
ANION GAP SERPL CALCULATED.3IONS-SCNC: 15 MMOL/L (ref 7–15)
AST SERPL W P-5'-P-CCNC: 47 U/L (ref 0–45)
BACTERIA SNV CULT: NORMAL
BASE EXCESS BLDA CALC-SCNC: -0.6 MMOL/L (ref -3–3)
BASE EXCESS BLDA CALC-SCNC: 1.2 MMOL/L (ref -3–3)
BASOPHILS # BLD AUTO: 0 10E3/UL (ref 0–0.2)
BASOPHILS NFR BLD AUTO: 0 %
BILIRUB SERPL-MCNC: 2.5 MG/DL
BUN SERPL-MCNC: 8.9 MG/DL (ref 6–20)
CALCIUM SERPL-MCNC: 8.1 MG/DL (ref 8.8–10.4)
CHLORIDE SERPL-SCNC: 108 MMOL/L (ref 98–107)
COHGB MFR BLD: 28.3 % (ref 96–97)
COHGB MFR BLD: 99 % (ref 96–97)
CREAT SERPL-MCNC: 0.61 MG/DL (ref 0.51–0.95)
CRP SERPL-MCNC: 51.2 MG/L
EGFRCR SERPLBLD CKD-EPI 2021: >90 ML/MIN/1.73M2
EOSINOPHIL # BLD AUTO: 0 10E3/UL (ref 0–0.7)
EOSINOPHIL NFR BLD AUTO: 0 %
ERYTHROCYTE [DISTWIDTH] IN BLOOD BY AUTOMATED COUNT: 17.4 % (ref 10–15)
ERYTHROCYTE [SEDIMENTATION RATE] IN BLOOD BY WESTERGREN METHOD: 37 MM/HR (ref 0–20)
GLUCOSE SERPL-MCNC: 155 MG/DL (ref 70–99)
HCO3 BLD-SCNC: 23 MMOL/L (ref 21–28)
HCO3 BLD-SCNC: 26 MMOL/L (ref 21–28)
HCO3 SERPL-SCNC: 22 MMOL/L (ref 22–29)
HCT VFR BLD AUTO: 27.2 % (ref 35–47)
HGB BLD-MCNC: 8.2 G/DL (ref 11.7–15.7)
IMM GRANULOCYTES # BLD: 0.2 10E3/UL
IMM GRANULOCYTES NFR BLD: 1 %
LYMPHOCYTES # BLD AUTO: 2 10E3/UL (ref 0.8–5.3)
LYMPHOCYTES NFR BLD AUTO: 9 %
MAGNESIUM SERPL-MCNC: 2 MG/DL (ref 1.7–2.3)
MCH RBC QN AUTO: 31.9 PG (ref 26.5–33)
MCHC RBC AUTO-ENTMCNC: 30.1 G/DL (ref 31.5–36.5)
MCV RBC AUTO: 106 FL (ref 78–100)
MONOCYTES # BLD AUTO: 0.9 10E3/UL (ref 0–1.3)
MONOCYTES NFR BLD AUTO: 4 %
NEUTROPHILS # BLD AUTO: 18.2 10E3/UL (ref 1.6–8.3)
NEUTROPHILS NFR BLD AUTO: 86 %
NRBC # BLD AUTO: 0 10E3/UL
NRBC BLD AUTO-RTO: 0 /100
O2/TOTAL GAS SETTING VFR VENT: 90 %
O2/TOTAL GAS SETTING VFR VENT: 90 %
OBSERVATION IMP: POSITIVE
PCO2 BLD: 31 MM HG (ref 35–45)
PCO2 BLD: 41 MM HG (ref 35–45)
PH BLD: 7.41 [PH] (ref 7.35–7.45)
PH BLD: 7.47 [PH] (ref 7.35–7.45)
PLATELET # BLD AUTO: 208 10E3/UL (ref 150–450)
PO2 BLD: 22 MM HG (ref 80–105)
PO2 BLD: 90 MM HG (ref 80–105)
POTASSIUM SERPL-SCNC: 2.6 MMOL/L (ref 3.4–5.3)
POTASSIUM SERPL-SCNC: 2.9 MMOL/L (ref 3.4–5.3)
POTASSIUM SERPL-SCNC: 3.1 MMOL/L (ref 3.4–5.3)
POTASSIUM SERPL-SCNC: 3.1 MMOL/L (ref 3.4–5.3)
PROCALCITONIN SERPL IA-MCNC: 0.37 NG/ML
PROT SERPL-MCNC: 6.4 G/DL (ref 6.4–8.3)
RBC # BLD AUTO: 2.57 10E6/UL (ref 3.8–5.2)
SAO2 % BLDA: 28 % (ref 92–100)
SAO2 % BLDA: 97 % (ref 92–100)
SCANNED LAB RESULT: NORMAL
SODIUM SERPL-SCNC: 145 MMOL/L (ref 135–145)
WBC # BLD AUTO: 21.2 10E3/UL (ref 4–11)

## 2024-08-15 PROCEDURE — 250N000011 HC RX IP 250 OP 636

## 2024-08-15 PROCEDURE — 250N000013 HC RX MED GY IP 250 OP 250 PS 637: Performed by: STUDENT IN AN ORGANIZED HEALTH CARE EDUCATION/TRAINING PROGRAM

## 2024-08-15 PROCEDURE — 36415 COLL VENOUS BLD VENIPUNCTURE: CPT

## 2024-08-15 PROCEDURE — 250N000013 HC RX MED GY IP 250 OP 250 PS 637

## 2024-08-15 PROCEDURE — 36600 WITHDRAWAL OF ARTERIAL BLOOD: CPT

## 2024-08-15 PROCEDURE — 999N000157 HC STATISTIC RCP TIME EA 10 MIN

## 2024-08-15 PROCEDURE — 80053 COMPREHEN METABOLIC PANEL: CPT

## 2024-08-15 PROCEDURE — 94640 AIRWAY INHALATION TREATMENT: CPT | Mod: 76

## 2024-08-15 PROCEDURE — 120N000002 HC R&B MED SURG/OB UMMC

## 2024-08-15 PROCEDURE — 86140 C-REACTIVE PROTEIN: CPT

## 2024-08-15 PROCEDURE — 71045 X-RAY EXAM CHEST 1 VIEW: CPT

## 2024-08-15 PROCEDURE — 82805 BLOOD GASES W/O2 SATURATION: CPT

## 2024-08-15 PROCEDURE — 36415 COLL VENOUS BLD VENIPUNCTURE: CPT | Performed by: STUDENT IN AN ORGANIZED HEALTH CARE EDUCATION/TRAINING PROGRAM

## 2024-08-15 PROCEDURE — 84132 ASSAY OF SERUM POTASSIUM: CPT | Performed by: STUDENT IN AN ORGANIZED HEALTH CARE EDUCATION/TRAINING PROGRAM

## 2024-08-15 PROCEDURE — 85025 COMPLETE CBC W/AUTO DIFF WBC: CPT

## 2024-08-15 PROCEDURE — 99233 SBSQ HOSP IP/OBS HIGH 50: CPT | Mod: GC | Performed by: INTERNAL MEDICINE

## 2024-08-15 PROCEDURE — 83735 ASSAY OF MAGNESIUM: CPT | Performed by: STUDENT IN AN ORGANIZED HEALTH CARE EDUCATION/TRAINING PROGRAM

## 2024-08-15 PROCEDURE — 99232 SBSQ HOSP IP/OBS MODERATE 35: CPT | Mod: GC | Performed by: STUDENT IN AN ORGANIZED HEALTH CARE EDUCATION/TRAINING PROGRAM

## 2024-08-15 PROCEDURE — 71045 X-RAY EXAM CHEST 1 VIEW: CPT | Mod: 26 | Performed by: RADIOLOGY

## 2024-08-15 PROCEDURE — 99233 SBSQ HOSP IP/OBS HIGH 50: CPT | Performed by: STUDENT IN AN ORGANIZED HEALTH CARE EDUCATION/TRAINING PROGRAM

## 2024-08-15 PROCEDURE — 84145 PROCALCITONIN (PCT): CPT

## 2024-08-15 PROCEDURE — 250N000011 HC RX IP 250 OP 636: Performed by: INTERNAL MEDICINE

## 2024-08-15 PROCEDURE — 250N000009 HC RX 250: Performed by: INTERNAL MEDICINE

## 2024-08-15 PROCEDURE — 84132 ASSAY OF SERUM POTASSIUM: CPT

## 2024-08-15 PROCEDURE — 94640 AIRWAY INHALATION TREATMENT: CPT

## 2024-08-15 PROCEDURE — 85652 RBC SED RATE AUTOMATED: CPT

## 2024-08-15 RX ORDER — POTASSIUM CHLORIDE 750 MG/1
20 TABLET, EXTENDED RELEASE ORAL ONCE
Status: COMPLETED | OUTPATIENT
Start: 2024-08-15 | End: 2024-08-15

## 2024-08-15 RX ORDER — POTASSIUM CHLORIDE 1.5 G/1.58G
40 POWDER, FOR SOLUTION ORAL 2 TIMES DAILY
Status: DISCONTINUED | OUTPATIENT
Start: 2024-08-15 | End: 2024-08-19

## 2024-08-15 RX ORDER — NALOXONE HYDROCHLORIDE 0.4 MG/ML
0.4 INJECTION, SOLUTION INTRAMUSCULAR; INTRAVENOUS; SUBCUTANEOUS
Status: DISCONTINUED | OUTPATIENT
Start: 2024-08-15 | End: 2024-08-21 | Stop reason: HOSPADM

## 2024-08-15 RX ORDER — ACETAMINOPHEN 325 MG/1
975 TABLET ORAL EVERY 6 HOURS PRN
Status: DISCONTINUED | OUTPATIENT
Start: 2024-08-15 | End: 2024-08-16

## 2024-08-15 RX ORDER — NALOXONE HYDROCHLORIDE 0.4 MG/ML
0.2 INJECTION, SOLUTION INTRAMUSCULAR; INTRAVENOUS; SUBCUTANEOUS
Status: DISCONTINUED | OUTPATIENT
Start: 2024-08-15 | End: 2024-08-21 | Stop reason: HOSPADM

## 2024-08-15 RX ORDER — POTASSIUM CHLORIDE 750 MG/1
40 TABLET, EXTENDED RELEASE ORAL ONCE
Status: DISCONTINUED | OUTPATIENT
Start: 2024-08-15 | End: 2024-08-15

## 2024-08-15 RX ORDER — FUROSEMIDE 10 MG/ML
60 INJECTION INTRAMUSCULAR; INTRAVENOUS EVERY 6 HOURS
Status: COMPLETED | OUTPATIENT
Start: 2024-08-15 | End: 2024-08-15

## 2024-08-15 RX ORDER — HYDROMORPHONE HYDROCHLORIDE 1 MG/ML
0.3 INJECTION, SOLUTION INTRAMUSCULAR; INTRAVENOUS; SUBCUTANEOUS EVERY 4 HOURS PRN
Status: DISCONTINUED | OUTPATIENT
Start: 2024-08-15 | End: 2024-08-19

## 2024-08-15 RX ORDER — HYDROXYZINE HYDROCHLORIDE 25 MG/1
25 TABLET, FILM COATED ORAL EVERY 6 HOURS PRN
Status: DISCONTINUED | OUTPATIENT
Start: 2024-08-15 | End: 2024-08-21 | Stop reason: HOSPADM

## 2024-08-15 RX ORDER — HYDROMORPHONE HYDROCHLORIDE 1 MG/ML
.5-1 INJECTION, SOLUTION INTRAMUSCULAR; INTRAVENOUS; SUBCUTANEOUS
Status: DISCONTINUED | OUTPATIENT
Start: 2024-08-15 | End: 2024-08-15

## 2024-08-15 RX ORDER — HYDROXYZINE HYDROCHLORIDE 25 MG/1
50 TABLET, FILM COATED ORAL EVERY 6 HOURS PRN
Status: DISCONTINUED | OUTPATIENT
Start: 2024-08-15 | End: 2024-08-15

## 2024-08-15 RX ORDER — FUROSEMIDE 10 MG/ML
40 INJECTION INTRAMUSCULAR; INTRAVENOUS EVERY 6 HOURS
Status: CANCELLED | OUTPATIENT
Start: 2024-08-15 | End: 2024-08-15

## 2024-08-15 RX ORDER — POTASSIUM CHLORIDE 750 MG/1
40 TABLET, EXTENDED RELEASE ORAL ONCE
Status: COMPLETED | OUTPATIENT
Start: 2024-08-15 | End: 2024-08-15

## 2024-08-15 RX ORDER — OXYCODONE HYDROCHLORIDE 5 MG/1
5 TABLET ORAL EVERY 4 HOURS PRN
Status: DISCONTINUED | OUTPATIENT
Start: 2024-08-15 | End: 2024-08-19

## 2024-08-15 RX ORDER — GABAPENTIN 100 MG/1
200 CAPSULE ORAL 3 TIMES DAILY
Status: DISCONTINUED | OUTPATIENT
Start: 2024-08-15 | End: 2024-08-16

## 2024-08-15 RX ADMIN — HYDROCORTISONE SODIUM SUCCINATE 50 MG: 100 INJECTION, POWDER, FOR SOLUTION INTRAMUSCULAR; INTRAVENOUS at 20:04

## 2024-08-15 RX ADMIN — GUAIFENESIN AND DEXTROMETHORPHAN 10 ML: 100; 10 SYRUP ORAL at 02:32

## 2024-08-15 RX ADMIN — POTASSIUM CHLORIDE 40 MEQ: 1.5 POWDER, FOR SOLUTION ORAL at 09:02

## 2024-08-15 RX ADMIN — HYDROCORTISONE SODIUM SUCCINATE 50 MG: 100 INJECTION, POWDER, FOR SOLUTION INTRAMUSCULAR; INTRAVENOUS at 14:31

## 2024-08-15 RX ADMIN — ENOXAPARIN SODIUM 40 MG: 40 INJECTION SUBCUTANEOUS at 20:04

## 2024-08-15 RX ADMIN — OXYCODONE HYDROCHLORIDE 5 MG: 5 TABLET ORAL at 18:45

## 2024-08-15 RX ADMIN — FUROSEMIDE 60 MG: 10 INJECTION, SOLUTION INTRAMUSCULAR; INTRAVENOUS at 14:33

## 2024-08-15 RX ADMIN — OXYCODONE HYDROCHLORIDE 5 MG: 5 TABLET ORAL at 03:54

## 2024-08-15 RX ADMIN — HYDROMORPHONE HYDROCHLORIDE 0.3 MG: 1 INJECTION, SOLUTION INTRAMUSCULAR; INTRAVENOUS; SUBCUTANEOUS at 20:27

## 2024-08-15 RX ADMIN — PIPERACILLIN AND TAZOBACTAM 4.5 G: 4; .5 INJECTION, POWDER, LYOPHILIZED, FOR SOLUTION INTRAVENOUS at 16:30

## 2024-08-15 RX ADMIN — HYDROMORPHONE HYDROCHLORIDE 1 MG: 1 INJECTION, SOLUTION INTRAMUSCULAR; INTRAVENOUS; SUBCUTANEOUS at 04:50

## 2024-08-15 RX ADMIN — POTASSIUM CHLORIDE 40 MEQ: 1.5 POWDER, FOR SOLUTION ORAL at 20:05

## 2024-08-15 RX ADMIN — OXYCODONE HYDROCHLORIDE 5 MG: 5 TABLET ORAL at 12:24

## 2024-08-15 RX ADMIN — PIPERACILLIN AND TAZOBACTAM 4.5 G: 4; .5 INJECTION, POWDER, LYOPHILIZED, FOR SOLUTION INTRAVENOUS at 04:49

## 2024-08-15 RX ADMIN — HYDROMORPHONE HYDROCHLORIDE 0.5 MG: 1 INJECTION, SOLUTION INTRAMUSCULAR; INTRAVENOUS; SUBCUTANEOUS at 00:35

## 2024-08-15 RX ADMIN — ALBUTEROL SULFATE 2.5 MG: 2.5 SOLUTION RESPIRATORY (INHALATION) at 21:24

## 2024-08-15 RX ADMIN — POTASSIUM CHLORIDE 20 MEQ: 750 TABLET, EXTENDED RELEASE ORAL at 11:55

## 2024-08-15 RX ADMIN — FUROSEMIDE 60 MG: 10 INJECTION, SOLUTION INTRAMUSCULAR; INTRAVENOUS at 09:02

## 2024-08-15 RX ADMIN — OXYCODONE HYDROCHLORIDE 5 MG: 5 TABLET ORAL at 08:16

## 2024-08-15 RX ADMIN — GABAPENTIN 200 MG: 100 CAPSULE ORAL at 20:05

## 2024-08-15 RX ADMIN — POTASSIUM CHLORIDE 40 MEQ: 750 TABLET, EXTENDED RELEASE ORAL at 16:16

## 2024-08-15 RX ADMIN — ACETAMINOPHEN 975 MG: 325 TABLET ORAL at 08:16

## 2024-08-15 RX ADMIN — HYDROCORTISONE SODIUM SUCCINATE 50 MG: 100 INJECTION, POWDER, FOR SOLUTION INTRAMUSCULAR; INTRAVENOUS at 02:31

## 2024-08-15 RX ADMIN — PIPERACILLIN AND TAZOBACTAM 4.5 G: 4; .5 INJECTION, POWDER, LYOPHILIZED, FOR SOLUTION INTRAVENOUS at 10:32

## 2024-08-15 RX ADMIN — GABAPENTIN 200 MG: 100 CAPSULE ORAL at 09:02

## 2024-08-15 RX ADMIN — HYDROXYZINE HYDROCHLORIDE 50 MG: 25 TABLET ORAL at 08:16

## 2024-08-15 RX ADMIN — PIPERACILLIN AND TAZOBACTAM 4.5 G: 4; .5 INJECTION, POWDER, LYOPHILIZED, FOR SOLUTION INTRAVENOUS at 22:47

## 2024-08-15 RX ADMIN — HYDROCORTISONE SODIUM SUCCINATE 50 MG: 100 INJECTION, POWDER, FOR SOLUTION INTRAMUSCULAR; INTRAVENOUS at 08:18

## 2024-08-15 RX ADMIN — ALBUTEROL SULFATE 2.5 MG: 2.5 SOLUTION RESPIRATORY (INHALATION) at 08:31

## 2024-08-15 RX ADMIN — GABAPENTIN 200 MG: 100 CAPSULE ORAL at 14:31

## 2024-08-15 RX ADMIN — BENZONATATE 100 MG: 100 CAPSULE ORAL at 03:54

## 2024-08-15 RX ADMIN — HYDROMORPHONE HYDROCHLORIDE 0.5 MG: 1 INJECTION, SOLUTION INTRAMUSCULAR; INTRAVENOUS; SUBCUTANEOUS at 02:33

## 2024-08-15 RX ADMIN — GUAIFENESIN 600 MG: 600 TABLET ORAL at 20:05

## 2024-08-15 RX ADMIN — HYDROMORPHONE HYDROCHLORIDE 0.3 MG: 1 INJECTION, SOLUTION INTRAMUSCULAR; INTRAVENOUS; SUBCUTANEOUS at 16:30

## 2024-08-15 RX ADMIN — HYDROXYZINE HYDROCHLORIDE 50 MG: 25 TABLET ORAL at 14:57

## 2024-08-15 RX ADMIN — GUAIFENESIN 600 MG: 600 TABLET ORAL at 08:16

## 2024-08-15 RX ADMIN — HYDROMORPHONE HYDROCHLORIDE 1 MG: 1 INJECTION, SOLUTION INTRAMUSCULAR; INTRAVENOUS; SUBCUTANEOUS at 08:24

## 2024-08-15 RX ADMIN — HYDROMORPHONE HYDROCHLORIDE 0.3 MG: 1 INJECTION, SOLUTION INTRAMUSCULAR; INTRAVENOUS; SUBCUTANEOUS at 12:24

## 2024-08-15 ASSESSMENT — ACTIVITIES OF DAILY LIVING (ADL)
ADLS_ACUITY_SCORE: 26
ADLS_ACUITY_SCORE: 27
ADLS_ACUITY_SCORE: 26
ADLS_ACUITY_SCORE: 27
ADLS_ACUITY_SCORE: 26
ADLS_ACUITY_SCORE: 26
ADLS_ACUITY_SCORE: 44
ADLS_ACUITY_SCORE: 27
ADLS_ACUITY_SCORE: 26
ADLS_ACUITY_SCORE: 25
ADLS_ACUITY_SCORE: 26
ADLS_ACUITY_SCORE: 27
ADLS_ACUITY_SCORE: 25
ADLS_ACUITY_SCORE: 26
ADLS_ACUITY_SCORE: 27
ADLS_ACUITY_SCORE: 26
ADLS_ACUITY_SCORE: 27
ADLS_ACUITY_SCORE: 26

## 2024-08-15 NOTE — PROGRESS NOTES
[facility]    Progress Note - Medicine Service, [facility] TEAM 1       Date of Admission:  8/12/2024    Updates 8/15:  -- Still on HFNC 8/15, mentating appropriately and appears to be improving    --> AM ABG was a venous sample, PM ABG with arterial oxygen at 90 reassuringly    --> hematoma to R wrist following multiple ABGs   --> OK to discontinue ABGs after 8/15 unless worsening   -- solu-Cortef 50mg q6hrs + Zosyn + Vanc per pulm and ID   -- CXR improved   -- Diuresis 60mg q6hrs x2 doses   -- Per pulm, continue on HFNC. If not tolerating overnight, can try CPAP - does not need a bivalve. Preference is for HFNC, however.   -- Pulm signed off, final recs as below.       Assessment & Plan      Maribell Leon is a 29 year old female admitted on 8/12/2024. She has a history of cirrhosis, alcohol use disorder, neuropathy, chronic pancreatitis who presented with acute dyspnea and cough found to have AHRF likely 2/2 CAP.      # Leukocytosis, improving   # Pleural effusions, bilateral iso  # Acute hypoxic respiratory failure secondary to community acquired PNA, worsening vs in conjunction with growing effusions  See previous notes for summary of care to this point. Overall appears to be improving. Continues to improve with steroids, diuresis, breathing treatments, HFNC and antibiotics. CXR today with improvement in opacities. Pleural effusions c/w transudate, likely hepatic hydrothorax per pulm who recommended continued diuresis.   Overnight, if changes in mentation, oxygenation, or generally appears to be decompensating --> STAT ABG, lactate, CXR and paging pulm   Work up:   RPP, flu, covid, RSV negative.   Legionella, strep pneumo antigen negative.   Cryptococcus negative.   MRSA nares negative.   -- Continue zosyn + vanc per pulm and ID  -- Continue HFNC, does not need BiPAP. May give overnight for comfort if necessary, however.   -- Duonebs QID  -- Lasix 60mg q6hrs x2  doses; UoP of 1400cc on 40mg BID with stable kidney function   -- Follow up sputum, blood cultures  -- C. Diff test pending   -- Follow up thoracentesis labs and culture -- NGTD  Pulm recs 8/15, signing off:  Will complete steroid course (per CAPE COD trial):   - Solucortef 50 q6H through 8/16 then   - Solucortef 50 q12H through 8/18 then   - Solucortef 50 daily through 8/20 then stop   - If patient is off O2 and being dc'ed prior to completion of the steroids okay to stop early   -- Aggressive diuresis   -- trial oximasks/off HFNC   -- OP pulm follow up closer to home with repeat chest CT to assess resolution in 6-8 weeks    # Hypokalemia 2/2 daily diuresis   Likely 2/2 diuresis in the ED and while inpatient given use of Lasix. No CHANTELL, no vomiting. Some diarrhea could be contributing as well.   -- Replete per protocol but have changed to oral repletion instead of IV, CTM qAM     # RLE pain  RLE xray negative. R ankle joint tap with ortho,no septic arthritis. Dual energy CT without gout findings despite elevated uric acid. Was placed on cefazolin due to concerns for cellulitis transitioned to Keflex for 7 days treatment after last admission.   -- Dilaudid 0.3mg q6h, consider expanding medications in AM 8/14 -- was on Gabapentin at admission but dc'd 2/2 decreased eGFR at admission    # LE swelling  # Volume overload  S/p furosemide 40mg IV x1 in ED with ~1L output. Given c/f re-collection of pleural effusions/hepatic hydrothorax, diuresing this PM with Lasix 40mg q6hrs x2 doses and will monitor output 8/15. Anticipate that this will also improve her respiratory status.   -- Hold home spironolactone-hydrochlorothiazide for now  -- Lymphedema consult    # Cirrhosis   # Hypoalbuminemia  # Elevated INR  Likely secondary to alcohol use. Not decompensated. Not enough ascites 8/13 for paracentesis.   -- Low sodium diet   -- 2L fluid restriction  -- Hold home spironolactone-hydrochlorothiazide for now    # Chronic  anemia  Stable, no further cares.   -- Continue folate supplement when tolerating PO       Diet: Fluid restriction 2000 ML FLUID  Regular Diet Adult    DVT Prophylaxis: Lovenox ppx  [place] Catheter: Not present  Fluids: N/A  Lines: None     Cardiac Monitoring: ACTIVE order. Indication: Electrolyte Imbalance (24 hours)- Magnesium <1.3 mg/ml; Potassium < =2.8 or > 5.5 mg/ml  Code Status: Full Code       The patient's care was discussed with the Attending Physician, Dr. Car .      Latonya Olivo MD  Medicine Service, [facility] TEAM [address] [place] [address]  Securely message with [employer] (more info)  Text page via [facility] Paging/Directory   See signed in provider for up to date coverage information  ______________________________________________________________________    Overnight events:   Pt feeling significantly improved this AM but is having ongoing concerns with repeat ABGs given swelling in R wrist with pain with the draws. She notes that these also increase her anxiety and is wanting to continue medications for this.     Feels that her pain is being well managed, notes an itching sensation in bilateral lower extremities however.     No increase WoB, cough, chest pain, nausea. Is tolerating PO this AM well.      Physical Exam   Vital Signs: Temp: 97.6  F (36.4  C) Temp src: Oral BP: 108/80 Pulse: 97   Resp: 20 SpO2: 100 % O2 Device: High Flow Nasal Cannula (HFNC) Oxygen Delivery: 35 LPM  Weight: 113 lbs 8 oz    General Appearance: Pleasant, pallid, less ill-appearing on exam. Interactive and sitting up in bed, smiling.   Respiratory: Increased work of breathing but less wheezing than previous. Intermittent cough, mucous rattling in upper airways. Still with decreased lung sounds at both bases, on HFNC  Cardiovascular: Tachycardiac, normal S1, S2, no murmurs  GI: Abdomen mildly distended, soft, non tender. POC US without increased ascites on repeat abdominal exam compared to 8/13.    Extremities: Erythema and swelling on anterior L wrist in area of ABG draws c/w hematoma. Does not extend past mid-forearm. Bilateral 2+ pitting edema, tenderness of RLE with palpation. Well healing small, circumferential wounds to dorsal aspect of RLE on foot and ankle. No surrounding erythema, warmth, drainage.       Please see A&P for additional details of medical decision making.      Data     I have personally reviewed the following data over the past 24 hrs:    21.2 (H)  \   8.2 (L)   / 208     145 108 (H) 8.9 /  155 (H)   3.1 (L) 22 0.61 \     ALT: 8 AST: 47 (H) AP: 95 TBILI: 2.5 (H)   ALB: 3.5 TOT PROTEIN: 6.4 LIPASE: N/A     Procal: 0.37 CRP: 51.20 (H) Lactic Acid: N/A         Imaging results reviewed over the past 24 hrs:   Recent Results (from the past 24 hour(s))   XR Chest Port 1 View    Narrative    EXAM: XR CHEST PORT 1 VIEW  8/15/2024 6:00 AM      HISTORY: Severe PNA, orders per pulm    COMPARISON: 8/14/2024    FINDINGS: Single view of the chest. Trachea is midline. Ill-defined  cardiac silhouette. Dense right greater than left consolidative  opacities with air bronchograms. Right greater than left pleural  effusions. No discernible pneumothorax.      Impression    IMPRESSION:   1. Mildly improved dense consolidative opacities affecting the right  greater than left lungs.  2. Right greater than left pleural effusions, which appear similar to  prior.    I have personally reviewed the examination and initial interpretation  and I agree with the findings.    TRUE LORENZANA MD         SYSTEM ID:  [id number]

## 2024-08-15 NOTE — PROGRESS NOTES
[facility]   Pulmonary Consult Note - Follow Up     Maribell Leon MRN: [id number]  Date of Admission:8/12/2024  Date of Service: 08/15/2024    Reason for consult: Management of AHRF and CAP requiring BiPAP in the context of a recent hospitalization in July 2/2 to mycoplasma pneumonia that requires intubation.   Primary service: Gold team         ASSESSMENT:     # AHRF 2/2 severe HAP - improving   # B/l R > L effusions, hepatic hydrothorax  # Bibasilar atelectasis   # Decompensated EtOH cirrhosis     29F with h/o ?mycoplasma PNA requiring intubation (7/16-7/18/24 at OSH) now admitted for AHRF 2/2 severe R side PNA. CT chest showed predominant RML and RLL consolidation. Ddx include HAP (recently hospitalized in July and August) v fungal (fungitell at OSH positive x2 but work up including BAL negative) v aspiration (heavy EtOH use). Sputum cultures and serologies are pending. ID is following as well.     Patient now significantly improved. On HFNC 30L/100% although expect she can tolerate oximask at this point. She reports breathing feels better but still hurts taking a deep breath. CXR looks better today after diuresis/removal of pleural fluid.    RECOMMENDATIONS:      - Will complete steroid course (per CAPE COD trial):   - Solucortef 50 q6H through 8/16 then   - Solucortef 50 q12H through 8/18 then   - Solucortef 50 daily through 8/20 then stop   - If patient is off O2 and being dc'ed prior to completion of the steroids okay to stop early   - Abx per ID and primary team   - Aggressive IV diuresis for pleural effusion (hepatic hydrothorax) for at least net negative 1L daily. Will defer diureses and I/O goals going forward to primary team   - Continue airway clearance: Flutter valve, albuterol neb + volera BID   - Okay to stop daily chest x-ray   - Can trial oximaks/off HFNC   - Will need outpatient pulmonary follow up closer to home with repeat CT chest to assess resolution (given severity of PNA) in 6-8  weeks     Pulmonary will sign off    Patient seen and discussed with Dr. Mann Meléndez MD   Pulmonary/Critical Care Fellow  Pager: [phone]    Pulmonary Attending Attestation    I saw and examined the patient with Dr. Gretta Meléndez, confirming key aspects of the history and exam.  I personally reviewed the recent Xrays and other labs.  The fellow s note reflects our detailed discussion of the findings, assessment and plan.    Ms Leon continues to look clinically improved and is coughing up a bit more sputum (which is good for clearance).  Her consolidation is strikingly dense and although she is improving on current single antibiotic, I remain concerned that she could easily slip backwards - particularly as steroids are decreased.  There also is at least theoretical risk of her developing an empyema (although not present initially) and having this masked by steroids - so if improvement slows/reverses, then pleural fluid should have repeat diagnostic thoracentesis.  Agree with continued aggressive airway clearance Rx and would follow CXR approx each 3 days.  Please call us if worsens.     Dre Darnell MD         24H/SUBJECTIVE:    NAEO. Breathing much better. Hurts to take a deep breath     Physical Exam:   /80 (BP Location: Left arm)   Pulse 97   Temp 97.6  F (36.4  C) (Oral)   Resp 20   Ht 1.524 m (5')   Wt 51.5 kg (113 lb 8 oz)   SpO2 100%   BMI 22.17 kg/m    - Gen: Aox3, NAD   - CV: RRR, no m/r/g  - Lung: Fine crackles diffusely, no increased work of breathing   - Abd: NTND, +BS  - Ext: Right BLE edema and tenderness  - Skin: No major rashes or lesions  - Neuro: CN grossly intact     Images/Studies:   8/15 CXR: Improved opacity b/l. Air bronchogram on the R side still present     Labs:   ABG  Recent Labs   Lab 08/15/24  1311 08/15/24  0831 08/14/24  1254 08/14/24  1156   PH 7.47* 7.41 7.42 7.42   PCO2 31* 41 34* 34*   PO2 90 22* 42* 41*   HCO3 23 26 22 22     CBC  Recent Labs   Lab  08/15/24  0628 08/14/24  0823 08/13/24  0642 08/12/24  0931   WBC 21.2* 27.3* 31.4* 21.9*   HGB 8.2* 8.2* 8.5* 8.8*    222 270 227     BMP  Recent Labs   Lab 08/15/24  1405 08/15/24  1057 08/15/24  0628 08/14/24  1903 08/14/24  1156 08/14/24  0823 08/13/24  0642 08/12/24  0931   NA  --   --  145  --   --  145 143 142   POTASSIUM 3.1* 3.1* 2.6* 3.0*   < > 3.0* 3.5 3.3*   CHLORIDE  --   --  108*  --   --  112* 111* 109*   CO2  --   --  22  --   --  19* 18* 19*   BUN  --   --  8.9  --   --  8.5 5.3* 4.0*   CR  --   --  0.61  --   --  0.60 0.59 0.50*   GLC  --   --  155*  --   --  140* 184* 148*    < > = values in this interval not displayed.     LFT  Recent Labs   Lab 08/15/24  0628 08/14/24  0823 08/13/24  0642 08/12/24  1204 08/12/24  0931   AST 47* 48* 70*  --   --    ALT 8 <5 10  --  7   ALKPHOS 95 86 114  --  126   BILITOTAL 2.5* 2.5* 2.4*  --  2.7*   ALBUMIN 3.5 3.2* 3.1*  --  3.7   INR  --   --   --  1.74*  --      Coagulation Profile  Recent Labs   Lab 08/12/24  1204   INR 1.74*       Medications:   Current Facility-Administered Medications   Medication Dose Route Frequency Provider Last Rate Last Admin    acetaminophen (TYLENOL) tablet 975 mg  975 mg Oral Q6H PRN Del Shaw MD   975 mg at 08/15/24 0816    albuterol (PROVENTIL) neb solution 2.5 mg  2.5 mg Nebulization 2 times daily Latrice Farmer MD   2.5 mg at 08/15/24 0831    benzonatate (TESSALON) capsule 100 mg  100 mg Oral TID PRN Latonya Olivo MD   100 mg at 08/15/24 0354    calcium carbonate (TUMS) chewable tablet 1,000 mg  1,000 mg Oral 4x Daily PRN Aries Vasquez MD        carboxymethylcellulose PF (REFRESH PLUS) 0.5 % ophthalmic solution 1 drop  1 drop Both Eyes Q1H PRN Curry Small MD        enoxaparin ANTICOAGULANT (LOVENOX) injection 40 mg  40 mg Subcutaneous Q24H Aries Vasquez MD   40 mg at 08/14/24 1958    gabapentin (NEURONTIN) capsule 200 mg  200 mg Oral TID Noreen Nevarez MD   200 mg at 08/15/24 7451     guaiFENesin (MUCINEX) 12 hr tablet 600 mg  600 mg Oral BID Latonya Olivo MD   600 mg at 08/15/24 0816    guaiFENesin-dextromethorphan (ROBITUSSIN DM) 100-10 MG/5ML syrup 10 mL  10 mL Oral Q4H PRN Noreen Nevarez MD   10 mL at 08/15/24 0232    HOLD: All Oral Medications   Does not apply HOLD Curry Small MD        hydrocortisone sodium succinate PF (solu-CORTEF) injection 50 mg  50 mg Intravenous Q6H [facility] Latrice Meléndez MD   50 mg at 08/15/24 1431    HYDROmorphone (PF) (DILAUDID) injection 0.3 mg  0.3 mg Intravenous Q4H PRN Noreen Nevarez MD   0.3 mg at 08/15/24 1630    hydrOXYzine HCl (ATARAX) tablet 25 mg  25 mg Oral Q6H PRN Del Shaw MD        lidocaine (LMX4) cream   Topical Q1H PRN Aries Vasquez MD        lidocaine 1 % 0.1-1 mL  0.1-1 mL Other Q1H PRN Aries Vasquez MD        melatonin tablet 3 mg  3 mg Oral At Bedtime PRN Aries Vasquez MD   3 mg at 08/13/24 2013    naloxone (NARCAN) injection 0.2 mg  0.2 mg Intravenous Q2 Min PRN Mina Car MD        Or    naloxone (NARCAN) injection 0.4 mg  0.4 mg Intravenous Q2 Min PRN Mina Car MD        Or    naloxone (NARCAN) injection 0.2 mg  0.2 mg Intramuscular Q2 Min PRN Mina Car MD        Or    naloxone (NARCAN) injection 0.4 mg  0.4 mg Intramuscular Q2 Min PRN Mina Car MD        No lozenges or gum should be given while patient on BIPAP/AVAPS/AVAPS AE   Does not apply Continuous PRN Curry Small MD        ondansetron (ZOFRAN) injection 4 mg  4 mg Intravenous Q6H PRN Latonya Olivo MD        oxyCODONE (ROXICODONE) tablet 5 mg  5 mg Oral Q4H PRN Del Shaw MD   5 mg at 08/15/24 1224    piperacillin-tazobactam (ZOSYN) 4.5 g vial to attach to  mL bag  4.5 g Intravenous Q6H Aries Vasquez MD 0 mL/hr at 08/14/24 2210 4.5 g at 08/15/24 1630    potassium chloride (KLOR-CON) Packet 40 mEq  40 mEq Oral BID Noreen Nevarez MD   40 mEq at 08/15/24 0902    senna-docusate  (SENOKOT-S/PERICOLACE) 8.6-50 MG per tablet 1 tablet  1 tablet Oral BID PRN Aries Vasquez MD        Or    senna-docusate (SENOKOT-S/PERICOLACE) 8.6-50 MG per tablet 2 tablet  2 tablet Oral BID PRN Aries Vasquez MD        sodium chloride (PF) 0.9% PF flush 3 mL  3 mL Intracatheter Q8H Aries Vasquez MD   3 mL at 08/14/24 1958    sodium chloride (PF) 0.9% PF flush 3 mL  3 mL Intracatheter q1 min prn Aries Vasquez MD   3 mL at 08/13/24 1601

## 2024-08-15 NOTE — PLAN OF CARE
Goal Outcome Evaluation:      Plan of Care Reviewed With: patient      /80 (BP Location: Left arm)   Pulse 97   Temp 97.6  F (36.4  C) (Oral)   Resp 20   Ht 1.524 m (5')   Wt 51.5 kg (113 lb 8 oz)   SpO2 100%   BMI 22.17 kg/m        Neuro: Alert & Oriented  x4, calls appropriately  Cardiac: on telemetry, bilateral lower extremities edematous and tender, denies cardiac chest pain  Respiratory: On 35 LPM high flow, denies SOB  GI/: Voiding AUOP; last BM 8-15; BS audible  Diet/Appetite: regular, 2L FR; denies nausea   Line: R PIV infusing TKO with ABX  Activity: SBA to bed side commode  Pain: C/O pain 8-9/10, Dilaudid and Oxycodone given for pain management  Change: No new change this shift  Plan: continue with current POC

## 2024-08-15 NOTE — PLAN OF CARE
Goal Outcome Evaluation:      Plan of Care Reviewed With: patient    Overall Patient Progress: no changeOverall Patient Progress: no change     Temp: 98.2  F (36.8  C) Temp src: Oral BP: 107/75 Pulse: 98   Resp: 21 SpO2: 100 % O2 Device: High Flow Nasal Cannula (HFNC) Oxygen Delivery: 35 LPM    Neuro: Alert and Oriented  x4, let needs known  Cardiac: on telemetry, lowers edematous and tender  Respiratory: LS clear to uppers and coarse and dim to lowers; on 35 LPM high flow  GI/: Voiding AUOP; last BM 8-14; BS audible  Diet/Appetite: regular, 2L FR; denies nausea presently  Skin:See skin note  LDA: R PIV tko   Activity: Up SBA  Pain: gave dilaudid 0.5 mg IV x2; robitussin codeine x1; tessalon x1; 1 mg IV dilaudid x1  Plan: continue plan of care

## 2024-08-15 NOTE — PLAN OF CARE
"Goal Outcome Evaluation:      Plan of Care Reviewed With: patient    Overall Patient Progress: no changeOverall Patient Progress: no change    Admitted/transferred from:   2 RN full   skin assessment completed by Thalia Levine, RN and GARETT RN.  Skin assessment finding: issues found scattered scabs to calves bilaterally; blanchable redness to heels bilaterally; thoracentesis site on R side of back; L FA bruise; R PIV    Interventions/actions: other not needed      Bedside Emergency Equipment Present:  Suction Regulator: Yes  Suction Canister: Yes  Tubing between Regulator and Canister: Yes  O2 Regulator with Tree: Yes  Ambu Bag: Yes    Documentation of Language Proficiency Assessment:  Does the patient speak a language other than English at home? No   Have they had difficulty understanding or being understood in previous admissions or doctor visits? No   Do they have difficulty clearly explaining what brought them into the hospital? No   Do they show difficulty providing clear teach back about call light use? No     If the answer was \"yes,\" to more than one question, was an  utilized for the remainder of the admission assessment? N/A    If no, please explain:          "

## 2024-08-15 NOTE — PLAN OF CARE
Goal Outcome Evaluation:       Pt has improved significantly. Alert and oriented X4 and can get panicky/anxious easily, Atarax given for anxiety. VSS on high flow of 90% FiO2 and 35 LPM Oxygen delivery. Tolerated reg diet well. K 2.6 this am, replaced with 60 mEq po. Lab jonnathan K level for recheck too early, prior to second dose of K given. Will have K recheck at 1400. Pt still has BLE edema, pain mainly in R leg. Oxycodone and IV dilaudid given for pain. Lasix and Hydrocortisone IV given. Pt has been up in room independently, stable, able to make needs known.

## 2024-08-15 NOTE — PROVIDER NOTIFICATION
"Pt has already had 2 ABGs drawn today. Pt states that it was painful and \"almost lost it\" during those blood draws. She is refusing ABG draws for the rest of the night. Currently she is breathing comfortably with saturations of 100% on HFNC 100%/35L. No cyanosis observed. Pt call light within reach. No other orders at this time. Cares to continue.     ,Dayday Jay, RT on 8/14/2024 at 11:24 PM     "

## 2024-08-15 NOTE — PROVIDER NOTIFICATION
Pt is able to deep breath and cough. Her breath sounds are diminished on the right side and clear on the left. She is able to utilize the aerobika properly and has a strong non-productive cough. CPT is not indicated at this time. Per [facility] protocol, RT will discontinue the order.    Dayday Jay, RT on 8/14/2024 at 11:54 PM

## 2024-08-15 NOTE — PLAN OF CARE
[id number]:  Neuro: A&Ox4.   Cardiac: ST,AVSS.   Respiratory: HFNC, 100% FiO2, 35LPM. O2 sats low 90s when lying down, upper 90s while sitting up in bed.    GI/: Adequate urine output. BM X1, but not seen by writer. Cdiff specimen still needed.   Pain: Dilaudid 0.5mg IV about q 2 hrs. Rates pain 8/10.  Skin: No new deficits noted.  LDAs: L & R PIV.  Labs: K+ level 3.0 this a.m., replaced with KCl 40mEq PO. 20 minutes later K+ level was drawn (2.6). MDs notified, and all agreed that previously given KCl was enough. Next lab schedued for 1830.  Mag level 1.5 at 1500 (add-on to morning labs). Mag sulfate to be infused after antibiotic.  Plan: Continue POC. Notify primary team with changes/concerns.

## 2024-08-15 NOTE — PROGRESS NOTES
Rt attempted to do Sputum induction, but was unsuccessful. Pt has non productive cough and left Sputum cup at bedside. RN notified. Pt on HF NC 90% 35 LPM; SPO2 92%    RT will continue to follow and monitor.     Aure Flores. RT

## 2024-08-15 NOTE — PROGRESS NOTES
[facility] Progress Note    Patient Name: Maribell Leon  YOB: 1995  Gender Identity: female  MRN: [id number]  Primary Attending: Jonathan Daigle    Today's Date: 08/13/24  Admission Date: 8/12/2024    Assessment:   Acute hypoxic respiratory failure  Multifocal pneumonia of unclear etiology, likely recurrrent from July  -She presented on 8/12 with new onset shortness of breath and cough which started the evening prior.  She had been taking her diuretic and her weight had been down significantly about 10 pounds in the prior 3 to 4 days.  She not measured fevers at home she did endorse a productive cough of sputum that is thick and green.  She noted chest pain that was worse with deep breathing and when palpated.  She denied any chills.  She felt better when upright in terms of her cough but shortness of breath did not improve or change with any particular position.  She did note rhinorrhea after she has been coughing particular.  She did not notice any sick contacts.  No rash, diarrhea, myalgias.  -WBC 21.9 on presentation, 31.4 on 8/13.  No eosinophils. 84% PMNs.   -Procalcitonin 0.52, on 8/13 0.28  -Chest x-ray moderate right-sided pleural effusion.  -CT chest: Negative for PE, dense consolidations of the right lower lobe, left lower lobe, right middle lobe with less dense consolidation of both upper lobes  -Started on vancomycin plus piperacillin/tazobactam  -Negative urine Legionella antigen and strep pneumo antigen  -Negative PCR for influenza, RSV, COVID.  -Negative full RVP  -Sputum and blood cultures pending  -S/p thoracentesis, not c/w infection  -only animal exposure healthy dogs, no outdoors activities, travel only back from [facility]  -States no h/o vaping    Discussion: Unclear etiology I suspect we have underlying diagnosis that both caused the first episode of acute hypoxic respiratory failure and this current episode.  She states that she improved a  lot in between and then rapidly worsened.  This could be infectious or noninfectious.  Pulmonology brings up recurrent aspiration is a possibility, certainly possible, also fungal diseases would make sense potentially although it is interesting to see the improvement in between episodes if that is the case.  Could be possible that there is some effect from previous steroids that calm down inflammation and that those have now been off prior to this hospitalization and she worsened.  I would also keep in mind noninfectious causes and certainly having pulmonary on board to consider that was very important.  She is on quite a bit of oxygen right now and her breathing remains tenuous.  Understandably bronchoscopy deemed safe currently, hopefully in the future that can be done because I do think it has a chance to be helpful.     Re: double gram negative coverage. Very rarely additive in terms of coverage and no evidence for synergy by adding FQ. If desired generally prefer an aminoglycoside as there tends to be more of a chance to add coverage for resistant microbes in that case. In either case there would still be gram negatives that are missed and our antibiogram would be consistent with this. If a change is desired, rather than adding a 2nd agent, I'd use meropenem to expand coverage although that's all empiric. That said, she's improving now so I wouldn't make any changes now.    Recent hospitalization with acute hypoxic resp failure of unclear etiology (Mycoplasma pneumoniae and EVALI among considerations though pt notes no h/o vaping and no smoking in >6 mo)  Recent persistent leukocytosis (June 2024)  In that July hospitalization she was admitted from 7/13-7/22 at [facility] (initially at [facility] from 7/10-13). There was uncertainty about whether her respiratory failure was due to Mycoplasma vs EVALI vs other. She had positive M pneumoniae IgG/M. She had a positive beta D glucan during that  hospitalization as well (1.78, positive is >0.74 by this assay). By 7/22 ID felt infection wasn't likely  and stopped antimicrobials.  She had a BAL that was negative for infection including fungal cultures on 7/16. Negative histo/blasto combination Ag, histo Ab's, tularemia Abs, blasto and cocci Abs, HIV, HCV, Syphilis, HBV Sag, RVP (including Mycoplasma), Aspergillus galactomannan, lyme Ab testing. Had WBCs >30 on a number of accasions, Abs Eos were generally >1000.  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   -Bronchoscopy could be helpful at some point when/if deemed safe to do by pulmonary   -Agree with pip/tazo, wouldn't add double gram negative coverage. See above.   -Watch for antimicrobial toxicities     Thank you for the consultation.     Abran Sanches MD    Division of Infectious Diseases and International Medicine      History of Present Illness   Maribell Leon is a 29 year old F with history of cirrhosis, chronic pancreatitis, and a recent hospitalization in July 2024 for acute hypoxic respiratory failure of uncertain etiology that required intubation.    See in initial consultation for more detailed history (8/13)    Procalcitonin 0.52, f/unit(s) on 8/13 0.28  CT chest: Negative for PE, dense consolidations of the right lower lobe, left lower lobe, right middle lobe with less dense consolidation of both upper lobes  Negative urine Legionella antigen and strep pneumo antigen  Negative PCR for influenza, RSV, COVID.  Negative full RVP  Sputum and blood cultures pending  S/p thoracentesis, not c/w infection  Crypto Ag negative  Urine histo Ag  negative  Sputum culture w/ yeast      Afebrile, tachycardic  HFNC 90% FI02 35L which is down somewhat but having some desaturations  WBC down to 21.2 from 27.3  PCT checked today was 0.37 (last check was 0.28 2 days prior)    Breathing feels much better today. Sitting up, states she has more energy.  Ongoing cough with some sputum.  She does not have any chest pain.  No headache, no fevers chills diaphoresis. She denies any new rash.  She denies any arthralgias or myalgias.  Denies any neck stiffness photophobia.  Denies any nausea or vomiting.      Antimicrobial Start date End date    pip/tazo 8/12 active   vanc 8/12 8/13   azithromycin 8/12 8/13                                      Medications     Current Facility-Administered Medications:     acetaminophen (TYLENOL) tablet 975 mg, 975 mg, Oral, Q6H PRN, Del Shaw MD, 975 mg at 08/15/24 0816    albuterol (PROVENTIL) neb solution 2.5 mg, 2.5 mg, Nebulization, 2 times daily, Latrice Farmer MD, 2.5 mg at 08/15/24 0831    benzonatate (TESSALON) capsule 100 mg, 100 mg, Oral, TID PRN, Latonya Olivo MD, 100 mg at 08/15/24 0354    calcium carbonate (TUMS) chewable tablet 1,000 mg, 1,000 mg, Oral, 4x Daily PRN, Aries Vasquez MD    carboxymethylcellulose PF (REFRESH PLUS) 0.5 % ophthalmic solution 1 drop, 1 drop, Both Eyes, Q1H PRN, Curry Small MD    enoxaparin ANTICOAGULANT (LOVENOX) injection 40 mg, 40 mg, Subcutaneous, Q24H, Aries Vasquez MD, 40 mg at 08/14/24 1958    furosemide (LASIX) injection 60 mg, 60 mg, Intravenous, Q6H, Noreen Nevarez MD, 60 mg at 08/15/24 0902    gabapentin (NEURONTIN) capsule 200 mg, 200 mg, Oral, TID, Noreen Nevarez MD, 200 mg at 08/15/24 0902    guaiFENesin (MUCINEX) 12 hr tablet 600 mg, 600 mg, Oral, BID, Latonya Olivo MD, 600 mg at 08/15/24 0816    guaiFENesin-dextromethorphan (ROBITUSSIN DM) 100-10 MG/5ML syrup 10 mL, 10 mL, Oral, Q4H PRN, Noreen Nevarez MD, 10 mL at 08/15/24 0232    HOLD: All Oral  Medications, , Does not apply, HOLD, Curry Small MD    hydrocortisone sodium succinate PF (solu-CORTEF) injection 50 mg, 50 mg, Intravenous, Q6H NATHNAAEL, Latrice Farmer MD, 50 mg at 08/15/24 0818    HYDROmorphone (PF) (DILAUDID) injection 0.3 mg, 0.3 mg, Intravenous, Q4H PRN, Noreen Nevarez MD    hydrOXYzine HCl (ATARAX) tablet 25 mg, 25 mg, Oral, Q6H PRN **OR** hydrOXYzine HCl (ATARAX) tablet 50 mg, 50 mg, Oral, Q6H PRN, eDl Shaw MD, 50 mg at 08/15/24 0816    lidocaine (LMX4) cream, , Topical, Q1H PRN, Aries Vasquez MD    lidocaine 1 % 0.1-1 mL, 0.1-1 mL, Other, Q1H PRN, Aries Vasquez MD    melatonin tablet 3 mg, 3 mg, Oral, At Bedtime PRN, Aries Vasquez MD, 3 mg at 08/13/24 2013    naloxone (NARCAN) injection 0.2 mg, 0.2 mg, Intravenous, Q2 Min PRN **OR** naloxone (NARCAN) injection 0.4 mg, 0.4 mg, Intravenous, Q2 Min PRN **OR** naloxone (NARCAN) injection 0.2 mg, 0.2 mg, Intramuscular, Q2 Min PRN **OR** naloxone (NARCAN) injection 0.4 mg, 0.4 mg, Intramuscular, Q2 Min PRN, Mina Car MD    No lozenges or gum should be given while patient on BIPAP/AVAPS/AVAPS AE, , Does not apply, Continuous PRN, Curry Small MD    ondansetron (ZOFRAN) injection 4 mg, 4 mg, Intravenous, Q6H PRN, Latonya Olivo MD    oxyCODONE (ROXICODONE) tablet 5 mg, 5 mg, Oral, Q4H PRN, Del Shaw MD, 5 mg at 08/15/24 0816    piperacillin-tazobactam (ZOSYN) 4.5 g vial to attach to  mL bag, 4.5 g, Intravenous, Q6H, Aries Vasquez MD, Last Rate: 0 mL/hr at 08/14/24 2210, 4.5 g at 08/15/24 0449    potassium chloride (KLOR-CON) Packet 40 mEq, 40 mEq, Oral, BID, Noreen Nevarez MD, 40 mEq at 08/15/24 0902    potassium chloride dru ER (KLOR-CON M10) CR tablet 20 mEq, 20 mEq, Oral, Once, Mina Car MD    senna-docusate (SENOKOT-S/PERICOLACE) 8.6-50 MG per tablet 1 tablet, 1 tablet, Oral, BID PRN **OR** senna-docusate (SENOKOT-S/PERICOLACE) 8.6-50 MG per tablet 2 tablet, 2 tablet,  Oral, BID PRN, Aries Vasquez MD    sodium chloride (PF) 0.9% PF flush 3 mL, 3 mL, Intracatheter, Q8H, Aries Vasquez MD, 3 mL at 24    sodium chloride (PF) 0.9% PF flush 3 mL, 3 mL, Intracatheter, q1 min prn, Aries Vasquez MD, 3 mL at 24 1601     Allergies    No Known Allergies     Physical Examination                           Vital Signs   Temp: 97.8  F (36.6  C) Temp  Min: 97.8  F (36.6  C)  Max: 98.3  F (36.8  C)  Resp: 21 Resp  Min: 18  Max: 21  SpO2: 95 % SpO2  Min: 91 %  Max: 100 %  Pulse: 100 Pulse  Min: 98  Max: 107    No data recorded  BP: (!) 118/91 Systolic (24hrs), Av , Min:107 , Max:119   Diastolic (24hrs), Av, Min:75, Max:91       General appearance: alert, oriented to situation, NAD  HENT: normocephalic, atraumatic, no sinus tenderness, bipap mask on,unable to examine oral cavity  Eyes: EOM grossly intact, Conj nl  Neck: supple  Lungs: Improved air movement in upper lung fields, still not much lower air fields. No wheezing appreciated. Some crackles on the L upper field.   Heart: regular rhythm, tachycardic no murmur, rub, gallop  Abdomen: soft, non-tender, mildly distended  Ext: + LE edema bilaterally, L foot with a couple of healing abrasions  Skin: no rashes/lesions, multiple tattoos, all appear professional with defined borders and no s/o infection, multiple piercings on face, no s/o infection     Lines: PIV     Lab Review   Labs reviewed    Microbiology and Radiology Review   Microbiology data reviewed.     Pertinent radiology images viewed.

## 2024-08-16 ENCOUNTER — APPOINTMENT (OUTPATIENT)
Dept: OCCUPATIONAL THERAPY | Facility: CLINIC | Age: 29
End: 2024-08-16
Payer: MEDICAID

## 2024-08-16 LAB
ADV 40+41 DNA STL QL NAA+NON-PROBE: NEGATIVE
ALBUMIN SERPL BCG-MCNC: 3.2 G/DL (ref 3.5–5.2)
ALP SERPL-CCNC: 77 U/L (ref 40–150)
ALT SERPL W P-5'-P-CCNC: <5 U/L (ref 0–50)
ANION GAP SERPL CALCULATED.3IONS-SCNC: 14 MMOL/L (ref 7–15)
AST SERPL W P-5'-P-CCNC: 36 U/L (ref 0–45)
ASTRO TYP 1-8 RNA STL QL NAA+NON-PROBE: NEGATIVE
BACTERIA SPT CULT: NORMAL
BASOPHILS # BLD AUTO: 0 10E3/UL (ref 0–0.2)
BASOPHILS NFR BLD AUTO: 0 %
BILIRUB DIRECT SERPL-MCNC: 1.18 MG/DL (ref 0–0.3)
BILIRUB SERPL-MCNC: 1.9 MG/DL
BUN SERPL-MCNC: 11.7 MG/DL (ref 6–20)
C CAYETANENSIS DNA STL QL NAA+NON-PROBE: NEGATIVE
CALCIUM SERPL-MCNC: 8.3 MG/DL (ref 8.8–10.4)
CAMPYLOBACTER DNA SPEC NAA+PROBE: NEGATIVE
CHLORIDE SERPL-SCNC: 109 MMOL/L (ref 98–107)
CREAT SERPL-MCNC: 0.73 MG/DL (ref 0.51–0.95)
CRP SERPL-MCNC: 21.1 MG/L
CRYPTOSP DNA STL QL NAA+NON-PROBE: NEGATIVE
E COLI O157 DNA STL QL NAA+NON-PROBE: NORMAL
E HISTOLYT DNA STL QL NAA+NON-PROBE: NEGATIVE
EAEC ASTA GENE ISLT QL NAA+PROBE: NEGATIVE
EC STX1+STX2 GENES STL QL NAA+NON-PROBE: NEGATIVE
EGFRCR SERPLBLD CKD-EPI 2021: >90 ML/MIN/1.73M2
EOSINOPHIL # BLD AUTO: 0 10E3/UL (ref 0–0.7)
EOSINOPHIL NFR BLD AUTO: 0 %
EPEC EAE GENE STL QL NAA+NON-PROBE: NEGATIVE
ERYTHROCYTE [DISTWIDTH] IN BLOOD BY AUTOMATED COUNT: 17.3 % (ref 10–15)
ETEC LTA+ST1A+ST1B TOX ST NAA+NON-PROBE: NEGATIVE
FERRITIN SERPL-MCNC: 88 NG/ML (ref 6–175)
FOLATE SERPL-MCNC: >40 NG/ML (ref 4.6–34.8)
G LAMBLIA DNA STL QL NAA+NON-PROBE: NEGATIVE
GLUCOSE SERPL-MCNC: 272 MG/DL (ref 70–99)
GRAM STAIN RESULT: NORMAL
HAPTOGLOB SERPL-MCNC: 118 MG/DL (ref 30–200)
HCO3 SERPL-SCNC: 21 MMOL/L (ref 22–29)
HCT VFR BLD AUTO: 25.6 % (ref 35–47)
HGB BLD-MCNC: 7.5 G/DL (ref 11.7–15.7)
HOLD SPECIMEN: NORMAL
IMM GRANULOCYTES # BLD: 0.2 10E3/UL
IMM GRANULOCYTES NFR BLD: 1 %
IRON BINDING CAPACITY (ROCHE): 182 UG/DL (ref 240–430)
IRON SATN MFR SERPL: 10 % (ref 15–46)
IRON SERPL-MCNC: 19 UG/DL (ref 37–145)
LYMPHOCYTES # BLD AUTO: 1.9 10E3/UL (ref 0.8–5.3)
LYMPHOCYTES NFR BLD AUTO: 11 %
MAGNESIUM SERPL-MCNC: 1.6 MG/DL (ref 1.7–2.3)
MCH RBC QN AUTO: 31.6 PG (ref 26.5–33)
MCHC RBC AUTO-ENTMCNC: 29.3 G/DL (ref 31.5–36.5)
MCV RBC AUTO: 108 FL (ref 78–100)
MONOCYTES # BLD AUTO: 1 10E3/UL (ref 0–1.3)
MONOCYTES NFR BLD AUTO: 5 %
NEUTROPHILS # BLD AUTO: 14.9 10E3/UL (ref 1.6–8.3)
NEUTROPHILS NFR BLD AUTO: 83 %
NOROVIRUS GI+II RNA STL QL NAA+NON-PROBE: NEGATIVE
NRBC # BLD AUTO: 0 10E3/UL
NRBC BLD AUTO-RTO: 0 /100
P SHIGELLOIDES DNA STL QL NAA+NON-PROBE: NEGATIVE
PLATELET # BLD AUTO: 165 10E3/UL (ref 150–450)
POTASSIUM SERPL-SCNC: 3.4 MMOL/L (ref 3.4–5.3)
POTASSIUM SERPL-SCNC: 3.7 MMOL/L (ref 3.4–5.3)
PROT SERPL-MCNC: 6 G/DL (ref 6.4–8.3)
RBC # BLD AUTO: 2.37 10E6/UL (ref 3.8–5.2)
RVA RNA STL QL NAA+NON-PROBE: NEGATIVE
SALMONELLA SP RPOD STL QL NAA+PROBE: NEGATIVE
SAPO I+II+IV+V RNA STL QL NAA+NON-PROBE: NEGATIVE
SHIGELLA SP+EIEC IPAH ST NAA+NON-PROBE: NEGATIVE
SODIUM SERPL-SCNC: 144 MMOL/L (ref 135–145)
V CHOLERAE DNA SPEC QL NAA+PROBE: NEGATIVE
VIBRIO DNA SPEC NAA+PROBE: NEGATIVE
VIT B12 SERPL-MCNC: 1366 PG/ML (ref 232–1245)
WBC # BLD AUTO: 17.9 10E3/UL (ref 4–11)
Y ENTEROCOL DNA STL QL NAA+PROBE: NEGATIVE

## 2024-08-16 PROCEDURE — 120N000002 HC R&B MED SURG/OB UMMC

## 2024-08-16 PROCEDURE — 250N000013 HC RX MED GY IP 250 OP 250 PS 637

## 2024-08-16 PROCEDURE — 250N000011 HC RX IP 250 OP 636: Performed by: INTERNAL MEDICINE

## 2024-08-16 PROCEDURE — 83921 ORGANIC ACID SINGLE QUANT: CPT

## 2024-08-16 PROCEDURE — 94640 AIRWAY INHALATION TREATMENT: CPT

## 2024-08-16 PROCEDURE — 250N000011 HC RX IP 250 OP 636

## 2024-08-16 PROCEDURE — 84132 ASSAY OF SERUM POTASSIUM: CPT | Performed by: STUDENT IN AN ORGANIZED HEALTH CARE EDUCATION/TRAINING PROGRAM

## 2024-08-16 PROCEDURE — 86140 C-REACTIVE PROTEIN: CPT

## 2024-08-16 PROCEDURE — 82728 ASSAY OF FERRITIN: CPT

## 2024-08-16 PROCEDURE — 82746 ASSAY OF FOLIC ACID SERUM: CPT

## 2024-08-16 PROCEDURE — 99233 SBSQ HOSP IP/OBS HIGH 50: CPT | Mod: GC | Performed by: STUDENT IN AN ORGANIZED HEALTH CARE EDUCATION/TRAINING PROGRAM

## 2024-08-16 PROCEDURE — 250N000009 HC RX 250: Performed by: INTERNAL MEDICINE

## 2024-08-16 PROCEDURE — 83735 ASSAY OF MAGNESIUM: CPT | Performed by: STUDENT IN AN ORGANIZED HEALTH CARE EDUCATION/TRAINING PROGRAM

## 2024-08-16 PROCEDURE — 83550 IRON BINDING TEST: CPT

## 2024-08-16 PROCEDURE — 97165 OT EVAL LOW COMPLEX 30 MIN: CPT | Mod: GO | Performed by: OCCUPATIONAL THERAPIST

## 2024-08-16 PROCEDURE — 999N000157 HC STATISTIC RCP TIME EA 10 MIN

## 2024-08-16 PROCEDURE — 80048 BASIC METABOLIC PNL TOTAL CA: CPT

## 2024-08-16 PROCEDURE — 82607 VITAMIN B-12: CPT

## 2024-08-16 PROCEDURE — 87507 IADNA-DNA/RNA PROBE TQ 12-25: CPT

## 2024-08-16 PROCEDURE — 85025 COMPLETE CBC W/AUTO DIFF WBC: CPT

## 2024-08-16 PROCEDURE — 94640 AIRWAY INHALATION TREATMENT: CPT | Mod: 76

## 2024-08-16 PROCEDURE — 99233 SBSQ HOSP IP/OBS HIGH 50: CPT | Performed by: STUDENT IN AN ORGANIZED HEALTH CARE EDUCATION/TRAINING PROGRAM

## 2024-08-16 PROCEDURE — 82248 BILIRUBIN DIRECT: CPT

## 2024-08-16 PROCEDURE — 999N000215 HC STATISTIC HFNC ADULT NON-CPAP

## 2024-08-16 PROCEDURE — 97140 MANUAL THERAPY 1/> REGIONS: CPT | Mod: GO | Performed by: OCCUPATIONAL THERAPIST

## 2024-08-16 PROCEDURE — 36415 COLL VENOUS BLD VENIPUNCTURE: CPT

## 2024-08-16 PROCEDURE — 83010 ASSAY OF HAPTOGLOBIN QUANT: CPT

## 2024-08-16 RX ORDER — ACETAMINOPHEN 325 MG/1
650 TABLET ORAL EVERY 6 HOURS PRN
Status: DISCONTINUED | OUTPATIENT
Start: 2024-08-16 | End: 2024-08-20

## 2024-08-16 RX ORDER — POTASSIUM CHLORIDE 750 MG/1
40 TABLET, EXTENDED RELEASE ORAL ONCE
Status: DISCONTINUED | OUTPATIENT
Start: 2024-08-16 | End: 2024-08-16

## 2024-08-16 RX ORDER — FUROSEMIDE 10 MG/ML
60 INJECTION INTRAMUSCULAR; INTRAVENOUS EVERY 6 HOURS
Status: COMPLETED | OUTPATIENT
Start: 2024-08-16 | End: 2024-08-16

## 2024-08-16 RX ORDER — GABAPENTIN 300 MG/1
300 CAPSULE ORAL 3 TIMES DAILY
Status: DISCONTINUED | OUTPATIENT
Start: 2024-08-16 | End: 2024-08-21 | Stop reason: HOSPADM

## 2024-08-16 RX ADMIN — HYDROMORPHONE HYDROCHLORIDE 0.3 MG: 1 INJECTION, SOLUTION INTRAMUSCULAR; INTRAVENOUS; SUBCUTANEOUS at 13:29

## 2024-08-16 RX ADMIN — FUROSEMIDE 60 MG: 10 INJECTION, SOLUTION INTRAMUSCULAR; INTRAVENOUS at 16:53

## 2024-08-16 RX ADMIN — ENOXAPARIN SODIUM 40 MG: 40 INJECTION SUBCUTANEOUS at 20:34

## 2024-08-16 RX ADMIN — GABAPENTIN 300 MG: 300 CAPSULE ORAL at 13:29

## 2024-08-16 RX ADMIN — PIPERACILLIN AND TAZOBACTAM 4.5 G: 4; .5 INJECTION, POWDER, LYOPHILIZED, FOR SOLUTION INTRAVENOUS at 04:48

## 2024-08-16 RX ADMIN — GUAIFENESIN 600 MG: 600 TABLET ORAL at 20:34

## 2024-08-16 RX ADMIN — ALBUTEROL SULFATE 2.5 MG: 2.5 SOLUTION RESPIRATORY (INHALATION) at 20:50

## 2024-08-16 RX ADMIN — HYDROMORPHONE HYDROCHLORIDE 0.3 MG: 1 INJECTION, SOLUTION INTRAMUSCULAR; INTRAVENOUS; SUBCUTANEOUS at 04:03

## 2024-08-16 RX ADMIN — ACETAMINOPHEN 975 MG: 325 TABLET ORAL at 06:46

## 2024-08-16 RX ADMIN — PIPERACILLIN AND TAZOBACTAM 4.5 G: 4; .5 INJECTION, POWDER, LYOPHILIZED, FOR SOLUTION INTRAVENOUS at 11:38

## 2024-08-16 RX ADMIN — DICLOFENAC SODIUM 4 G: 10 GEL TOPICAL at 15:14

## 2024-08-16 RX ADMIN — GABAPENTIN 200 MG: 100 CAPSULE ORAL at 08:56

## 2024-08-16 RX ADMIN — GUAIFENESIN AND DEXTROMETHORPHAN 10 ML: 100; 10 SYRUP ORAL at 08:56

## 2024-08-16 RX ADMIN — HYDROCORTISONE SODIUM SUCCINATE 50 MG: 100 INJECTION, POWDER, FOR SOLUTION INTRAMUSCULAR; INTRAVENOUS at 15:13

## 2024-08-16 RX ADMIN — POTASSIUM CHLORIDE 40 MEQ: 1.5 POWDER, FOR SOLUTION ORAL at 20:34

## 2024-08-16 RX ADMIN — GUAIFENESIN 600 MG: 600 TABLET ORAL at 08:56

## 2024-08-16 RX ADMIN — POTASSIUM CHLORIDE 40 MEQ: 1.5 POWDER, FOR SOLUTION ORAL at 08:56

## 2024-08-16 RX ADMIN — PIPERACILLIN AND TAZOBACTAM 4.5 G: 4; .5 INJECTION, POWDER, LYOPHILIZED, FOR SOLUTION INTRAVENOUS at 16:52

## 2024-08-16 RX ADMIN — OXYCODONE HYDROCHLORIDE 5 MG: 5 TABLET ORAL at 20:35

## 2024-08-16 RX ADMIN — OXYCODONE HYDROCHLORIDE 5 MG: 5 TABLET ORAL at 15:13

## 2024-08-16 RX ADMIN — PIPERACILLIN AND TAZOBACTAM 4.5 G: 4; .5 INJECTION, POWDER, LYOPHILIZED, FOR SOLUTION INTRAVENOUS at 23:18

## 2024-08-16 RX ADMIN — HYDROXYZINE HYDROCHLORIDE 25 MG: 25 TABLET ORAL at 20:36

## 2024-08-16 RX ADMIN — OXYCODONE HYDROCHLORIDE 5 MG: 5 TABLET ORAL at 10:27

## 2024-08-16 RX ADMIN — ALBUTEROL SULFATE 2.5 MG: 2.5 SOLUTION RESPIRATORY (INHALATION) at 07:23

## 2024-08-16 RX ADMIN — GABAPENTIN 300 MG: 300 CAPSULE ORAL at 20:34

## 2024-08-16 RX ADMIN — HYDROMORPHONE HYDROCHLORIDE 0.3 MG: 1 INJECTION, SOLUTION INTRAMUSCULAR; INTRAVENOUS; SUBCUTANEOUS at 08:56

## 2024-08-16 RX ADMIN — GUAIFENESIN AND DEXTROMETHORPHAN 10 ML: 100; 10 SYRUP ORAL at 13:27

## 2024-08-16 RX ADMIN — ACETAMINOPHEN 650 MG: 325 TABLET ORAL at 18:26

## 2024-08-16 RX ADMIN — GUAIFENESIN AND DEXTROMETHORPHAN 10 ML: 100; 10 SYRUP ORAL at 18:26

## 2024-08-16 RX ADMIN — HYDROXYZINE HYDROCHLORIDE 25 MG: 25 TABLET ORAL at 04:59

## 2024-08-16 RX ADMIN — HYDROCORTISONE SODIUM SUCCINATE 50 MG: 100 INJECTION, POWDER, FOR SOLUTION INTRAMUSCULAR; INTRAVENOUS at 20:38

## 2024-08-16 RX ADMIN — OXYCODONE HYDROCHLORIDE 5 MG: 5 TABLET ORAL at 04:46

## 2024-08-16 RX ADMIN — FUROSEMIDE 60 MG: 10 INJECTION, SOLUTION INTRAMUSCULAR; INTRAVENOUS at 10:25

## 2024-08-16 RX ADMIN — HYDROCORTISONE SODIUM SUCCINATE 50 MG: 100 INJECTION, POWDER, FOR SOLUTION INTRAMUSCULAR; INTRAVENOUS at 10:26

## 2024-08-16 RX ADMIN — HYDROMORPHONE HYDROCHLORIDE 0.3 MG: 1 INJECTION, SOLUTION INTRAMUSCULAR; INTRAVENOUS; SUBCUTANEOUS at 18:26

## 2024-08-16 RX ADMIN — HYDROCORTISONE SODIUM SUCCINATE 50 MG: 100 INJECTION, POWDER, FOR SOLUTION INTRAMUSCULAR; INTRAVENOUS at 04:03

## 2024-08-16 RX ADMIN — HYDROXYZINE HYDROCHLORIDE 25 MG: 25 TABLET ORAL at 11:39

## 2024-08-16 ASSESSMENT — ACTIVITIES OF DAILY LIVING (ADL)
ADLS_ACUITY_SCORE: 24
ADLS_ACUITY_SCORE: 26
ADLS_ACUITY_SCORE: 24
ADLS_ACUITY_SCORE: 24
ADLS_ACUITY_SCORE: 26
ADLS_ACUITY_SCORE: 24
ADLS_ACUITY_SCORE: 24
ADLS_ACUITY_SCORE: 23
ADLS_ACUITY_SCORE: 24
ADLS_ACUITY_SCORE: 23
ADLS_ACUITY_SCORE: 24
ADLS_ACUITY_SCORE: 24
ADLS_ACUITY_SCORE: 26
ADLS_ACUITY_SCORE: 23
ADLS_ACUITY_SCORE: 26

## 2024-08-16 NOTE — PROGRESS NOTES
[facility] Progress Note    Patient Name: Maribell Leon  YOB: 1995  Gender Identity: female  MRN: [id number]  Primary Attending: Jonathan Daigle    Today's Date: 08/16/24  Admission Date: 8/12/2024    Assessment:   Acute hypoxic respiratory failure  Multifocal pneumonia of unclear etiology, new episode vs recurrrent from July  -She presented on 8/12 with new onset shortness of breath and cough which started the evening prior.  She had been taking her diuretic and her weight had been down significantly about 10 pounds in the prior 3 to 4 days.  She not measured fevers at home she did endorse a productive cough of sputum that is thick and green.  She noted chest pain that was worse with deep breathing and when palpated.  She denied any chills.  She felt better when upright in terms of her cough but shortness of breath did not improve or change with any particular position.  She did note rhinorrhea after she has been coughing particular.  She did not notice any sick contacts.  No rash, diarrhea, myalgias.  -WBC 21.9 on presentation, 31.4 on 8/13.  No eosinophils. 84% PMNs.   -Procalcitonin 0.52, on 8/13 0.28  -Chest x-ray moderate right-sided pleural effusion.  -CT chest: Negative for PE, dense consolidations of the right lower lobe, left lower lobe, right middle lobe with less dense consolidation of both upper lobes  -Started on vancomycin plus piperacillin/tazobactam  -Negative urine Legionella antigen and strep pneumo antigen  -Negative PCR for influenza, RSV, COVID.  -Negative full RVP  -Sputum and blood cultures pending  -S/p thoracentesis, not c/w infection  -only animal exposure healthy dogs, no outdoors activities, travel only back from [facility]  -States no h/o vaping  -1,3 beta D glucan 143, histo urine negative, blasto Ag negative  -C albicans on sputum culture      Recent hospitalization with acute hypoxic resp failure of unclear etiology (Mycoplasma  pneumoniae and EVALI among considerations though pt notes no h/o vaping and no smoking in >6 mo)  Recent persistent leukocytosis (June 2024)  In that July hospitalization she was admitted from 7/13-7/22 at [facility] (initially at [facility] from 7/10-13). There was uncertainty about whether her respiratory failure was due to Mycoplasma vs EVALI vs other. She had positive M pneumoniae IgG/M. She had a positive beta D glucan during that hospitalization as well (1.78, positive is >0.74 by this assay). By 7/22 ID felt infection wasn't likely  and stopped antimicrobials.  She had a BAL that was negative for infection including fungal cultures on 7/16. Negative histo/blasto combination Ag, histo Ab's, tularemia Abs, blasto and cocci Abs, HIV, HCV, Syphilis, HBV Sag, RVP (including Mycoplasma), Aspergillus galactomannan, lyme Ab testing. Had WBCs >30 on a number of accasions, Abs Eos were generally >1000.  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 Once safe bronchoscopy would be useful. She certainly could have a fungal etiology including blasto or histo, even with negative antigen testing so far.  Steroids could temporarily improve her status with many fungal pneumonias and so it is necessary to determine if we do have a fungal infection despite her improvement.  -Agree with sending PJP sputum PCR  -Agree with pip/tazo, tentatively would give 7 days total.   -Watch for antimicrobial toxicities     Thank you for the consultation.     Abran Sanches MD    Division of Infectious Diseases and International Medicine    Dr. Tian sands  round on Monday.  For any questions over the weekend please page the ID team on call.        History of Present Illness   Maribell Leon is a 29 year old F with history of cirrhosis, chronic pancreatitis, and a recent hospitalization in July 2024 for acute hypoxic respiratory failure of uncertain etiology that required intubation.    See in initial consultation for more detailed history (8/13)    Procalcitonin 0.52, f/unit(s) on 8/13 0.28  CT chest: Negative for PE, dense consolidations of the right lower lobe, left lower lobe, right middle lobe with less dense consolidation of both upper lobes  Negative urine Legionella antigen and strep pneumo antigen  Negative PCR for influenza, RSV, COVID.  Negative full RVP  Sputum and blood cultures pending  S/p thoracentesis, not c/w infection  Crypto Ag negative  Urine histo Ag negative  Sputum culture w/ yeast   Blasto Ag negative, histo urine and serum Ag's negative  BDG +, 143     Afebrile, mild tachycardia, low but seemingly baseline blood pressures  High flow nasal cannula down 70% FiO2, 40 L/min  WBC down to 21.2 from 27.3      Breathing feels much better today again today. Sitting up, states she has more energy.  Ongoing cough with some sputum.  She does not have any chest pain.  No headache, no fevers chills diaphoresis. She denies any new rash.  She denies any arthralgias or myalgias.  Denies any neck stiffness photophobia.  Denies any nausea or vomiting.      Antimicrobial Start date End date    pip/tazo 8/12 active   vanc 8/12 8/13   azithromycin 8/12 8/13                                      Medications     Current Facility-Administered Medications:     acetaminophen (TYLENOL) tablet 975 mg, 975 mg, Oral, Q6H PRN, Del Shaw MD, 975 mg at 08/16/24 0646    albuterol (PROVENTIL) neb solution 2.5 mg, 2.5 mg, Nebulization, 2 times daily, Latrice Farmer MD, 2.5 mg at 08/16/24 0723    benzonatate (TESSALON) capsule 100 mg, 100 mg, Oral, TID PRN, Geovani  MD Latonya, 100 mg at 08/15/24 0354    calcium carbonate (TUMS) chewable tablet 1,000 mg, 1,000 mg, Oral, 4x Daily PRN, Aries Vasquez MD    carboxymethylcellulose PF (REFRESH PLUS) 0.5 % ophthalmic solution 1 drop, 1 drop, Both Eyes, Q1H PRN, Curry Small MD    enoxaparin ANTICOAGULANT (LOVENOX) injection 40 mg, 40 mg, Subcutaneous, Q24H, Aries Vasquez MD, 40 mg at 08/15/24 2004    gabapentin (NEURONTIN) capsule 200 mg, 200 mg, Oral, TID, Noreen Nevarez MD, 200 mg at 08/15/24 2005    guaiFENesin (MUCINEX) 12 hr tablet 600 mg, 600 mg, Oral, BID, GeovaniLatonya MD, 600 mg at 08/15/24 2005    guaiFENesin-dextromethorphan (ROBITUSSIN DM) 100-10 MG/5ML syrup 10 mL, 10 mL, Oral, Q4H PRN, Noreen Nevarez MD, 10 mL at 08/15/24 0232    HOLD: All Oral Medications, , Does not apply, HOLD, Curry Small MD    hydrocortisone sodium succinate PF (solu-CORTEF) injection 50 mg, 50 mg, Intravenous, Q6H NATHANAEL, Latrice Farmer MD, 50 mg at 08/16/24 0403    HYDROmorphone (PF) (DILAUDID) injection 0.3 mg, 0.3 mg, Intravenous, Q4H PRN, Noreen Nevarez MD, 0.3 mg at 08/16/24 0403    hydrOXYzine HCl (ATARAX) tablet 25 mg, 25 mg, Oral, Q6H PRN, 25 mg at 08/16/24 0459 **OR** [DISCONTINUED] hydrOXYzine HCl (ATARAX) tablet 50 mg, 50 mg, Oral, Q6H PRN, Del Shaw MD, 50 mg at 08/15/24 1347    lidocaine (LMX4) cream, , Topical, Q1H PRN, Aries Vasquez MD    lidocaine 1 % 0.1-1 mL, 0.1-1 mL, Other, Q1H PRN, Aries Vasquez MD    melatonin tablet 3 mg, 3 mg, Oral, At Bedtime PRN, Aries Vasquez MD, 3 mg at 08/13/24 2013    naloxone (NARCAN) injection 0.2 mg, 0.2 mg, Intravenous, Q2 Min PRN **OR** naloxone (NARCAN) injection 0.4 mg, 0.4 mg, Intravenous, Q2 Min PRN **OR** naloxone (NARCAN) injection 0.2 mg, 0.2 mg, Intramuscular, Q2 Min PRN **OR** naloxone (NARCAN) injection 0.4 mg, 0.4 mg, Intramuscular, Q2 Min PRN, Mina Car MD    No lozenges or gum should be given while patient on  BIPAP/AVAPS/AVAPS AE, , Does not apply, Continuous PRN, Curry Small MD    ondansetron (ZOFRAN) injection 4 mg, 4 mg, Intravenous, Q6H PRN, Latonya Olivo MD    oxyCODONE (ROXICODONE) tablet 5 mg, 5 mg, Oral, Q4H PRN, Del Shaw MD, 5 mg at 24    piperacillin-tazobactam (ZOSYN) 4.5 g vial to attach to  mL bag, 4.5 g, Intravenous, Q6H, Aries Vasquez MD, Last Rate: 0 mL/hr at 240, 4.5 g at 24    potassium chloride (KLOR-CON) Packet 40 mEq, 40 mEq, Oral, BID, Noreen Nevarez MD, 40 mEq at 08/15/24 2005    potassium chloride dru ER (KLOR-CON M10) CR tablet 40 mEq, 40 mEq, Oral, Once, Mina Car MD    senna-docusate (SENOKOT-S/PERICOLACE) 8.6-50 MG per tablet 1 tablet, 1 tablet, Oral, BID PRN **OR** senna-docusate (SENOKOT-S/PERICOLACE) 8.6-50 MG per tablet 2 tablet, 2 tablet, Oral, BID PRN, Aries Vasquez MD    sodium chloride (PF) 0.9% PF flush 3 mL, 3 mL, Intracatheter, Q8H, Aries Vasquez MD, 3 mL at 24    sodium chloride (PF) 0.9% PF flush 3 mL, 3 mL, Intracatheter, q1 min prn, Aries Vasquez MD, 3 mL at 24 1601     Allergies    No Known Allergies     Physical Examination                           Vital Signs   Temp: 98.3  F (36.8  C) Temp  Min: 97.5  F (36.4  C)  Max: 98.8  F (37.1  C)  Resp: 18 Resp  Min: 18  Max: 20  SpO2: 99 % SpO2  Min: 88 %  Max: 100 %  Pulse: 96 Pulse  Min: 89  Max: 119    No data recorded  BP: 117/86 Systolic (24hrs), Av , Min:108 , Max:119   Diastolic (24hrs), Av, Min:80, Max:91       General appearance: alert, oriented to situation, NAD  HENT: normocephalic, atraumatic, no sinus tenderness, bipap mask on,unable to examine oral cavity  Eyes: EOM grossly intact, Conj nl  Neck: supple  Lungs: Improved air movement in upper lung fields and L lower lung field, still not much R lower lung field. No wheezing appreciated. No crackles  Heart: regular rhythm, tachycardic no murmur, rub, gallop  Abdomen:  soft, non-tender, mildly distended  Ext: + LE edema bilaterally, L foot with a couple of healing abrasions  Skin: no rashes/lesions, multiple tattoos, all appear professional with defined borders and no s/o infection, multiple piercings on face, no s/o infection     Lines: PIV     Lab Review   Labs reviewed    Microbiology and Radiology Review   Microbiology data reviewed.     Pertinent radiology images viewed.

## 2024-08-16 NOTE — PLAN OF CARE
Goal Outcome Evaluation:      Plan of Care Reviewed With: patient    Overall Patient Progress: improvingOverall Patient Progress: improving    Temp: 98.4  F (36.9  C) Temp src: Oral BP: (!) 125/94 Pulse: 88   Resp: 18 SpO2: 94 % O2 Device: (S) Oxymask Oxygen Delivery: 11 LPM    Pt A&O X4, able to communicate needs. VSS, on tele. Pt weaned from high flow to 11Ls oxymask. Denies SOB at rest and with exertion. Denies cardiac chest pain. Pt having frequent dry cough, PRN robitussin given X3. Sputum sample still need to be collected. Feet and lower back pain shortly relieved with current pain regimen. 2+/3+ edema on BLE, lymph wraps placed by OT. Voiding adequately and had 1 small BM. Potassium replaced, recheck in the AM. Pt tolerating regular diet without N/V, poor appetite.

## 2024-08-16 NOTE — PLAN OF CARE
Goal Outcome Evaluation:      Plan of Care Reviewed With: patient    Overall Patient Progress: improving    Blood pressure 117/86, pulse 96, temperature 98.3  F (36.8  C), temperature source Oral, resp. rate 18, height 1.524 m (5'), weight 51.5 kg (113 lb 8 oz), SpO2 99%.    Pt had 2 episodes of desat down into 50s at lowest. Upon entry to room pt was not wear o2. She explained she removed them out of discomfort and frustration. She agreed to keep it on after some conversation. Patient benefits from IV dilaudid, atarax, and oral oxycodone to manage pain in feet which is worst in right foot. R foot has some numbness/tingling, but pt denies on other extremities. Pt is tachycardic overnight, 120s with coughing fits but back to 100s-105 after. Pt is on 35 L HF NC overnight. Waiting for stool sample to rule out C-diff, all samples were contaminated with urine.     Reg diet, 2 L fluid restriction

## 2024-08-16 NOTE — PROGRESS NOTES
08/16/24 1625   Appointment Info   Signing Clinician's Name / Credentials (OT) Barrett Suarez OTR/L   Quick Adds   Quick Adds Edema   Living Environment   People in Home parent(s)   Current Living Arrangements house   Home Accessibility no concerns   Living Environment Comments Will be going to stay with her mom after d/c with all needs met on main level of home. Endorses minimal stairs into home. (per chart review)   Self-Care   Usual Activity Tolerance good   Current Activity Tolerance good   Regular Exercise No   Equipment Currently Used at Home none   Fall history within last six months no   Activity/Exercise/Self-Care Comment Ind at baseline with ADLs   General Information   Onset of Illness/Injury or Date of Surgery 08/12/24   Referring Physician Aries Vasquez MD   Patient/Family Therapy Goal Statement (OT) to reduce LE swelling   Additional Occupational Profile Info/Pertinent History of Current Problem Maribell Leon is a 29 year old female admitted on 8/12/2024. She has a history of cirrhosis, alcohol use disorder, neuropathy, chronic pancreatitis who presented with acute dyspnea and cough found to have AHRF likely 2/2 CAP.   Cognitive Status Examination   Orientation Status orientation to person, place and time   Visual Perception   Visual Impairment/Limitations WNL   Sensory   Sensory Quick Adds sensation intact  (R foot hypersensitive)   Pain Assessment   Patient Currently in Pain No   Edema General Information   Onset of Edema 08/02/24   Affected Body Part(s) Left LE;Right LE   Edema Etiology   (low albumin, cirrhosis, renal failure)   Edema Precautions Renal Insufficiency   Edema Examination/Assessment   Skin Condition Pitting;Dryness;Intact   Skin Condition Comments Pt with soft 3+ pitting in beto LEs, ankles and feet. Skin integrity intact, but dry- no ulcerations noted.   Scar No   Ulcerations No   Dorsal Pedal Pulse Symmetrical   Stemmer Sign Negative   Pitting Assessment 3+   Muscle Tone  Assessment   Muscle Tone Quick Adds No deficits were identified   Coordination   Upper Extremity Coordination No deficits were identified   Activities of Daily Living   BADL Assessment/Intervention   (Pt reports Ind)   Clinical Impression   Criteria for Skilled Therapeutic Interventions Met (OT) Yes, treatment indicated   Edema: Patient Presentation Edema   Edema: Planned Interventions Gradient compression bandaging;Fit for compression garment;Edema exercises;Precautions to prevent infection/exacerbation;Education;Manual therapy   Clinical Decision Making Complexity (OT) problem focused assessment/low complexity   Risk & Benefits of therapy have been explained evaluation/treatment results reviewed;care plan/treatment goals reviewed;risks/benefits reviewed;patient;current/potential barriers reviewed   Clinical Impression Comments Pt may benefit from skilled IP Edema to help decrease LE swelling and risk from skin breakdown and infection   OT Total Evaluation Time   OT Eval, Low Complexity Minutes (62556) 10   OT Goals   Therapy Frequency (OT) 4 times/week   OT Predicted Duration/Target Date for Goal Attainment 08/30/24   OT Goals Edema   OT: Edema education to increase ability to manage edema after discharge from the hospital Patient;Verbalize;Jadwin   OT: Management of edema bandages Patient;Verbalize;Jadwin   OT: Functional edema exercise program to reduce limb volume, increase activity tolerance and improve independence with ADL Patient;Demonstrates;Jadwin   Interventions   Interventions Quick Adds Manual Therapy

## 2024-08-16 NOTE — PROGRESS NOTES
[facility]    Progress Note - Medicine Service, [facility] TEAM 1       Date of Admission:  8/12/2024    Updates 8/16:  -- Still on HFNC AM of 8/16, attempting to wean to oxymask today   -- D/w ID and pulm BAL vs PCR testing re: PJP or other fungus/yeast testing following +BDGlucan. Given O2 needs, will do PCR and hold off on BAL.   -- Mycology and micro pending, at least 1-2 days until speciation of yeast and sputum culture  -- Increase Gabapentin 300mg TID + Voltaren gel for analgesia  -- Continue diuresis 60mg q6hrs x2 doses, monitor I's&Os      Assessment & Plan      Maribell Leon is a 29 year old female admitted on 8/12/2024. She has a history of cirrhosis, alcohol use disorder, neuropathy, chronic pancreatitis who presented with acute dyspnea and cough found to have AHRF likely 2/2 CAP.      # Leukocytosis, improving   # Pleural effusions, bilateral iso  # Acute hypoxic respiratory failure secondary to community acquired PNA, worsening vs in conjunction with growing effusions  See previous notes for summary of care to this point. Continues to improve with steroids, diuresis, breathing treatments, HFNC and antibiotics, attempting to wean to Oxymask 8/16.  Beta-D-glucan positive 8/16. Pulm signed off 8/15 with final recs as below, did curbside 8/16 to d/w team BAL vs PCR for PJP testing. Given risk of intubation following BAL, recommending PCR and follow up on cultures as ordered at this time. ID in agreement.   Overnight, if changes in mentation, oxygenation, or generally appears to be decompensating --> STAT ABG, lactate, CXR and paging pulm   Work up:   RPP, flu, covid, RSV negative.   Legionella, strep pneumo antigen negative.   Cryptococcus negative.   MRSA nares negative.   -- Continue zosyn + vanc per pulm and ID  -- Continue HFNC, attempting to transition to oxymask  -- Duonebs QID  -- Lasix 60mg q6hrs x2 doses; UoP of 1600cc on 40mg BID with stable kidney  function   -- Follow up sputum, blood cultures  -- Follow up thoracentesis labs and culture -- NGTD  Pulm recs 8/15, signing off:  Will complete steroid course (per CAPE COD trial):   - Solucortef 50 q6H through 8/16 then   - Solucortef 50 q12H through 8/18 then   - Solucortef 50 daily through 8/20 then stop   - If patient is off O2 and being dc'ed prior to completion of the steroids okay to stop early   -- Aggressive diuresis   -- Trial oximasks/off HFNC   -- OP pulm follow up closer to home with repeat chest CT to assess resolution in 6-8 weeks    # Macrocytic anemia, worsened   #AoCI iso EtOH use disorder  Pt with underlying macrocytic anemia since admission, acutely worsened this AM. No active signs of bleeding, no bruising, hematemesis, hematochezia, melena, vaginal bleeding. Likely dropped iso active infection and bone marrow suppression iso EtOH use disorder causing macrocytosis. Did send labs checking B12, folate, iron studies this AM. Folate and B12 wnl, MMA pending. Haptoglobin wnl, no signs of hemolysis. Iron studies indicative of AoCI with known EtOH use disorder. CTM.     # Hypokalemia 2/2 daily diuresis, ongoing  Likely 2/2 diuresis in the ED and while inpatient given use of Lasix. No CHANTELL, no vomiting. Some diarrhea could be contributing as well.   -- Replete per protocol but have changed to oral repletion instead of IV, CTM qAM     # RLE pain > LLE, c/f neuropathy iso EtOH use disorder, liver disease  # LE swelling   RLE xray negative. R ankle joint tap with ortho,no septic arthritis. Dual energy CT without gout findings despite elevated uric acid. Was placed on cefazolin due to concerns for cellulitis transitioned to Keflex for 7 days treatment after last admission.   Pt with ongoing LE pain but attempting to wean off of IV opioids here. Have decreased IV dilaudid dosing out of concern for her respiratory status and added Gabapentin 200mg TID 8/15 given recovery of her eGFR.   Will increase this 8/16  to 300mg TID 8/16 given significant pain, adding Voltaren gel 4g QID. Has 650mg APAP q6hrs, LFTs wnl and albumin mildly low.     0.3mg Dilaudid q4hrs IV available for severe pain.     # Volume overload  # Hepatic hydrothorax, R  S/p furosemide 40mg IV x1 in ED with ~1L output. Given c/f re-collection of pleural effusions/hepatic hydrothorax, continuing diuresis as noted above.   -- Hold home spironolactone-hydrochlorothiazide for now  -- Lymphedema consult    # Cirrhosis   # Hypoalbuminemia  # Elevated INR  Likely secondary to alcohol use. Not decompensated. Not enough ascites 8/13 for paracentesis.   -- Low sodium diet   -- 2L fluid restriction  -- Hold home spironolactone-hydrochlorothiazide for now    # Chronic anemia  Stable, no further cares.   -- Continue folate supplement when tolerating PO       Diet: Fluid restriction 2000 ML FLUID  Regular Diet Adult    DVT Prophylaxis: Lovenox ppx  [place] Catheter: Not present  Fluids: N/A  Lines: None     Cardiac Monitoring: ACTIVE order. Indication: Electrolyte Imbalance (24 hours)- Magnesium <1.3 mg/ml; Potassium < =2.8 or > 5.5 mg/ml  Code Status: Full Code       The patient's care was discussed with the Attending Physician, Dr. Car .      Latonya Olivo MD  Medicine Service, [facility] TEAM [address] [place] [address]  Securely message with [employer] (more info)  Text page via [employer] Paging/Directory   See signed in provider for up to date coverage information  ______________________________________________________________________    Overnight events:   Pt feeling significantly improved this AM, would like to continue to wean off HFNC as much as possible. States that her pain is more significant today without the increased dosing of Dilaudid but understands the importance of weaning off of this and transitioning to Gabapentin and other agents with goal for discharge without opioids. No acute concerns of overnight events.       Physical  Exam   Vital Signs: Temp: 98.2  F (36.8  C) Temp src: Oral BP: 110/77 Pulse: 88   Resp: 18 SpO2: 100 % O2 Device: High Flow Nasal Cannula (HFNC) Oxygen Delivery: 40 LPM  Weight: 113 lbs 6.4 oz    General Appearance: Pleasant, pallid, less ill-appearing on exam. Interactive and sitting up in bed, smiling.   Respiratory: WoB improving. Intermittent cough, mucous rattling in upper airways. Still with decreased lung sounds at both bases, on HFNC  Cardiovascular: Tachycardiac, normal S1, S2, no murmurs  GI: Abdomen mildly distended, soft, non tender.   Extremities: Erythema and swelling on anterior L wrist in area of ABG draws c/w hematoma. Does not extend past mid-forearm. Bilateral 2+ pitting edema, tenderness of RLE with palpation. Well healing small, circumferential wounds to dorsal aspect of RLE on foot and ankle. No surrounding erythema, warmth, drainage.       Please see A&P for additional details of medical decision making.      Data     I have personally reviewed the following data over the past 24 hrs:    17.9 (H)  \   7.5 (L)   / 165     144 109 (H) 11.7 /  272 (H)   3.7 21 (L) 0.73 \     ALT: <5 AST: 36 AP: 77 TBILI: 1.9 (H)   ALB: 3.2 (L) TOT PROTEIN: 6.0 (L) LIPASE: N/A     Procal: N/A CRP: 21.10 (H) Lactic Acid: N/A         Imaging results reviewed over the past 24 hrs:   No results found for this or any previous visit (from the past 24 hour(s)).

## 2024-08-17 ENCOUNTER — APPOINTMENT (OUTPATIENT)
Dept: OCCUPATIONAL THERAPY | Facility: CLINIC | Age: 29
End: 2024-08-17
Payer: MEDICAID

## 2024-08-17 LAB
ALBUMIN SERPL BCG-MCNC: 3.4 G/DL (ref 3.5–5.2)
ALP SERPL-CCNC: 74 U/L (ref 40–150)
ALT SERPL W P-5'-P-CCNC: 9 U/L (ref 0–50)
ANION GAP SERPL CALCULATED.3IONS-SCNC: 15 MMOL/L (ref 7–15)
AST SERPL W P-5'-P-CCNC: 38 U/L (ref 0–45)
BACTERIA BLD CULT: NO GROWTH
BACTERIA PLR CULT: NO GROWTH
BASOPHILS # BLD AUTO: 0 10E3/UL (ref 0–0.2)
BASOPHILS NFR BLD AUTO: 0 %
BILIRUB SERPL-MCNC: 1.9 MG/DL
BUN SERPL-MCNC: 16.7 MG/DL (ref 6–20)
CALCIUM SERPL-MCNC: 8.1 MG/DL (ref 8.8–10.4)
CHLORIDE SERPL-SCNC: 104 MMOL/L (ref 98–107)
CREAT SERPL-MCNC: 0.8 MG/DL (ref 0.51–0.95)
EGFRCR SERPLBLD CKD-EPI 2021: >90 ML/MIN/1.73M2
EOSINOPHIL # BLD AUTO: 0 10E3/UL (ref 0–0.7)
EOSINOPHIL NFR BLD AUTO: 0 %
ERYTHROCYTE [DISTWIDTH] IN BLOOD BY AUTOMATED COUNT: 17.1 % (ref 10–15)
GLUCOSE BLDC GLUCOMTR-MCNC: 441 MG/DL (ref 70–99)
GLUCOSE BLDC GLUCOMTR-MCNC: 498 MG/DL (ref 70–99)
GLUCOSE BLDC GLUCOMTR-MCNC: 551 MG/DL (ref 70–99)
GLUCOSE SERPL-MCNC: 455 MG/DL (ref 70–99)
GRAM STAIN RESULT: NORMAL
GRAM STAIN RESULT: NORMAL
HCO3 SERPL-SCNC: 24 MMOL/L (ref 22–29)
HCT VFR BLD AUTO: 26 % (ref 35–47)
HGB BLD-MCNC: 7.8 G/DL (ref 11.7–15.7)
IMM GRANULOCYTES # BLD: 0.2 10E3/UL
IMM GRANULOCYTES NFR BLD: 1 %
LYMPHOCYTES # BLD AUTO: 1.6 10E3/UL (ref 0.8–5.3)
LYMPHOCYTES NFR BLD AUTO: 10 %
MAGNESIUM SERPL-MCNC: 1.3 MG/DL (ref 1.7–2.3)
MAGNESIUM SERPL-MCNC: 2 MG/DL (ref 1.7–2.3)
MCH RBC QN AUTO: 31.2 PG (ref 26.5–33)
MCHC RBC AUTO-ENTMCNC: 30 G/DL (ref 31.5–36.5)
MCV RBC AUTO: 104 FL (ref 78–100)
MONOCYTES # BLD AUTO: 0.6 10E3/UL (ref 0–1.3)
MONOCYTES NFR BLD AUTO: 4 %
NEUTROPHILS # BLD AUTO: 12.7 10E3/UL (ref 1.6–8.3)
NEUTROPHILS NFR BLD AUTO: 85 %
NRBC # BLD AUTO: 0 10E3/UL
NRBC BLD AUTO-RTO: 0 /100
PLATELET # BLD AUTO: 153 10E3/UL (ref 150–450)
POTASSIUM SERPL-SCNC: 2 MMOL/L (ref 3.4–5.3)
POTASSIUM SERPL-SCNC: 2.2 MMOL/L (ref 3.4–5.3)
POTASSIUM SERPL-SCNC: 2.3 MMOL/L (ref 3.4–5.3)
PROT SERPL-MCNC: 6.2 G/DL (ref 6.4–8.3)
RBC # BLD AUTO: 2.5 10E6/UL (ref 3.8–5.2)
SODIUM SERPL-SCNC: 143 MMOL/L (ref 135–145)
WBC # BLD AUTO: 15 10E3/UL (ref 4–11)

## 2024-08-17 PROCEDURE — 250N000011 HC RX IP 250 OP 636: Performed by: INTERNAL MEDICINE

## 2024-08-17 PROCEDURE — 94640 AIRWAY INHALATION TREATMENT: CPT | Mod: 76

## 2024-08-17 PROCEDURE — 36415 COLL VENOUS BLD VENIPUNCTURE: CPT

## 2024-08-17 PROCEDURE — 84132 ASSAY OF SERUM POTASSIUM: CPT

## 2024-08-17 PROCEDURE — 250N000011 HC RX IP 250 OP 636

## 2024-08-17 PROCEDURE — 250N000013 HC RX MED GY IP 250 OP 250 PS 637

## 2024-08-17 PROCEDURE — 94640 AIRWAY INHALATION TREATMENT: CPT

## 2024-08-17 PROCEDURE — 250N000011 HC RX IP 250 OP 636: Performed by: STUDENT IN AN ORGANIZED HEALTH CARE EDUCATION/TRAINING PROGRAM

## 2024-08-17 PROCEDURE — 250N000013 HC RX MED GY IP 250 OP 250 PS 637: Performed by: STUDENT IN AN ORGANIZED HEALTH CARE EDUCATION/TRAINING PROGRAM

## 2024-08-17 PROCEDURE — 99233 SBSQ HOSP IP/OBS HIGH 50: CPT | Mod: GC | Performed by: STUDENT IN AN ORGANIZED HEALTH CARE EDUCATION/TRAINING PROGRAM

## 2024-08-17 PROCEDURE — 36415 COLL VENOUS BLD VENIPUNCTURE: CPT | Performed by: STUDENT IN AN ORGANIZED HEALTH CARE EDUCATION/TRAINING PROGRAM

## 2024-08-17 PROCEDURE — 80053 COMPREHEN METABOLIC PANEL: CPT

## 2024-08-17 PROCEDURE — 999N000248 HC STATISTIC IV INSERT WITH US BY RN

## 2024-08-17 PROCEDURE — 97140 MANUAL THERAPY 1/> REGIONS: CPT | Mod: GO

## 2024-08-17 PROCEDURE — 83735 ASSAY OF MAGNESIUM: CPT | Performed by: STUDENT IN AN ORGANIZED HEALTH CARE EDUCATION/TRAINING PROGRAM

## 2024-08-17 PROCEDURE — 120N000002 HC R&B MED SURG/OB UMMC

## 2024-08-17 PROCEDURE — 85004 AUTOMATED DIFF WBC COUNT: CPT

## 2024-08-17 PROCEDURE — 250N000012 HC RX MED GY IP 250 OP 636 PS 637

## 2024-08-17 PROCEDURE — 84132 ASSAY OF SERUM POTASSIUM: CPT | Performed by: STUDENT IN AN ORGANIZED HEALTH CARE EDUCATION/TRAINING PROGRAM

## 2024-08-17 PROCEDURE — 999N000157 HC STATISTIC RCP TIME EA 10 MIN

## 2024-08-17 PROCEDURE — 250N000009 HC RX 250: Performed by: INTERNAL MEDICINE

## 2024-08-17 RX ORDER — FUROSEMIDE 10 MG/ML
60 INJECTION INTRAMUSCULAR; INTRAVENOUS EVERY 6 HOURS
Status: COMPLETED | OUTPATIENT
Start: 2024-08-17 | End: 2024-08-17

## 2024-08-17 RX ORDER — POTASSIUM CHLORIDE 7.45 MG/ML
10 INJECTION INTRAVENOUS ONCE
Status: COMPLETED | OUTPATIENT
Start: 2024-08-17 | End: 2024-08-17

## 2024-08-17 RX ORDER — POTASSIUM CHLORIDE 1.5 G/1.58G
20 POWDER, FOR SOLUTION ORAL ONCE
Status: CANCELLED | OUTPATIENT
Start: 2024-08-17 | End: 2024-08-17

## 2024-08-17 RX ORDER — POTASSIUM CHLORIDE 1.5 G/1.58G
40 POWDER, FOR SOLUTION ORAL ONCE
Status: COMPLETED | OUTPATIENT
Start: 2024-08-17 | End: 2024-08-17

## 2024-08-17 RX ORDER — POTASSIUM CHLORIDE 7.45 MG/ML
10 INJECTION INTRAVENOUS
Status: DISCONTINUED | OUTPATIENT
Start: 2024-08-17 | End: 2024-08-17

## 2024-08-17 RX ORDER — DEXTROSE MONOHYDRATE 25 G/50ML
25-50 INJECTION, SOLUTION INTRAVENOUS
Status: DISCONTINUED | OUTPATIENT
Start: 2024-08-17 | End: 2024-08-18

## 2024-08-17 RX ORDER — POTASSIUM CHLORIDE 1.5 G/1.58G
60 POWDER, FOR SOLUTION ORAL ONCE
Status: COMPLETED | OUTPATIENT
Start: 2024-08-17 | End: 2024-08-17

## 2024-08-17 RX ORDER — POTASSIUM CHLORIDE 1.5 G/1.58G
40 POWDER, FOR SOLUTION ORAL ONCE
Status: CANCELLED | OUTPATIENT
Start: 2024-08-17 | End: 2024-08-17

## 2024-08-17 RX ORDER — POTASSIUM CHLORIDE 1.5 G/1.58G
20 POWDER, FOR SOLUTION ORAL ONCE
Status: DISCONTINUED | OUTPATIENT
Start: 2024-08-17 | End: 2024-08-17

## 2024-08-17 RX ORDER — NICOTINE POLACRILEX 4 MG
15-30 LOZENGE BUCCAL
Status: DISCONTINUED | OUTPATIENT
Start: 2024-08-17 | End: 2024-08-18

## 2024-08-17 RX ORDER — MAGNESIUM SULFATE HEPTAHYDRATE 40 MG/ML
4 INJECTION, SOLUTION INTRAVENOUS ONCE
Status: COMPLETED | OUTPATIENT
Start: 2024-08-17 | End: 2024-08-17

## 2024-08-17 RX ADMIN — HYDROXYZINE HYDROCHLORIDE 25 MG: 25 TABLET ORAL at 11:30

## 2024-08-17 RX ADMIN — POTASSIUM CHLORIDE 60 MEQ: 1.5 POWDER, FOR SOLUTION ORAL at 11:06

## 2024-08-17 RX ADMIN — OXYCODONE HYDROCHLORIDE 5 MG: 5 TABLET ORAL at 01:53

## 2024-08-17 RX ADMIN — GUAIFENESIN AND DEXTROMETHORPHAN 10 ML: 100; 10 SYRUP ORAL at 05:56

## 2024-08-17 RX ADMIN — POTASSIUM CHLORIDE 10 MEQ: 7.46 INJECTION, SOLUTION INTRAVENOUS at 22:14

## 2024-08-17 RX ADMIN — GABAPENTIN 300 MG: 300 CAPSULE ORAL at 13:52

## 2024-08-17 RX ADMIN — GUAIFENESIN 600 MG: 600 TABLET ORAL at 08:12

## 2024-08-17 RX ADMIN — PIPERACILLIN AND TAZOBACTAM 4.5 G: 4; .5 INJECTION, POWDER, LYOPHILIZED, FOR SOLUTION INTRAVENOUS at 23:52

## 2024-08-17 RX ADMIN — PIPERACILLIN AND TAZOBACTAM 4.5 G: 4; .5 INJECTION, POWDER, LYOPHILIZED, FOR SOLUTION INTRAVENOUS at 11:06

## 2024-08-17 RX ADMIN — GABAPENTIN 300 MG: 300 CAPSULE ORAL at 20:05

## 2024-08-17 RX ADMIN — HYDROXYZINE HYDROCHLORIDE 25 MG: 25 TABLET ORAL at 05:13

## 2024-08-17 RX ADMIN — POTASSIUM CHLORIDE 10 MEQ: 7.46 INJECTION, SOLUTION INTRAVENOUS at 20:56

## 2024-08-17 RX ADMIN — OXYCODONE HYDROCHLORIDE 5 MG: 5 TABLET ORAL at 21:20

## 2024-08-17 RX ADMIN — MAGNESIUM SULFATE IN WATER 4 G: 40 INJECTION, SOLUTION INTRAVENOUS at 10:14

## 2024-08-17 RX ADMIN — POTASSIUM CHLORIDE 40 MEQ: 1.5 POWDER, FOR SOLUTION ORAL at 22:14

## 2024-08-17 RX ADMIN — GUAIFENESIN AND DEXTROMETHORPHAN 10 ML: 100; 10 SYRUP ORAL at 01:53

## 2024-08-17 RX ADMIN — ACETAMINOPHEN 650 MG: 325 TABLET ORAL at 18:23

## 2024-08-17 RX ADMIN — POTASSIUM CHLORIDE 10 MEQ: 7.46 INJECTION, SOLUTION INTRAVENOUS at 08:50

## 2024-08-17 RX ADMIN — HYDROCORTISONE SODIUM SUCCINATE 50 MG: 100 INJECTION, POWDER, FOR SOLUTION INTRAMUSCULAR; INTRAVENOUS at 01:53

## 2024-08-17 RX ADMIN — ENOXAPARIN SODIUM 40 MG: 40 INJECTION SUBCUTANEOUS at 20:05

## 2024-08-17 RX ADMIN — INSULIN ASPART 7 UNITS: 100 INJECTION, SOLUTION INTRAVENOUS; SUBCUTANEOUS at 12:19

## 2024-08-17 RX ADMIN — INSULIN HUMAN 12 UNITS: 100 INJECTION, SUSPENSION SUBCUTANEOUS at 20:05

## 2024-08-17 RX ADMIN — INSULIN ASPART 7 UNITS: 100 INJECTION, SOLUTION INTRAVENOUS; SUBCUTANEOUS at 17:12

## 2024-08-17 RX ADMIN — POTASSIUM CHLORIDE 40 MEQ: 1.5 POWDER, FOR SOLUTION ORAL at 20:56

## 2024-08-17 RX ADMIN — OXYCODONE HYDROCHLORIDE 5 MG: 5 TABLET ORAL at 12:35

## 2024-08-17 RX ADMIN — PIPERACILLIN AND TAZOBACTAM 4.5 G: 4; .5 INJECTION, POWDER, LYOPHILIZED, FOR SOLUTION INTRAVENOUS at 05:13

## 2024-08-17 RX ADMIN — HYDROXYZINE HYDROCHLORIDE 25 MG: 25 TABLET ORAL at 18:23

## 2024-08-17 RX ADMIN — POTASSIUM CHLORIDE 40 MEQ: 1.5 POWDER, FOR SOLUTION ORAL at 20:04

## 2024-08-17 RX ADMIN — GUAIFENESIN AND DEXTROMETHORPHAN 10 ML: 100; 10 SYRUP ORAL at 20:04

## 2024-08-17 RX ADMIN — PIPERACILLIN AND TAZOBACTAM 4.5 G: 4; .5 INJECTION, POWDER, LYOPHILIZED, FOR SOLUTION INTRAVENOUS at 16:56

## 2024-08-17 RX ADMIN — FUROSEMIDE 60 MG: 10 INJECTION, SOLUTION INTRAMUSCULAR; INTRAVENOUS at 12:18

## 2024-08-17 RX ADMIN — HYDROMORPHONE HYDROCHLORIDE 0.3 MG: 1 INJECTION, SOLUTION INTRAMUSCULAR; INTRAVENOUS; SUBCUTANEOUS at 18:23

## 2024-08-17 RX ADMIN — HYDROCORTISONE SODIUM SUCCINATE 50 MG: 100 INJECTION, POWDER, FOR SOLUTION INTRAMUSCULAR; INTRAVENOUS at 13:52

## 2024-08-17 RX ADMIN — OXYCODONE HYDROCHLORIDE 5 MG: 5 TABLET ORAL at 16:55

## 2024-08-17 RX ADMIN — HYDROMORPHONE HYDROCHLORIDE 0.3 MG: 1 INJECTION, SOLUTION INTRAMUSCULAR; INTRAVENOUS; SUBCUTANEOUS at 00:15

## 2024-08-17 RX ADMIN — HYDROMORPHONE HYDROCHLORIDE 0.3 MG: 1 INJECTION, SOLUTION INTRAMUSCULAR; INTRAVENOUS; SUBCUTANEOUS at 05:14

## 2024-08-17 RX ADMIN — FUROSEMIDE 60 MG: 10 INJECTION, SOLUTION INTRAMUSCULAR; INTRAVENOUS at 18:24

## 2024-08-17 RX ADMIN — ALBUTEROL SULFATE 2.5 MG: 2.5 SOLUTION RESPIRATORY (INHALATION) at 19:27

## 2024-08-17 RX ADMIN — HYDROMORPHONE HYDROCHLORIDE 0.3 MG: 1 INJECTION, SOLUTION INTRAMUSCULAR; INTRAVENOUS; SUBCUTANEOUS at 09:22

## 2024-08-17 RX ADMIN — GUAIFENESIN AND DEXTROMETHORPHAN 10 ML: 100; 10 SYRUP ORAL at 16:55

## 2024-08-17 RX ADMIN — HYDROCORTISONE SODIUM SUCCINATE 50 MG: 100 INJECTION, POWDER, FOR SOLUTION INTRAMUSCULAR; INTRAVENOUS at 08:49

## 2024-08-17 RX ADMIN — DICLOFENAC SODIUM 4 G: 10 GEL TOPICAL at 12:29

## 2024-08-17 RX ADMIN — POTASSIUM CHLORIDE 10 MEQ: 7.46 INJECTION, SOLUTION INTRAVENOUS at 10:14

## 2024-08-17 RX ADMIN — POTASSIUM CHLORIDE 40 MEQ: 1.5 POWDER, FOR SOLUTION ORAL at 08:12

## 2024-08-17 RX ADMIN — ALBUTEROL SULFATE 2.5 MG: 2.5 SOLUTION RESPIRATORY (INHALATION) at 09:09

## 2024-08-17 RX ADMIN — GUAIFENESIN 600 MG: 600 TABLET ORAL at 20:04

## 2024-08-17 RX ADMIN — GABAPENTIN 300 MG: 300 CAPSULE ORAL at 08:12

## 2024-08-17 RX ADMIN — OXYCODONE HYDROCHLORIDE 5 MG: 5 TABLET ORAL at 08:12

## 2024-08-17 RX ADMIN — HYDROMORPHONE HYDROCHLORIDE 0.3 MG: 1 INJECTION, SOLUTION INTRAMUSCULAR; INTRAVENOUS; SUBCUTANEOUS at 13:51

## 2024-08-17 ASSESSMENT — ACTIVITIES OF DAILY LIVING (ADL)
ADLS_ACUITY_SCORE: 22
ADLS_ACUITY_SCORE: 24
ADLS_ACUITY_SCORE: 28
ADLS_ACUITY_SCORE: 24
ADLS_ACUITY_SCORE: 24
ADLS_ACUITY_SCORE: 28
ADLS_ACUITY_SCORE: 24
ADLS_ACUITY_SCORE: 28
ADLS_ACUITY_SCORE: 28
ADLS_ACUITY_SCORE: 24
ADLS_ACUITY_SCORE: 28
ADLS_ACUITY_SCORE: 22
ADLS_ACUITY_SCORE: 28
ADLS_ACUITY_SCORE: 24

## 2024-08-17 NOTE — PLAN OF CARE
Goal Outcome Evaluation:      Plan of Care Reviewed With: patient    Overall Patient Progress: improvingOverall Patient Progress: improving    Temp: 98.1  F (36.7  C) Temp src: Oral BP: (!) 114/104 Pulse: 92   Resp: 16 SpO2: 93 % O2 Device: Oxymask Oxygen Delivery: 10 LPM    Pt A&O X4, able to communicate needs. VSS, on tele. On 10Ls oxymask, denies SOB at rest and with exertion. Denies cardiac chest pain. infrequent dry cough, PRN robitussin given X1. Pt coughing less compared to yesterday. Sputum sample still need to be collected. Feet and lower back pain shortly relieved with current pain regimen. AM KCl and Mg at 2.3 and 1.3, respectively. Both replaced. KCI @ 2.0 after redraw, KCI provider notified and KCI replaced again. Lymph wraps in place on BLE, edema improving. Pt tolerating regular diet without N/V, poor appetite.

## 2024-08-17 NOTE — PROGRESS NOTES
[facility]    Progress Note - Medicine Service, [facility] TEAM 1       Date of Admission:  8/12/2024    Updates 8/16:  -- Remains pm 10 L Oxymask   -- PJP Pcr pending   -- Mycology and micro pending, at least 1-2 days until speciation of yeast and sputum culture  -- Increase Gabapentin 300mg TID + Voltaren gel for analgesia  -- Continue diuresis 60mg q6hrs x2 doses, monitor I's&Os  -- Elevated glucose (started on MSSI) added one dose of 12 units NPH per pharm recs, Will monitor  -- A1c ordered       Assessment & Plan      Maribell Leon is a 29 year old female admitted on 8/12/2024. She has a history of cirrhosis, alcohol use disorder, neuropathy, chronic pancreatitis who presented with acute dyspnea and cough found to have AHRF likely 2/2 CAP.      # Leukocytosis, improving   # Pleural effusions, bilateral iso  # Acute hypoxic respiratory failure secondary to community acquired PNA, worsening vs in conjunction with growing effusions  See previous notes for summary of care to this point. Continues to improve with steroids, diuresis, breathing treatments, HFNC and antibiotics, attempting to wean to Oxymask 8/16.  Beta-D-glucan positive 8/16. Pulm signed off 8/15 with final recs as below, did curbside 8/16 to d/w team BAL vs PCR for PJP testing. Given risk of intubation following BAL, recommending PCR and follow up on cultures as ordered at this time. ID in agreement.   Overnight, if changes in mentation, oxygenation, or generally appears to be decompensating --> STAT ABG, lactate, CXR and paging pulm   Work up:   RPP, flu, covid, RSV negative.   Legionella, strep pneumo antigen negative.   Cryptococcus negative.   MRSA nares negative.   -- Continue zosyn + vanc per pulm and ID  -- Continue HFNC, attempting to transition to oxymask  -- Duonebs QID  -- Lasix 60mg q6hrs x2 doses; UoP of 1600cc on 40mg BID with stable kidney function   -- Follow up sputum, blood cultures  --  Follow up thoracentesis labs and culture -- NGTD  Pulm recs 8/15, signing off:  Will complete steroid course (per CAPE COD trial):   - Solucortef 50 q6H through 8/16 then   - Solucortef 50 q12H through 8/18 then   - Solucortef 50 daily through 8/20 then stop   - If patient is off O2 and being dc'ed prior to completion of the steroids okay to stop early   -- Aggressive diuresis   -- Trial oximasks/off HFNC   -- OP pulm follow up closer to home with repeat chest CT to assess resolution in 6-8 weeks    # Macrocytic anemia, worsened   #AoCI iso EtOH use disorder  Pt with underlying macrocytic anemia since admission, acutely worsened this AM. No active signs of bleeding, no bruising, hematemesis, hematochezia, melena, vaginal bleeding. Likely dropped iso active infection and bone marrow suppression iso EtOH use disorder causing macrocytosis. Did send labs checking B12, folate, iron studies this AM. Folate and B12 wnl, MMA pending. Haptoglobin wnl, no signs of hemolysis. Iron studies indicative of AoCI with known EtOH use disorder. CTM.     # Hypokalemia 2/2 daily diuresis, ongoing  Likely 2/2 diuresis in the ED and while inpatient given use of Lasix. No CHANTELL, no vomiting. Some diarrhea could be contributing as well.   -- Replete per protocol but have changed to oral repletion instead of IV, CTM qAM     # RLE pain > LLE, c/f neuropathy iso EtOH use disorder, liver disease  # LE swelling   RLE xray negative. R ankle joint tap with ortho,no septic arthritis. Dual energy CT without gout findings despite elevated uric acid. Was placed on cefazolin due to concerns for cellulitis transitioned to Keflex for 7 days treatment after last admission.   Pt with ongoing LE pain but attempting to wean off of IV opioids here. Have decreased IV dilaudid dosing out of concern for her respiratory status and added Gabapentin 200mg TID 8/15 given recovery of her eGFR.   Will increase this 8/16 to 300mg TID 8/16 given significant pain, adding  Voltaren gel 4g QID. Has 650mg APAP q6hrs, LFTs wnl and albumin mildly low.     0.3mg Dilaudid q4hrs IV available for severe pain.     # Volume overload  # Hepatic hydrothorax, R  S/p furosemide 40mg IV x1 in ED with ~1L output. Given c/f re-collection of pleural effusions/hepatic hydrothorax, continuing diuresis as noted above.   -- Hold home spironolactone-hydrochlorothiazide for now  -- Lymphedema consult    # Cirrhosis   # Hypoalbuminemia  # Elevated INR  Likely secondary to alcohol use. Not decompensated. Not enough ascites 8/13 for paracentesis.   -- Low sodium diet   -- 2L fluid restriction  -- Hold home spironolactone-hydrochlorothiazide for now    # Chronic anemia  Stable, no further cares.   -- Continue folate supplement when tolerating PO       Diet: Fluid restriction 2000 ML FLUID  Regular Diet Adult    DVT Prophylaxis: Lovenox ppx  [place] Catheter: Not present  Fluids: N/A  Lines: None     Cardiac Monitoring: ACTIVE order. Indication: Electrolyte Imbalance (24 hours)- Magnesium <1.3 mg/ml; Potassium < =2.8 or > 5.5 mg/ml  Code Status: Full Code       The patient's care was discussed with the Attending Physician, Dr. Car .      Noreen Nevarez MD  Medicine Service, [facility] TEAM [address] [place] [address]  Securely message with [employer] (more info)  Text page via [facility] Paging/Directory   See signed in provider for up to date coverage information  ______________________________________________________________________    Overnight events:   Pt feeling significantly improved this AM, would like to continue to wean off HFNC as much as possible. States that her pain is more significant today without the increased dosing of Dilaudid but understands the importance of weaning off of this and transitioning to Gabapentin and other agents with goal for discharge without opioids. No acute concerns of overnight events.       Physical Exam   Vital Signs: Temp: 98.1  F (36.7  C) Temp src:  Oral BP: 127/88 Pulse: 90   Resp: 16 SpO2: 97 % O2 Device: Oxymask Oxygen Delivery: 10 LPM  Weight: 113 lbs 1.6 oz    General Appearance: Pleasant, pallid, less ill-appearing on exam. Interactive and sitting up in bed, smiling.   Respiratory: WoB improving. Intermittent cough, mucous rattling in upper airways. Still with decreased lung sounds at both bases, on HFNC  Cardiovascular: Tachycardiac, normal S1, S2, no murmurs  GI: Abdomen mildly distended, soft, non tender.   Extremities: Erythema and swelling on anterior L wrist in area of ABG draws c/w hematoma. Does not extend past mid-forearm. Bilateral 2+ pitting edema, tenderness of RLE with palpation. Well healing small, circumferential wounds to dorsal aspect of RLE on foot and ankle. No surrounding erythema, warmth, drainage.       Please see A&P for additional details of medical decision making.      Data     I have personally reviewed the following data over the past 24 hrs:    15.0 (H)  \   7.8 (L)   / 153     143 104 16.7 /  498 (H)   2.3 (LL) 24 0.80 \     ALT: 9 AST: 38 AP: 74 TBILI: 1.9 (H)   ALB: 3.4 (L) TOT PROTEIN: 6.2 (L) LIPASE: N/A       Imaging results reviewed over the past 24 hrs:   No results found for this or any previous visit (from the past 24 hour(s)).

## 2024-08-17 NOTE — PLAN OF CARE
Goal Outcome Evaluation:      Plan of Care Reviewed With: patient    Overall Patient Progress: no changeOverall Patient Progress: no change     VSS, on Oxymask 11L, on tele. SBA/ind in rm pivot to bedside commode. Fluid restriction 2000ml. Poor apetite, take sips of water w/ meds. Denies nausea. Pain tolerated w/ PRN IV Dilaudid, PO Oxy, PO Atarax. Adequate UO, mixed w/ stool. BLE lymp wraps. Reported frequent dry cough, relieved w/ PRN robitussin. R PIV TKO bw ABX. Cont POC.

## 2024-08-18 LAB
ALBUMIN SERPL BCG-MCNC: 3.5 G/DL (ref 3.5–5.2)
ALP SERPL-CCNC: 72 U/L (ref 40–150)
ALT SERPL W P-5'-P-CCNC: 10 U/L (ref 0–50)
ANION GAP SERPL CALCULATED.3IONS-SCNC: 13 MMOL/L (ref 7–15)
ANION GAP SERPL CALCULATED.3IONS-SCNC: 15 MMOL/L (ref 7–15)
ANION GAP SERPL CALCULATED.3IONS-SCNC: 16 MMOL/L (ref 7–15)
ANION GAP SERPL CALCULATED.3IONS-SCNC: 17 MMOL/L (ref 7–15)
ANION GAP SERPL CALCULATED.3IONS-SCNC: 18 MMOL/L (ref 7–15)
ANION GAP SERPL CALCULATED.3IONS-SCNC: 18 MMOL/L (ref 7–15)
AST SERPL W P-5'-P-CCNC: 57 U/L (ref 0–45)
B-OH-BUTYR SERPL-SCNC: <0.18 MMOL/L
BASOPHILS # BLD AUTO: 0 10E3/UL (ref 0–0.2)
BASOPHILS NFR BLD AUTO: 0 %
BILIRUB SERPL-MCNC: 1.9 MG/DL
BUN SERPL-MCNC: 11.5 MG/DL (ref 6–20)
BUN SERPL-MCNC: 11.9 MG/DL (ref 6–20)
BUN SERPL-MCNC: 12.3 MG/DL (ref 6–20)
BUN SERPL-MCNC: 12.3 MG/DL (ref 6–20)
BUN SERPL-MCNC: 13.9 MG/DL (ref 6–20)
CALCIUM SERPL-MCNC: 7.8 MG/DL (ref 8.8–10.4)
CALCIUM SERPL-MCNC: 8.2 MG/DL (ref 8.8–10.4)
CALCIUM SERPL-MCNC: 8.2 MG/DL (ref 8.8–10.4)
CALCIUM SERPL-MCNC: 8.4 MG/DL (ref 8.8–10.4)
CALCIUM SERPL-MCNC: 8.5 MG/DL (ref 8.8–10.4)
CHLORIDE SERPL-SCNC: 100 MMOL/L (ref 98–107)
CHLORIDE SERPL-SCNC: 104 MMOL/L (ref 98–107)
CHLORIDE SERPL-SCNC: 104 MMOL/L (ref 98–107)
CHLORIDE SERPL-SCNC: 108 MMOL/L (ref 98–107)
CHLORIDE SERPL-SCNC: 109 MMOL/L (ref 98–107)
CHLORIDE SERPL-SCNC: 109 MMOL/L (ref 98–107)
CREAT SERPL-MCNC: 0.74 MG/DL (ref 0.51–0.95)
CREAT SERPL-MCNC: 0.74 MG/DL (ref 0.51–0.95)
CREAT SERPL-MCNC: 0.75 MG/DL (ref 0.51–0.95)
CREAT SERPL-MCNC: 0.75 MG/DL (ref 0.51–0.95)
CREAT SERPL-MCNC: 0.78 MG/DL (ref 0.51–0.95)
CRP SERPL-MCNC: 5.22 MG/L
EGFRCR SERPLBLD CKD-EPI 2021: >90 ML/MIN/1.73M2
EOSINOPHIL # BLD AUTO: 0 10E3/UL (ref 0–0.7)
EOSINOPHIL NFR BLD AUTO: 0 %
ERYTHROCYTE [DISTWIDTH] IN BLOOD BY AUTOMATED COUNT: 17 % (ref 10–15)
GLUCOSE BLDC GLUCOMTR-MCNC: 130 MG/DL (ref 70–99)
GLUCOSE BLDC GLUCOMTR-MCNC: 133 MG/DL (ref 70–99)
GLUCOSE BLDC GLUCOMTR-MCNC: 164 MG/DL (ref 70–99)
GLUCOSE BLDC GLUCOMTR-MCNC: 178 MG/DL (ref 70–99)
GLUCOSE BLDC GLUCOMTR-MCNC: 183 MG/DL (ref 70–99)
GLUCOSE BLDC GLUCOMTR-MCNC: 239 MG/DL (ref 70–99)
GLUCOSE BLDC GLUCOMTR-MCNC: 239 MG/DL (ref 70–99)
GLUCOSE BLDC GLUCOMTR-MCNC: 241 MG/DL (ref 70–99)
GLUCOSE BLDC GLUCOMTR-MCNC: 299 MG/DL (ref 70–99)
GLUCOSE BLDC GLUCOMTR-MCNC: 299 MG/DL (ref 70–99)
GLUCOSE BLDC GLUCOMTR-MCNC: 303 MG/DL (ref 70–99)
GLUCOSE BLDC GLUCOMTR-MCNC: 325 MG/DL (ref 70–99)
GLUCOSE BLDC GLUCOMTR-MCNC: 382 MG/DL (ref 70–99)
GLUCOSE BLDC GLUCOMTR-MCNC: 445 MG/DL (ref 70–99)
GLUCOSE BLDC GLUCOMTR-MCNC: 584 MG/DL (ref 70–99)
GLUCOSE BLDC GLUCOMTR-MCNC: >600 MG/DL (ref 70–99)
GLUCOSE SERPL-MCNC: 137 MG/DL (ref 70–99)
GLUCOSE SERPL-MCNC: 173 MG/DL (ref 70–99)
GLUCOSE SERPL-MCNC: 308 MG/DL (ref 70–99)
GLUCOSE SERPL-MCNC: 357 MG/DL (ref 70–99)
GLUCOSE SERPL-MCNC: 542 MG/DL (ref 70–99)
HBA1C MFR BLD: 5.3 %
HCO3 SERPL-SCNC: 21 MMOL/L (ref 22–29)
HCO3 SERPL-SCNC: 24 MMOL/L (ref 22–29)
HCO3 SERPL-SCNC: 25 MMOL/L (ref 22–29)
HCO3 SERPL-SCNC: 26 MMOL/L (ref 22–29)
HCO3 SERPL-SCNC: 26 MMOL/L (ref 22–29)
HCO3 SERPL-SCNC: 27 MMOL/L (ref 22–29)
HCT VFR BLD AUTO: 24.8 % (ref 35–47)
HGB BLD-MCNC: 7.6 G/DL (ref 11.7–15.7)
IMM GRANULOCYTES # BLD: 0.4 10E3/UL
IMM GRANULOCYTES NFR BLD: 2 %
LIPASE SERPL-CCNC: 17 U/L (ref 13–60)
LYMPHOCYTES # BLD AUTO: 1.7 10E3/UL (ref 0.8–5.3)
LYMPHOCYTES NFR BLD AUTO: 8 %
MAGNESIUM SERPL-MCNC: 1.3 MG/DL (ref 1.7–2.3)
MAGNESIUM SERPL-MCNC: 1.4 MG/DL (ref 1.7–2.3)
MAGNESIUM SERPL-MCNC: 3.2 MG/DL (ref 1.7–2.3)
MCH RBC QN AUTO: 31.5 PG (ref 26.5–33)
MCHC RBC AUTO-ENTMCNC: 30.6 G/DL (ref 31.5–36.5)
MCV RBC AUTO: 103 FL (ref 78–100)
MONOCYTES # BLD AUTO: 0.9 10E3/UL (ref 0–1.3)
MONOCYTES NFR BLD AUTO: 4 %
NEUTROPHILS # BLD AUTO: 18.7 10E3/UL (ref 1.6–8.3)
NEUTROPHILS NFR BLD AUTO: 86 %
NRBC # BLD AUTO: 0 10E3/UL
NRBC BLD AUTO-RTO: 0 /100
PLATELET # BLD AUTO: 142 10E3/UL (ref 150–450)
POTASSIUM SERPL-SCNC: 2.1 MMOL/L (ref 3.4–5.3)
POTASSIUM SERPL-SCNC: 2.6 MMOL/L (ref 3.4–5.3)
POTASSIUM SERPL-SCNC: 3.2 MMOL/L (ref 3.4–5.3)
POTASSIUM SERPL-SCNC: 3.4 MMOL/L (ref 3.4–5.3)
POTASSIUM SERPL-SCNC: 3.4 MMOL/L (ref 3.4–5.3)
POTASSIUM SERPL-SCNC: 3.9 MMOL/L (ref 3.4–5.3)
PROCALCITONIN SERPL IA-MCNC: 0.29 NG/ML
PROT SERPL-MCNC: 6.2 G/DL (ref 6.4–8.3)
RBC # BLD AUTO: 2.41 10E6/UL (ref 3.8–5.2)
SODIUM SERPL-SCNC: 145 MMOL/L (ref 135–145)
SODIUM SERPL-SCNC: 145 MMOL/L (ref 135–145)
SODIUM SERPL-SCNC: 146 MMOL/L (ref 135–145)
SODIUM SERPL-SCNC: 147 MMOL/L (ref 135–145)
SODIUM SERPL-SCNC: 148 MMOL/L (ref 135–145)
SODIUM SERPL-SCNC: 149 MMOL/L (ref 135–145)
WBC # BLD AUTO: 21.8 10E3/UL (ref 4–11)

## 2024-08-18 PROCEDURE — 250N000013 HC RX MED GY IP 250 OP 250 PS 637

## 2024-08-18 PROCEDURE — 83735 ASSAY OF MAGNESIUM: CPT | Performed by: STUDENT IN AN ORGANIZED HEALTH CARE EDUCATION/TRAINING PROGRAM

## 2024-08-18 PROCEDURE — 999N000215 HC STATISTIC HFNC ADULT NON-CPAP

## 2024-08-18 PROCEDURE — 250N000011 HC RX IP 250 OP 636: Performed by: STUDENT IN AN ORGANIZED HEALTH CARE EDUCATION/TRAINING PROGRAM

## 2024-08-18 PROCEDURE — 80048 BASIC METABOLIC PNL TOTAL CA: CPT

## 2024-08-18 PROCEDURE — 83735 ASSAY OF MAGNESIUM: CPT

## 2024-08-18 PROCEDURE — 36415 COLL VENOUS BLD VENIPUNCTURE: CPT

## 2024-08-18 PROCEDURE — 250N000011 HC RX IP 250 OP 636

## 2024-08-18 PROCEDURE — 999N000157 HC STATISTIC RCP TIME EA 10 MIN

## 2024-08-18 PROCEDURE — 84145 PROCALCITONIN (PCT): CPT

## 2024-08-18 PROCEDURE — 99233 SBSQ HOSP IP/OBS HIGH 50: CPT | Mod: GC | Performed by: STUDENT IN AN ORGANIZED HEALTH CARE EDUCATION/TRAINING PROGRAM

## 2024-08-18 PROCEDURE — 83036 HEMOGLOBIN GLYCOSYLATED A1C: CPT

## 2024-08-18 PROCEDURE — 82010 KETONE BODYS QUAN: CPT

## 2024-08-18 PROCEDURE — 83690 ASSAY OF LIPASE: CPT

## 2024-08-18 PROCEDURE — 85025 COMPLETE CBC W/AUTO DIFF WBC: CPT

## 2024-08-18 PROCEDURE — 250N000009 HC RX 250: Performed by: INTERNAL MEDICINE

## 2024-08-18 PROCEDURE — 120N000002 HC R&B MED SURG/OB UMMC

## 2024-08-18 PROCEDURE — 94640 AIRWAY INHALATION TREATMENT: CPT

## 2024-08-18 PROCEDURE — 94640 AIRWAY INHALATION TREATMENT: CPT | Mod: 76

## 2024-08-18 PROCEDURE — 86140 C-REACTIVE PROTEIN: CPT

## 2024-08-18 PROCEDURE — 999N000123 HC STATISTIC OXYGEN O2DAILY TECH TIME

## 2024-08-18 PROCEDURE — 250N000009 HC RX 250

## 2024-08-18 RX ORDER — POTASSIUM CHLORIDE 7.45 MG/ML
10 INJECTION INTRAVENOUS ONCE
Status: DISCONTINUED | OUTPATIENT
Start: 2024-08-18 | End: 2024-08-18

## 2024-08-18 RX ORDER — POTASSIUM CHLORIDE 1.5 G/1.58G
40 POWDER, FOR SOLUTION ORAL ONCE
Status: DISCONTINUED | OUTPATIENT
Start: 2024-08-18 | End: 2024-08-18

## 2024-08-18 RX ORDER — POTASSIUM CHLORIDE 7.45 MG/ML
10 INJECTION INTRAVENOUS ONCE
Status: COMPLETED | OUTPATIENT
Start: 2024-08-18 | End: 2024-08-18

## 2024-08-18 RX ORDER — NICOTINE POLACRILEX 4 MG
15-30 LOZENGE BUCCAL
Status: DISCONTINUED | OUTPATIENT
Start: 2024-08-18 | End: 2024-08-21 | Stop reason: HOSPADM

## 2024-08-18 RX ORDER — MAGNESIUM SULFATE HEPTAHYDRATE 40 MG/ML
4 INJECTION, SOLUTION INTRAVENOUS ONCE
Status: COMPLETED | OUTPATIENT
Start: 2024-08-18 | End: 2024-08-18

## 2024-08-18 RX ORDER — POTASSIUM CHLORIDE 7.45 MG/ML
10 INJECTION INTRAVENOUS
Status: COMPLETED | OUTPATIENT
Start: 2024-08-18 | End: 2024-08-19

## 2024-08-18 RX ORDER — SODIUM CHLORIDE AND POTASSIUM CHLORIDE 150; 900 MG/100ML; MG/100ML
INJECTION, SOLUTION INTRAVENOUS CONTINUOUS
Status: DISCONTINUED | OUTPATIENT
Start: 2024-08-18 | End: 2024-08-18 | Stop reason: ALTCHOICE

## 2024-08-18 RX ORDER — DEXTROSE MONOHYDRATE 25 G/50ML
25-50 INJECTION, SOLUTION INTRAVENOUS
Status: DISCONTINUED | OUTPATIENT
Start: 2024-08-18 | End: 2024-08-21 | Stop reason: HOSPADM

## 2024-08-18 RX ORDER — SODIUM CHLORIDE AND POTASSIUM CHLORIDE 150; 450 MG/100ML; MG/100ML
INJECTION, SOLUTION INTRAVENOUS CONTINUOUS
Status: DISCONTINUED | OUTPATIENT
Start: 2024-08-18 | End: 2024-08-20

## 2024-08-18 RX ORDER — FUROSEMIDE 10 MG/ML
60 INJECTION INTRAMUSCULAR; INTRAVENOUS EVERY 6 HOURS
Status: COMPLETED | OUTPATIENT
Start: 2024-08-18 | End: 2024-08-18

## 2024-08-18 RX ADMIN — HYDROCORTISONE SODIUM SUCCINATE 50 MG: 100 INJECTION, POWDER, FOR SOLUTION INTRAMUSCULAR; INTRAVENOUS at 13:31

## 2024-08-18 RX ADMIN — ACETAMINOPHEN 650 MG: 325 TABLET ORAL at 04:54

## 2024-08-18 RX ADMIN — GABAPENTIN 300 MG: 300 CAPSULE ORAL at 13:32

## 2024-08-18 RX ADMIN — POTASSIUM CHLORIDE AND SODIUM CHLORIDE: 450; 150 INJECTION, SOLUTION INTRAVENOUS at 09:55

## 2024-08-18 RX ADMIN — INSULIN HUMAN 5.5 UNITS/HR: 1 INJECTION, SOLUTION INTRAVENOUS at 01:30

## 2024-08-18 RX ADMIN — GABAPENTIN 300 MG: 300 CAPSULE ORAL at 08:04

## 2024-08-18 RX ADMIN — ACETAMINOPHEN 650 MG: 325 TABLET ORAL at 17:53

## 2024-08-18 RX ADMIN — GUAIFENESIN AND DEXTROMETHORPHAN 10 ML: 100; 10 SYRUP ORAL at 08:04

## 2024-08-18 RX ADMIN — ALBUTEROL SULFATE 2.5 MG: 2.5 SOLUTION RESPIRATORY (INHALATION) at 08:32

## 2024-08-18 RX ADMIN — MAGNESIUM SULFATE IN WATER 4 G: 40 INJECTION, SOLUTION INTRAVENOUS at 20:06

## 2024-08-18 RX ADMIN — OXYCODONE HYDROCHLORIDE 5 MG: 5 TABLET ORAL at 16:22

## 2024-08-18 RX ADMIN — OXYCODONE HYDROCHLORIDE 5 MG: 5 TABLET ORAL at 23:23

## 2024-08-18 RX ADMIN — ACETAMINOPHEN 650 MG: 325 TABLET ORAL at 11:57

## 2024-08-18 RX ADMIN — OXYCODONE HYDROCHLORIDE 5 MG: 5 TABLET ORAL at 11:53

## 2024-08-18 RX ADMIN — HYDROMORPHONE HYDROCHLORIDE 0.3 MG: 1 INJECTION, SOLUTION INTRAMUSCULAR; INTRAVENOUS; SUBCUTANEOUS at 09:55

## 2024-08-18 RX ADMIN — ENOXAPARIN SODIUM 40 MG: 40 INJECTION SUBCUTANEOUS at 20:06

## 2024-08-18 RX ADMIN — POTASSIUM CHLORIDE AND SODIUM CHLORIDE: 900; 150 INJECTION, SOLUTION INTRAVENOUS at 01:33

## 2024-08-18 RX ADMIN — POTASSIUM CHLORIDE 10 MEQ: 7.46 INJECTION, SOLUTION INTRAVENOUS at 21:36

## 2024-08-18 RX ADMIN — FUROSEMIDE 60 MG: 10 INJECTION, SOLUTION INTRAMUSCULAR; INTRAVENOUS at 17:52

## 2024-08-18 RX ADMIN — HYDROCORTISONE SODIUM SUCCINATE 50 MG: 100 INJECTION, POWDER, FOR SOLUTION INTRAMUSCULAR; INTRAVENOUS at 01:41

## 2024-08-18 RX ADMIN — FUROSEMIDE 60 MG: 10 INJECTION, SOLUTION INTRAMUSCULAR; INTRAVENOUS at 11:53

## 2024-08-18 RX ADMIN — OXYCODONE HYDROCHLORIDE 5 MG: 5 TABLET ORAL at 03:56

## 2024-08-18 RX ADMIN — POTASSIUM CHLORIDE 40 MEQ: 1.5 POWDER, FOR SOLUTION ORAL at 08:03

## 2024-08-18 RX ADMIN — PIPERACILLIN AND TAZOBACTAM 4.5 G: 4; .5 INJECTION, POWDER, LYOPHILIZED, FOR SOLUTION INTRAVENOUS at 22:56

## 2024-08-18 RX ADMIN — GUAIFENESIN 600 MG: 600 TABLET ORAL at 08:04

## 2024-08-18 RX ADMIN — ALBUTEROL SULFATE 2.5 MG: 2.5 SOLUTION RESPIRATORY (INHALATION) at 21:08

## 2024-08-18 RX ADMIN — HYDROMORPHONE HYDROCHLORIDE 0.3 MG: 1 INJECTION, SOLUTION INTRAMUSCULAR; INTRAVENOUS; SUBCUTANEOUS at 05:54

## 2024-08-18 RX ADMIN — HYDROXYZINE HYDROCHLORIDE 25 MG: 25 TABLET ORAL at 11:54

## 2024-08-18 RX ADMIN — HYDROXYZINE HYDROCHLORIDE 25 MG: 25 TABLET ORAL at 17:53

## 2024-08-18 RX ADMIN — HYDROXYZINE HYDROCHLORIDE 25 MG: 25 TABLET ORAL at 04:54

## 2024-08-18 RX ADMIN — GABAPENTIN 300 MG: 300 CAPSULE ORAL at 20:05

## 2024-08-18 RX ADMIN — HYDROMORPHONE HYDROCHLORIDE 0.3 MG: 1 INJECTION, SOLUTION INTRAMUSCULAR; INTRAVENOUS; SUBCUTANEOUS at 13:47

## 2024-08-18 RX ADMIN — PIPERACILLIN AND TAZOBACTAM 4.5 G: 4; .5 INJECTION, POWDER, LYOPHILIZED, FOR SOLUTION INTRAVENOUS at 17:52

## 2024-08-18 RX ADMIN — POTASSIUM CHLORIDE 40 MEQ: 1.5 POWDER, FOR SOLUTION ORAL at 20:05

## 2024-08-18 RX ADMIN — HYDROMORPHONE HYDROCHLORIDE 0.3 MG: 1 INJECTION, SOLUTION INTRAMUSCULAR; INTRAVENOUS; SUBCUTANEOUS at 21:47

## 2024-08-18 RX ADMIN — POTASSIUM CHLORIDE 10 MEQ: 7.46 INJECTION, SOLUTION INTRAVENOUS at 02:38

## 2024-08-18 RX ADMIN — POTASSIUM CHLORIDE 10 MEQ: 7.46 INJECTION, SOLUTION INTRAVENOUS at 00:42

## 2024-08-18 RX ADMIN — GUAIFENESIN AND DEXTROMETHORPHAN 10 ML: 100; 10 SYRUP ORAL at 15:05

## 2024-08-18 RX ADMIN — OXYCODONE HYDROCHLORIDE 5 MG: 5 TABLET ORAL at 20:06

## 2024-08-18 RX ADMIN — PIPERACILLIN AND TAZOBACTAM 4.5 G: 4; .5 INJECTION, POWDER, LYOPHILIZED, FOR SOLUTION INTRAVENOUS at 11:53

## 2024-08-18 RX ADMIN — PIPERACILLIN AND TAZOBACTAM 4.5 G: 4; .5 INJECTION, POWDER, LYOPHILIZED, FOR SOLUTION INTRAVENOUS at 04:45

## 2024-08-18 RX ADMIN — HYDROMORPHONE HYDROCHLORIDE 0.3 MG: 1 INJECTION, SOLUTION INTRAMUSCULAR; INTRAVENOUS; SUBCUTANEOUS at 17:52

## 2024-08-18 RX ADMIN — OXYCODONE HYDROCHLORIDE 5 MG: 5 TABLET ORAL at 08:04

## 2024-08-18 RX ADMIN — HYDROMORPHONE HYDROCHLORIDE 0.3 MG: 1 INJECTION, SOLUTION INTRAMUSCULAR; INTRAVENOUS; SUBCUTANEOUS at 01:42

## 2024-08-18 RX ADMIN — GUAIFENESIN 600 MG: 600 TABLET ORAL at 20:06

## 2024-08-18 ASSESSMENT — ACTIVITIES OF DAILY LIVING (ADL)
ADLS_ACUITY_SCORE: 22
ADLS_ACUITY_SCORE: 28
ADLS_ACUITY_SCORE: 28
ADLS_ACUITY_SCORE: 24
ADLS_ACUITY_SCORE: 28
ADLS_ACUITY_SCORE: 28
ADLS_ACUITY_SCORE: 24
ADLS_ACUITY_SCORE: 22
ADLS_ACUITY_SCORE: 24
ADLS_ACUITY_SCORE: 22
ADLS_ACUITY_SCORE: 22
ADLS_ACUITY_SCORE: 28
DEPENDENT_IADLS:: INDEPENDENT
ADLS_ACUITY_SCORE: 24
ADLS_ACUITY_SCORE: 22
ADLS_ACUITY_SCORE: 24
ADLS_ACUITY_SCORE: 22

## 2024-08-18 NOTE — PROGRESS NOTES
[facility]    Progress Note - Medicine Service, [facility] TEAM 1       Date of Admission:  8/12/2024    Updates 8/18:  -- 2-3L NC for oxygenation   -- PJP PCR pending   -- MMA pending   -- Mycology and micro pending, at least 1-2 days until speciation of yeast and sputum culture  -- Analgesia Gabapentin 300mg TID + Voltaren gel   -- Continue diuresis 60mg q6hrs x2 doses, monitor I's&Os  -- 14u NPH daily + 1:20 carb counting + High resistance dosing coverage at mealtimes and bedtime.   -- Changed to 0.45%NS at 50cc/hr given increasing Na on chemistry, continuing with 20mEq KCl  -- 60mg Lasix x2 doses   -- Likely BAL tomorrow 8/19 given significant decrease in O2 needs and would assist narrowing treatment plan    Assessment & Plan      Maribell Leon is a 29 year old female admitted on 8/12/2024. She has a history of cirrhosis, alcohol use disorder, neuropathy, chronic pancreatitis who presented with acute dyspnea and cough found to have AHRF likely 2/2 CAP.      # Leukocytosis, improving   # Pleural effusions, bilateral iso  # Acute hypoxic respiratory failure secondary to community acquired PNA, worsening vs in conjunction with growing effusions  See previous notes for summary of care to this point. Continues to improve with steroids, diuresis, breathing treatments, HFNC and antibiotics, attempting to wean to Oxymask 8/16.  Beta-D-glucan positive 8/16. Pulm signed off 8/15 with final recs as below, did curbside 8/16 to d/w team BAL vs PCR for PJP testing. Given risk of intubation following BAL, recommending PCR and follow up on cultures as ordered at this time. ID in agreement.   Overnight, if changes in mentation, oxygenation, or generally appears to be decompensating --> STAT ABG, lactate, CXR and paging pulm   Work up:   RPP, flu, covid, RSV negative.   Legionella, strep pneumo antigen negative.   Cryptococcus negative.   MRSA nares negative.   -- Continue zosyn +  vanc per pulm and ID  -- Continue NC as indicated, wean to room air   -- ?BAL tomorrow 8/19  -- Duonebs QID  -- Lasix 60mg q6hrs x2 doses; UoP of 1600cc on 40mg BID with stable kidney function   -- Follow up sputum, blood cultures  -- Follow up thoracentesis labs and culture -- NGTD  Pulm recs 8/15, signing off:  Will complete steroid course (per CAPE COD trial):   - Solucortef 50 q6H through 8/16 then   - Solucortef 50 q12H through 8/18 then   - Solucortef 50 daily through 8/20 then stop   - If patient is off O2 and being dc'ed prior to completion of the steroids okay to stop early   -- Aggressive diuresis   -- OP pulm follow up closer to home with repeat chest CT to assess resolution in 6-8 weeks    # Hyperglycemia iso steroid use   # H/o chronic pancreatitis   Did consider pancreatogenic diabetes vs exocrine insufficiency in this pt given history with her ongoing and significant hyperglycemia overnight c/f DKA vs HHS. A1c yesterday 5.3%, BGmax ~600s overnight with negative KBH-butyrate and no significant gap on chemistry. Lipase wnl, no change in LFTs. No h/o significant n/v/d, no increased WoB/Kussmaul concerns. Based on work up and evaluation, pt is likely not managing the elevated dosing of steroids at this time and therefore is becoming reactively hyperglycemic. Will treat as steroid-induced hyperglycemia as below.  -- 14u NPH daily   -- 1:20 CHO TID with meals and snacks   -- High insulin resistance coverage ordered   -- Hypoglycemia protocol   -- CTM as steroid dosing decreases     # Hypernatremia   Iso 0.9%NS + 20mEq KCl repletion with normal diet iso diuresis as noted above. Decreased K infusion to 0.45% to begin with, if not improved on repeat BMP this afternoon, will initiate D5W + KCl instead given ongoing concerns for hypokalemia with ongoing diuretic needs. Tomorrow 8/19, consider lowering diuretic dosing given improvement in respiratory status and likely significant improvement in hepatic  hydrothorax and bilateral LE edema.   -- PM BMP + 0.9% NS --> 0.45% NS    --> initiate D5W with KCl if Na remains elevated or unchanged (last Na 147)    # Macrocytic anemia, worsened   #AoCI iso EtOH use disorder  MMA pending still, anemia stable     # Hypokalemia 2/2 daily diuresis, ongoing  Likely 2/2 diuresis in the ED and while inpatient given use of Lasix. No CHANTELL, no vomiting. Some diarrhea could be contributing as well.   -- Replete per protocol but have changed to oral repletion instead of IV, CTM qAM     # RLE pain > LLE, c/f neuropathy iso EtOH use disorder, liver disease  # LE swelling   RLE xray negative. R ankle joint tap with ortho,no septic arthritis. Dual energy CT without gout findings despite elevated uric acid. Was placed on cefazolin due to concerns for cellulitis transitioned to Keflex for 7 days treatment after last admission.   Pt with ongoing LE pain but attempting to wean off of IV opioids here. Have decreased IV dilaudid dosing out of concern for her respiratory status and added Gabapentin 200mg TID 8/15 given recovery of her eGFR.   Will increase this 8/16 to 300mg TID 8/16 given significant pain, adding Voltaren gel 4g QID. Has 650mg APAP q6hrs, LFTs wnl and albumin mildly low.     0.3mg Dilaudid q4hrs IV available for severe pain.     # Volume overload  # Hepatic hydrothorax, R  S/p furosemide 40mg IV x1 in ED with ~1L output. Given c/f re-collection of pleural effusions/hepatic hydrothorax, continuing diuresis as noted above.   -- Hold home spironolactone-hydrochlorothiazide for now  -- Lymphedema consult    # Cirrhosis   # Hypoalbuminemia  # Elevated INR  Likely secondary to alcohol use. Not decompensated. Not enough ascites 8/13 for paracentesis.   -- Low sodium diet   -- 2L fluid restriction  -- Hold home spironolactone-hydrochlorothiazide for now    # Chronic anemia  Stable, no further cares.   -- Continue folate supplement when tolerating PO       Diet: Fluid restriction 2000 ML  FLUID  Moderate Consistent Carb (60 g CHO per Meal) Diet    DVT Prophylaxis: Lovenox ppx  [place] Catheter: Not present  Fluids: N/A  Lines: None     Cardiac Monitoring: ACTIVE order. Indication: Electrolyte Imbalance (24 hours)- Magnesium <1.3 mg/ml; Potassium < =2.8 or > 5.5 mg/ml  Code Status: Full Code       The patient's care was discussed with the Attending Physician, Dr. Car .      Latonya Olivo MD  Medicine Service, [facility] TEAM [address] [place] [address]  Securely message with Thumbs Up (more info)  Text page via [facility] Paging/Directory   See signed in provider for up to date coverage information  ______________________________________________________________________    Overnight events:   Pt feeling significantly improved this AM, understands necessity of insulin drip and why her sugars have been elevated. Does have some burning with initiation of IV K this morning but is also wanting to continue this given her need for it. She has no acute concerns for our team this AM.        Physical Exam   Vital Signs: Temp: 98.2  F (36.8  C) Temp src: Oral BP: (!) 138/93 Pulse: 93   Resp: 17 SpO2: 98 % O2 Device: Nasal cannula Oxygen Delivery: 3 LPM  Weight: 111 lbs 6.4 oz    General Appearance: Pleasant, pallid, less ill-appearing on exam. Interactive and sitting up in bed, smiling.   Respiratory: WoB improving. Intermittent cough, mucous rattling in upper airways. Still with decreased lung sounds at both bases, on HFNC  Cardiovascular: Tachycardiac, normal S1, S2, no murmurs  GI: Abdomen mildly distended, soft, non tender.   Extremities: Erythema and swelling on anterior L wrist in area of ABG draws c/w hematoma. Does not extend past mid-forearm. Bilateral 2+ pitting edema, tenderness of RLE with palpation. Well healing small, circumferential wounds to dorsal aspect of RLE on foot and ankle. No surrounding erythema, warmth, drainage.       Please see A&P for additional details of  medical decision making.      Data     I have personally reviewed the following data over the past 24 hrs:    21.8 (H)  \   7.6 (L)   / 142 (L)     149 (H) 109 (H) 11.9 /  382 (H)   3.9 24 0.74 \     ALT: 10 AST: 57 (H) AP: 72 TBILI: 1.9 (H)   ALB: 3.5 TOT PROTEIN: 6.2 (L) LIPASE: 17     TSH: N/A T4: N/A A1C: 5.3     Procal: 0.29 CRP: 5.22 (H) Lactic Acid: N/A         Imaging results reviewed over the past 24 hrs:   No results found for this or any previous visit (from the past 24 hour(s)).

## 2024-08-18 NOTE — PLAN OF CARE
Goal Outcome Evaluation:      Plan of Care Reviewed With: patient    Overall Patient Progress: improvingOverall Patient Progress: improving    Temp: 98.2  F (36.8  C) Temp src: Oral BP: (!) 138/93 Pulse: 105   Resp: 17 SpO2: 96 % O2 Device: Nasal cannula Oxygen Delivery: 2 LPM    Pt A&O X4, able to communicate needs. VSS, intermittently tachycardic. On tele. On 2Ls NC, denies SOB at rest and with exertion. Denies cardiac chest pain. infrequent dry cough, PRN robitussin given X1. Pt reported generalized pain decreased to 4/10, but still having 7+/10 on right foot. Pain shortly relieved with current pain regimen. Lymph wraps in place on BLE, edema improving. Abdomen slightly distended and firm. Insulin drip discontinued, pt started on sliding scale insulin. BG trending up. Pt had 1 large BM and voiding with good UOP.  Tolerating regular diet without N/V, poor appetite. Pt reported not having big appetite at baseline.

## 2024-08-18 NOTE — CONSULTS
Care Management Initial Consult    General Information  Assessment completed with: Maribell Yusuf  Type of CM/SW Visit: Initial Assessment    Primary Care Provider verified and updated as needed: No (patient has a [facility] PCP but wants to establish a PCP at [facility])   Readmission within the last 30 days: current reason for admission unrelated to previous admission   Return Category: Exacerbation of disease  Reason for Consult: other (see comments) (elevated readmission risk score)  Advance Care Planning: Advance Care Planning Reviewed: no concerns identified          Communication Assessment  Patient's communication style: spoken language (English or Bilingual)    Hearing Difficulty or Deaf: no   Wear Glasses or Blind: no    Cognitive  Cognitive/Neuro/Behavioral: WDL                      Living Environment:   People in home: friend(s)     Current living Arrangements: house      Able to return to prior arrangements: other (see comments)  Living Arrangement Comments: Patient plans to stay with her mother Leonel in [place], [place]    Family/Social Support:  Care provided by: self  Provides care for: no one  Marital Status:   Friend, Parent(s)          Description of Support System: Supportive, Involved    Support Assessment: Adequate social supports, Adequate family and caregiver support    Current Resources:   Patient receiving home care services: No     Community Resources: Financial/Insurance  Equipment currently used at home: none  Supplies currently used at home: None    Employment/Financial:  Employment Status: unemployed        Financial Concerns: unable to afford rent/mortgage for September 1st       Does the patient's insurance plan have a 3 day qualifying hospital stay waiver?  No    Lifestyle & Psychosocial Needs:  Social Determinants of Health     Food Insecurity: No Food Insecurity (7/13/2024)    Received from [facility]    [facility] Vital  Sign     Worried About Running Out of Food in the Last Year: Never true     Ran Out of Food in the Last Year: Never true   Depression: Not at risk (6/18/2024)    Received from [facility]    PHQ-2     PHQ-2 Total: 0   Housing Stability: Low Risk  (7/13/2024)    Received from [facility]    Housing Stability Vital Sign     Unable to Pay for Housing in the Last Year: No     Number of Times Moved in the Last Year: 1     Homeless in the Last Year: No   Tobacco Use: High Risk (8/12/2024)    Patient History     Smoking Tobacco Use: Some Days     Smokeless Tobacco Use: Never     Passive Exposure: Not on file   Financial Resource Strain: Patient Declined (7/12/2024)    Received from [facility]    Overall Financial Resource Strain (CARDIA)     Difficulty of Paying Living Expenses: Patient declined   Alcohol Use: Not At Risk (7/10/2024)    Received from [facility]    AUDIT-C     Frequency of Alcohol Consumption: Never     Average Number of Drinks: 3 or 4     Frequency of Binge Drinking: Never   Transportation Needs: No Transportation Needs (7/13/2024)    Received from [facility]    PRAPARE - Transportation     Lack of Transportation (Medical): No     Lack of Transportation (Non-Medical): No   Physical Activity: Not on file   Interpersonal Safety: Not At Risk (7/13/2024)    Received from [facility]    EH IP Custom IPV     Do you feel UNSAFE in any of your personal relationships with your family members or any other acquaintances?: No   Stress: Not on file   Social Connections: Unknown (4/11/2023)    Received from [facility]    Social Connections     Frequency of Communication with Friends and Family: Not on file   Health Literacy: Not on file       Functional Status:  Prior to admission patient needed assistance:    Dependent ADLs:: Ambulation-no assistive device, Independent  Dependent IADLs:: Independent       Mental Health Status:  Mental Health Status: Current Concern  Mental Health Management: Medication, Individual Therapy    Chemical Dependency Status:  Chemical Dependency Status: Past Concern  Chemical Dependency Management: Other (see comment) (Maribell is interested in outpatient KIMBER treatment at [facility] [place], [facility].)    Maribell reports sobriety since May of this year. PEth tests from July and August 2024 were negative. Maribell reports consuming alcohol 3-4 days per week, and consuming 3-4 drinks per day prior to discontinuing all consumption in May 2024.  Maribell has no history of KIMBER treatment. She has a history of a DWI in June of 2020.           Values/Beliefs:  Spiritual, Cultural Beliefs, Presybeterian Practices, Values that affect care: no               Additional Information:    Please order KIMBER assessment while patient is inpatient so she can be referred for outpatient treatment at [facility] [place].    ANSON Bourne, [facility]  8/18/2024       Social Work and Care Management Department       SEARCHABLE in [facility] - search SOCIAL WORK       [place] (0800 - 1630) Saturday and Sunday     Units: 4A Vocera, 4C Vocera, & 4E Vocera        Units: 5A 7974-3598 Vocera, 5A 9193-4227 Vocera , BMT SW 1 BMT SW 2, BMT SW 3 & BMT SW 4  5C Off Service 5401 - 5416  5C Off Service 7501-0594     Units: 6A Vocera & 6B Vocera      Units: 6C Vocera     Units: 7A Vocera & 7B Vocera      Units: 7C Med Surg 7401 thru 7418 and 7C Med Surg 7502 thru 7521      Unit: [facility] ED Vocera & [place] Obs Vocera     [facility] (6772-1644) Saturday and Sunday      Units: 5 Ortho Vocera, 5 Med Surg Vocera & WB ED Vocera     Units: 6 Med Surg Vocera, 8 Med Surg Vocera, & 10 ICU Vocera      After hours Vocera [facility] and After Hours Vocera [place]     Saturday & Sunday (1630 - 0000)    Mon-Fri ([id number])     [facility] Recognized  Holidays  (4557-7740)    Units: ALL   - see above VOCERA links to units and after hours

## 2024-08-18 NOTE — PLAN OF CARE
Goal Outcome Evaluation:      Plan of Care Reviewed With: patient    Overall Patient Progress: decliningOverall Patient Progress: declining    Vitals: Blood pressure 135/88, pulse 101, temperature 97.5  F (36.4  C), temperature source Oral, resp. rate 16, height 1.524 m (5'), weight 51.3 kg (113 lb 1.6 oz), SpO2 100%.   Time: 4545-8849  Neuro: A/O x 4, calls appropriately and makes needs known.  Activity: up with SBA. Up to bedside commode.   Pain and N/V: Back and LE pain managed with oxycodone and dilaudid.   GI/: Large BM x1 this shift, Voiding spontaneously. AUOP.   Cardiac: Denies cardiac chest pain.   Diet: Regular. 2000 mL fluid restriction.   Respiratory: On 3 L O2 oxymask. Pt denies SOB.  Lines/Drains: Rt and Lt PIV.  Incisions/Skin: WDL, No new deficit.   Lab: Reviewed.  New changes this shift: No significant changes this shift, Continue POC.

## 2024-08-19 ENCOUNTER — APPOINTMENT (OUTPATIENT)
Dept: OCCUPATIONAL THERAPY | Facility: CLINIC | Age: 29
End: 2024-08-19
Payer: MEDICAID

## 2024-08-19 LAB
ALBUMIN SERPL BCG-MCNC: 3.3 G/DL (ref 3.5–5.2)
ALP SERPL-CCNC: 63 U/L (ref 40–150)
ALT SERPL W P-5'-P-CCNC: 13 U/L (ref 0–50)
ANION GAP SERPL CALCULATED.3IONS-SCNC: 12 MMOL/L (ref 7–15)
ANION GAP SERPL CALCULATED.3IONS-SCNC: 12 MMOL/L (ref 7–15)
ANION GAP SERPL CALCULATED.3IONS-SCNC: 13 MMOL/L (ref 7–15)
ANION GAP SERPL CALCULATED.3IONS-SCNC: 14 MMOL/L (ref 7–15)
ANION GAP SERPL CALCULATED.3IONS-SCNC: 15 MMOL/L (ref 7–15)
ANION GAP SERPL CALCULATED.3IONS-SCNC: 18 MMOL/L (ref 7–15)
AST SERPL W P-5'-P-CCNC: 50 U/L (ref 0–45)
BACTERIA PLR CULT: NORMAL
BASOPHILS # BLD AUTO: 0 10E3/UL (ref 0–0.2)
BASOPHILS NFR BLD AUTO: 0 %
BILIRUB SERPL-MCNC: 1.8 MG/DL
BUN SERPL-MCNC: 11.3 MG/DL (ref 6–20)
CALCIUM SERPL-MCNC: 7.9 MG/DL (ref 8.8–10.4)
CHLORIDE SERPL-SCNC: 101 MMOL/L (ref 98–107)
CHLORIDE SERPL-SCNC: 102 MMOL/L (ref 98–107)
CHLORIDE SERPL-SCNC: 104 MMOL/L (ref 98–107)
CREAT SERPL-MCNC: 0.66 MG/DL (ref 0.51–0.95)
EGFRCR SERPLBLD CKD-EPI 2021: >90 ML/MIN/1.73M2
EOSINOPHIL # BLD AUTO: 0 10E3/UL (ref 0–0.7)
EOSINOPHIL NFR BLD AUTO: 0 %
ERYTHROCYTE [DISTWIDTH] IN BLOOD BY AUTOMATED COUNT: 16.8 % (ref 10–15)
GLUCOSE BLDC GLUCOMTR-MCNC: 157 MG/DL (ref 70–99)
GLUCOSE BLDC GLUCOMTR-MCNC: 164 MG/DL (ref 70–99)
GLUCOSE BLDC GLUCOMTR-MCNC: 190 MG/DL (ref 70–99)
GLUCOSE BLDC GLUCOMTR-MCNC: 238 MG/DL (ref 70–99)
GLUCOSE BLDC GLUCOMTR-MCNC: 298 MG/DL (ref 70–99)
GLUCOSE SERPL-MCNC: 193 MG/DL (ref 70–99)
H CAPSUL AG SER IA-ACNC: NOT DETECTED
H CAPSUL AG SER QL IA: NOT DETECTED
HCO3 SERPL-SCNC: 19 MMOL/L (ref 22–29)
HCO3 SERPL-SCNC: 22 MMOL/L (ref 22–29)
HCO3 SERPL-SCNC: 25 MMOL/L (ref 22–29)
HCO3 SERPL-SCNC: 27 MMOL/L (ref 22–29)
HCT VFR BLD AUTO: 26.2 % (ref 35–47)
HGB BLD-MCNC: 8 G/DL (ref 11.7–15.7)
IMM GRANULOCYTES # BLD: 0.3 10E3/UL
IMM GRANULOCYTES NFR BLD: 1 %
LYMPHOCYTES # BLD AUTO: 4 10E3/UL (ref 0.8–5.3)
LYMPHOCYTES NFR BLD AUTO: 17 %
MAGNESIUM SERPL-MCNC: 2.4 MG/DL (ref 1.7–2.3)
MAGNESIUM SERPL-MCNC: 3 MG/DL (ref 1.7–2.3)
MCH RBC QN AUTO: 31.3 PG (ref 26.5–33)
MCHC RBC AUTO-ENTMCNC: 30.5 G/DL (ref 31.5–36.5)
MCV RBC AUTO: 102 FL (ref 78–100)
MONOCYTES # BLD AUTO: 1.3 10E3/UL (ref 0–1.3)
MONOCYTES NFR BLD AUTO: 5 %
NEUTROPHILS # BLD AUTO: 18.7 10E3/UL (ref 1.6–8.3)
NEUTROPHILS NFR BLD AUTO: 77 %
NRBC # BLD AUTO: 0 10E3/UL
NRBC BLD AUTO-RTO: 0 /100
PLATELET # BLD AUTO: 139 10E3/UL (ref 150–450)
POTASSIUM SERPL-SCNC: 2.4 MMOL/L (ref 3.4–5.3)
POTASSIUM SERPL-SCNC: 2.5 MMOL/L (ref 3.4–5.3)
POTASSIUM SERPL-SCNC: 2.5 MMOL/L (ref 3.4–5.3)
POTASSIUM SERPL-SCNC: 2.6 MMOL/L (ref 3.4–5.3)
POTASSIUM SERPL-SCNC: 3.7 MMOL/L (ref 3.4–5.3)
POTASSIUM SERPL-SCNC: 3.8 MMOL/L (ref 3.4–5.3)
POTASSIUM SERPL-SCNC: 4.9 MMOL/L (ref 3.4–5.3)
PROT SERPL-MCNC: 5.9 G/DL (ref 6.4–8.3)
RBC # BLD AUTO: 2.56 10E6/UL (ref 3.8–5.2)
SODIUM SERPL-SCNC: 138 MMOL/L (ref 135–145)
SODIUM SERPL-SCNC: 140 MMOL/L (ref 135–145)
SODIUM SERPL-SCNC: 141 MMOL/L (ref 135–145)
SODIUM SERPL-SCNC: 143 MMOL/L (ref 135–145)
WBC # BLD AUTO: 24.3 10E3/UL (ref 4–11)

## 2024-08-19 PROCEDURE — 250N000011 HC RX IP 250 OP 636

## 2024-08-19 PROCEDURE — 250N000013 HC RX MED GY IP 250 OP 250 PS 637

## 2024-08-19 PROCEDURE — 120N000002 HC R&B MED SURG/OB UMMC

## 2024-08-19 PROCEDURE — 83735 ASSAY OF MAGNESIUM: CPT | Performed by: STUDENT IN AN ORGANIZED HEALTH CARE EDUCATION/TRAINING PROGRAM

## 2024-08-19 PROCEDURE — 80053 COMPREHEN METABOLIC PANEL: CPT

## 2024-08-19 PROCEDURE — 999N000157 HC STATISTIC RCP TIME EA 10 MIN

## 2024-08-19 PROCEDURE — 99232 SBSQ HOSP IP/OBS MODERATE 35: CPT | Mod: GC | Performed by: STUDENT IN AN ORGANIZED HEALTH CARE EDUCATION/TRAINING PROGRAM

## 2024-08-19 PROCEDURE — 250N000009 HC RX 250: Performed by: INTERNAL MEDICINE

## 2024-08-19 PROCEDURE — 85025 COMPLETE CBC W/AUTO DIFF WBC: CPT

## 2024-08-19 PROCEDURE — 84295 ASSAY OF SERUM SODIUM: CPT

## 2024-08-19 PROCEDURE — 36415 COLL VENOUS BLD VENIPUNCTURE: CPT

## 2024-08-19 PROCEDURE — 84132 ASSAY OF SERUM POTASSIUM: CPT

## 2024-08-19 PROCEDURE — 94640 AIRWAY INHALATION TREATMENT: CPT | Mod: 76

## 2024-08-19 PROCEDURE — 84132 ASSAY OF SERUM POTASSIUM: CPT | Performed by: STUDENT IN AN ORGANIZED HEALTH CARE EDUCATION/TRAINING PROGRAM

## 2024-08-19 PROCEDURE — 97140 MANUAL THERAPY 1/> REGIONS: CPT | Mod: GO | Performed by: OCCUPATIONAL THERAPIST

## 2024-08-19 PROCEDURE — 94640 AIRWAY INHALATION TREATMENT: CPT

## 2024-08-19 PROCEDURE — 99232 SBSQ HOSP IP/OBS MODERATE 35: CPT | Performed by: INTERNAL MEDICINE

## 2024-08-19 PROCEDURE — 250N000011 HC RX IP 250 OP 636: Performed by: STUDENT IN AN ORGANIZED HEALTH CARE EDUCATION/TRAINING PROGRAM

## 2024-08-19 RX ORDER — ALBUTEROL SULFATE 0.83 MG/ML
2.5 SOLUTION RESPIRATORY (INHALATION) EVERY 4 HOURS PRN
Status: DISCONTINUED | OUTPATIENT
Start: 2024-08-19 | End: 2024-08-21 | Stop reason: HOSPADM

## 2024-08-19 RX ORDER — POTASSIUM CHLORIDE 7.45 MG/ML
10 INJECTION INTRAVENOUS
Status: COMPLETED | OUTPATIENT
Start: 2024-08-19 | End: 2024-08-19

## 2024-08-19 RX ORDER — POTASSIUM CHLORIDE 1.5 G/1.58G
40 POWDER, FOR SOLUTION ORAL 3 TIMES DAILY
Status: DISCONTINUED | OUTPATIENT
Start: 2024-08-19 | End: 2024-08-21 | Stop reason: HOSPADM

## 2024-08-19 RX ORDER — HYDROMORPHONE HYDROCHLORIDE 1 MG/ML
0.3 INJECTION, SOLUTION INTRAMUSCULAR; INTRAVENOUS; SUBCUTANEOUS
Status: DISCONTINUED | OUTPATIENT
Start: 2024-08-19 | End: 2024-08-20

## 2024-08-19 RX ORDER — OXYCODONE HYDROCHLORIDE 5 MG/1
5-10 TABLET ORAL EVERY 4 HOURS PRN
Status: DISCONTINUED | OUTPATIENT
Start: 2024-08-19 | End: 2024-08-21 | Stop reason: HOSPADM

## 2024-08-19 RX ADMIN — HYDROCORTISONE SODIUM SUCCINATE 50 MG: 100 INJECTION, POWDER, FOR SOLUTION INTRAMUSCULAR; INTRAVENOUS at 08:26

## 2024-08-19 RX ADMIN — HYDROMORPHONE HYDROCHLORIDE 0.3 MG: 1 INJECTION, SOLUTION INTRAMUSCULAR; INTRAVENOUS; SUBCUTANEOUS at 03:55

## 2024-08-19 RX ADMIN — PIPERACILLIN AND TAZOBACTAM 4.5 G: 4; .5 INJECTION, POWDER, LYOPHILIZED, FOR SOLUTION INTRAVENOUS at 04:00

## 2024-08-19 RX ADMIN — GABAPENTIN 300 MG: 300 CAPSULE ORAL at 19:44

## 2024-08-19 RX ADMIN — POTASSIUM CHLORIDE 10 MEQ: 7.46 INJECTION, SOLUTION INTRAVENOUS at 08:29

## 2024-08-19 RX ADMIN — HYDROXYZINE HYDROCHLORIDE 25 MG: 25 TABLET ORAL at 16:04

## 2024-08-19 RX ADMIN — HYDROMORPHONE HYDROCHLORIDE 0.3 MG: 1 INJECTION, SOLUTION INTRAMUSCULAR; INTRAVENOUS; SUBCUTANEOUS at 19:52

## 2024-08-19 RX ADMIN — OXYCODONE HYDROCHLORIDE 10 MG: 5 TABLET ORAL at 09:38

## 2024-08-19 RX ADMIN — POTASSIUM CHLORIDE 10 MEQ: 7.46 INJECTION, SOLUTION INTRAVENOUS at 14:17

## 2024-08-19 RX ADMIN — GABAPENTIN 300 MG: 300 CAPSULE ORAL at 08:26

## 2024-08-19 RX ADMIN — POTASSIUM CHLORIDE 10 MEQ: 7.46 INJECTION, SOLUTION INTRAVENOUS at 03:07

## 2024-08-19 RX ADMIN — DICLOFENAC SODIUM 4 G: 10 GEL TOPICAL at 12:11

## 2024-08-19 RX ADMIN — POTASSIUM CHLORIDE 10 MEQ: 7.46 INJECTION, SOLUTION INTRAVENOUS at 02:00

## 2024-08-19 RX ADMIN — POTASSIUM CHLORIDE 10 MEQ: 7.46 INJECTION, SOLUTION INTRAVENOUS at 13:13

## 2024-08-19 RX ADMIN — OXYCODONE HYDROCHLORIDE 5 MG: 5 TABLET ORAL at 18:20

## 2024-08-19 RX ADMIN — ALBUTEROL SULFATE 2.5 MG: 2.5 SOLUTION RESPIRATORY (INHALATION) at 21:02

## 2024-08-19 RX ADMIN — POTASSIUM CHLORIDE 40 MEQ: 1.5 POWDER, FOR SOLUTION ORAL at 19:44

## 2024-08-19 RX ADMIN — HYDROMORPHONE HYDROCHLORIDE 0.3 MG: 1 INJECTION, SOLUTION INTRAMUSCULAR; INTRAVENOUS; SUBCUTANEOUS at 00:34

## 2024-08-19 RX ADMIN — PIPERACILLIN AND TAZOBACTAM 4.5 G: 4; .5 INJECTION, POWDER, LYOPHILIZED, FOR SOLUTION INTRAVENOUS at 17:30

## 2024-08-19 RX ADMIN — OXYCODONE HYDROCHLORIDE 10 MG: 5 TABLET ORAL at 14:18

## 2024-08-19 RX ADMIN — ENOXAPARIN SODIUM 40 MG: 40 INJECTION SUBCUTANEOUS at 19:44

## 2024-08-19 RX ADMIN — POTASSIUM CHLORIDE 10 MEQ: 7.46 INJECTION, SOLUTION INTRAVENOUS at 12:10

## 2024-08-19 RX ADMIN — POTASSIUM CHLORIDE 40 MEQ: 1.5 POWDER, FOR SOLUTION ORAL at 14:18

## 2024-08-19 RX ADMIN — DICLOFENAC SODIUM 4 G: 10 GEL TOPICAL at 08:26

## 2024-08-19 RX ADMIN — GUAIFENESIN 600 MG: 600 TABLET ORAL at 08:26

## 2024-08-19 RX ADMIN — HYDROMORPHONE HYDROCHLORIDE 0.3 MG: 1 INJECTION, SOLUTION INTRAMUSCULAR; INTRAVENOUS; SUBCUTANEOUS at 16:03

## 2024-08-19 RX ADMIN — OXYCODONE HYDROCHLORIDE 10 MG: 5 TABLET ORAL at 05:45

## 2024-08-19 RX ADMIN — HYDROXYZINE HYDROCHLORIDE 25 MG: 25 TABLET ORAL at 03:56

## 2024-08-19 RX ADMIN — OXYCODONE HYDROCHLORIDE 10 MG: 5 TABLET ORAL at 01:50

## 2024-08-19 RX ADMIN — POTASSIUM CHLORIDE 10 MEQ: 7.46 INJECTION, SOLUTION INTRAVENOUS at 11:07

## 2024-08-19 RX ADMIN — POTASSIUM CHLORIDE 10 MEQ: 7.46 INJECTION, SOLUTION INTRAVENOUS at 00:59

## 2024-08-19 RX ADMIN — HYDROMORPHONE HYDROCHLORIDE 0.3 MG: 1 INJECTION, SOLUTION INTRAMUSCULAR; INTRAVENOUS; SUBCUTANEOUS at 08:22

## 2024-08-19 RX ADMIN — POTASSIUM CHLORIDE 40 MEQ: 1.5 POWDER, FOR SOLUTION ORAL at 08:25

## 2024-08-19 RX ADMIN — POTASSIUM CHLORIDE AND SODIUM CHLORIDE: 450; 150 INJECTION, SOLUTION INTRAVENOUS at 11:08

## 2024-08-19 RX ADMIN — ALBUTEROL SULFATE 2.5 MG: 2.5 SOLUTION RESPIRATORY (INHALATION) at 08:50

## 2024-08-19 RX ADMIN — POTASSIUM CHLORIDE 10 MEQ: 7.46 INJECTION, SOLUTION INTRAVENOUS at 09:35

## 2024-08-19 RX ADMIN — PIPERACILLIN AND TAZOBACTAM 4.5 G: 4; .5 INJECTION, POWDER, LYOPHILIZED, FOR SOLUTION INTRAVENOUS at 11:07

## 2024-08-19 RX ADMIN — HYDROMORPHONE HYDROCHLORIDE 0.3 MG: 1 INJECTION, SOLUTION INTRAMUSCULAR; INTRAVENOUS; SUBCUTANEOUS at 22:54

## 2024-08-19 RX ADMIN — GABAPENTIN 300 MG: 300 CAPSULE ORAL at 14:18

## 2024-08-19 RX ADMIN — GUAIFENESIN 600 MG: 600 TABLET ORAL at 19:44

## 2024-08-19 RX ADMIN — GUAIFENESIN AND DEXTROMETHORPHAN 10 ML: 100; 10 SYRUP ORAL at 05:45

## 2024-08-19 RX ADMIN — POTASSIUM CHLORIDE 10 MEQ: 7.46 INJECTION, SOLUTION INTRAVENOUS at 00:01

## 2024-08-19 RX ADMIN — HYDROMORPHONE HYDROCHLORIDE 0.3 MG: 1 INJECTION, SOLUTION INTRAMUSCULAR; INTRAVENOUS; SUBCUTANEOUS at 11:14

## 2024-08-19 RX ADMIN — POTASSIUM CHLORIDE 10 MEQ: 7.46 INJECTION, SOLUTION INTRAVENOUS at 03:59

## 2024-08-19 ASSESSMENT — ACTIVITIES OF DAILY LIVING (ADL)
ADLS_ACUITY_SCORE: 28
ADLS_ACUITY_SCORE: 24
ADLS_ACUITY_SCORE: 28
ADLS_ACUITY_SCORE: 24
ADLS_ACUITY_SCORE: 28
ADLS_ACUITY_SCORE: 28
ADLS_ACUITY_SCORE: 24
ADLS_ACUITY_SCORE: 28
ADLS_ACUITY_SCORE: 24
ADLS_ACUITY_SCORE: 28
ADLS_ACUITY_SCORE: 24
ADLS_ACUITY_SCORE: 24

## 2024-08-19 NOTE — PROGRESS NOTES
BRIEF PULMONARY NOTE    Was contacted by primary service regarding bronchoscopy. Patient's fungitell came back positive again. She's now down to 1-2L of O2 with improved inflammatory markers on just Zosyn and brief steroid burst. I discussed case with ID who last week was recommending bronch. Specificity for fungitell is poor and false positives are not rare. If patient truly had PJP or any other active fungal infection that caused involvement of her RML and RLL she would NOT have gotten dramatically better in antibiotics alone. ID in agreement no indication for bronchoscopy BAL and monitor clinically. Remainder of recommendation per 8/15 note.     Discussed with Dr. Bryan Meléndez MD   Pulmonary/Critical Care Fellow  Pager: [phone]

## 2024-08-19 NOTE — PROGRESS NOTES
[facility]    Progress Note - Medicine Service, [facility] TEAM 1       Date of Admission:  8/12/2024    Updates:  -- NC --> RA  -- PJP PCR pending, no BAL per ID and pulm --> BDG false positive   -- MMA pending   -- PT/OT eval  -- Analgesia Gabapentin 300mg TID + Voltaren gel   -- Diuretic holiday 8/19 d/t persistent hypokalemia   -- 16u NPH daily + 1:20 carb counting + High resistance dosing coverage at mealtimes and bedtime.     Assessment & Plan      Maribell Leon is a 29 year old female admitted on 8/12/2024. She has a history of cirrhosis, alcohol use disorder, neuropathy, chronic pancreatitis who presented with acute dyspnea and cough found to have AHRF likely 2/2 CAP.      # Pleural effusions, bilateral -- improving   # Acute hypoxic respiratory failure secondary to community acquired PNA, improving significantly   # Leukocytosis 2/2 demargination   See previous notes for summary of care to this point. Continues to improve with steroids, diuresis, breathing treatments, HFNC and antibiotics, attempting to wean to RA from NC 8/18, 8/19.   Spoke with ID and pulm at length regarding need for BAL in this pt with previously positive BDGlucan during this admission. Initially did discuss getting BAL in agreement with ID. On review of labs and pt's current cares with pulm, ultimately agreed that a sputum culture would be enough. ID agreed that her BDGlucan was likely a false positive given empiric improvement here with negate sputum culture thus far. No further needs re: BAL, will CTM other infectious work up but otherwise Maribell is improving well from a respiratory standpoint. She does have an increasing leukocytosis likely iso demargination with high steroid use rather than worsening infection given her improvement in O2 needs, other labs, and overall presentation.   Overnight, if changes in mentation, oxygenation, or generally appears to be decompensating --> STAT ABG,  lactate, CXR and paging pulm   Work up:   RPP, flu, covid, RSV negative.   Legionella, strep pneumo antigen negative.   Cryptococcus negative.   MRSA nares negative.   BDGlucan false positive.   -- Continue zosyn + vanc per pulm and ID  -- Continue NC as indicated, wean to room air   -- Duonebs QID  -- Lasix holiday 8/19  -- Follow up sputum, blood cultures  -- Follow up thoracentesis labs and culture -- NGTD  Pulm recs 8/15, signing off:  Will complete steroid course (per CAPE COD trial):   - Solucortef 50 q6H through 8/16 then   - Solucortef 50 q12H through 8/18 then   - Solucortef 50 daily through 8/20 then stop   - If patient is off O2 and being dc'ed prior to completion of the steroids okay to stop early   -- Aggressive diuresis   -- OP pulm follow up closer to home with repeat chest CT to assess resolution in 6-8 weeks    # Hypokalemia iso diuresis (aggressive)  Pt with ongoing hypokalemia with aggressive diuresis. She is having an overall wonderful response to Lasix 60mg q6hrs x2 doses daily here. However, her K has been difficult to replete given the discomfort with IV dosing in addition ongoing uptitration of insulin pushing K intracellularly. Given this, elected to take diuresis holiday 8/19 as she is diuresing well and will re-evaluate 8/20 for further needs there. Meanwhile, did increase PO KCl to TID and will continue with IV dosing as well. No chest pain, palpitations, SOB, bradycardia concerning iso hypokalemia at this time, remains on tele.   -- serial electrolyte panels to monitor   -- dosing as ordered, dual IV and PO repletion     # Hyperglycemia iso steroid use   # H/o chronic pancreatitis   Did consider pancreatogenic diabetes vs exocrine insufficiency in this pt given history with her ongoing and significant hyperglycemia overnight c/f DKA vs HHS. A1c yesterday 5.3%, BGmax ~600s overnight with negative KBH-butyrate and no significant gap on chemistry. Lipase wnl, no change in LFTs. No h/o  significant n/v/d, no increased WoB/Kussmaul concerns. Based on work up and evaluation, pt is likely not managing the elevated dosing of steroids at this time and therefore is becoming reactively hyperglycemic. Will treat as steroid-induced hyperglycemia as below.  -- 16u NPH daily   -- 1:20 CHO TID with meals and snacks   -- High insulin resistance coverage ordered   -- Hypoglycemia protocol   -- CTM as steroid dosing decreases     # Hypernatremia, resolved   Iso 0.9%NS + 20mEq KCl repletion with normal diet iso diuresis as noted above. Decreased K infusion to 0.45% to begin with, if not improved on repeat BMP this afternoon, will initiate D5W + KCl instead given ongoing concerns for hypokalemia with ongoing diuretic needs. Tomorrow 8/19, consider lowering diuretic dosing given improvement in respiratory status and likely significant improvement in hepatic hydrothorax and bilateral LE edema.   -- PM BMP + 0.9% NS --> 0.45% NS   -- serial electrolyte panels     # Macrocytic anemia, worsened   #AoCI iso EtOH use disorder  MMA pending still, anemia stable     # Hypokalemia 2/2 daily diuresis, ongoing  Likely 2/2 diuresis in the ED and while inpatient given use of Lasix. No CHANTELL, no vomiting. Some diarrhea could be contributing as well.   -- Replete per protocol but have changed to oral repletion instead of IV, CTM qAM     # RLE pain > LLE, c/f neuropathy iso EtOH use disorder, liver disease  # LE swelling   RLE xray negative. R ankle joint tap with ortho,no septic arthritis. Dual energy CT without gout findings despite elevated uric acid. Was placed on cefazolin due to concerns for cellulitis transitioned to Keflex for 7 days treatment after last admission.   Pt with ongoing LE pain but attempting to wean off of IV opioids here. Have decreased IV dilaudid dosing out of concern for her respiratory status and added Gabapentin 200mg TID 8/15 given recovery of her eGFR.   Will increase this 8/16 to 300mg TID 8/16 given  significant pain, adding Voltaren gel 4g QID. Has 650mg APAP q6hrs, LFTs wnl and albumin mildly low.     0.3mg Dilaudid q4hrs IV available for severe pain.     # Volume overload  # Hepatic hydrothorax, R  Given c/f re-collection of pleural effusions/hepatic hydrothorax, continuing diuresis as noted above.   -- Hold home spironolactone-hydrochlorothiazide for now  -- Lymphedema consult    # Cirrhosis   # Hypoalbuminemia  # Elevated INR  Likely secondary to alcohol use. Not decompensated. Not enough ascites 8/13 for paracentesis.   -- Low sodium diet   -- 2L fluid restriction  -- Hold home spironolactone-hydrochlorothiazide for now    # Chronic anemia  Stable, no further cares.   -- Continue folate supplement when tolerating PO       Diet: Fluid restriction 2000 ML FLUID  Moderate Consistent Carb (60 g CHO per Meal) Diet    DVT Prophylaxis: Lovenox ppx  [place] Catheter: Not present  Fluids: N/A  Lines: None     Cardiac Monitoring: ACTIVE order. Indication: Electrolyte Imbalance (24 hours)- Magnesium <1.3 mg/ml; Potassium < =2.8 or > 5.5 mg/ml  Code Status: Full Code       The patient's care was discussed with the Attending Physician, Dr. Car .      Latonya Olivo MD  Medicine Service, [facility] TEAM [address] [place] [address]  Securely message with [email or web address] (more info)  Text page via [facility] Paging/Directory   See signed in provider for up to date coverage information  ______________________________________________________________________    Overnight events:   Pt feeling significantly improved this AM, and is able to intermittently come off NC O2. She is wanting to start working with PT and OT to make sure all her needs are evaluated and met prior to discharge. Otherwise, no complaints this AM. States that her pain is well managed.       Physical Exam   Vital Signs: Temp: 98.4  F (36.9  C) Temp src: Oral BP: 126/88 Pulse: 79   Resp: 16 SpO2: 99 % O2 Device: Nasal cannula Oxygen  Delivery: 1.5 LPM  Weight: 111 lbs 6.4 oz    General Appearance: Pleasant, pallid, less ill-appearing on exam. Interactive and sitting up in bed, smiling.   Respiratory: WoB improving. Still with decreased lung sounds at both bases, on NC intermittently   Cardiovascular: RRR, normal S1, S2, no murmurs  GI: Abdomen mildly distended, soft, non tender.   Extremities: Erythema and swelling on anterior L wrist in area of ABG draws c/w hematoma. Does not extend past mid-forearm. Bilateral 2+ pitting edema, tenderness of RLE with palpation. Well healing small, circumferential wounds to dorsal aspect of RLE on foot and ankle. No surrounding erythema, warmth, drainage.       Please see A&P for additional details of medical decision making.      Data     I have personally reviewed the following data over the past 24 hrs:    24.3 (H)  \   8.0 (L)   / 139 (L)     141; 141 102; 102 11.3 /  157 (H)   2.6 (LL); 2.5 (LL); 2.5 (LL) 27; 27 0.66 \     ALT: 13 AST: 50 (H) AP: 63 TBILI: 1.8 (H)   ALB: 3.3 (L) TOT PROTEIN: 5.9 (L) LIPASE: N/A       Imaging results reviewed over the past 24 hrs:   No results found for this or any previous visit (from the past 24 hour(s)).

## 2024-08-19 NOTE — PLAN OF CARE
Goal Outcome Evaluation:      Plan of Care Reviewed With: patient    Overall Patient Progress: no changeOverall Patient Progress: no change     Vitals: Blood pressure 122/83, pulse 87, temperature 98.1  F (36.7  C), temperature source Oral, resp. rate 18, height 1.524 m (5'), weight 50.5 kg (111 lb 6.4 oz), SpO2 100%.  Time: 7158-1653  Neuro: A/O x 4, calls appropriately and makes needs known.  Activity: up with SBA. Up to bedside commode.   Pain and N/V: Back and LE pain managed with oxycodone and dilaudid.   GI/: No BM this shift, LBM on 08/18/24. Voiding spontaneously. AUOP.   Cardiac: On tele, sinus rhythm. Denies cardiac chest pain.   Diet: Regular. 2L fluid restriction.   Respiratory: On 2 L O2 NC. Pt denies SOB.  Lines/Drains: Rt and Lt PIV.  Incisions/Skin: WDL, No new deficit. LE edema with lymph wrap.   Lab: Reviewed.  New changes this shift: Pain medication increase per pt request due to increase in pain. No other significant changes this shift, Continue POC.

## 2024-08-19 NOTE — PROGRESS NOTES
"CLINICAL NUTRITION SERVICES - ASSESSMENT NOTE     Nutrition Prescription    RECOMMENDATIONS FOR MDs/PROVIDERS TO ORDER:  Recommend daily MVI with minerals, folic acid 1 mg, thiamine 100 mg daily.     Malnutrition Status:    Unable to determine (at this time) due to incomplete information    Future/Additional Recommendations:  Consider oral supplement (Glucerna vs Ensure Max Protein) with meals if PO intake reduced and/or not consuming adequate protein.     REASON FOR ASSESSMENT  Maribell Leon is a/an 29 year old female assessed by the dietitian for LOS    NUTRITION HISTORY  Per RD notes from ~1 month ago at OSH, patient reported fair-good appetite with intake of % of meals.     CURRENT NUTRITION ORDERS  Diet: Moderate Consistent Carbohydrate + 2000 ml fluid restriction  Intake/Tolerance: Intake variable throughout admission, meals recorded by nursing as % consumed.     LABS  K+ 2.5 (low)  -208 in last 24 hours    MEDICATIONS  NPH 16 units daily  High sliding scale insulin  Novolog 1 unit per 20 grams CHO    ANTHROPOMETRICS  Height: 152.4 cm (5' 0\")  Most Recent Weight: 50.5 kg (111 lb 6.4 oz)    IBW: 45.5 kg  BMI: Normal BMI  Weight History: Reported UBW was ~110#.   Wt Readings from Last 15 Encounters:   08/18/24 50.5 kg (111 lb 6.4 oz)   03/30/15 48.8 kg (107 lb 8 oz)   03/10/14 49.2 kg (108 lb 6.4 oz) (14%, Z= -1.08)*   08/01/13 51.8 kg (114 lb 4 oz) (27%, Z= -0.60)*   07/10/13 51 kg (112 lb 6 oz) (24%, Z= -0.72)*   01/22/13 53.5 kg (118 lb) (38%, Z= -0.31)*   08/17/12 56.7 kg (125 lb) (54%, Z= 0.11)*     * Growth percentiles are based on CDC (Girls, 2-20 Years) data.   Dosing Weight: 51 kg (actual wt)    ASSESSED NUTRITION NEEDS  Estimated Energy Needs: 9750-1678 kcals/day (25 - 30 kcals/kg)  Justification: Maintenance  Estimated Protein Needs: 61-77 grams protein/day (1.2 - 1.5 grams of pro/kg)  Justification: Increased needs  Estimated Fluid Needs: per MD    MALNUTRITION  % Intake: Unable to " assess  % Weight Loss: Unable to assess  Subcutaneous Fat Loss: Unable to assess  Muscle Loss: Unable to assess  Fluid Accumulation/Edema: Moderate  Malnutrition Diagnosis: Unable to determine due to incomplete information    NUTRITION DIAGNOSIS  Predicted inadequate nutrient intake (kcal/pro) related to restrictive diet, prolonged hospitalization    INTERVENTIONS  Implementation  Attempted to visit with patient, however patient busy with providers.      Goals  Patient to consume % of nutritionally adequate meal trays TID, or the equivalent with supplements/snacks.     Monitoring/Evaluation  Progress toward goals will be monitored and evaluated per protocol.  Leah Major, MS, RD, LD, CCTD, [facility]  7A + 7B (beds 1047-4783)  Available on Vocera [7A or 7B Clinical Dietitian]   Weekend/Holiday: Vocera [Weekend Clinical Dietitian]

## 2024-08-20 ENCOUNTER — APPOINTMENT (OUTPATIENT)
Dept: OCCUPATIONAL THERAPY | Facility: CLINIC | Age: 29
End: 2024-08-20
Payer: MEDICAID

## 2024-08-20 LAB
ACANTHOCYTES BLD QL SMEAR: ABNORMAL
ALBUMIN SERPL BCG-MCNC: 3.2 G/DL (ref 3.5–5.2)
ALP SERPL-CCNC: 74 U/L (ref 40–150)
ALT SERPL W P-5'-P-CCNC: 13 U/L (ref 0–50)
ANION GAP SERPL CALCULATED.3IONS-SCNC: 10 MMOL/L (ref 7–15)
ANION GAP SERPL CALCULATED.3IONS-SCNC: 11 MMOL/L (ref 7–15)
AST SERPL W P-5'-P-CCNC: 59 U/L (ref 0–45)
AUER BODIES BLD QL SMEAR: ABNORMAL
BASO STIPL BLD QL SMEAR: ABNORMAL
BASOPHILS # BLD AUTO: 0 10E3/UL (ref 0–0.2)
BASOPHILS NFR BLD AUTO: 0 %
BILIRUB SERPL-MCNC: 1.5 MG/DL
BITE CELLS BLD QL SMEAR: ABNORMAL
BLISTER CELLS BLD QL SMEAR: ABNORMAL
BUN SERPL-MCNC: 8.4 MG/DL (ref 6–20)
BURR CELLS BLD QL SMEAR: ABNORMAL
CALCIUM SERPL-MCNC: 8.6 MG/DL (ref 8.8–10.4)
CHLORIDE SERPL-SCNC: 104 MMOL/L (ref 98–107)
CHLORIDE SERPL-SCNC: 104 MMOL/L (ref 98–107)
CHLORIDE SERPL-SCNC: 105 MMOL/L (ref 98–107)
CHLORIDE SERPL-SCNC: 106 MMOL/L (ref 98–107)
CHLORIDE SERPL-SCNC: 107 MMOL/L (ref 98–107)
CREAT SERPL-MCNC: 0.64 MG/DL (ref 0.51–0.95)
DACRYOCYTES BLD QL SMEAR: ABNORMAL
EGFRCR SERPLBLD CKD-EPI 2021: >90 ML/MIN/1.73M2
ELLIPTOCYTES BLD QL SMEAR: ABNORMAL
EOSINOPHIL # BLD AUTO: 0.3 10E3/UL (ref 0–0.7)
EOSINOPHIL NFR BLD AUTO: 1 %
ERYTHROCYTE [DISTWIDTH] IN BLOOD BY AUTOMATED COUNT: 16.6 % (ref 10–15)
FRAGMENTS BLD QL SMEAR: ABNORMAL
GLUCOSE BLDC GLUCOMTR-MCNC: 118 MG/DL (ref 70–99)
GLUCOSE BLDC GLUCOMTR-MCNC: 128 MG/DL (ref 70–99)
GLUCOSE BLDC GLUCOMTR-MCNC: 208 MG/DL (ref 70–99)
GLUCOSE BLDC GLUCOMTR-MCNC: 77 MG/DL (ref 70–99)
GLUCOSE BLDC GLUCOMTR-MCNC: 92 MG/DL (ref 70–99)
GLUCOSE SERPL-MCNC: 95 MG/DL (ref 70–99)
HCO3 SERPL-SCNC: 20 MMOL/L (ref 22–29)
HCO3 SERPL-SCNC: 22 MMOL/L (ref 22–29)
HCO3 SERPL-SCNC: 23 MMOL/L (ref 22–29)
HCT VFR BLD AUTO: 25.9 % (ref 35–47)
HGB BLD-MCNC: 8 G/DL (ref 11.7–15.7)
HGB C CRYSTALS: ABNORMAL
HOLD SPECIMEN: NORMAL
HOWELL-JOLLY BOD BLD QL SMEAR: ABNORMAL
IMM GRANULOCYTES # BLD: 0.2 10E3/UL
IMM GRANULOCYTES NFR BLD: 1 %
LYMPHOCYTES # BLD AUTO: 5.7 10E3/UL (ref 0.8–5.3)
LYMPHOCYTES NFR BLD AUTO: 24 %
MAGNESIUM SERPL-MCNC: 1.7 MG/DL (ref 1.7–2.3)
MCH RBC QN AUTO: 31.7 PG (ref 26.5–33)
MCHC RBC AUTO-ENTMCNC: 30.9 G/DL (ref 31.5–36.5)
MCV RBC AUTO: 103 FL (ref 78–100)
MONOCYTES # BLD AUTO: 1.4 10E3/UL (ref 0–1.3)
MONOCYTES NFR BLD AUTO: 6 %
NEUTROPHILS # BLD AUTO: 16.2 10E3/UL (ref 1.6–8.3)
NEUTROPHILS NFR BLD AUTO: 68 %
NEUTS HYPERSEG BLD QL SMEAR: ABNORMAL
NRBC # BLD AUTO: 0 10E3/UL
NRBC BLD AUTO-RTO: 0 /100
PLAT MORPH BLD: ABNORMAL
PLATELET # BLD AUTO: 140 10E3/UL (ref 150–450)
POLYCHROMASIA BLD QL SMEAR: SLIGHT
POTASSIUM SERPL-SCNC: 4.5 MMOL/L (ref 3.4–5.3)
POTASSIUM SERPL-SCNC: 4.6 MMOL/L (ref 3.4–5.3)
POTASSIUM SERPL-SCNC: 4.7 MMOL/L (ref 3.4–5.3)
POTASSIUM SERPL-SCNC: 4.8 MMOL/L (ref 3.4–5.3)
POTASSIUM SERPL-SCNC: 4.8 MMOL/L (ref 3.4–5.3)
PROCALCITONIN SERPL IA-MCNC: 0.33 NG/ML
PROT SERPL-MCNC: 5.5 G/DL (ref 6.4–8.3)
RBC # BLD AUTO: 2.52 10E6/UL (ref 3.8–5.2)
RBC AGGLUT BLD QL: ABNORMAL
RBC MORPH BLD: ABNORMAL
ROULEAUX BLD QL SMEAR: ABNORMAL
SCANNED LAB RESULT: NORMAL
SICKLE CELLS BLD QL SMEAR: ABNORMAL
SMUDGE CELLS BLD QL SMEAR: ABNORMAL
SODIUM SERPL-SCNC: 137 MMOL/L (ref 135–145)
SODIUM SERPL-SCNC: 139 MMOL/L (ref 135–145)
SODIUM SERPL-SCNC: 139 MMOL/L (ref 135–145)
SODIUM SERPL-SCNC: 140 MMOL/L (ref 135–145)
SODIUM SERPL-SCNC: 140 MMOL/L (ref 135–145)
SPHEROCYTES BLD QL SMEAR: ABNORMAL
STOMATOCYTES BLD QL SMEAR: ABNORMAL
TARGETS BLD QL SMEAR: ABNORMAL
TOXIC GRANULES BLD QL SMEAR: ABNORMAL
VARIANT LYMPHS BLD QL SMEAR: ABNORMAL
WBC # BLD AUTO: 23.8 10E3/UL (ref 4–11)

## 2024-08-20 PROCEDURE — 250N000013 HC RX MED GY IP 250 OP 250 PS 637

## 2024-08-20 PROCEDURE — 97140 MANUAL THERAPY 1/> REGIONS: CPT | Mod: GO | Performed by: OCCUPATIONAL THERAPIST

## 2024-08-20 PROCEDURE — 250N000011 HC RX IP 250 OP 636

## 2024-08-20 PROCEDURE — 83735 ASSAY OF MAGNESIUM: CPT

## 2024-08-20 PROCEDURE — 80051 ELECTROLYTE PANEL: CPT

## 2024-08-20 PROCEDURE — 84295 ASSAY OF SERUM SODIUM: CPT

## 2024-08-20 PROCEDURE — 999N000111 HC STATISTIC OT IP EVAL DEFER

## 2024-08-20 PROCEDURE — 84145 PROCALCITONIN (PCT): CPT | Performed by: INTERNAL MEDICINE

## 2024-08-20 PROCEDURE — 85025 COMPLETE CBC W/AUTO DIFF WBC: CPT

## 2024-08-20 PROCEDURE — 999N000147 HC STATISTIC PT IP EVAL DEFER

## 2024-08-20 PROCEDURE — 36415 COLL VENOUS BLD VENIPUNCTURE: CPT

## 2024-08-20 PROCEDURE — 120N000002 HC R&B MED SURG/OB UMMC

## 2024-08-20 PROCEDURE — 80053 COMPREHEN METABOLIC PANEL: CPT

## 2024-08-20 PROCEDURE — 99232 SBSQ HOSP IP/OBS MODERATE 35: CPT | Performed by: INTERNAL MEDICINE

## 2024-08-20 PROCEDURE — 82374 ASSAY BLOOD CARBON DIOXIDE: CPT

## 2024-08-20 PROCEDURE — 99232 SBSQ HOSP IP/OBS MODERATE 35: CPT | Mod: GC | Performed by: INTERNAL MEDICINE

## 2024-08-20 RX ORDER — FUROSEMIDE 10 MG/ML
60 INJECTION INTRAMUSCULAR; INTRAVENOUS ONCE
Status: CANCELLED | OUTPATIENT
Start: 2024-08-20

## 2024-08-20 RX ORDER — FUROSEMIDE 20 MG
20 TABLET ORAL DAILY
Status: DISCONTINUED | OUTPATIENT
Start: 2024-08-20 | End: 2024-08-21 | Stop reason: HOSPADM

## 2024-08-20 RX ORDER — HYDROXYZINE HYDROCHLORIDE 25 MG/1
25 TABLET, FILM COATED ORAL 4 TIMES DAILY
Status: CANCELLED | OUTPATIENT
Start: 2024-08-20

## 2024-08-20 RX ORDER — HYDROMORPHONE HYDROCHLORIDE 1 MG/ML
0.3 INJECTION, SOLUTION INTRAMUSCULAR; INTRAVENOUS; SUBCUTANEOUS EVERY 6 HOURS PRN
Status: DISCONTINUED | OUTPATIENT
Start: 2024-08-20 | End: 2024-08-21 | Stop reason: HOSPADM

## 2024-08-20 RX ORDER — DULOXETIN HYDROCHLORIDE 30 MG/1
30 CAPSULE, DELAYED RELEASE ORAL DAILY
Status: DISCONTINUED | OUTPATIENT
Start: 2024-08-21 | End: 2024-08-21 | Stop reason: HOSPADM

## 2024-08-20 RX ORDER — DULOXETIN HYDROCHLORIDE 20 MG/1
40 CAPSULE, DELAYED RELEASE ORAL DAILY
Status: DISCONTINUED | OUTPATIENT
Start: 2024-08-20 | End: 2024-08-20

## 2024-08-20 RX ORDER — ACETAMINOPHEN 325 MG/1
650 TABLET ORAL 4 TIMES DAILY
Status: DISCONTINUED | OUTPATIENT
Start: 2024-08-20 | End: 2024-08-21 | Stop reason: HOSPADM

## 2024-08-20 RX ORDER — MULTIPLE VITAMINS W/ MINERALS TAB 9MG-400MCG
1 TAB ORAL DAILY
Status: DISCONTINUED | OUTPATIENT
Start: 2024-08-20 | End: 2024-08-21 | Stop reason: HOSPADM

## 2024-08-20 RX ORDER — FOLIC ACID 1 MG/1
1 TABLET ORAL DAILY
Status: DISCONTINUED | OUTPATIENT
Start: 2024-08-20 | End: 2024-08-21 | Stop reason: HOSPADM

## 2024-08-20 RX ADMIN — ACETAMINOPHEN 650 MG: 325 TABLET ORAL at 11:25

## 2024-08-20 RX ADMIN — GUAIFENESIN 600 MG: 600 TABLET ORAL at 19:44

## 2024-08-20 RX ADMIN — FOLIC ACID 1 MG: 1 TABLET ORAL at 11:25

## 2024-08-20 RX ADMIN — DULOXETINE HYDROCHLORIDE 40 MG: 20 CAPSULE, DELAYED RELEASE ORAL at 11:25

## 2024-08-20 RX ADMIN — ACETAMINOPHEN 650 MG: 325 TABLET ORAL at 15:27

## 2024-08-20 RX ADMIN — GABAPENTIN 300 MG: 300 CAPSULE ORAL at 19:44

## 2024-08-20 RX ADMIN — HYDROMORPHONE HYDROCHLORIDE 0.3 MG: 1 INJECTION, SOLUTION INTRAMUSCULAR; INTRAVENOUS; SUBCUTANEOUS at 05:33

## 2024-08-20 RX ADMIN — HYDROXYZINE HYDROCHLORIDE 25 MG: 25 TABLET ORAL at 08:59

## 2024-08-20 RX ADMIN — HYDROMORPHONE HYDROCHLORIDE 0.3 MG: 1 INJECTION, SOLUTION INTRAMUSCULAR; INTRAVENOUS; SUBCUTANEOUS at 09:00

## 2024-08-20 RX ADMIN — THIAMINE HCL TAB 100 MG 100 MG: 100 TAB at 11:25

## 2024-08-20 RX ADMIN — HYDROCORTISONE SODIUM SUCCINATE 50 MG: 100 INJECTION, POWDER, FOR SOLUTION INTRAMUSCULAR; INTRAVENOUS at 08:49

## 2024-08-20 RX ADMIN — OXYCODONE HYDROCHLORIDE 10 MG: 5 TABLET ORAL at 11:31

## 2024-08-20 RX ADMIN — ACETAMINOPHEN 650 MG: 325 TABLET ORAL at 19:44

## 2024-08-20 RX ADMIN — FUROSEMIDE 20 MG: 20 TABLET ORAL at 11:25

## 2024-08-20 RX ADMIN — GABAPENTIN 300 MG: 300 CAPSULE ORAL at 14:29

## 2024-08-20 RX ADMIN — OXYCODONE HYDROCHLORIDE 10 MG: 5 TABLET ORAL at 00:24

## 2024-08-20 RX ADMIN — HYDROMORPHONE HYDROCHLORIDE 0.3 MG: 1 INJECTION, SOLUTION INTRAMUSCULAR; INTRAVENOUS; SUBCUTANEOUS at 03:08

## 2024-08-20 RX ADMIN — GABAPENTIN 300 MG: 300 CAPSULE ORAL at 08:47

## 2024-08-20 RX ADMIN — POTASSIUM CHLORIDE 40 MEQ: 1.5 POWDER, FOR SOLUTION ORAL at 08:47

## 2024-08-20 RX ADMIN — Medication 1 TABLET: at 11:25

## 2024-08-20 RX ADMIN — HYDROXYZINE HYDROCHLORIDE 25 MG: 25 TABLET ORAL at 17:14

## 2024-08-20 RX ADMIN — POTASSIUM CHLORIDE 40 MEQ: 1.5 POWDER, FOR SOLUTION ORAL at 19:44

## 2024-08-20 RX ADMIN — HYDROMORPHONE HYDROCHLORIDE 0.3 MG: 1 INJECTION, SOLUTION INTRAMUSCULAR; INTRAVENOUS; SUBCUTANEOUS at 15:23

## 2024-08-20 RX ADMIN — ACETAMINOPHEN 650 MG: 325 TABLET ORAL at 06:58

## 2024-08-20 RX ADMIN — POTASSIUM CHLORIDE 40 MEQ: 1.5 POWDER, FOR SOLUTION ORAL at 14:30

## 2024-08-20 RX ADMIN — GUAIFENESIN 600 MG: 600 TABLET ORAL at 08:47

## 2024-08-20 RX ADMIN — ENOXAPARIN SODIUM 40 MG: 40 INJECTION SUBCUTANEOUS at 19:44

## 2024-08-20 RX ADMIN — OXYCODONE HYDROCHLORIDE 10 MG: 5 TABLET ORAL at 06:58

## 2024-08-20 RX ADMIN — OXYCODONE HYDROCHLORIDE 10 MG: 5 TABLET ORAL at 17:14

## 2024-08-20 RX ADMIN — HYDROMORPHONE HYDROCHLORIDE 0.3 MG: 1 INJECTION, SOLUTION INTRAMUSCULAR; INTRAVENOUS; SUBCUTANEOUS at 21:28

## 2024-08-20 ASSESSMENT — ACTIVITIES OF DAILY LIVING (ADL)
ADLS_ACUITY_SCORE: 23
ADLS_ACUITY_SCORE: 24
ADLS_ACUITY_SCORE: 28
ADLS_ACUITY_SCORE: 24
ADLS_ACUITY_SCORE: 28
ADLS_ACUITY_SCORE: 28
ADLS_ACUITY_SCORE: 23
ADLS_ACUITY_SCORE: 28
ADLS_ACUITY_SCORE: 23
ADLS_ACUITY_SCORE: 23
ADLS_ACUITY_SCORE: 28
ADLS_ACUITY_SCORE: 23
ADLS_ACUITY_SCORE: 28
ADLS_ACUITY_SCORE: 23
ADLS_ACUITY_SCORE: 23
ADLS_ACUITY_SCORE: 28
ADLS_ACUITY_SCORE: 28
ADLS_ACUITY_SCORE: 23
ADLS_ACUITY_SCORE: 28
ADLS_ACUITY_SCORE: 28

## 2024-08-20 NOTE — PROGRESS NOTES
Temp: 98.2  F (36.8  C) Temp src: Oral BP: 119/84 Pulse: 91   Resp: 15 SpO2: 100 % O2 Device: None (Room air)        Pt is A&O x4, weaned off oxygen, now on RA sating adequately. VSS. Able to make needs known. On tele, sinus tach. Denies any SOB or chest pain. Pain on foot, somewhat managed with current pan regimen. Potassium is at 2.5 in the AM, replaced with IV KCL + scheduled one, recheck is now at 4.9. Edema on BLE, lymph wraps in place. Pt tolerating regular diet, poor appetite. BG within parameters. PIV infusing 0.45 NaCl  + Kcl @ 50 mL/hr. Continue with POC.

## 2024-08-20 NOTE — PLAN OF CARE
Goal Outcome Evaluation:      Plan of Care Reviewed With: patient    Overall Patient Progress: no change    Outcome Evaluation: continues to progress    Neuro:A/Ox4  Respiratory:WDL on RA  Cardiac:WDL, Denies chest pain  Diet:consistent carb  GI/:voiding spontaneously, passing gas  Incision/Drains: none   IV Access:R PIV infusing maintenance fluids 50 ml/hr , L PIV SL  Pain: managed with PRN dilaudid and oxy  Labs:reviewed  VS:Temp: 98.2  F (36.8  C) Temp src: Oral BP: 119/84 Pulse: 91   Resp: 15 SpO2: 100 % O2 Device: None (Room air)   Activity:ind     New Changes this shift:none   Plan: continue POC

## 2024-08-20 NOTE — PROGRESS NOTES
[facility] INFECTIOUS DISEASES PROGRESS NOTE     Patient:  Maribell Leon   YOB: 1995, MRN: [id number]  Date of Visit: 08/19/2024  Date of Admission: 8/12/2024          ASSESSMENT AND PLAN     Maribell Leon is a 29 year old female presenting with acute respiratory failure and multifocal pneumonia. I told her and her family that we did not find a definitive infectious etiology of her presumed infectious pneumonia. Probably not fungal as she got better with antibiotics alone. If her respiratory failure recurs, then would need BAL samples including histopathology. Would also explore non-infectious causes of pneumonitis.     IMPRESSION  Multifocal pneumonia with right sided pleural effusion  Acute respiratory failure resolved   Leukocytosis secondary to corticosteroid use   Alcohol-associated cirrhosis   Neuropathy from alcohol use     RECOMMENDATIONS:  Stop antibiotics and observe.     ID will continue to follow with you. Please check [facility] for staff covering the [facility] ID service.     Jennifer Overton MD  Infectious Diseases  Pager: [phone]  Generate sunita    MDM: >3 notes and tests reviewed, discussed with primary and pulmonology teams, life threatening illness.          SUBJECTIVE      Interval History and Events:  No dyspnea or chest pain.     Antimicrobial Treatment:  Piptazo 8/12-         OBJECTIVE       Physical Examination:    Temp:  [97.9  F (36.6  C)-98.4  F (36.9  C)] 98.2  F (36.8  C)  Pulse:  [79-95] 91  Resp:  [15-18] 15  BP: (119-126)/(78-90) 119/84  SpO2:  [97 %-100 %] 100 %    I/O last 3 completed shifts:  In: 1251 [P.O.:420; I.V.:831]  Out: 2100 [Urine:2100]    Vitals:    08/12/24 0848 08/15/24 0749 08/16/24 0852 08/17/24 0959   Weight: 55.8 kg (123 lb) 51.5 kg (113 lb 8 oz) 51.4 kg (113 lb 6.4 oz) 51.3 kg (113 lb 1.6 oz)    08/18/24 0927   Weight: 50.5 kg (111 lb 6.4 oz)       Respiratory: Decreased breath sounds, right side   Soft abdomen   Right lower ext swelling     I reviewed the  following Laboratory Data:    WBC 24  CRP 5.22    PCT   8/18 0.29  8/12 0.58    Microbiology:  8/16 GIP - neg   8/14 Histo Ab -   Histo Ag, crypto ag, serum -   8/13 BDG - 143 positive   8/13 Blasto ag, histo ag urine - neg   8/13 Scx - C albicans   8/12 Legio, strep urine - neg   8/12 RPP, MRSA - neg   8/12 R pleural cavity , 9%N  OSH  7/16 BAL afb, asper ag, legionella sp, PJP, fungal and bacterial cx - neg

## 2024-08-20 NOTE — PROGRESS NOTES
Brief Social Work Note    Social work review patient's chart and noted a KIMBER assessment was requested in [facility] dated 8/18/2024.     Requested Med Tg 1 review patient and request a CD assessment if appropriate via Vocera.       ANSON Velez, [facility]  7B   Phone: [phone]

## 2024-08-20 NOTE — PROGRESS NOTES
[facility]    Progress Note - Medicine Service, MARO[facility] TEAM 1       Date of Admission:  8/12/2024    Updates:  -- Doing well on RA  -- Dc'd ax 8/19 PM per ID, observation period.   -- Last day solu-Cortex 8/20, continue insulin    --> Likely can stop insulin and coverage tomorrow 8/21  -- PT/OT eval  -- Analgesia Gabapentin 300mg TID + Voltaren gel + APAP 650mg QID + Duloxetine 40mg every day (start 8/20) + dilaudid 0.3mg q6hrs (changed from q3hrs) + Oxycodone 5-10mg q4hrs prn for severe pain  -- PO Lasix 20mg 8/20 trial, monitor Is & Os    Assessment & Plan      Maribell Leon is a 29 year old female admitted on 8/12/2024. She has a history of cirrhosis, alcohol use disorder, neuropathy, chronic pancreatitis who presented with acute dyspnea and cough found to have AHRF likely 2/2 CAP.      # Pleural effusions, bilateral -- improving   # Acute hypoxic respiratory failure secondary to community acquired PNA, resolved   # Leukocytosis 2/2 demargination   Significantly improved. Now stable on RA, ID dc'd Zosyn + Vanc PM 8/19 after completing course for CAP with steroids, diuresis with significant improvement. Workup remains negative for identified etiology for CAP but wa c/b hydrothorax iso underlying cirrhosis which certainly complicated her disease burden. Lungs without abnormal sounds, no increase WoB 8/20. CTM O2 status, encouraged ambulation and work with PT/OT.   See ID notes for full testing, all negative at this time.  -- Room air, CTM  -- Duonebs QID prn  -- Lasix 20mg PO trial 8/20  -- Sputum cx with C. Albicans, presumed normal oral karrie. Remainder of cultures, NGTD. No need to to CTM.   Pulm recs 8/15, signing off:  Will complete steroid course 8/20 solucortef 50mg x1. (per CAPE COD trial):   -- OP pulm follow up closer to home with repeat chest CT to assess resolution in 6-8 weeks    # Hypokalemia iso diuresis, resolved   Resolved overnight following  diuretics holiday. Giving 20mg Lasix PO this AM to trial for OP use given ongoing c/f LE edema and worsening in OP setting. CTM K on lyte panels.   -- serial electrolyte panels to monitor   -- PO repletion ordered     # Hyperglycemia iso steroid use, continued    # H/o chronic pancreatitis   Did consider pancreatogenic diabetes vs exocrine insufficiency in this pt given history with her ongoing and significant hyperglycemia overnight c/f DKA vs HHS. A1c yesterday 5.3%, BGmax ~600s overnight with negative KBH-butyrate and no significant gap on chemistry. Lipase wnl, no change in LFTs. No h/o significant n/v/d, no increased WoB/Kussmaul concerns. Based on work up and evaluation, pt is likely not managing the elevated dosing of steroids at this time and therefore is becoming reactively hyperglycemic. Will treat as steroid-induced hyperglycemia as below.  -- 16u NPH daily   -- 1:20 CHO TID with meals and snacks   -- High insulin resistance coverage ordered   -- Hypoglycemia protocol   -- CTM as steroid dosing decreases     # Macrocytic anemia, stable  #AoCI iso EtOH use disorder  MMA pending still, anemia stable     # Hypokalemia 2/2 daily diuresis, ongoing  Likely 2/2 diuresis in the ED and while inpatient given use of Lasix. No CHANTELL, no vomiting. Some diarrhea could be contributing as well.   -- Replete per protocol but have changed to oral repletion instead of IV, CTM qAM     # RLE pain > LLE, c/f neuropathy iso EtOH use disorder, liver disease  # LE swelling   Pain management ongoing while here, working on dc'ing IV analgesics and moving to non-opioid orals prior to discharge as goal, but OK with pt having to titrate down PO oxycodone at OP with small few dose regimen if need be. Current analgesia regimen altered with addition of Duloxetine 40mg daily 8/20 and increase in timespan between IV dilaudid dosing, also scheduled APAP QID for her. Continuing to offer prn Atarax for pruritus and anxiety, will likely  discharge with some prns.   -- Analgesia Gabapentin 300mg TID + Voltaren gel + APAP 650mg QID + Duloxetine 40mg every day (start 8/20) + dilaudid 0.3mg q6hrs (changed from q3hrs) + Oxycodone 5-10mg q4hrs prn for severe pain  -- Atarax 25mg QID prn    # Volume overload  # Hepatic hydrothorax, R - resolved   Given c/f re-collection of pleural effusions/hepatic hydrothorax, continuing diuresis as noted above.   -- 8/20 Lasix 20mg PO to eval response   -- Lymphedema consult    # Cirrhosis iso EtOH use disorder (sober)  # Hypoalbuminemia  # Elevated INR  Likely secondary to alcohol use. Not decompensated.   Nutrition saw pt, supplementing with thiamine, folic acid, MVI per their recommendations.   -- Low sodium diet   -- 2L fluid restriction  -- Likely start diuretic at discharge given concerns for chronic edema in OP per family, trialling Lasix 20mg PO 8/20       Diet: Fluid restriction 2000 ML FLUID  Moderate Consistent Carb (60 g CHO per Meal) Diet    DVT Prophylaxis: Lovenox ppx  [place] Catheter: Not present  Fluids: N/A  Lines: None     Cardiac Monitoring: ACTIVE order. Indication: Electrolyte Imbalance (24 hours)- Magnesium <1.3 mg/ml; Potassium < =2.8 or > 5.5 mg/ml  Code Status: Full Code       The patient's care was discussed with the Attending Physician, Dr. Car .      Latonya Olivo MD  Medicine Service, [facility] TEAM 1  [facility]  Securely message with [employer] (more info)  Text page via [facility] Paging/Directory   See signed in provider for up to date coverage information  ______________________________________________________________________    Overnight events:   Pt is significantly improved today, comfortable on room air and without significant pain. Is looking forward to going home, pt's mom Gabriella present in room with questions on medications, lab results. Pt is wondering about long term needs for insulin and any lung scarring that would remain 2/2 two  severe lung infections this summer. No new or worsened symptoms today.       Physical Exam   Vital Signs: Temp: 97.9  F (36.6  C) Temp src: Oral BP: (!) 132/94 Pulse: 94   Resp: 18 SpO2: 94 % O2 Device: None (Room air)    Weight: 111 lbs 6.4 oz    General Appearance: Pleasant, well appearing and appropriate. Interactive and sitting up in bed, smiling.   Respiratory: WoB improving. BS improved greatly on exam, particularly in bases. Scant wheezing.   Cardiovascular: RRR, normal S1, S2, no murmurs  GI: Abdomen mildly distended, soft, non tender.   Extremities: Erythema and swelling on anterior L wrist in area of ABG draws c/w hematoma. Does not extend past mid-forearm. Bilateral 2+ pitting edema, tenderness of RLE with palpation. Well healing small, circumferential wounds to dorsal aspect of RLE on foot and ankle. No surrounding erythema, warmth, drainage.       Please see A&P for additional details of medical decision making.      Data     I have personally reviewed the following data over the past 24 hrs:    23.8 (H)  \   8.0 (L)   / 140 (L)     139 105 8.4 /  77   4.8 23 0.64 \     ALT: 13 AST: 59 (H) AP: 74 TBILI: 1.5 (H)   ALB: 3.2 (L) TOT PROTEIN: 5.5 (L) LIPASE: N/A     Procal: 0.33 CRP: N/A Lactic Acid: N/A         Imaging results reviewed over the past 24 hrs:   No results found for this or any previous visit (from the past 24 hour(s)).

## 2024-08-20 NOTE — PROGRESS NOTES
[facility] INFECTIOUS DISEASES PROGRESS NOTE     Patient:  Maribell Leon   YOB: 1995, MRN: [id number]  Date of Visit: 08/20/2024  Date of Admission: 8/12/2024          ASSESSMENT AND PLAN     Maribell Leon is a 29 year old female presenting with acute respiratory failure and multifocal pneumonia. We did not find a definitive infectious etiology of her presumed infectious pneumonia. I recommend close follow up with pulmonology as they can advise on follow up imaging. I recommended to her that she be updated on all her vaccines especially flu and covid as she would be more prone to infections due to damage to her lungs.      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   Outpatient pulmonology follow up.     ID will sign off. Please check [facility] for staff covering the [facility] ID service.     Jennifer Overton MD  Infectious Diseases  Pager: [phone]  [employer]         SUBJECTIVE      Interval History and Events:  Feeling well.     Antimicrobial Treatment:  Piptazo 8/12-8/19         OBJECTIVE     Physical Examination:    Temp:  [97.9  F (36.6  C)-98  F (36.7  C)] 98  F (36.7  C)  Pulse:  [94-98] 98  Resp:  [16-18] 16  BP: (116-132)/(65-94) 123/86  FiO2 (%):  [21 %] 21 %  SpO2:  [91 %-97 %] 97 %    I/O last 3 completed shifts:  In: 640 [P.O.:640]  Out: 550 [Urine:200; Other:350]    Vitals:    08/12/24 0848 08/15/24 0749 08/16/24 0852 08/17/24 0959   Weight: 55.8 kg (123 lb) 51.5 kg (113 lb 8 oz) 51.4 kg (113 lb 6.4 oz) 51.3 kg (113 lb 1.6 oz)    08/18/24 0927   Weight: 50.5 kg (111 lb 6.4 oz)     Constitutional: Awake, alert, interactive.  Respiratory: Decreased breath sounds, right side   Left mostly clear     I reviewed the following Laboratory Data:    PCT   8/20 0.33  8/18 0.29  8/12  0.58    Microbiology:  8/16 GIP - neg   8/14 Histo Ab -   Histo Ag, crypto ag, serum -   8/13 BDG - 143 positive   8/13 Blasto ag, histo ag urine - neg   8/13 Scx - C albicans   8/12 Legio, strep urine - neg   8/12 RPP, MRSA - neg   8/12 R pleural cavity , 9%N  OSH  7/16 BAL afb, asper ag, legionella sp, PJP, fungal and bacterial cx - neg

## 2024-08-20 NOTE — PLAN OF CARE
Vitals:    08/19/24 1528 08/19/24 2208 08/20/24 0802 08/20/24 1100   BP: 119/84 116/65 (!) 132/94    BP Location: Right arm Right arm Right arm    Pulse: 91 95 96 94   Resp: 15 16 18    Temp: 98.2  F (36.8  C) 98  F (36.7  C) 97.9  F (36.6  C)    TempSrc: Oral Oral Oral    SpO2: 100% 95% 91% 94%   Weight:       Height:          Problem: Adult Inpatient Plan of Care  Goal: Absence of Hospital-Acquired Illness or Injury  Intervention: Prevent Infection  Recent Flowsheet Documentation  Taken 8/20/2024 1007 by Augustus Us RN  Infection Prevention:   environmental surveillance performed   hand hygiene promoted   rest/sleep promoted   equipment surfaces disinfected   personal protective equipment utilized       Problem: Comorbidity Management  Goal: Blood Glucose Levels Within Targeted Range  Intervention: Monitor and Manage Glycemia  Recent Flowsheet Documentation  Taken 8/20/2024 1007 by Augustus Us RN  Medication Review/Management: medications reviewed with in parameter   Sliding scale and carb coverage   Problem: Fall Injury Risk  Goal: Absence of Fall and Fall-Related Injury  Intervention: Promote Injury-Free Environment  Recent Flowsheet Documentation  Taken 8/20/2024 1007 by Augustus Us RN  Safety Promotion/Fall Prevention:   lighting adjusted   nonskid shoes/slippers when out of bed   clutter free environment maintained   assistive device/personal items within reach   patient and family education   room near nurse's station   room organization consistent   safety round/check completed   supervised activity   activity supervised   increase visualization of patient   Pain management alternating with po and IV dilaudid   Voiding adequate, tolerating intake, +BS, up and ambulated   Telemetry discontinued, continue with plan of care.

## 2024-08-20 NOTE — PROVIDER NOTIFICATION
Will change Nebs to PRN per patient request and patients improved respiratory status.     08/19/24 4083   RCAT Assessment   Reason for Assessment Other (see comments)   Pulmonary Status 0   Surgical Status 0   Chest X-ray 1  (pt hasn't had cxr since 8/15)   Respiratory Pattern 0   Mental Status 0   Breath Sounds 0   Cough Effectiveness 0   Level of Activity 0   O2 Required for SpO2>=92% 0   Acuity Level (points) 1   Acuity Level  5

## 2024-08-20 NOTE — PLAN OF CARE
Occupational Therapy: Orders received. Chart reviewed and discussed with care team.? Occupational Therapy not indicated due to pt mobilizing SBA-Mod I with PT, Mod I with ADLs and declining OT needs at this time. Anticipate pt safe to discharge home with assist from mother PRN. Defer discharge recommendations to Medical Team.? Will complete orders.

## 2024-08-20 NOTE — PLAN OF CARE
7B cancel/defer         Physical Therapy: Orders received. Chart reviewed and discussed with care team.? Physical Therapy not indicated due to current mobility status at baseline. Pt demonstrated bed mobility, STS, and gait with FWW w/ SBA to [place]. Pt denied mobility concerns regarding discharge to home?setting. Pt would benefit from OP PT for potential AFO for RLE. Defer discharge recommendations to medical team, anticipate return to mom's home with PRN assist and OP PT .? Will complete orders.

## 2024-08-21 ENCOUNTER — TELEPHONE (OUTPATIENT)
Dept: INTERNAL MEDICINE | Facility: CLINIC | Age: 29
End: 2024-08-21
Payer: MEDICAID

## 2024-08-21 ENCOUNTER — APPOINTMENT (OUTPATIENT)
Dept: OCCUPATIONAL THERAPY | Facility: CLINIC | Age: 29
End: 2024-08-21
Payer: MEDICAID

## 2024-08-21 VITALS
OXYGEN SATURATION: 96 % | HEART RATE: 95 BPM | TEMPERATURE: 98.4 F | SYSTOLIC BLOOD PRESSURE: 129 MMHG | HEIGHT: 60 IN | RESPIRATION RATE: 16 BRPM | DIASTOLIC BLOOD PRESSURE: 93 MMHG | BODY MASS INDEX: 23.15 KG/M2 | WEIGHT: 117.9 LBS

## 2024-08-21 DIAGNOSIS — J18.9 PNEUMONIA OF BOTH LUNGS DUE TO INFECTIOUS ORGANISM, UNSPECIFIED PART OF LUNG: Primary | ICD-10-CM

## 2024-08-21 LAB
ANION GAP SERPL CALCULATED.3IONS-SCNC: 10 MMOL/L (ref 7–15)
ANION GAP SERPL CALCULATED.3IONS-SCNC: 9 MMOL/L (ref 7–15)
BASOPHILS # BLD AUTO: 0 10E3/UL (ref 0–0.2)
BASOPHILS NFR BLD AUTO: 0 %
CHLORIDE SERPL-SCNC: 104 MMOL/L (ref 98–107)
CHLORIDE SERPL-SCNC: 106 MMOL/L (ref 98–107)
CHLORIDE SERPL-SCNC: 106 MMOL/L (ref 98–107)
CHLORIDE SERPL-SCNC: 107 MMOL/L (ref 98–107)
CREAT SERPL-MCNC: 0.62 MG/DL (ref 0.51–0.95)
EGFRCR SERPLBLD CKD-EPI 2021: >90 ML/MIN/1.73M2
EOSINOPHIL # BLD AUTO: 0.4 10E3/UL (ref 0–0.7)
EOSINOPHIL NFR BLD AUTO: 2 %
ERYTHROCYTE [DISTWIDTH] IN BLOOD BY AUTOMATED COUNT: 16.3 % (ref 10–15)
GLUCOSE BLDC GLUCOMTR-MCNC: 105 MG/DL (ref 70–99)
GLUCOSE BLDC GLUCOMTR-MCNC: 121 MG/DL (ref 70–99)
GLUCOSE BLDC GLUCOMTR-MCNC: 126 MG/DL (ref 70–99)
HCO3 SERPL-SCNC: 20 MMOL/L (ref 22–29)
HCO3 SERPL-SCNC: 20 MMOL/L (ref 22–29)
HCO3 SERPL-SCNC: 21 MMOL/L (ref 22–29)
HCO3 SERPL-SCNC: 21 MMOL/L (ref 22–29)
HCT VFR BLD AUTO: 28.2 % (ref 35–47)
HGB BLD-MCNC: 8.6 G/DL (ref 11.7–15.7)
HOLD SPECIMEN: NORMAL
IMM GRANULOCYTES # BLD: 0.2 10E3/UL
IMM GRANULOCYTES NFR BLD: 1 %
LYMPHOCYTES # BLD AUTO: 6.4 10E3/UL (ref 0.8–5.3)
LYMPHOCYTES NFR BLD AUTO: 28 %
MAGNESIUM SERPL-MCNC: 1.6 MG/DL (ref 1.7–2.3)
MCH RBC QN AUTO: 31 PG (ref 26.5–33)
MCHC RBC AUTO-ENTMCNC: 30.5 G/DL (ref 31.5–36.5)
MCV RBC AUTO: 102 FL (ref 78–100)
MONOCYTES # BLD AUTO: 1.4 10E3/UL (ref 0–1.3)
MONOCYTES NFR BLD AUTO: 6 %
NEUTROPHILS # BLD AUTO: 14.1 10E3/UL (ref 1.6–8.3)
NEUTROPHILS NFR BLD AUTO: 63 %
NRBC # BLD AUTO: 0 10E3/UL
NRBC BLD AUTO-RTO: 0 /100
PLATELET # BLD AUTO: 148 10E3/UL (ref 150–450)
PLATELET # BLD AUTO: ABNORMAL 10*3/UL
POTASSIUM SERPL-SCNC: 4.3 MMOL/L (ref 3.4–5.3)
POTASSIUM SERPL-SCNC: 4.5 MMOL/L (ref 3.4–5.3)
POTASSIUM SERPL-SCNC: 4.5 MMOL/L (ref 3.4–5.3)
POTASSIUM SERPL-SCNC: 4.7 MMOL/L (ref 3.4–5.3)
POTASSIUM SERPL-SCNC: 4.7 MMOL/L (ref 3.4–5.3)
RBC # BLD AUTO: 2.77 10E6/UL (ref 3.8–5.2)
SODIUM SERPL-SCNC: 134 MMOL/L (ref 135–145)
SODIUM SERPL-SCNC: 135 MMOL/L (ref 135–145)
SODIUM SERPL-SCNC: 136 MMOL/L (ref 135–145)
SODIUM SERPL-SCNC: 137 MMOL/L (ref 135–145)
WBC # BLD AUTO: 22.5 10E3/UL (ref 4–11)

## 2024-08-21 PROCEDURE — 83735 ASSAY OF MAGNESIUM: CPT | Performed by: STUDENT IN AN ORGANIZED HEALTH CARE EDUCATION/TRAINING PROGRAM

## 2024-08-21 PROCEDURE — 99238 HOSP IP/OBS DSCHRG MGMT 30/<: CPT | Mod: GC | Performed by: INTERNAL MEDICINE

## 2024-08-21 PROCEDURE — 85049 AUTOMATED PLATELET COUNT: CPT

## 2024-08-21 PROCEDURE — 250N000013 HC RX MED GY IP 250 OP 250 PS 637

## 2024-08-21 PROCEDURE — 82565 ASSAY OF CREATININE: CPT

## 2024-08-21 PROCEDURE — 80051 ELECTROLYTE PANEL: CPT

## 2024-08-21 PROCEDURE — 36415 COLL VENOUS BLD VENIPUNCTURE: CPT

## 2024-08-21 PROCEDURE — 85048 AUTOMATED LEUKOCYTE COUNT: CPT

## 2024-08-21 PROCEDURE — 250N000011 HC RX IP 250 OP 636

## 2024-08-21 PROCEDURE — 97140 MANUAL THERAPY 1/> REGIONS: CPT | Mod: GO | Performed by: OCCUPATIONAL THERAPIST

## 2024-08-21 RX ORDER — LANOLIN ALCOHOL/MO/W.PET/CERES
100 CREAM (GRAM) TOPICAL DAILY
Qty: 30 TABLET | Refills: 0 | Status: SHIPPED | OUTPATIENT
Start: 2024-08-22 | End: 2024-09-16

## 2024-08-21 RX ORDER — GABAPENTIN 300 MG/1
300 CAPSULE ORAL 3 TIMES DAILY
Qty: 90 CAPSULE | Refills: 0 | Status: SHIPPED | OUTPATIENT
Start: 2024-08-21 | End: 2024-09-16

## 2024-08-21 RX ORDER — ACETAMINOPHEN 325 MG/1
650 TABLET ORAL 3 TIMES DAILY
Qty: 90 TABLET | Refills: 0 | Status: SHIPPED | OUTPATIENT
Start: 2024-08-21

## 2024-08-21 RX ORDER — DULOXETIN HYDROCHLORIDE 30 MG/1
30 CAPSULE, DELAYED RELEASE ORAL DAILY
Qty: 30 CAPSULE | Refills: 0 | Status: SHIPPED | OUTPATIENT
Start: 2024-08-22 | End: 2024-09-16

## 2024-08-21 RX ORDER — FUROSEMIDE 20 MG
20 TABLET ORAL DAILY
Qty: 30 TABLET | Refills: 0 | Status: SHIPPED | OUTPATIENT
Start: 2024-08-22 | End: 2024-09-16

## 2024-08-21 RX ORDER — HYDROXYZINE HYDROCHLORIDE 25 MG/1
25 TABLET, FILM COATED ORAL EVERY 6 HOURS PRN
Qty: 120 TABLET | Refills: 0 | Status: SHIPPED | OUTPATIENT
Start: 2024-08-21 | End: 2024-09-16

## 2024-08-21 RX ORDER — MULTIPLE VITAMINS W/ MINERALS TAB 9MG-400MCG
1 TAB ORAL DAILY
Qty: 30 TABLET | Refills: 0 | Status: SHIPPED | OUTPATIENT
Start: 2024-08-22 | End: 2024-09-16

## 2024-08-21 RX ORDER — OXYCODONE HYDROCHLORIDE 5 MG/1
5 TABLET ORAL EVERY 4 HOURS PRN
Qty: 10 TABLET | Refills: 0 | Status: SHIPPED | OUTPATIENT
Start: 2024-08-21 | End: 2024-09-16

## 2024-08-21 RX ORDER — AMOXICILLIN 250 MG
1 CAPSULE ORAL 2 TIMES DAILY PRN
Qty: 15 TABLET | Refills: 0 | Status: SHIPPED | OUTPATIENT
Start: 2024-08-21

## 2024-08-21 RX ADMIN — ACETAMINOPHEN 650 MG: 325 TABLET ORAL at 09:08

## 2024-08-21 RX ADMIN — HYDROMORPHONE HYDROCHLORIDE 0.3 MG: 1 INJECTION, SOLUTION INTRAMUSCULAR; INTRAVENOUS; SUBCUTANEOUS at 03:34

## 2024-08-21 RX ADMIN — Medication 1 TABLET: at 09:08

## 2024-08-21 RX ADMIN — DULOXETINE HYDROCHLORIDE 30 MG: 30 CAPSULE, DELAYED RELEASE ORAL at 09:07

## 2024-08-21 RX ADMIN — OXYCODONE HYDROCHLORIDE 10 MG: 5 TABLET ORAL at 06:39

## 2024-08-21 RX ADMIN — GUAIFENESIN 600 MG: 600 TABLET ORAL at 09:08

## 2024-08-21 RX ADMIN — POTASSIUM CHLORIDE 40 MEQ: 1.5 POWDER, FOR SOLUTION ORAL at 09:08

## 2024-08-21 RX ADMIN — FUROSEMIDE 20 MG: 20 TABLET ORAL at 09:08

## 2024-08-21 RX ADMIN — HYDROXYZINE HYDROCHLORIDE 25 MG: 25 TABLET ORAL at 06:39

## 2024-08-21 RX ADMIN — THIAMINE HCL TAB 100 MG 100 MG: 100 TAB at 09:08

## 2024-08-21 RX ADMIN — FOLIC ACID 1 MG: 1 TABLET ORAL at 09:08

## 2024-08-21 RX ADMIN — HYDROMORPHONE HYDROCHLORIDE 0.3 MG: 1 INJECTION, SOLUTION INTRAMUSCULAR; INTRAVENOUS; SUBCUTANEOUS at 09:12

## 2024-08-21 RX ADMIN — OXYCODONE HYDROCHLORIDE 10 MG: 5 TABLET ORAL at 11:30

## 2024-08-21 RX ADMIN — GABAPENTIN 300 MG: 300 CAPSULE ORAL at 09:08

## 2024-08-21 ASSESSMENT — ACTIVITIES OF DAILY LIVING (ADL)
ADLS_ACUITY_SCORE: 28

## 2024-08-21 NOTE — PROGRESS NOTES
Lymphedema Discharge Summary    Reason for therapy discharge:    Discharged to home.    Progress towards therapy goal(s). See goals on Care Plan in [facility] electronic health record for goal details.  Goals partially met.  Barriers to achieving goals:   discharge from facility.    Therapy recommendation(s):    Continued therapy is recommended.  Rationale/Recommendations:  Continued OP lymphedema is recommended to assist with fluid mobility and long term edema mgmt.

## 2024-08-21 NOTE — PLAN OF CARE
Vitals:    08/20/24 1536 08/20/24 2238 08/21/24 0756 08/21/24 0911   BP: 123/86 125/83 (!) 129/93    BP Location: Right arm Right arm Right arm    Pulse: 98 83 95    Resp: 16 16 16    Temp: 98  F (36.7  C) 97.8  F (36.6  C) 98.4  F (36.9  C)    TempSrc: Oral Oral Oral    SpO2: 97% 98% 96%    Weight:    53.5 kg (117 lb 14.4 oz)   Height:           Neuro: alert and oriented     VS: afebrile slight tachy, sating 96% on room air non labor breathing, lungs clear     BG: with in parameter     Cardiac: 95    Pain/Nausea: denies any nausea, IV and oral pain med with better management    Lines/Drains: PIV x 2 SL     GI/:voiding adequate, passing gas + BS    Diet/Appetite: tolerating intake     Activity: up independently     Plan: discharge script written teaching has been done, a copy of discharge provided   Pt discharged home .

## 2024-08-21 NOTE — PLAN OF CARE
Goal Outcome Evaluation:      Plan of Care Reviewed With: patient    Overall Patient Progress: no change  Outcome Evaluation: continues to progress      Neuro:A/Ox4  Respiratory:WDL on RA  Cardiac:WDL, Denies chest pain  Diet:consistent carb  GI/:voiding spontaneously, passing gas  Incision/Drains: none   IV Access:R PIV SL , L PIV SL  Pain: managed with PRN dilaudid and oxy  Labs:reviewed  VS:Temp: 97.8  F (36.6  C) Temp src: Oral BP: 125/83 Pulse: 83   Resp: 16 SpO2: 98 % O2 Device: None (Room air)     Activity:ind     New Changes this shift:none   Plan: continue POC

## 2024-08-21 NOTE — DISCHARGE SUMMARY
[facility]  Discharge Summary - Medicine & Pediatrics       Date of Admission:  8/12/2024  Date of Discharge:  8/21/2024  2:07 PM  Discharging Provider: Dr Daugherty  Discharge Service: Medicine Service, SAVANNA[facility] TEAM 1    Discharge Diagnoses   # Pleural effusions, bilateral  # Acute hypoxic respiratory failure secondary to community acquired PNA, resolved   # Leukocytosis 2/2 demargination   # Hypokalemia iso diuresis, resolved   # Hyperglycemia iso steroid use, continued    # H/o chronic pancreatitis   # Macrocytic anemia, stable  #AoCI iso EtOH use disorder  # Hypokalemia 2/2 daily diuresis, ongoing   # RLE pain > LLE, c/f neuropathy iso EtOH use disorder, liver disease  # Volume overload  # Hepatic hydrothorax, R - improved   # Cirrhosis iso EtOH use disorder (sober)  # Hypoalbuminemia  # Elevated INR    Clinically Significant Risk Factors          Follow-ups Needed After Discharge   Follow-up Appointments     Adult [facility] Follow-up and recommended labs and tests      Follow up with meLatonya MD, within 1 week. to evaluate   medication change and for hospital follow- up.  The following labs/tests   are recommended: CBC, CMP, consider PFTs.  Follow up with Hepatology, at (location with clinic name or city) [facility],   within 2-4 weeks  to evaluate medication change and for hospital follow-   up.   Follow up with Pulmonology in 6-8 weeks. Please have CT chest completed   prior to the visit   The following labs/tests are recommended: Labs, repeat CT scan with   pulmonology.    Appointments on [place] and/or [facility] (with [facility] or [facility]   provider or service). Call [phone] if you haven't heard regarding   these appointments within 7 days of discharge.            Unresulted Labs Ordered in the Past 30 Days of this Admission       Date and Time Order Name Status Description    8/16/2024  7:31 AM Methylmalonic Acid In process     8/13/2024  7:41 AM Fungal  or Yeast Culture Routine Preliminary         These results will be followed up by PCP    Discharge Disposition   Discharged to home  Condition at discharge: Stable    Hospital Course   Maribell Leon is a 29 year old female admitted on 8/12/2024. She has a history of cirrhosis, alcohol use disorder, neuropathy, chronic pancreatitis who presented with acute dyspnea and cough found to have AHRF likely 2/2 CAP.     # Pleural effusions, bilateral -- improving   # Acute hypoxic respiratory failure secondary to community acquired PNA, resolved   # Leukocytosis 2/2 demargination   Significantly improved. Now stable on RA, ID dc'd Zosyn + Vanc PM 8/19 after completing course for CAP with steroids, diuresis with significant improvement. Workup remains negative for identified etiology for CAP but wa c/b hydrothorax iso underlying cirrhosis which certainly complicated her disease burden. Lungs without abnormal sounds, no increase WoB 8/20. CTM O2 status, encouraged ambulation and work with PT/OT.   See ID notes for full testing, all negative at this time.  -- Room air, CTM  -- Duonebs QID prn  -- Lasix 20mg PO trial 8/20  -- Sputum cx with C. Albicans, presumed normal oral karrie. Remainder of cultures, NGTD. No need to to CTM.   Pulm recs 8/15, signing off:  Completed steroid course 8/20 solucortef 50mg x1. (per CAPE COD trial):   --  outpatient OP pulm follow up with repeat chest CT to assess resolution in 6-8 weeks     # Hypokalemia iso diuresis, resolved   Resolved overnight following diuretics holiday. Giving 20mg Lasix PO this AM to trial for OP use given ongoing c/f LE edema and worsening in OP setting. CTM K on lyte panels.   -- serial electrolyte panels to monitor   -- PO repletion ordered while inpatient      # Hyperglycemia iso steroid use, continued    # H/o chronic pancreatitis   Did consider pancreatogenic diabetes vs exocrine insufficiency in this pt given history with her ongoing and significant hyperglycemia  overnight c/f DKA vs HHS. A1c yesterday 5.3%, BGmax ~600s overnight with negative KBH-butyrate and no significant gap on chemistry. Lipase wnl, no change in LFTs. No h/o significant n/v/d, no increased WoB/Kussmaul concerns. Based on work up and evaluation, pt is likely not managing the elevated dosing of steroids at this time and therefore is becoming reactively hyperglycemic. Will treat as steroid-induced hyperglycemia as below.  -- 16u NPH daily   -- 1:20 CHO TID with meals and snacks   -- High insulin resistance coverage ordered   -- Hypoglycemia protocol   -- CTM as steroid dosing decreases      # Macrocytic anemia, stable  #AoCI iso EtOH use disorder  MMA pending still, anemia stable      # Hypokalemia 2/2 daily diuresis, ongoing  Likely 2/2 diuresis in the ED and while inpatient given use of Lasix. No CHANTELL, no vomiting. Some diarrhea could be contributing as well.   -- Replete per protocol but have changed to oral repletion instead of IV, CTM qAM      # RLE pain > LLE, c/f neuropathy iso EtOH use disorder, liver disease  # LE swelling   Pain management ongoing while here, working on dc'ing IV analgesics and moving to non-opioid orals prior to discharge as goal, but OK with pt having to titrate down PO oxycodone at OP with small few dose regimen if need be. Current analgesia regimen altered with addition of Duloxetine 40mg daily 8/20 and increase in timespan between IV dilaudid dosing, also scheduled APAP QID for her. Continuing to offer prn Atarax for pruritus and anxiety, will likely discharge with some prns.   -- Analgesia Gabapentin 300mg TID + Voltaren gel + APAP 650mg QID + Duloxetine 40mg every day (start 8/20) + dilaudid 0.3mg q6hrs (changed from q3hrs) + Oxycodone 5-10mg q4hrs prn for severe pain while inpatient advised to taper off after discharge   -- Atarax 25mg QID prn  -- Duloxetine 30 mg daily      # Volume overload  # Hepatic hydrothorax, R - resolved   Given c/f re-collection of pleural  effusions/hepatic hydrothorax, continuing diuresis as noted above.   -- Will discharge on Lasix 20mg PO   -- Lymphedema consult, with outpatient follow up      # Cirrhosis iso EtOH use disorder (sober)  # Hypoalbuminemia  # Elevated INR  Likely secondary to alcohol use. Not decompensated.   Nutrition saw pt, supplementing with thiamine, folic acid, MVI per their recommendations.   -- Low sodium diet   -- 2L fluid restriction  -- Lasix 20 po Daily   -- Follow up with hepatology     Consultations This Hospital Stay   PHARMACY TO DOSE VANC  INTERNAL MEDICINE PROCEDURE TEAM ADULT IP CONSULT [place] - THORACENTESIS  INFECTIOUS DISEASE GENERAL ADULT IP CONSULT  PULMONARY GENERAL ADULT IP CONSULT  LYMPHEDEMA THERAPY IP CONSULT  PULMONARY GENERAL ADULT IP CONSULT  NURSING TO CONSULT FOR VASCULAR ACCESS CARE IP CONSULT  PHARMACY IP CONSULT  CARE MANAGEMENT / SOCIAL WORK IP CONSULT  PULMONARY GENERAL ADULT IP CONSULT  PHYSICAL THERAPY ADULT IP CONSULT  OCCUPATIONAL THERAPY ADULT IP CONSULT    Code Status   Full Code       The patient was discussed with MD Tg Reddy [address] [place] [address] [address] [address]  Phone: [phone]  ______________________________________________________________________    Physical Exam   Vital Signs: Temp: 98.4  F (36.9  C) Temp src: Oral BP: (!) 129/93 Pulse: 95   Resp: 16 SpO2: 96 % O2 Device: None (Room air)    Weight: 117 lbs 14.4 oz  General Appearance:  Pleasant, well appearing and appropriate. Interactive and sitting up in bed, smiling.   Respiratory: WoB improving. BS improved greatly on exam, particularly in bases. Scant wheezing.   Cardiovascular: RRR, normal S1, S2, no murmurs  GI: Abdomen non distended, soft, non tender.   Extremities: b/l wrapped with lymphedema wraps       Primary Care Physician   Domingo Peñaloza    Discharge Orders      CT Chest w & w/o contrast     Physical Therapy   Referral      Home Care Referral      Lymphedema Therapy  Referral      Primary Care Referral      Adult Pulmonary Medicine  Referral      Reason for your hospital stay    You were hospitalized for acute respiratory failure with suspected community acquired pneumonia complicated by a collection of fluid in your R chest cavity related to your underlying liver disease. You initially required BiPAP to sustain oxygenation but with antibiotics and steroid treatments with diuresis, your oxygen needs resolved and you were treated fully for your infection. In addition, you required insulin dosing for steroid-induced high blood sugars this stay - this need should not continue in the outpatient setting. Medication changes were also made to better manage your pain from your neuropathy of your lower extremities, your swelling, and your anxiety.     Activity    Your activity upon discharge: activity as tolerated     Monitor and record    Fluid intake and output daily:  Limit intake to 2000mL per day .  Weight: every day.     When to contact your care team    Call your primary doctor if you have any of the following:  increased shortness of breath, increased swelling, or increased pain.  Call the [facility] ED nurse line if you have any of the following: temperature greater than 100.4 for 24 hrs which does not resolve with Tylenol,  increased shortness of breath that is limiting your activity level or similar to what you experienced before this stay, if you have uncontrolled nausea and vomiting.     Adult [facility] Follow-up and recommended labs and tests    Follow up with meLatonya MD, within 1 week. to evaluate medication change and for hospital follow- up.  The following labs/tests are recommended: CBC, CMP, consider PFTs.  Follow up with Hepatology, at (location with clinic name or city) [facility], within 2-4 weeks  to evaluate medication change and for hospital follow- up.   Follow up with Pulmonology in 6-8  weeks. Please have CT chest completed prior to the visit   The following labs/tests are recommended: Labs, repeat CT scan with pulmonology.    Appointments on [place] and/or [facility] (with [facility] or [facility] provider or service). Call [phone] if you haven't heard regarding these appointments within 7 days of discharge.     Full Code     Compression Sleeve/Stocking    Compression Sleeve/Stocking Documentation:   The patient needs compression stockings for Other reason: bilateral LE swelling    I, the undersigned, certify that the above prescribed supplies are medically necessary for this patient and is both reasonable and necessary in reference to accepted standards of medical and necessary in reference to accepted standards of medical practice in the treatment of this patient's condition and is not prescribed as a convenience.     Diet    Follow this diet upon discharge: Fluid restriction:  limit fluid intake to 2000mL  per day. Otherwise, regular adult diet as tolerated.       Significant Results and Procedures   Most Recent 3 CBC's:  Recent Labs   Lab Test 08/21/24  0610 08/20/24  0636 08/19/24  0624   WBC 22.5* 23.8* 24.3*   HGB 8.6* 8.0* 8.0*   * 103* 102*   * 140* 139*     Most Recent 3 BMP's:  Recent Labs   Lab Test 08/21/24  1138 08/21/24  1133 08/21/24  0712 08/21/24  0610 08/21/24  0312 08/21/24  0153 08/20/24  0751 08/20/24  0636 08/19/24  0733 08/19/24  0624 08/19/24  0136 08/18/24  2234     --   --  137 136  --    < > 137  137   < > 141  141   < > 145   POTASSIUM 4.7  --   --  4.5  4.5 4.3  --    < > 4.5  4.5   < > 2.5*  2.6*  2.5*   < > 2.1*   CHLORIDE 106  --   --  107 106  --    < > 104  104   < > 102  102   < > 104   CO2 20*  --   --  21* 20*  --    < > 23  23   < > 27  27   < > 26   BUN  --   --   --   --   --   --   --  8.4  --  11.3  --  12.3   CR  --   --   --  0.62  --   --   --  0.64  --  0.66  --  0.75   ANIONGAP 9  --   --  9 10  --    < > 10  10   < >  12  12   < > 15   SOSA  --   --   --   --   --   --   --  8.6*  --  7.9*  --  7.8*   GLC  --  126* 105*  --   --  121*   < > 95   < > 193*   < > 308*    < > = values in this interval not displayed.     Most Recent 2 LFT's:  Recent Labs   Lab Test 08/20/24  0636 08/19/24  0624   AST 59* 50*   ALT 13 13   ALKPHOS 74 63   BILITOTAL 1.5* 1.8*     Most Recent 3 INR's:  Recent Labs   Lab Test 08/12/24  1204 08/01/24  0827 07/31/24  1053   INR 1.74* 1.81* 1.85*     7-Day Micro Results       Collected Updated Procedure Result Status      08/16/2024 1135 08/16/2024 1441 Enteric Bacteria and Virus Panel PCR [[id number]]    Stool from Per Rectum    Final result Component Value   Campylobacter species Negative   Salmonella species Negative   Vibrio species Negative   Vibrio cholerae Negative   Yersinia enterocolitica Negative   Enteropathogenic E. coli (EPEC) Negative   Shiga-like toxin-producing E. coli (STEC) Negative   Shigella/Enteroinvasive E. coli (EIEC) Negative   Cryptosporidium species Negative   Giardia lamblia Negative   Norovirus Gl/Gll Negative   Rotavirus A Negative   Plesiomonas shigelloides Negative   Enteroaggregative E. coli (EAEC) Negative   Enterotoxigenic E. coli (ETEC) Negative   E. coli O157 NA   Cyclospora cayetanensis Negative   Entamoeba histolytica Negative   Adenovirus F40/41 Negative   Astrovirus Negative   Sapovirus Negative                ,   Results for orders placed or performed during the hospital encounter of 08/12/24   XR Chest Port 1 View    Narrative    EXAM: XR CHEST PORT 1 VIEW  8/12/2024 9:51 AM      HISTORY: sob, cough    COMPARISON: Chest x-ray 7/31/2024.    FINDINGS: Single view of the chest. The trachea is midline. No  pneumothorax. Right-sided costophrenic angle is blunted with fluid  tracking into the pulmonary fissures are representing a moderate-sized  pleural effusion. Left-sided costophrenic angle is well defined, no  pleural effusion is seen. Bilateral diffuse interstitial  opacities  with mild peribronchial cuffing which could represent pulmonary edema.  No acute bony abnormalities. Visualized upper abdomen is unremarkable.      Impression    IMPRESSION:   1. Moderate right-sided pleural effusion.  2. Radiographic evidence of pulmonary edema.    I have personally reviewed the examination and initial interpretation  and I agree with the findings.    GRACIELA YU MD         SYSTEM ID:  [id number]   CT Chest Pulmonary Embolism w Contrast    Narrative    CT chest pulmonary angiogram with contrast    INDICATION: Chest pain, short of breath, pleural effusion    COMPARISON: None    FINDINGS: Prominent upper thoracic kyphosis. Moderate bilateral  pleural effusions with consolidative opacities with air bronchograms  in the right middle lobe and bilateral lower lobes. No obviously  abnormal breast tissues. Calcifications of the pancreas may indicate  chronic calcified pancreatitis. Left atrium is mildly enlarged. No  pericardial effusion.  No enlarged lymph nodes.  Bone detail shows good mineralization throughout no suspicious  sclerotic or lytic/destructive lesion.  Detail of the lungs shows no discrete solid nodule. There is mild  patchy upper lobe similar consolidative opacities just less dense than  the lower lobe and right middle lobe opacities. Major airways appear  nicely patent.  Pulmonary artery system was well-opacified with contrast. No hypodense  filling defect in the pulmonary artery system to indicate evidence of  embolus. No obviously anomalous pulmonary venous return to the left  atrium.      Impression    IMPRESSION:  1. No CT evidence of pulmonary embolus.  2. Pleural effusions bilaterally.  3. Dense consolidation right lower lobe, left lower lobe and right  middle lobe with less dense consolidations in both upper lobes.  Differential could include infection comparison dense pulmonary  hemorrhage. Cannot exclude subtle edema superimposed on the  consolidative/atelectatic  opacities.    GRACIELA YU MD         SYSTEM ID:  [id number]   POC US Guide for Thorcentesis    Impression    Limited chest ultrasound was performed and demonstrated an adequate fluid collection on the right hemithorax.     Thoracentesis Indication:pleural effusion and dyspnea    Doppler of the skin demonstrated an area at this site without significant vasculature.  A thoracentesis at this site was subsequently performed.    Ultrasound revealed normal lung sliding after the procedure.  Wellington Campbell,      XR Chest Port 1 View    Narrative    Portable chest 8/12/2024 at 1213 hours    INDICATION: Follow-up postthoracentesis    COMPARISON: 0945 hours earlier today    FINDINGS: Heart size normal. Decreased right costo phrenic angle  meniscus formation and edema. No evidence of pneumothorax. Left lung  appears unchanged. Bony structures appear grossly intact.      Impression    IMPRESSION: Decreased right pleural effusion and lung base edema  without pneumothorax.    GRACIELA YU MD         SYSTEM ID:  [id number]   CT Abdomen Pelvis w Contrast    Narrative    EXAM: CT ABDOMEN PELVIS W CONTRAST  LOCATION: [facility]  DATE: 8/13/2024    INDICATION: LLQ and RLQ pain and tenderness  COMPARISON: None.  TECHNIQUE: CT scan of the abdomen and pelvis was performed following injection of IV contrast. Multiplanar reformats were obtained. Dose reduction techniques were used.  CONTRAST: 76ml isovue 370    FINDINGS:   LOWER CHEST: Small bilateral pleural effusions. Near complete loss of volume right lung. Moderate left compressive atelectasis.    HEPATOBILIARY: Small low-attenuation focus in the right liver (series 3 image 16) has a benign appearance. No follow-up needed. No significant mass or bile duct dilatation. No calcified gallstones. Gallbladder wall thickening or pericholecystic fluid.   Mild ascites adjacent to the liver and extending in the pelvic  cul-de-sac.    PANCREAS: Numerous pancreatic calcifications consistent with chronic pancreatitis changes. No pancreatic masses. No significant pancreatic ductal dilatation.    SPLEEN: Normal.    ADRENAL GLANDS: Normal.    KIDNEYS/BLADDER: The bilateral kidneys enhance symmetrically without evidence for hydronephrosis or pyelonephritis. No renal masses or calculi noted. The bilateral ureters and urinary bladder are unremarkable.    BOWEL: Colonic diverticula without evidence for diverticulitis. Nonspecific nonobstructive bowel gas pattern. Thickening of the ascending colon and terminal ileum may be secondary to adjacent moderate ascites. Close clinical correlation recommended.   Appendix not seen.    LYMPH NODES: Normal.    VASCULATURE: Normal.    PELVIC ORGANS: Moderate ascites and free fluid in the pelvis. No pelvic masses. Uterus unremarkable.    MUSCULOSKELETAL: Postoperative changes of intervertebral strut graft placement at L5-S1.. Anasarca changes about the abdominal and pelvic walls. No inguinal or ventral hernias.      Impression    IMPRESSION:   1.  Moderate ascites and free fluid in the pelvis. Anasarca changes about the abdominal and pelvic walls.  2.  Small bilateral pleural effusions with near complete loss of volume right lung and moderate compressive atelectasis left lung base.  3.  Gallbladder wall thickening or pericholecystic fluid. No calcified gallstones.  4.  Numerous pancreatic calcifications consistent with chronic pancreatitis changes. No acute pancreatitis changes.  5.  Colonic diverticula without evidence for diverticulitis.  6.  Thickening of the ascending colon and terminal ileum may be secondary to adjacent moderate ascites.     XR Chest Port 1 View    Narrative    Portable chest    INDICATION: Severe pneumonia    COMPARISON: 8/12/2024 plain films. CT pulmonary angiogram 8/12/2024.    Findings: Increased opacification right lower lung zone concerning for  worsening consolidation versus  pleural effusion. No definite  pneumothorax. Right heart border remains mostly obscured by the dense  opacity in the right lower lung zone. Streaky an patchy opacities in  the left lower lung zone are also slightly increased. Air bronchograms  in the right lower lung zone.      Impression    IMPRESSION: Worsening probable consolidation versus increased pleural  effusion an atelectasis in the right lower lung zone    GRACIELA YU MD         SYSTEM ID:  [id number]   XR Chest Port 1 View    Narrative    EXAM: XR CHEST PORT 1 VIEW  LOCATION: [facility]  DATE: 8/14/2024    INDICATION: Severe PNA, orders per pulm  COMPARISON: 8/13/2024      Impression    IMPRESSION: Interval worsening of now near complete dense consolidation of the right lung. Similar patchy consolidation in the left lung. The cardiac silhouette is obscured. No pneumothorax. Bilateral pleural effusions.   XR Chest Port 1 View    Narrative    EXAM: XR CHEST PORT 1 VIEW  8/15/2024 6:00 AM      HISTORY: Severe PNA, orders per pulm    COMPARISON: 8/14/2024    FINDINGS: Single view of the chest. Trachea is midline. Ill-defined  cardiac silhouette. Dense right greater than left consolidative  opacities with air bronchograms. Right greater than left pleural  effusions. No discernible pneumothorax.      Impression    IMPRESSION:   1. Mildly improved dense consolidative opacities affecting the right  greater than left lungs.  2. Right greater than left pleural effusions, which appear similar to  prior.    I have personally reviewed the examination and initial interpretation  and I agree with the findings.    TRUE LORENZANA MD         SYSTEM ID:  [id number]       Discharge Medications   Discharge Medication List as of 8/21/2024  1:38 PM        START taking these medications    Details   acetaminophen (TYLENOL) 325 MG tablet Take 2 tablets (650 mg) by mouth 3 times daily., Disp-90 tablet, R-0, E-Prescribe       diclofenac (VOLTAREN) 1 % topical gel Apply 4 g topically 4 times daily., Disp-100 g, R-0, E-Prescribe      DULoxetine (CYMBALTA) 30 MG capsule Take 1 capsule (30 mg) by mouth daily., Disp-30 capsule, R-0, E-Prescribe      furosemide (LASIX) 20 MG tablet Take 1 tablet (20 mg) by mouth daily., Disp-30 tablet, R-0, E-Prescribe      gabapentin (NEURONTIN) 300 MG capsule Take 1 capsule (300 mg) by mouth 3 times daily., Disp-90 capsule, R-0, E-Prescribe      hydrOXYzine HCl (ATARAX) 25 MG tablet Take 1 tablet (25 mg) by mouth every 6 hours as needed for other (adjuvant pain)., Disp-120 tablet, R-0, E-Prescribe      multivitamin w/minerals (THERA-VIT-M) tablet Take 1 tablet by mouth daily., Disp-30 tablet, R-0, E-Prescribe      naloxone (NARCAN) 4 MG/0.1ML nasal spray Spray 1 spray (4 mg) into one nostril alternating nostrils as needed for opioid reversal. every 2-3 minutes until assistance arrives, Disp-2 each, R-0, E-Prescribe      oxyCODONE (ROXICODONE) 5 MG tablet Take 1 tablet (5 mg) by mouth every 4 hours as needed for moderate pain., Disp-10 tablet, R-0, E-Prescribe      senna-docusate (SENOKOT-S/PERICOLACE) 8.6-50 MG tablet Take 1 tablet by mouth 2 times daily as needed for constipation., Disp-15 tablet, R-0, E-Prescribe      thiamine (B-1) 100 MG tablet Take 1 tablet (100 mg) by mouth daily., Disp-30 tablet, R-0, E-Prescribe           CONTINUE these medications which have NOT CHANGED    Details   cyanocobalamin (VITAMIN B-12) 1000 MCG tablet Take 1,000 mcg by mouth daily, Historical      folic acid (FOLVITE) 1 MG tablet Take 1 tablet (1 mg) by mouth daily, Disp-30 tablet, R-0, E-Prescribe      magnesium 250 MG tablet Take 1 tablet by mouth daily, Historical      methocarbamol (ROBAXIN) 500 MG tablet Take 1 tablet (500 mg) by mouth 4 times daily as needed for muscle spasms, Disp-30 tablet, R-0, E-Prescribe           STOP taking these medications       escitalopram (LEXAPRO) 10 MG tablet Comments:   Reason for  Stopping:         spironolactone-HCTZ (ALDACTAZIDE) 25-25 MG tablet Comments:   Reason for Stopping:             Allergies   No Known Allergies

## 2024-08-21 NOTE — TELEPHONE ENCOUNTER
[facility] Call Center    Phone Message    May a detailed message be left on voicemail: yes     Reason for Call: Other: Patient Mom called and there are no appts till October and she was told to come in in 1-2 weeks, she discharged her from the hospital today, please see her order, please call.       Action Taken: Message routed to:  [facility] PCC    Travel Screening: Not Applicable     Date of Service:                                                                       Dr. Miller

## 2024-08-22 LAB — METHYLMALONATE SERPL-SCNC: 0.19 UMOL/L (ref 0–0.4)

## 2024-08-23 ENCOUNTER — PATIENT OUTREACH (OUTPATIENT)
Dept: CARE COORDINATION | Facility: CLINIC | Age: 29
End: 2024-08-23
Payer: MEDICAID

## 2024-08-23 NOTE — PROGRESS NOTES
Connected Care Resource Center:   [facility] Resource Center Contact  [facility]/Voicemail     Clinical Data: Post-Discharge Outreach     Outreach attempted x 2.  Left message on patient's voicemail, providing [facility]'s central phone number of [phone] ([phone]) for questions/concerns and/or to schedule an appt with an [facility] provider, if they do not have a PCP.      Plan:  [facility] will do no further outreaches at this time.       Fe Clifford MA  Connected Care Resource Center, [facility]    *Connected Care Resource Team does NOT follow patient ongoing. Referrals are identified based on internal discharge reports and the outreach is to ensure patient has an understanding of their discharge instructions.

## 2024-08-26 NOTE — CONFIDENTIAL NOTE
DIAGNOSIS:  Other cirrhosis of liver (    Appt Date: 09.16.2024    NOTES STATUS DETAILS   OFFICE NOTE from referring provider Internal 07.31.2024 Noreen Nevarez MD    OFFICE NOTES from other specialists Care Everywhere 07.30.2024 Abran Hernandez MD [facility]   DISCHARGE SUMMARY from hospital Internal      Care Everywhere 07.31.2024  Noreen Nevarez MD     08.12.2024, 08.09.2024  [facility]    07.13.2024 [facility]       MEDICATION LIST Internal / CE    IMAGING     ULTRASOUND LIVER Internal 08.09.2024, 07.31.2024 US Abd Limited   LABS     HEPATIC PANEL (LIVER PANEL) Internal 08.16.2024   BASIC METABOLIC PANEL Internal 08.18.2024   COMPLETE METABOLIC PANEL Internal 08.21.2024   COMPLETE BLOOD COUNT (CBC) Internal 08.21.2024   INTERNATIONAL NORMALIZED RATIO (INR) Internal 08.12.2024   HEPATITIS C ANTIBODY Care Everywhere 06.22.2024   HEPATITIS B SURFACE ANTIGEN Care Everywhere 06.22.2024   HEPATITIS B CORE ANTIBODY Care Everywhere 06.22.2024

## 2024-08-28 ENCOUNTER — TELEPHONE (OUTPATIENT)
Dept: PULMONOLOGY | Facility: CLINIC | Age: 29
End: 2024-08-28
Payer: MEDICAID

## 2024-08-28 NOTE — TELEPHONE ENCOUNTER
Sent Clinklet (1st Attempt) and Patient Contacted for the patient to call back and schedule the following:    Appointment type: Imaging  Provider: Gustavo  Return date: 11/12/2024  Specialty phone number: [phone]  Additional appointment(s) needed: Chest CT  Additonal Notes: Per Antonietta: OK to use from Dr. Nevarez (order)

## 2024-08-30 NOTE — TELEPHONE ENCOUNTER
Left Voicemail (2nd Attempt) for the patient to call back and schedule the following:    Appointment type: Imaging  Provider: Gustavo  Return date: 11/12/2024  Specialty phone number: [phone]  Additional appointment(s) needed: Chest CT  Additonal Notes: Per Antonietta: OK to use from Dr. Nevarez (order)

## 2024-09-06 ENCOUNTER — APPOINTMENT (OUTPATIENT)
Dept: GENERAL RADIOLOGY | Facility: CLINIC | Age: 29
End: 2024-09-06
Attending: EMERGENCY MEDICINE
Payer: COMMERCIAL

## 2024-09-06 ENCOUNTER — HOSPITAL ENCOUNTER (EMERGENCY)
Facility: CLINIC | Age: 29
Discharge: HOME | End: 2024-09-06
Attending: EMERGENCY MEDICINE | Admitting: EMERGENCY MEDICINE
Payer: COMMERCIAL

## 2024-09-06 ENCOUNTER — APPOINTMENT (OUTPATIENT)
Dept: GENERAL RADIOLOGY | Facility: CLINIC | Age: 29
End: 2024-09-06
Attending: STUDENT IN AN ORGANIZED HEALTH CARE EDUCATION/TRAINING PROGRAM
Payer: COMMERCIAL

## 2024-09-06 ENCOUNTER — APPOINTMENT (OUTPATIENT)
Dept: ULTRASOUND IMAGING | Facility: CLINIC | Age: 29
End: 2024-09-06
Attending: EMERGENCY MEDICINE
Payer: COMMERCIAL

## 2024-09-06 ENCOUNTER — TRANSCRIBE ORDERS (OUTPATIENT)
Dept: OTHER | Age: 29
End: 2024-09-06

## 2024-09-06 ENCOUNTER — APPOINTMENT (OUTPATIENT)
Dept: CT IMAGING | Facility: CLINIC | Age: 29
End: 2024-09-06
Attending: EMERGENCY MEDICINE
Payer: COMMERCIAL

## 2024-09-06 VITALS
HEART RATE: 99 BPM | SYSTOLIC BLOOD PRESSURE: 141 MMHG | OXYGEN SATURATION: 100 % | DIASTOLIC BLOOD PRESSURE: 104 MMHG | TEMPERATURE: 98.5 F | RESPIRATION RATE: 16 BRPM

## 2024-09-06 DIAGNOSIS — J90 PLEURAL EFFUSION ON RIGHT: ICD-10-CM

## 2024-09-06 DIAGNOSIS — D64.9 ANEMIA: Primary | ICD-10-CM

## 2024-09-06 DIAGNOSIS — R07.9 CHEST PAIN, UNSPECIFIED TYPE: ICD-10-CM

## 2024-09-06 LAB
ALBUMIN SERPL BCG-MCNC: 3.5 G/DL (ref 3.5–5.2)
ALP SERPL-CCNC: 98 U/L (ref 40–150)
ALT SERPL W P-5'-P-CCNC: 8 U/L (ref 0–50)
ANION GAP SERPL CALCULATED.3IONS-SCNC: 13 MMOL/L (ref 7–15)
AST SERPL W P-5'-P-CCNC: 39 U/L (ref 0–45)
ATRIAL RATE - MUSE: 97 BPM
BASOPHILS # BLD AUTO: 0.1 10E3/UL (ref 0–0.2)
BASOPHILS NFR BLD AUTO: 1 %
BILIRUB SERPL-MCNC: 0.9 MG/DL
BUN SERPL-MCNC: 4.5 MG/DL (ref 6–20)
CALCIUM SERPL-MCNC: 8.6 MG/DL (ref 8.8–10.4)
CHLORIDE SERPL-SCNC: 111 MMOL/L (ref 98–107)
CREAT SERPL-MCNC: 0.58 MG/DL (ref 0.51–0.95)
D DIMER PPP FEU-MCNC: 3.12 UG/ML FEU (ref 0–0.5)
DIASTOLIC BLOOD PRESSURE - MUSE: NORMAL MMHG
EGFRCR SERPLBLD CKD-EPI 2021: >90 ML/MIN/1.73M2
EOSINOPHIL # BLD AUTO: 0.1 10E3/UL (ref 0–0.7)
EOSINOPHIL NFR BLD AUTO: 2 %
ERYTHROCYTE [DISTWIDTH] IN BLOOD BY AUTOMATED COUNT: 16.3 % (ref 10–15)
GLUCOSE SERPL-MCNC: 158 MG/DL (ref 70–99)
HCO3 SERPL-SCNC: 19 MMOL/L (ref 22–29)
HCT VFR BLD AUTO: 26.7 % (ref 35–47)
HGB BLD-MCNC: 8.5 G/DL (ref 11.7–15.7)
HOLD SPECIMEN: NORMAL
IMM GRANULOCYTES # BLD: 0 10E3/UL
IMM GRANULOCYTES NFR BLD: 0 %
INTERPRETATION ECG - MUSE: NORMAL
LYMPHOCYTES # BLD AUTO: 2.1 10E3/UL (ref 0.8–5.3)
LYMPHOCYTES NFR BLD AUTO: 31 %
MCH RBC QN AUTO: 30.2 PG (ref 26.5–33)
MCHC RBC AUTO-ENTMCNC: 31.8 G/DL (ref 31.5–36.5)
MCV RBC AUTO: 95 FL (ref 78–100)
MONOCYTES # BLD AUTO: 0.5 10E3/UL (ref 0–1.3)
MONOCYTES NFR BLD AUTO: 7 %
NEUTROPHILS # BLD AUTO: 4.1 10E3/UL (ref 1.6–8.3)
NEUTROPHILS NFR BLD AUTO: 59 %
NRBC # BLD AUTO: 0 10E3/UL
NRBC BLD AUTO-RTO: 0 /100
NT-PROBNP SERPL-MCNC: 3582 PG/ML (ref 0–450)
P AXIS - MUSE: 71 DEGREES
PLATELET # BLD AUTO: 198 10E3/UL (ref 150–450)
POTASSIUM SERPL-SCNC: 3.2 MMOL/L (ref 3.4–5.3)
PR INTERVAL - MUSE: 152 MS
PROT SERPL-MCNC: 6.6 G/DL (ref 6.4–8.3)
QRS DURATION - MUSE: 72 MS
QT - MUSE: 390 MS
QTC - MUSE: 495 MS
R AXIS - MUSE: 68 DEGREES
RBC # BLD AUTO: 2.81 10E6/UL (ref 3.8–5.2)
SODIUM SERPL-SCNC: 143 MMOL/L (ref 135–145)
SYSTOLIC BLOOD PRESSURE - MUSE: NORMAL MMHG
T AXIS - MUSE: -65 DEGREES
TROPONIN T SERPL HS-MCNC: 12 NG/L
TROPONIN T SERPL HS-MCNC: 14 NG/L
VENTRICULAR RATE- MUSE: 97 BPM
WBC # BLD AUTO: 6.8 10E3/UL (ref 4–11)

## 2024-09-06 PROCEDURE — 84484 ASSAY OF TROPONIN QUANT: CPT | Performed by: EMERGENCY MEDICINE

## 2024-09-06 PROCEDURE — 96376 TX/PRO/DX INJ SAME DRUG ADON: CPT | Performed by: EMERGENCY MEDICINE

## 2024-09-06 PROCEDURE — 71275 CT ANGIOGRAPHY CHEST: CPT | Mod: 26 | Performed by: RADIOLOGY

## 2024-09-06 PROCEDURE — 250N000013 HC RX MED GY IP 250 OP 250 PS 637: Performed by: EMERGENCY MEDICINE

## 2024-09-06 PROCEDURE — 250N000011 HC RX IP 250 OP 636: Performed by: STUDENT IN AN ORGANIZED HEALTH CARE EDUCATION/TRAINING PROGRAM

## 2024-09-06 PROCEDURE — 96375 TX/PRO/DX INJ NEW DRUG ADDON: CPT | Performed by: EMERGENCY MEDICINE

## 2024-09-06 PROCEDURE — 99285 EMERGENCY DEPT VISIT HI MDM: CPT | Mod: 25 | Performed by: EMERGENCY MEDICINE

## 2024-09-06 PROCEDURE — 32555 ASPIRATE PLEURA W/ IMAGING: CPT | Performed by: STUDENT IN AN ORGANIZED HEALTH CARE EDUCATION/TRAINING PROGRAM

## 2024-09-06 PROCEDURE — 93970 EXTREMITY STUDY: CPT | Mod: 26 | Performed by: RADIOLOGY

## 2024-09-06 PROCEDURE — 99285 EMERGENCY DEPT VISIT HI MDM: CPT | Performed by: EMERGENCY MEDICINE

## 2024-09-06 PROCEDURE — 71275 CT ANGIOGRAPHY CHEST: CPT

## 2024-09-06 PROCEDURE — 93970 EXTREMITY STUDY: CPT

## 2024-09-06 PROCEDURE — 71046 X-RAY EXAM CHEST 2 VIEWS: CPT | Mod: 26 | Performed by: STUDENT IN AN ORGANIZED HEALTH CARE EDUCATION/TRAINING PROGRAM

## 2024-09-06 PROCEDURE — 250N000011 HC RX IP 250 OP 636: Performed by: EMERGENCY MEDICINE

## 2024-09-06 PROCEDURE — 96374 THER/PROPH/DIAG INJ IV PUSH: CPT | Mod: 59 | Performed by: EMERGENCY MEDICINE

## 2024-09-06 PROCEDURE — 93005 ELECTROCARDIOGRAM TRACING: CPT | Mod: RTG

## 2024-09-06 PROCEDURE — 71046 X-RAY EXAM CHEST 2 VIEWS: CPT | Mod: 26 | Performed by: RADIOLOGY

## 2024-09-06 PROCEDURE — 85025 COMPLETE CBC W/AUTO DIFF WBC: CPT | Performed by: EMERGENCY MEDICINE

## 2024-09-06 PROCEDURE — 80053 COMPREHEN METABOLIC PANEL: CPT | Performed by: EMERGENCY MEDICINE

## 2024-09-06 PROCEDURE — 71046 X-RAY EXAM CHEST 2 VIEWS: CPT

## 2024-09-06 PROCEDURE — 85379 FIBRIN DEGRADATION QUANT: CPT | Performed by: EMERGENCY MEDICINE

## 2024-09-06 PROCEDURE — 83880 ASSAY OF NATRIURETIC PEPTIDE: CPT | Performed by: EMERGENCY MEDICINE

## 2024-09-06 PROCEDURE — 36415 COLL VENOUS BLD VENIPUNCTURE: CPT | Performed by: EMERGENCY MEDICINE

## 2024-09-06 PROCEDURE — 999N000065 XR CHEST 2 VIEWS

## 2024-09-06 RX ORDER — HYDROMORPHONE HYDROCHLORIDE 1 MG/ML
0.5 INJECTION, SOLUTION INTRAMUSCULAR; INTRAVENOUS; SUBCUTANEOUS
Status: COMPLETED | OUTPATIENT
Start: 2024-09-06 | End: 2024-09-06

## 2024-09-06 RX ORDER — FUROSEMIDE 10 MG/ML
40 INJECTION INTRAMUSCULAR; INTRAVENOUS ONCE
Status: COMPLETED | OUTPATIENT
Start: 2024-09-06 | End: 2024-09-06

## 2024-09-06 RX ORDER — POTASSIUM CHLORIDE 1.5 G/1.58G
40 POWDER, FOR SOLUTION ORAL ONCE
Status: COMPLETED | OUTPATIENT
Start: 2024-09-06 | End: 2024-09-06

## 2024-09-06 RX ORDER — ONDANSETRON 2 MG/ML
4 INJECTION INTRAMUSCULAR; INTRAVENOUS EVERY 30 MIN PRN
Status: DISCONTINUED | OUTPATIENT
Start: 2024-09-06 | End: 2024-09-06 | Stop reason: HOSPADM

## 2024-09-06 RX ORDER — IOPAMIDOL 755 MG/ML
51 INJECTION, SOLUTION INTRAVASCULAR ONCE
Status: COMPLETED | OUTPATIENT
Start: 2024-09-06 | End: 2024-09-06

## 2024-09-06 RX ADMIN — HYDROMORPHONE HYDROCHLORIDE 0.5 MG: 1 INJECTION, SOLUTION INTRAMUSCULAR; INTRAVENOUS; SUBCUTANEOUS at 12:05

## 2024-09-06 RX ADMIN — HYDROMORPHONE HYDROCHLORIDE 0.5 MG: 1 INJECTION, SOLUTION INTRAMUSCULAR; INTRAVENOUS; SUBCUTANEOUS at 14:49

## 2024-09-06 RX ADMIN — ONDANSETRON 4 MG: 2 INJECTION INTRAMUSCULAR; INTRAVENOUS at 14:49

## 2024-09-06 RX ADMIN — FUROSEMIDE 40 MG: 10 INJECTION, SOLUTION INTRAVENOUS at 10:42

## 2024-09-06 RX ADMIN — POTASSIUM CHLORIDE 40 MEQ: 1.5 POWDER, FOR SOLUTION ORAL at 15:51

## 2024-09-06 RX ADMIN — HYDROMORPHONE HYDROCHLORIDE 0.5 MG: 1 INJECTION, SOLUTION INTRAMUSCULAR; INTRAVENOUS; SUBCUTANEOUS at 16:50

## 2024-09-06 RX ADMIN — IOPAMIDOL 51 ML: 755 INJECTION, SOLUTION INTRAVENOUS at 11:30

## 2024-09-06 ASSESSMENT — ACTIVITIES OF DAILY LIVING (ADL)
ADLS_ACUITY_SCORE: 38

## 2024-09-06 ASSESSMENT — COLUMBIA-SUICIDE SEVERITY RATING SCALE - C-SSRS
1. IN THE PAST MONTH, HAVE YOU WISHED YOU WERE DEAD OR WISHED YOU COULD GO TO SLEEP AND NOT WAKE UP?: NO
6. HAVE YOU EVER DONE ANYTHING, STARTED TO DO ANYTHING, OR PREPARED TO DO ANYTHING TO END YOUR LIFE?: NO
2. HAVE YOU ACTUALLY HAD ANY THOUGHTS OF KILLING YOURSELF IN THE PAST MONTH?: NO

## 2024-09-06 NOTE — PROCEDURES
[facility]  CAPS PROCEDURE NOTE  Date of Admission:  9/6/2024  Consult Requested by: Medicine  Reason for Consult: management of symptomatic pleural effusion    Indication/HPI: Pleural effusion    Pre-Procedure Diagnosis: Right Pleural Effusion    Post-Procedure Diagnosis: Right Pleural Effusion    Risk Assessment: Average risk, Technically straightforward; patient's anticoagulation has been held according to guidelines based on the agent and platelets and coags are within guidelines    Procedure Outcome:  Therapeutic thoracentesis performed with 1.5 liters removed.   See additional procedure details below.    The primary covering service should follow up and address any lab results as appropriate.    Jovon Werner MD  [facility]  Securely message with [employer] (more info)  Text page via [employer] Paging/Directory   See signed in provider for up to date coverage information      [facility]    Thoracentesis at Bedside    Date/Time: 9/6/2024 4:18 PM    Performed by: Jovon Werner MD  Authorized by: Jovon Werner MD    Risks, benefits and alternatives discussed.    ANESTHESIA (see MAR for exact dosages):     Anesthesia method:  Local infiltration    PROCEDURE DETAILS      Ultrasound guidance: yes      POST PROCEDURE DETAILS     Chest x-ray performed: yes      Chest x-ray findings: Pending.    Patient tolerance of procedure:  Patient tolerated the procedure well with no immediate complications      PROCEDURE    Patient Tolerance:  Patient tolerated the procedure well with no immediate complications      POC US Guide for Thorcentesis     Impression  Limited chest ultrasound was performed and demonstrated an adequate fluid collection on the right hemithorax.    Thoracentesis Indication:pleural effusion    Doppler of the skin demonstrated an area at this site without  significant vasculature.  A thoracentesis at this site was subsequently performed.    oJvon Werner MD

## 2024-09-06 NOTE — ED TRIAGE NOTES
PT arrives c/o midsternal stabbing chest pain accompanied with shortness of breath, starting last night. PT also arrives with BLE edema wrapped in ACE wraps from home.     PT has a hx of liver disease and recent hospital admission for PNA. PT was also admitted for PNA in July which required intubation.     Denies fever or chills.     Triage Assessment (Adult)       Row Name 09/06/24 0922          Triage Assessment    Airway WDL WDL        Respiratory WDL    Respiratory WDL X;rhythm/pattern     Rhythm/Pattern, Respiratory shortness of breath        Skin Circulation/Temperature WDL    Skin Circulation/Temperature WDL WDL        Cardiac WDL    Cardiac WDL X     Cardiac Rhythm ST        Peripheral/Neurovascular WDL    Peripheral Neurovascular WDL WDL        Cognitive/Neuro/Behavioral WDL    Cognitive/Neuro/Behavioral WDL WDL

## 2024-09-06 NOTE — DISCHARGE INSTRUCTIONS
Please follow up with GI Clinic (phone: [phone]) at your already scheduled appointment for further evaluation and recommendations.  If your symptoms worsen prior to your appointment please come back to the Emergency Department.

## 2024-09-06 NOTE — ED PROVIDER NOTES
[place] [facility] DEPARTMENT ([facility])    9/06/24       ED PROVIDER NOTE   ED 18  History     Chief Complaint   Patient presents with    Chest Pain    Shortness of Breath     The history is provided by the patient and medical records.     Maribell Loen is a 29 year old female with history of alcohol use disorder, alcoholic liver cirrhosis complicated by hydrothorax, neuropathy, chronic pancreatitis who presents to the Emergency Department with her stepfather chest pain, shortness of breath, and leg pain.     Epic records reviewed. Patient was recently hospitalized from 8/12/2024 to 8/21/24 for community-acquired pneumonia, bilateral pleural effusions resulting in acute hypoxic respiratory failure.  ID dc'd Zosyn + Vanc PM 8/19 after completing course for CAP with steroids, diuresis with significant improvement. Workup remains negative for identified etiology for CAP but wa c/b hydrothorax iso underlying cirrhosis which certainly complicated her disease burden. Patient is not on anticoagulants, but is on Lasix diuretic. Per [facility] records no history of COVID-19 vaccination, no recent flu vaccine.     Here in the ED, patient reports she started experiencing chest pain and shortness of breath last night.  Additionally she notes that she is experiencing bilateral leg and foot pain and swelling.  Her stepfather reports that the patient's mother has been wrapping her legs every night.  Patient endorses a new dry cough as well and notes that she has not coughed since she was last admitted. She denies fever, vomiting, anticoagulation, history of blood clots.      Past Medical History  Past Medical History:   Diagnosis Date    Acute pancreatitis     Cirrhosis of liver (H)      Past Surgical History:   Procedure Laterality Date    TONSILLECTOMY      8/05     acetaminophen (TYLENOL) 325 MG tablet  cyanocobalamin (VITAMIN B-12) 1000 MCG tablet  diclofenac (VOLTAREN) 1 % topical gel  DULoxetine (CYMBALTA) 30 MG  capsule  folic acid (FOLVITE) 1 MG tablet  furosemide (LASIX) 20 MG tablet  gabapentin (NEURONTIN) 300 MG capsule  hydrOXYzine HCl (ATARAX) 25 MG tablet  magnesium 250 MG tablet  methocarbamol (ROBAXIN) 500 MG tablet  multivitamin w/minerals (THERA-VIT-M) tablet  naloxone (NARCAN) 4 MG/0.1ML nasal spray  oxyCODONE (ROXICODONE) 5 MG tablet  senna-docusate (SENOKOT-S/PERICOLACE) 8.6-50 MG tablet  thiamine (B-1) 100 MG tablet      No Known Allergies  Family History  Family History   Problem Relation Age of Onset    Allergy (Severe) Mother         Allergies,Environmental allergies, celiac disease    Family History Negative Father         Good Health    Cancer Maternal Grandfather         Cancer,Kidney cancer    Diabetes Maternal Grandmother         Diabetes,Type 2    Heart Disease Paternal Grandfather         Heart Disease,Cardiomyopathy    Asthma Brother         Asthma,history of intermittent asthma    Other - See Comments Brother         Enuresis    Family History Negative Sister         Good Health    Family History Negative Sister         Good Health     Social History   Social History     Tobacco Use    Smoking status: Some Days     Current packs/day: 0.10     Types: Cigarettes    Smokeless tobacco: Never   Substance Use Topics    Alcohol use: No    Drug use: Unknown     Types: Other     Comment: Drug use: No      A medically appropriate review of systems was performed with pertinent positives and negatives noted in the HPI, and all other systems negative.    Physical Exam   BP: (!) 139/91  Pulse: 100  Temp: 98.5  F (36.9  C)  Resp: 16  SpO2: 100 %    Wt Readings from Last 10 Encounters:   08/21/24 53.5 kg (117 lb 14.4 oz)   03/30/15 48.8 kg (107 lb 8 oz)   03/10/14 49.2 kg (108 lb 6.4 oz) (14%, Z= -1.08)*   08/01/13 51.8 kg (114 lb 4 oz) (27%, Z= -0.60)*   07/10/13 51 kg (112 lb 6 oz) (24%, Z= -0.72)*   01/22/13 53.5 kg (118 lb) (38%, Z= -0.31)*   08/17/12 56.7 kg (125 lb) (54%, Z= 0.11)*     * Growth percentiles  are based on [facility]Girls, 2-20 Years) data.       Physical Exam  Vitals and nursing note reviewed.   Constitutional:       General: She is not in acute distress.     Appearance: She is not ill-appearing or diaphoretic.   HENT:      Head: Normocephalic and atraumatic.   Eyes:      Extraocular Movements: Extraocular movements intact.      Conjunctiva/sclera: Conjunctivae normal.   Cardiovascular:      Rate and Rhythm: Normal rate.      Pulses: Normal pulses.      Heart sounds: Normal heart sounds.   Pulmonary:      Effort: Pulmonary effort is normal. No respiratory distress.      Breath sounds: No stridor. No wheezing or rales.      Comments: Diminished breath sounds in posterior, right, lower lung field.  Abdominal:      General: There is no distension.      Palpations: Abdomen is soft. There is no mass.      Tenderness: There is no abdominal tenderness. There is no guarding or rebound.      Hernia: No hernia is present.   Musculoskeletal:         General: No tenderness, deformity or signs of injury. Normal range of motion.      Cervical back: Normal range of motion and neck supple.      Right lower leg: Edema present.      Left lower leg: Edema present.   Skin:     General: Skin is warm.      Findings: No rash.   Neurological:      General: No focal deficit present.      Mental Status: She is alert and oriented to person, place, and time.   Psychiatric:         Mood and Affect: Mood normal.         Behavior: Behavior normal.         Thought Content: Thought content normal.         Judgment: Judgment normal.       ED Course, Procedures, & Data      Procedures  Results for orders placed during the hospital encounter of 09/06/24    POC US GUIDE FOR THORACENTESIS    Impression  Limited chest ultrasound was performed and demonstrated an adequate fluid collection on the right hemithorax.    Thoracentesis Indication:pleural effusion    Doppler of the skin demonstrated an area at this site without significant vasculature.  A  thoracentesis at this site was subsequently performed.    Jovon Werner MD            EKG Interpretation:      Interpreted by Elvin Fay MD  Time reviewed: 9:44 AM  Symptoms at time of EKG: Chest pain, shortness of breath  Rhythm: normal sinus   Rate: normal  Axis: normal  Ectopy: none  Conduction: normal  ST Segments/ T Waves: T wave inversions in leads V4 through V6  Q Waves: none  Comparison to prior: Unchanged from old EKG done on August 12, 2024    Clinical Impression: NSR, no acute ischemia         Results for orders placed or performed during the hospital encounter of 09/06/24   Thoracentesis at Bedside     Status: None    Narrative    Jovon Werner MD     9/6/2024  4:18 PM  [facility]    Thoracentesis at Bedside    Date/Time: 9/6/2024 4:18 PM    Performed by: Jovon Werner MD  Authorized by: Jovon Werner MD    Risks, benefits and alternatives discussed.    ANESTHESIA (see MAR for exact dosages):     Anesthesia method:  Local infiltration    PROCEDURE DETAILS      Ultrasound guidance: yes      POST PROCEDURE DETAILS     Chest x-ray performed: yes      Chest x-ray findings: Pending.    Patient tolerance of procedure:  Patient tolerated the procedure well   with no immediate complications      PROCEDURE    Patient Tolerance:  Patient tolerated the procedure well with no immediate   complications   XR Chest 2 Views     Status: None    Narrative    Exam: XR CHEST 2 VIEWS, 9/6/2024 10:00 AM    Comparison: Radiograph 8/15/2024, 8/14/2024, 8/13/2024    History: chest pain    Findings:  PA and lateral views of the chest. Trachea is midline.  Cardiomediastinal silhouette is partially obscured along the right  heart border. Elevation of the right hemidiaphragm. Significant  decreased multifocal consolidative opacities throughout both lungs  compared to 8/15/2024. No appreciable pleural effusion. No  pneumothorax. No acute osseous  abnormality.      Impression    Impression:   1. Significant decreased multifocal consolidative opacities throughout  both lungs compared to 8/15/2024.  2. No appreciable residual pleural effusion.  3. Elevation of the right hemidiaphragm.     I have personally reviewed the examination and initial interpretation  and I agree with the findings.    NADJA AUSTIN MD         SYSTEM ID:  [id number]   US Lower Extremity Venous Duplex Bilateral     Status: None    Narrative    EXAMINATION: DOPPLER VENOUS ULTRASOUND OF BILATERAL LOWER EXTREMITIES,  9/6/2024 10:26 AM     COMPARISON: 7/31/2024.    HISTORY: Bilateral lower extremity swelling and pain.    TECHNIQUE: Gray-scale evaluation with compression, spectral flow and  color Doppler assessment of the deep venous system of both legs from  groin to knee, and then at the ankles.    FINDINGS:  In both lower extremities, the common femoral, femoral, and popliteal  veins demonstrate normal compressibility and blood flow. Normal  compressibility of the posterior tibial and peroneal veins.    Mildly enlarged benign-appearing lymph node with a fatty hilum in the  right upper thigh measuring 2.5 x 1.0 x 2.1 cm, likely reactive.      Impression    IMPRESSION: No evidence of deep venous thrombosis in either lower  extremity.    I have personally reviewed the examination and initial interpretation  and I agree with the findings.    JEISON WILSON MD         SYSTEM ID:  [id number]   CT Chest Pulmonary Embolism w Contrast     Status: None    Narrative    EXAMINATION: CTA pulmonary angiogram, 9/6/2024 11:53 AM     COMPARISON: CT chest 8/12/2024.    HISTORY: Short of breath, elevated d-dimer    TECHNIQUE: Volumetric helical acquisition of CT images of the chest  from the lung apices to the kidneys were acquired after the  administration of 80 mL of Isovue-370 IV contrast. Post-processed  multiplanar and/or MIP reformations were obtained, archived to PACS  and used in interpretation of this  study.     FINDINGS:      Contrast bolus is: excellent.  Exam is negative for acute pulmonary  embolism. No CT evidence of right heart strain.    Lungs: Central airways are patent. Large right pleural effusion and  small left pleural effusion, increased on the right and decrease in  the left. Homogeneously enhancing segmental atelectasis of the medial  right upper lobe, right middle lobe, and posterior right lower lobe  with resolution of the previously seen multifocal bilateral  groundglass and consolidative opacities. Subsegmental lingular and  left lower lobe atelectasis.    Mediastinum: Heart size normal. No pericardial effusion. Normal  caliber of the descending thoracic aorta and main pulmonary artery. No  mediastinal lymphadenopathy. Normal esophagus. Normal thyroid.    Upper abdomen: Ascites in the upper abdomen, partially visualized.  Coarse calcifications throughout the atrophic pancreas.    Bones/Soft Tissues: No acute osseous abnormalities or suspicious bony  lesions. Soft tissues are unremarkable.      Impression    IMPRESSION:   1. No pulmonary embolism.  2. Increased large right pleural effusion and decreased small left  pleural effusion with adjacent compressive atelectasis, as detailed  above. Resolution of the previously seen multifocal groundglass and  consolidative opacities.  3. Partially visualized ascites in the upper abdomen.  4. Changes of chronic pancreatitis.      I have personally reviewed the examination and initial interpretation  and I agree with the findings.    BRIE VOGEL DO         SYSTEM ID:  [id number]   POC US Guide for Thorcentesis     Status: None    Impression    Limited chest ultrasound was performed and demonstrated an adequate fluid collection on the right hemithorax.     Thoracentesis Indication:pleural effusion    Doppler of the skin demonstrated an area at this site without significant vasculature.  A thoracentesis at this site was subsequently performed.    Jovon  Clayton Werner MD     XR Chest 2 Views     Status: None (Preliminary result)    Impression    RESIDENT PRELIMINARY INTERPRETATION  Impression:   1. No significant remaining right pleural effusion. No appreciable  pneumothorax.  2. Trace left pleural effusion.  3. Minimal streaky perihilar opacities, likely atelectasis.    [place] Draw     Status: None    Narrative    The following orders were created for panel order [place] Draw.  Procedure                               Abnormality         Status                     ---------                               -----------         ------                     Extra Blue Top Tube[[id number]]                              Final result               Extra Red Top Tube[[id number]]                               Final result               Extra Green Top (Lithium...[[id number]]                      Final result               Extra Purple Top Tube[[id number]]                            Final result                 Please view results for these tests on the individual orders.   Extra Blue Top Tube     Status: None   Result Value Ref Range    Hold Specimen JIC    Extra Red Top Tube     Status: None   Result Value Ref Range    Hold Specimen JIC    Extra Green Top (Lithium Heparin) Tube     Status: None   Result Value Ref Range    Hold Specimen JIC    Extra Purple Top Tube     Status: None   Result Value Ref Range    Hold Specimen JIC    D dimer quantitative     Status: Abnormal   Result Value Ref Range    D-Dimer Quantitative 3.12 (H) 0.00 - 0.50 ug/mL FEU    Narrative    This D-dimer assay is intended for use in conjunction with a clinical pretest probability assessment model to exclude pulmonary embolism (PE) and deep venous thrombosis (DVT) in outpatients suspected of PE or DVT. The cut-off value is 0.50 ug/mL FEU.   Comprehensive metabolic panel     Status: Abnormal   Result Value Ref Range    Sodium 143 135 - 145 mmol/L    Potassium 3.2 (L) 3.4 - 5.3 mmol/L    Carbon Dioxide (CO2)  19 (L) 22 - 29 mmol/L    Anion Gap 13 7 - 15 mmol/L    Urea Nitrogen 4.5 (L) 6.0 - 20.0 mg/dL    Creatinine 0.58 0.51 - 0.95 mg/dL    GFR Estimate >90 >60 mL/min/1.73m2    Calcium 8.6 (L) 8.8 - 10.4 mg/dL    Chloride 111 (H) 98 - 107 mmol/L    Glucose 158 (H) 70 - 99 mg/dL    Alkaline Phosphatase 98 40 - 150 U/L    AST 39 0 - 45 U/L    ALT 8 0 - 50 U/L    Protein Total 6.6 6.4 - 8.3 g/dL    Albumin 3.5 3.5 - 5.2 g/dL    Bilirubin Total 0.9 <=1.2 mg/dL   Troponin T, High Sensitivity     Status: Normal   Result Value Ref Range    Troponin T, High Sensitivity 14 <=14 ng/L   Nt probnp inpatient (BNP)     Status: Abnormal   Result Value Ref Range    N terminal Pro BNP Inpatient 3,582 (H) 0 - 450 pg/mL   CBC with platelets and differential     Status: Abnormal   Result Value Ref Range    WBC Count 6.8 4.0 - 11.0 10e3/uL    RBC Count 2.81 (L) 3.80 - 5.20 10e6/uL    Hemoglobin 8.5 (L) 11.7 - 15.7 g/dL    Hematocrit 26.7 (L) 35.0 - 47.0 %    MCV 95 78 - 100 fL    MCH 30.2 26.5 - 33.0 pg    MCHC 31.8 31.5 - 36.5 g/dL    RDW 16.3 (H) 10.0 - 15.0 %    Platelet Count 198 150 - 450 10e3/uL    % Neutrophils 59 %    % Lymphocytes 31 %    % Monocytes 7 %    % Eosinophils 2 %    % Basophils 1 %    % Immature Granulocytes 0 %    NRBCs per 100 WBC 0 <1 /100    Absolute Neutrophils 4.1 1.6 - 8.3 10e3/uL    Absolute Lymphocytes 2.1 0.8 - 5.3 10e3/uL    Absolute Monocytes 0.5 0.0 - 1.3 10e3/uL    Absolute Eosinophils 0.1 0.0 - 0.7 10e3/uL    Absolute Basophils 0.1 0.0 - 0.2 10e3/uL    Absolute Immature Granulocytes 0.0 <=0.4 10e3/uL    Absolute NRBCs 0.0 10e3/uL   Troponin T, High Sensitivity     Status: Normal   Result Value Ref Range    Troponin T, High Sensitivity 12 <=14 ng/L   EKG 12-lead, tracing only     Status: None   Result Value Ref Range    Systolic Blood Pressure  mmHg    Diastolic Blood Pressure  mmHg    Ventricular Rate 97 BPM    Atrial Rate 97 BPM    CT Interval 152 ms    QRS Duration 72 ms     ms    QTc 495 ms    P  Axis 71 degrees    R AXIS 68 degrees    T Axis -65 degrees    Interpretation ECG       Sinus rhythm  Low voltage QRS  Cannot rule out Anterior infarct , age undetermined  Abnormal ECG  Unconfirmed report - interpretation of this ECG is computer generated - see medical record for final interpretation  Confirmed by - EMERGENCY ROOM, PHYSICIAN ([id number]),  GARETH BONE ([id number]) on 9/6/2024 3:11:54 PM     CBC with platelets differential     Status: Abnormal    Narrative    The following orders were created for panel order CBC with platelets differential.  Procedure                               Abnormality         Status                     ---------                               -----------         ------                     CBC with platelets and d...[[id number]]  Abnormal            Final result                 Please view results for these tests on the individual orders.     Medications   ondansetron (ZOFRAN) injection 4 mg (4 mg Intravenous $Given 9/6/24 1449)   HYDROmorphone (PF) (DILAUDID) injection 0.5 mg (has no administration in time range)   furosemide (LASIX) injection 40 mg (40 mg Intravenous $Given 9/6/24 1042)   sodium chloride (PF) 0.9% PF flush 83 mL (83 mLs Intravenous $Given 9/6/24 1130)   iopamidol (ISOVUE-370) solution 51 mL (51 mLs Intravenous $Given 9/6/24 1130)   HYDROmorphone (PF) (DILAUDID) injection 0.5 mg (0.5 mg Intravenous $Given 9/6/24 1205)   HYDROmorphone (PF) (DILAUDID) injection 0.5 mg (0.5 mg Intravenous $Given 9/6/24 1449)   potassium chloride (KLOR-CON) Packet 40 mEq (40 mEq Oral $Given 9/6/24 1551)     Labs Ordered and Resulted from Time of ED Arrival to Time of ED Departure   D DIMER QUANTITATIVE - Abnormal       Result Value    D-Dimer Quantitative 3.12 (*)    COMPREHENSIVE METABOLIC PANEL - Abnormal    Sodium 143      Potassium 3.2 (*)     Carbon Dioxide (CO2) 19 (*)     Anion Gap 13      Urea Nitrogen 4.5 (*)     Creatinine 0.58      GFR Estimate >90      Calcium 8.6 (*)      Chloride 111 (*)     Glucose 158 (*)     Alkaline Phosphatase 98      AST 39      ALT 8      Protein Total 6.6      Albumin 3.5      Bilirubin Total 0.9     NT PROBNP INPATIENT - Abnormal    N terminal Pro BNP Inpatient 3,582 (*)    CBC WITH PLATELETS AND DIFFERENTIAL - Abnormal    WBC Count 6.8      RBC Count 2.81 (*)     Hemoglobin 8.5 (*)     Hematocrit 26.7 (*)     MCV 95      MCH 30.2      MCHC 31.8      RDW 16.3 (*)     Platelet Count 198      % Neutrophils 59      % Lymphocytes 31      % Monocytes 7      % Eosinophils 2      % Basophils 1      % Immature Granulocytes 0      NRBCs per 100 WBC 0      Absolute Neutrophils 4.1      Absolute Lymphocytes 2.1      Absolute Monocytes 0.5      Absolute Eosinophils 0.1      Absolute Basophils 0.1      Absolute Immature Granulocytes 0.0      Absolute NRBCs 0.0     TROPONIN T, HIGH SENSITIVITY - Normal    Troponin T, High Sensitivity 14     TROPONIN T, HIGH SENSITIVITY - Normal    Troponin T, High Sensitivity 12       XR Chest 2 Views   Preliminary Result   RESIDENT PRELIMINARY INTERPRETATION   Impression:    1. No significant remaining right pleural effusion. No appreciable   pneumothorax.   2. Trace left pleural effusion.   3. Minimal streaky perihilar opacities, likely atelectasis.       POC US Guide for Thorcentesis   Final Result   Limited chest ultrasound was performed and demonstrated an adequate fluid collection on the right hemithorax.       Thoracentesis Indication:pleural effusion      Doppler of the skin demonstrated an area at this site without significant vasculature.   A thoracentesis at this site was subsequently performed.      Jovon Werner MD         CT Chest Pulmonary Embolism w Contrast   Final Result   IMPRESSION:    1. No pulmonary embolism.   2. Increased large right pleural effusion and decreased small left   pleural effusion with adjacent compressive atelectasis, as detailed   above. Resolution of the previously seen multifocal  groundglass and   consolidative opacities.   3. Partially visualized ascites in the upper abdomen.   4. Changes of chronic pancreatitis.         I have personally reviewed the examination and initial interpretation   and I agree with the findings.      BRIE VOGEL DO            SYSTEM ID:  [id number]      US Lower Extremity Venous Duplex Bilateral   Final Result   IMPRESSION: No evidence of deep venous thrombosis in either lower   extremity.      I have personally reviewed the examination and initial interpretation   and I agree with the findings.      JEISON WILSON MD            SYSTEM ID:  [id number]      XR Chest 2 Views   Final Result   Impression:    1. Significant decreased multifocal consolidative opacities throughout   both lungs compared to 8/15/2024.   2. No appreciable residual pleural effusion.   3. Elevation of the right hemidiaphragm.       I have personally reviewed the examination and initial interpretation   and I agree with the findings.      NADJA AUSTIN MD            SYSTEM ID:  [id number]             Critical care was not performed.     Medical Decision Making  The patient's presentation was of high complexity (an acute health issue posing potential threat to life or bodily function).    The patient's evaluation involved:  review of external note(s) from 1 sources (see separate area of note for details)  review of 3+ test result(s) ordered prior to this encounter (see separate area of note for details)  ordering and/or review of 3+ test(s) in this encounter (see separate area of note for details)  independent interpretation of testing performed by another health professional (see separate area of note for details)  discussion of management or test interpretation with another health professional (see separate area of note for details)    The patient's management necessitated high risk (a decision regarding hospitalization).    Assessment & Plan    Maribell Leon is a 29-year-old woman with history  of liver cirrhosis complicated by hydrothorax, chronic pancreatitis, among other medical problems who presents to the ED today with chest pain, shortness of breath, and bilateral lower extremity swelling.      Differential diagnosis: ACS, pneumonia, pneumothorax, hydrothorax, pleural effusions, pulmonary embolus, fluid overload.    After thorough history and physical exam patient appears to be no acute distress.  I will obtain laboratory studies, chest x-ray, EKG, and bilateral lower extremity ultrasound for further diagnostic evaluation.  She agrees with the plan.      Laboratory studies returned with no leukocytosis, WBC is normal at 6800.  There is anemia with hemoglobin of 8.5 which is the patient's baseline.  Electrolytes show creatinine of 0.58.  There is hypokalemia with potassium of 3.2 which was repleted with oral potassium chloride solution.  Serial troponins were within normal limits and EKG showed no acute ischemia.  I do not suspect the patient has acute coronary syndrome.  D-dimer was elevated at 3.12 and patient underwent CT angiogram of the chest for evaluation of possible pulmonary embolus.  BNP was slightly elevated at 3582 which was improved from the prior value from last month.  I reviewed patient's chest x-ray and I've read the Radiology report; there were decreased multifocal consolidative opacities throughout both lungs as compared to prior study.  I also reviewed her lower extremity ultrasound and I read the radiology report; no evidence of DVTs.  I also reviewed her CT angiogram of the chest and her read the radiology report; no evidence of pulmonary embolus, however, patient does have increased right-sided pleural effusion.  I consulted the procedural team to evaluate the patient for thoracentesis since this could be the cause and etiology of her symptoms.  I do not see any clear indications for admission.  If they are able to relieve her symptoms with thoracentesis.  Patient agrees with  the plan.  She was given couple of doses of IV Dilaudid for pain and a dose of IV Zofran for nausea.    Patient underwent thoracentesis by the procedural team.  Subsequent chest x-ray shows resolution of pleural effusion and no evidence of pneumothorax.  Her symptoms have improved.  At this point I do not see any clear reasons for hospitalization.  She has an appointment with hepatology in 10 days which she will keep and follow-up.  She will return to the emerged department in the interim if her symptoms worsen.  At this point she is stable for discharge.  She and her mother agree with the plan.    This part of the document was transcribed by Holly Palomares, Medical Scribe.      I have reviewed the nursing notes. I have reviewed the findings, diagnosis, plan and need for follow up with the patient.    New Prescriptions    No medications on file       Final diagnoses:   Chest pain, unspecified type   Pleural effusion on right     IWhitney, am serving as a trained medical scribe to document services personally performed by Elvin Fay MD based on the provider's statements to me.     IElvin MD was physically present and have reviewed and verified the accuracy of this note documented by Whitney Meade.     Elvin Fay MD   [facility] EMERGENCY DEPARTMENT  9/6/2024                 Elvin Fay MD  09/06/24 [id number]

## 2024-09-09 ENCOUNTER — PATIENT OUTREACH (OUTPATIENT)
Dept: ONCOLOGY | Facility: CLINIC | Age: 29
End: 2024-09-09
Payer: COMMERCIAL

## 2024-09-09 NOTE — PROGRESS NOTES
New Patient Oncology Nurse Navigator Note     Referral Received: 09/09/24      Referring provider: Dr. Cogan    Referring Clinic/Organization: [facility]     Referred to: Benign Hematology    Requested provider (if applicable): Dr. Cogan     Evaluation for :   Diagnosis   D64.9 (ICD-10-CM) - Anemia      Clinical History (per Nurse review of records provided):       Latest Reference Range & Units 08/15/24 06:28 08/16/24 06:40 08/17/24 07:33 08/18/24 07:18 08/19/24 06:24 08/20/24 06:36 08/21/24 06:10 09/06/24 09:29   WBC 4.0 - 11.0 10e3/uL 21.2 (H) 17.9 (H) 15.0 (H) 21.8 (H) 24.3 (H) 23.8 (H) 22.5 (H) 6.8   Hemoglobin 11.7 - 15.7 g/dL 8.2 (L) 7.5 (L) 7.8 (L) 7.6 (L) 8.0 (L) 8.0 (L) 8.6 (L) 8.5 (L)   Hematocrit 35.0 - 47.0 % 27.2 (L) 25.6 (L) 26.0 (L) 24.8 (L) 26.2 (L) 25.9 (L) 28.2 (L) 26.7 (L)   Platelet Count 150 - 450 10e3/uL 208 165 153 142 (L) 139 (L) 140 (L) 148 (L) 198   RBC Count 3.80 - 5.20 10e6/uL 2.57 (L) 2.37 (L) 2.50 (L) 2.41 (L) 2.56 (L) 2.52 (L) 2.77 (L) 2.81 (L)   MCV 78 - 100 fL 106 (H) 108 (H) 104 (H) 103 (H) 102 (H) 103 (H) 102 (H) 95   MCH 26.5 - 33.0 pg 31.9 31.6 31.2 31.5 31.3 31.7 31.0 30.2   MCHC 31.5 - 36.5 g/dL 30.1 (L) 29.3 (L) 30.0 (L) 30.6 (L) 30.5 (L) 30.9 (L) 30.5 (L) 31.8   RDW 10.0 - 15.0 % 17.4 (H) 17.3 (H) 17.1 (H) 17.0 (H) 16.8 (H) 16.6 (H) 16.3 (H) 16.3 (H)   % Neutrophils % 86 83 85 86 77 68 63 59   % Lymphocytes % 9 11 10 8 17 24 28 31   % Monocytes % 4 5 4 4 5 6 6 7   % Eosinophils % 0 0 0 0 0 1 2 2   % Basophils % 0 0 0 0 0 0 0 1   Absolute Basophils 0.0 - 0.2 10e3/uL 0.0 0.0 0.0 0.0 0.0 0.0 0.0 0.1   Absolute Eosinophils 0.0 - 0.7 10e3/uL 0.0 0.0 0.0 0.0 0.0 0.3 0.4 0.1   Absolute Immature Granulocytes <=0.4 10e3/uL 0.2 0.2 0.2 0.4 0.3 0.2 0.2 0.0   Absolute Lymphocytes 0.8 - 5.3 10e3/uL 2.0 1.9 1.6 1.7 4.0 5.7 (H) 6.4 (H) 2.1   Absolute Monocytes 0.0 - 1.3 10e3/uL 0.9 1.0 0.6 0.9 1.3 1.4 (H) 1.4 (H) 0.5   % Immature Granulocytes % 1 1 1 2 1 1 1 0   Absolute Neutrophils  1.6 - 8.3 10e3/uL 18.2 (H) 14.9 (H) 12.7 (H) 18.7 (H) 18.7 (H) 16.2 (H) 14.1 (H) 4.1   Absolute NRBCs 10e3/uL 0.0 0.0 0.0 0.0 0.0 0.0 0.0 0.0   NRBCs per 100 WBC <1 /100 0 0 0 0 0 0 0 0   RBC Morphology       Confirmed RBC Indices     Platelet Morphology Automated Count Confirmed. Platelet morphology is normal.       Automated Count Confirmed. Platelet morphology is normal.     Polychromasia None Seen       Slight !     Sed Rate 0 - 20 mm/hr 37 (H)          (H): Data is abnormally high  (L): Data is abnormally low  !: Data is abnormal    Records Location: [facility] Needed:     N/A    Additional testing needed prior to consult:     Lab    Referral updates and Plan:     09/09/2024 11:12 AM -  Referral received and reviewed. Called pt at this time and left voicemail, introduced my role as nurse navigator with [facility] Hemat[facility]/On[facility] dept and that we have recd the referral for dx of anemia from Dr Cogan.    Provided contact information if future questions arise.   -Explained to pt that they will receive a call from our scheduling intake team and provided our call-back number below if needed.    Eleonora York, SHELIAN, RN  Hematology/Oncology Nurse Navigator  [facility]  [phone] / [phone]

## 2024-09-10 ENCOUNTER — THERAPY VISIT (OUTPATIENT)
Dept: PHYSICAL THERAPY | Facility: CLINIC | Age: 29
End: 2024-09-10
Payer: COMMERCIAL

## 2024-09-10 DIAGNOSIS — J18.9 PNEUMONIA OF BOTH LUNGS DUE TO INFECTIOUS ORGANISM, UNSPECIFIED PART OF LUNG: ICD-10-CM

## 2024-09-10 DIAGNOSIS — R53.1 WEAKNESS GENERALIZED: ICD-10-CM

## 2024-09-10 DIAGNOSIS — J90 PLEURAL EFFUSION ON RIGHT: ICD-10-CM

## 2024-09-10 DIAGNOSIS — M79.604 PAIN OF RIGHT LOWER EXTREMITY: ICD-10-CM

## 2024-09-10 DIAGNOSIS — G59 MONONEUROPATHY DUE TO UNDERLYING DISEASE: ICD-10-CM

## 2024-09-10 LAB — BACTERIA SPT CULT: ABNORMAL

## 2024-09-10 PROCEDURE — 97162 PT EVAL MOD COMPLEX 30 MIN: CPT | Mod: GP | Performed by: PHYSICAL THERAPIST

## 2024-09-10 PROCEDURE — 97110 THERAPEUTIC EXERCISES: CPT | Mod: GP | Performed by: PHYSICAL THERAPIST

## 2024-09-10 NOTE — PROGRESS NOTES
PHYSICAL THERAPY EVALUATION  Type of Visit: Evaluation              Subjective       Presenting condition or subjective complaint: neuropathyPt hosptialized 8/12-8/21. She has a history of cirrhosis, alcohol use disorder, neuropathy, chronic pancreatitis who presented with acute dyspnea and cough found to have AHRF likely 2/2 CAP. Was in the ED again on 9/6. Maribell states that overall she is doing better. Still has some difficulty with stairs, walking longer distances. Continues to have R foot pain, shooting pain from top of foot up to knee. Mild foot drop noted. Pain will get very bad, limiting sleep. Foot pain started back in June, around the same time as the swelling. Has had a couple of falls because of LEs giving out. No injury. Feet have N/T, usually staying in feet/ankles but will occasionally travel to the knee.   Date of onset: 08/12/24    Relevant medical history: Anemia; Foot drop; Pain at night or rest; Significant weakness   Dates & types of surgery: tonsilectomy, L5-S1 fusion, L5-S1 fusion hardware removal, wisdom teeth    Prior diagnostic imaging/testing results: MRI; CT scan; X-ray     Prior therapy history for the same diagnosis, illness or injury: No      Prior Level of Function  Transfers: Independent  Ambulation: Independent  ADL: Independent      Living Environment  Social support: With family members   Type of home: House   Stairs to enter the home: Yes 4 Is there a railing: No     Ramp: No   Stairs inside the home: Yes 15 Is there a railing: Yes     Help at home: Self Cares (home health aide/personal care attendant, family, etc); Home management tasks (cooking, cleaning); Assist for driving and community activities  Equipment owned: Walker with wheels; Grab bars; Commode; Raised toilet seat; Bath bench     Employment: No    Hobbies/Interests: swimming    Patient goals for therapy: walk distances, jog, sleep,take care of my dogs,shower myself,etc.    Pain assessment:  R foot pain 7-8/10  currently     Objective   Cognitive Status Examination  Orientation: Oriented to person, place and time   Level of Consciousness: Alert      OBSERVATION: Presents with LEs wrapped to just under knee. Not wearing shoe on R foot    RANGE OF MOTION: R dorsiflexion to neutral but not beyond, noting tightness  STRENGTH: 1/5 R dorsiflexion, otherwise 4-/5 throughout LEs    BED MOBILITY: Independent    TRANSFERS: Independent    GAIT:   Gait Description: Ambulates with reduced stance time on R LE, decreased stride length    BALANCE:     SPECIAL TESTS  Functional Gait Assessment (FGA)    (<22/30 indicates fall risk)   10 Meter Walk Test (Comfortable)  0.66 m/s   6 Minute Walk Test (6MWT)         Dong Balance Scale (BBS)    (<45/56 indicates fall risk)   30 sec sit to stand  5 reps     Dynamic Gait Index (DGI)     (<19/24 indicates fall risk)   Timed Up and Go (TUG) - sec    Single Leg Stance Right (sec)    Single Leg Stance Left (sec)    Modified CTSIB Conditions (sec) Cond 1: 30  Cond 2: 30  Cond 4:   Cond 5 :    Romberg  (sec)    Sharpened Romberg (sec)            SENSATION: Reports tingling and numbness in feet and ankles that occasionally travels to knees        Assessment & Plan   CLINICAL IMPRESSIONS  Medical Diagnosis: Pleural effusion on right  Pneumonia of both lungs due to infectious organism, unspecified part of lung  Mononeuropathy due to underlying disease  Pain of right lower extremity  Weakness generalized    Treatment Diagnosis: Weakness and deconditioning   Impression/Assessment: Patient is a 29 year old female with weakness and gait complaints.  The following significant findings have been identified: Pain, Decreased ROM/flexibility, Decreased strength, Impaired balance, Impaired sensation, Edema, Impaired gait, Impaired muscle performance, Decreased activity tolerance, and Instability. These impairments interfere with their ability to perform self care tasks, work tasks, recreational activities, household  chores, household mobility, and community mobility as compared to previous level of function.     Clinical Decision Making (Complexity):  Clinical Presentation: Evolving/Changing  Clinical Presentation Rationale: based on medical and personal factors listed in PT evaluation  Clinical Decision Making (Complexity): Moderate complexity    PLAN OF CARE  Treatment Interventions:  Interventions: Gait Training, Manual Therapy, Neuromuscular Re-education, Therapeutic Activity, Therapeutic Exercise, Self-Care/Home Management    Long Term Goals     PT Goal 1  Goal Identifier: 30 sec sit to stand test  Goal Description: The pt will complete 7 or more stands in 30 sec due to improvemenst in functional LE strength and ability to transfer from a variety of surfaces  Goal Progress: baseline: 5 reps  Target Date: 11/08/24  PT Goal 2  Goal Identifier: Gait speed  Goal Description: The pt will improve self selected gait speed to >0.9 m/s, improving community mobility  Goal Progress: 0.66 m/s  Target Date: 11/08/24  PT Goal 3  Goal Identifier: 6MWT  Goal Description: The pt will improve distance on 6MWT by 200', improving ability to walk community distances  Goal Progress: baseline: NA on eval  Target Date: 11/08/24  PT Goal 4  Goal Identifier: HEP  Goal Description: The pt will be independent with an HEP in order to improve self management of symptoms  Target Date: 11/08/24      Frequency of Treatment: 1x/week  Duration of Treatment: 60 days    R  Education Assessment:   Learner/Method: Patient  Education Comments: PT role, POC    Risks and benefits of evaluation/treatment have been explained.   Patient/Family/caregiver agrees with Plan of Care.     Evaluation Time:     PT Eval, Moderate Complexity Minutes (56089): 25       Signing Clinician: Sophia Willett PT        [facility]                                                                                   OUTPATIENT PHYSICAL THERAPY      PLAN  OF TREATMENT FOR OUTPATIENT REHABILITATION   Patient's Last Name, First Name, Maribell Sousa YOB: 1995   Provider's Name   [facility]   Medical Record No.  [id number]     Onset Date: 08/12/24  Start of Care Date: 09/10/24     Medical Diagnosis:  Pleural effusion on right  Pneumonia of both lungs due to infectious organism, unspecified part of lung  Mononeuropathy due to underlying disease  Pain of right lower extremity  Weakness generalized      PT Treatment Diagnosis:  Weakness and deconditioning Plan of Treatment  Frequency/Duration: 1x/week/ 60 days    Certification date from 09/10/24 to 11/08/24         See note for plan of treatment details and functional goals     Sophia Willett, PT                         I CERTIFY THE NEED FOR THESE SERVICES FURNISHED UNDER        THIS PLAN OF TREATMENT AND WHILE UNDER MY CARE     (Physician attestation of this document indicates review and certification of the therapy plan).              Referring Provider:  Latonya Olivo    Initial Assessment  See Epic Evaluation- Start of Care Date: 09/10/24

## 2024-09-11 ENCOUNTER — THERAPY VISIT (OUTPATIENT)
Dept: OCCUPATIONAL THERAPY | Facility: CLINIC | Age: 29
End: 2024-09-11
Payer: COMMERCIAL

## 2024-09-11 DIAGNOSIS — I89.0 LYMPHEDEMA: Primary | ICD-10-CM

## 2024-09-11 DIAGNOSIS — K70.31 ALCOHOLIC CIRRHOSIS OF LIVER WITH ASCITES (H): ICD-10-CM

## 2024-09-11 DIAGNOSIS — G59 MONONEUROPATHY DUE TO UNDERLYING DISEASE: ICD-10-CM

## 2024-09-11 PROCEDURE — 97165 OT EVAL LOW COMPLEX 30 MIN: CPT | Mod: GO | Performed by: OCCUPATIONAL THERAPIST

## 2024-09-11 PROCEDURE — 97140 MANUAL THERAPY 1/> REGIONS: CPT | Mod: GO | Performed by: OCCUPATIONAL THERAPIST

## 2024-09-11 NOTE — PROGRESS NOTES
OCCUPATIONAL THERAPY EVALUATION  Type of Visit: Evaluation       Fall Risk Screen:  Fall screen completed by: OT  Have you fallen 2 or more times in the past year?: No  Have you fallen and had an injury in the past year?: No  Is patient a fall risk?: No  Fall screen comments: Pt already working w/ OP PT to address mobility and R foot pain    Subjective      Presenting condition or subjective complaint: neuropathy  Date of onset: 06/15/24    Relevant medical history: Anemia; Foot drop; Pain at night or rest; Significant weakness   Dates & types of surgery: tonsilectomy, L5-S1 fusion, L5-S1 fusion hardware removal, wisdom teeth    Prior diagnostic imaging/testing results: MRI; CT scan; X-ray     Prior therapy history for the same diagnosis, illness or injury: No      Prior Level of Function  Transfers: Independent  Ambulation: Independent  ADL: Independent - pt's mother provides assist for compression wraps  IADL: Driving, Housekeeping, Laundry, Meal preparation, Medication management    Living Environment  Social support: With family members   Type of home: House   Stairs to enter the home: Yes 4 Is there a railing: No     Ramp: No   Stairs inside the home: Yes 15 Is there a railing: Yes     Help at home: Self Cares (home health aide/personal care attendant, family, etc); Home management tasks (cooking, cleaning); Assist for driving and community activities  Equipment owned: Walker with wheels; Grab bars; Commode; Raised toilet seat; Bath bench     Employment: No    Hobbies/Interests: swimming    Patient goals for therapy: walk distances, jog, sleep,take care of my dogs,shower myself,etc.    Pain assessment: Location: R foot/Rating: variable 6-7/10     Objective       EDEMA EVALUATION  Additional history:  Body part affected by edema: Left & right foot/legs. Right side worse  If cancer related, treatment: N/A  If not cancer related, problems with veins or cause of swelling: No  Distance able to walk: As far as  needed. May need occasional breaks/rest.  Time able to stand: 10 minutes just standing  Sensation problems in hands/feet: Yes Numbness, loss of feeling  Edema etiology: Cirrhosis, volume overload       FUNCTIONAL SCALES   Lymphedema Life Impact Scale (LLIS): 40    Cognitive Status Examination  Orientation: Oriented to person, place and time   Level of Consciousness: Alert  Follows Commands and Answers Questions: 100% of the time  Personal Safety and Judgement: Intact  Memory: Intact    EDEMA  Skin Condition: 2+ pitting over dorum of B feet, 1+ and soft non-pitting edema ankles > knee, firmer 2+ pitting posterior thighs. Skin on toes mildly dry. No noted discoloration or skin changes  Scar: No  Dorsal Pedal Pulse: Symmetrical  Stemmer Sign: +  Ulceration: No    Pt reports BLE edema first noticed June 2024. Pt reports symptoms are highly variable and BLE volume is now greatly reduced from August when she went to ED for leg swelling and difficulty breathing. In the past (date not specific) pt was given antibiotics for concern about BLE celluitis.    GIRTH MEASUREMENTS: Not taken today    RANGE OF MOTION:  Limited R ankle flexion. Otherwise WFL  STRENGTH: UE Strength WFL, LE Strength WFL  POSTURE: WNL  ACTIVITIES OF DAILY LIVING: IND, mother provides assist for BLE compression  BED MOBILITY: Independent  TRANSFERS: Independent  GAIT/LOCOMOTION: IND, no balance deficits noted. Gait antalgic - R foot.  BALANCE: WNL  SENSATION: UE Sensation WNL, LE Sensation WNL  VASCULAR:  No noted concerns  COORDINATION: WNL  MUSCLE TONE: WNL    Assessment & Plan   CLINICAL IMPRESSIONS  Medical Diagnosis: Alcoholic cirrhosis of liver with ascites (H),  Mononeuropathy due to underlying disease    Treatment Diagnosis: Lymphedema    Impression/Assessment: Pt is a 29 year old female presenting to Occupational Therapy due to BLE lymphedema symptoms 2/2 liver cirrhosis and fluid overload and R foot pain impairing functional mobility and  ADL/IADL performance.  The following significant findings have been identified: Impaired activity tolerance, Impaired mobility, and Pain.  These identified deficits interfere with their ability to perform self care tasks, work tasks, recreational activities, household chores, driving , household mobility, and meal planning and preparation as compared to previous level of function.     Clinical Decision Making (Complexity):  Assessment of Occupational Performance: 1-3 Performance Deficits  Occupational Performance Limitations: functional mobility, driving and community mobility, health management and maintenance, and home establishment and management  Clinical Decision Making (Complexity): Low complexity    PLAN OF CARE  Treatment Interventions:  Interventions: Self-Care/Home Management, Gradient Compression Bandaging, Manual Therapy    Long Term Goals   OT Goal 1  Goal Identifier: 1 - Education  Goal Description: Pt to verbalize understanding of lymphatic system function, s/s and stages of lymphedema, components CDT, increased risk of cellulitis, and role of skin care and hygiene in reducing risk of infection.  Rationale: In order to maximize safety and independence with performance of self-care activities  Target Date: 11/19/24  OT Goal 2  Goal Identifier: 2 - GCB  Goal Description: Pt and or care giver will be IND with donning/doffing GCBs and pt will tolerate wearing GCB for 23 hrs/day to promote BLE volume reduction.  Rationale: In order to maximize safety and independence with cognitive function within the home or community  Target Date: 11/19/24  OT Goal 3  Goal Identifier: 3 - Garments  Goal Description: Pt will IND don/doff compression garments and verbalize wear schedule, s/s intolerance, garment care instructions to promote/maintain BLE volume reductions.  Rationale: In order to maximize safety and independence with cognitive function within the home or community  Target Date: 11/19/24  OT Goal 4  Goal  Identifier: 4 - Home program  Goal Description: Pt will verbalize/demonstrate IND with self-MLD and lymph exercises to promote lymph flow to reduce BLE volume.  Rationale: In order to maximize safety and independence with cognitive function within the home or community  Target Date: 11/19/24  OT Goal 5  Goal Identifier: 5 - Skin cares  Goal Description: Pt will verbalize/demonstrate IND w/ skin care routine to promote skin integrity and reduce risk of infection.  Rationale: In order to maximize safety and independence with cognitive function within the home or community  Target Date: 11/19/24  OT Goal 6  Goal Identifier: 5 - Volume reduction  Goal Description: BLE volume will reduce by 150 ml for improved functional mobility, ADL/IADL IND, and skin integrity.  Rationale: In order to maximize safety and independence with cognitive function within the home or community  Target Date: 11/19/24      Frequency of Treatment: 1x/week, with ability to increase/decrease freq as clinically indicated  Duration of Treatment: 10 weeks     Recommended Referrals to Other Professionals: Physical Therapy - patient was evaluated by PT on 9/10, has POC in place.  Education Assessment: Learner/Method: Patient;Family;Listening;Reading;Demonstration;No Barriers to Learning  Education Comments: Pt and mother verbalized understanding of all education, further demonstrating understanding through relevant questions     Risks and benefits of evaluation/treatment have been explained.   Patient/Family/caregiver agrees with Plan of Care.     Evaluation Time:    OT Eval, Low Complexity Minutes (03123): 16    Signing Clinician: SAMMI Mcclain [facility]                                                                                   OUTPATIENT OCCUPATIONAL THERAPY      PLAN OF TREATMENT FOR OUTPATIENT REHABILITATION   Patient's Last Name, First Name, Maribell Sousa YOB: 1995    Provider's Name   [facility]   Medical Record No.  [id number]     Onset Date: 06/15/24 Start of Care Date: 09/11/24     Medical Diagnosis:  Alcoholic cirrhosis of liver with ascites (H),  Mononeuropathy due to underlying disease      OT Treatment Diagnosis:  Lymphedema Plan of Treatment  Frequency/Duration:1x/week, with ability to increase/decrease freq as clinically indicated/10 weeks    Certification date from 09/11/24   To 11/19/24        See note for plan of treatment details and functional goals     Ann Milan, RITESHR                         I CERTIFY THE NEED FOR THESE SERVICES FURNISHED UNDER        THIS PLAN OF TREATMENT AND WHILE UNDER MY CARE     (Physician attestation of this document indicates review and certification of the therapy plan).              Referring Provider:  Latonya Olivo    Initial Assessment  See Epic Evaluation- 09/11/24

## 2024-09-16 ENCOUNTER — OFFICE VISIT (OUTPATIENT)
Dept: INTERNAL MEDICINE | Facility: CLINIC | Age: 29
End: 2024-09-16
Payer: COMMERCIAL

## 2024-09-16 ENCOUNTER — OFFICE VISIT (OUTPATIENT)
Dept: GASTROENTEROLOGY | Facility: CLINIC | Age: 29
End: 2024-09-16
Attending: INTERNAL MEDICINE
Payer: COMMERCIAL

## 2024-09-16 ENCOUNTER — LAB (OUTPATIENT)
Dept: LAB | Facility: CLINIC | Age: 29
End: 2024-09-16
Payer: COMMERCIAL

## 2024-09-16 ENCOUNTER — PRE VISIT (OUTPATIENT)
Dept: GASTROENTEROLOGY | Facility: CLINIC | Age: 29
End: 2024-09-16

## 2024-09-16 VITALS — SYSTOLIC BLOOD PRESSURE: 152 MMHG | OXYGEN SATURATION: 98 % | DIASTOLIC BLOOD PRESSURE: 108 MMHG | HEART RATE: 97 BPM

## 2024-09-16 VITALS
WEIGHT: 99.8 LBS | RESPIRATION RATE: 18 BRPM | TEMPERATURE: 98.1 F | DIASTOLIC BLOOD PRESSURE: 103 MMHG | HEART RATE: 101 BPM | BODY MASS INDEX: 19.59 KG/M2 | SYSTOLIC BLOOD PRESSURE: 148 MMHG | OXYGEN SATURATION: 98 % | HEIGHT: 60 IN

## 2024-09-16 DIAGNOSIS — K70.9 ALCOHOLIC LIVER DISEASE (H): Primary | ICD-10-CM

## 2024-09-16 DIAGNOSIS — D64.9 ANEMIA, UNSPECIFIED TYPE: ICD-10-CM

## 2024-09-16 DIAGNOSIS — E61.1 IRON DEFICIENCY: ICD-10-CM

## 2024-09-16 DIAGNOSIS — F41.9 ANXIETY: ICD-10-CM

## 2024-09-16 DIAGNOSIS — G90.50 RSD (REFLEX SYMPATHETIC DYSTROPHY): Primary | ICD-10-CM

## 2024-09-16 DIAGNOSIS — R73.09 ELEVATED GLUCOSE: ICD-10-CM

## 2024-09-16 DIAGNOSIS — E13.9 OTHER SPECIFIED DIABETES MELLITUS WITHOUT COMPLICATION, WITHOUT LONG-TERM CURRENT USE OF INSULIN (H): ICD-10-CM

## 2024-09-16 DIAGNOSIS — K74.69 OTHER CIRRHOSIS OF LIVER (H): ICD-10-CM

## 2024-09-16 DIAGNOSIS — K70.31 ALCOHOLIC CIRRHOSIS OF LIVER WITH ASCITES (H): ICD-10-CM

## 2024-09-16 DIAGNOSIS — K70.9 ALCOHOLIC LIVER DISEASE (H): ICD-10-CM

## 2024-09-16 DIAGNOSIS — G59 MONONEUROPATHY DUE TO UNDERLYING DISEASE: ICD-10-CM

## 2024-09-16 LAB
ALBUMIN SERPL BCG-MCNC: 3.3 G/DL (ref 3.5–5.2)
ALP SERPL-CCNC: 95 U/L (ref 40–150)
ALT SERPL W P-5'-P-CCNC: 6 U/L (ref 0–50)
ANION GAP SERPL CALCULATED.3IONS-SCNC: 11 MMOL/L (ref 7–15)
AST SERPL W P-5'-P-CCNC: 29 U/L (ref 0–45)
BILIRUB DIRECT SERPL-MCNC: 0.32 MG/DL (ref 0–0.3)
BILIRUB SERPL-MCNC: 0.6 MG/DL
BUN SERPL-MCNC: 6.6 MG/DL (ref 6–20)
CALCIUM SERPL-MCNC: 9.1 MG/DL (ref 8.8–10.4)
CHLORIDE SERPL-SCNC: 102 MMOL/L (ref 98–107)
CREAT SERPL-MCNC: 0.65 MG/DL (ref 0.51–0.95)
EGFRCR SERPLBLD CKD-EPI 2021: >90 ML/MIN/1.73M2
ERYTHROCYTE [DISTWIDTH] IN BLOOD BY AUTOMATED COUNT: 15.2 % (ref 10–15)
FERRITIN SERPL-MCNC: 41 NG/ML (ref 6–175)
GLUCOSE SERPL-MCNC: 408 MG/DL (ref 70–99)
HCO3 SERPL-SCNC: 25 MMOL/L (ref 22–29)
HCT VFR BLD AUTO: 26.8 % (ref 35–47)
HGB BLD-MCNC: 8.6 G/DL (ref 11.7–15.7)
INR PPP: 1.2 (ref 0.85–1.15)
IRON BINDING CAPACITY (ROCHE): 302 UG/DL (ref 240–430)
IRON SATN MFR SERPL: 8 % (ref 15–46)
IRON SERPL-MCNC: 25 UG/DL (ref 37–145)
MCH RBC QN AUTO: 29.2 PG (ref 26.5–33)
MCHC RBC AUTO-ENTMCNC: 32.1 G/DL (ref 31.5–36.5)
MCV RBC AUTO: 91 FL (ref 78–100)
PLATELET # BLD AUTO: 212 10E3/UL (ref 150–450)
POTASSIUM SERPL-SCNC: 3.5 MMOL/L (ref 3.4–5.3)
PROT SERPL-MCNC: 6.3 G/DL (ref 6.4–8.3)
RBC # BLD AUTO: 2.95 10E6/UL (ref 3.8–5.2)
SODIUM SERPL-SCNC: 138 MMOL/L (ref 135–145)
WBC # BLD AUTO: 8.2 10E3/UL (ref 4–11)

## 2024-09-16 PROCEDURE — 83540 ASSAY OF IRON: CPT | Performed by: PATHOLOGY

## 2024-09-16 PROCEDURE — 83550 IRON BINDING TEST: CPT | Performed by: PATHOLOGY

## 2024-09-16 PROCEDURE — 99205 OFFICE O/P NEW HI 60 MIN: CPT | Performed by: INTERNAL MEDICINE

## 2024-09-16 PROCEDURE — 82728 ASSAY OF FERRITIN: CPT | Performed by: PATHOLOGY

## 2024-09-16 PROCEDURE — 85027 COMPLETE CBC AUTOMATED: CPT | Performed by: PATHOLOGY

## 2024-09-16 PROCEDURE — 82248 BILIRUBIN DIRECT: CPT | Performed by: PATHOLOGY

## 2024-09-16 PROCEDURE — G0463 HOSPITAL OUTPT CLINIC VISIT: HCPCS | Performed by: INTERNAL MEDICINE

## 2024-09-16 PROCEDURE — 80053 COMPREHEN METABOLIC PANEL: CPT | Performed by: PATHOLOGY

## 2024-09-16 PROCEDURE — 85610 PROTHROMBIN TIME: CPT | Performed by: PATHOLOGY

## 2024-09-16 PROCEDURE — 36415 COLL VENOUS BLD VENIPUNCTURE: CPT | Performed by: PATHOLOGY

## 2024-09-16 RX ORDER — MULTIPLE VITAMINS W/ MINERALS TAB 9MG-400MCG
1 TAB ORAL DAILY
Qty: 100 TABLET | Refills: 3 | Status: SHIPPED | OUTPATIENT
Start: 2024-09-16

## 2024-09-16 RX ORDER — FUROSEMIDE 20 MG
20 TABLET ORAL DAILY
Qty: 90 TABLET | Refills: 3 | Status: SHIPPED | OUTPATIENT
Start: 2024-09-16

## 2024-09-16 RX ORDER — LANOLIN ALCOHOL/MO/W.PET/CERES
1000 CREAM (GRAM) TOPICAL DAILY
Qty: 100 TABLET | Refills: 3 | Status: SHIPPED | OUTPATIENT
Start: 2024-09-16

## 2024-09-16 RX ORDER — TIZANIDINE 2 MG/1
2 TABLET ORAL 3 TIMES DAILY PRN
Qty: 90 TABLET | Refills: 3 | Status: SHIPPED | OUTPATIENT
Start: 2024-09-16

## 2024-09-16 RX ORDER — GABAPENTIN 300 MG/1
300 CAPSULE ORAL 3 TIMES DAILY
Qty: 180 CAPSULE | Refills: 4 | Status: SHIPPED | OUTPATIENT
Start: 2024-09-16

## 2024-09-16 RX ORDER — HYDROXYZINE HYDROCHLORIDE 25 MG/1
25 TABLET, FILM COATED ORAL EVERY 6 HOURS PRN
Qty: 180 TABLET | Refills: 3 | Status: SHIPPED | OUTPATIENT
Start: 2024-09-16

## 2024-09-16 RX ORDER — FERROUS SULFATE 325(65) MG
325 TABLET ORAL
Qty: 90 TABLET | Refills: 3 | Status: SHIPPED | OUTPATIENT
Start: 2024-09-16

## 2024-09-16 RX ORDER — FOLIC ACID 1 MG/1
1 TABLET ORAL DAILY
Qty: 100 TABLET | Refills: 3 | Status: SHIPPED | OUTPATIENT
Start: 2024-09-16

## 2024-09-16 RX ORDER — DULOXETIN HYDROCHLORIDE 30 MG/1
30 CAPSULE, DELAYED RELEASE ORAL DAILY
Qty: 90 CAPSULE | Refills: 2 | Status: SHIPPED | OUTPATIENT
Start: 2024-09-16

## 2024-09-16 RX ORDER — LANOLIN ALCOHOL/MO/W.PET/CERES
100 CREAM (GRAM) TOPICAL DAILY
Qty: 100 TABLET | Refills: 11 | Status: SHIPPED | OUTPATIENT
Start: 2024-09-16

## 2024-09-16 ASSESSMENT — PAIN SCALES - GENERAL: PAINLEVEL: SEVERE PAIN (7)

## 2024-09-16 NOTE — PROGRESS NOTES
HPI  29-year-old presents today with her mother to establish primary care.  She has a long and complex past medical history largely related to alcohol use disorder with episodes of recurrent pancreatitis and alcoholic cirrhosis.  History of heavy alcohol use but has now been dry since June of this year.  She is not involved in AA or any type of outpatient treatment or chemical dependency program.  She also has quit smoking in July.  She presents today mainly concerned about pain control in her right leg and blood pressure control.  She has had persistent pain and discomfort in the right leg associated with both allodynia and hyperesthesia.  She has had intermittent swelling and some color changes and temperature changes in the foot as well.  Apparently has been diagnosed with neuropathy in the past and has been recently treated with a combination of Cymbalta and gabapentin which is largely been ineffective for the pain.  She does not recall any injury or trauma that triggered the pain or that set things off.  She has had CT scans x-rays and MRIs of the foot and the leg which have essentially been normal.  No history of bone scan.  Blood pressure has been consistently elevated.  She has had apparent problems with fluid retention in the past and is on daily furosemide and was recently started on spironolactone.  No history of ascites by her report.  She had an A1c last month of 5.3% her blood sugar today however is 408.  No history of diabetes but she has had elevated blood sugars in the hospital which she says have been attributed to steroids.  Past Medical History:   Diagnosis Date    Acute pancreatitis     ADHD (attention deficit hyperactivity disorder)     Alcohol-induced chronic pancreatitis (H)     Anxiety      Past Surgical History:   Procedure Laterality Date    POSTERIOR SPINAL FUSION      L5/S1    SPINE HARDWARE REMOVAL      TONSILLECTOMY      8/05    WISDOM TOOTH EXTRACTION       Family History   Problem  Relation Age of Onset    Allergy (Severe) Mother         Allergies,Environmental allergies, celiac disease    Family History Negative Father         Good Health    Family History Negative Sister         Good Health    Family History Negative Sister         Good Health    Asthma Brother         Asthma,history of intermittent asthma    Other - See Comments Brother         Enuresis    Diabetes Maternal Grandmother         Diabetes,Type 2    Cancer Maternal Grandfather         Cancer,Kidney cancer    Breast Cancer Paternal Grandmother     Heart Disease Paternal Grandfather         Heart Disease,Cardiomyopathy    Colon Cancer No family hx of     Liver Disease No family hx of          Exam:  BP (!) 152/108 (BP Location: Right arm, Patient Position: Sitting, Cuff Size: Adult Regular)   Pulse 97   SpO2 98%   The patient is alert, oriented with a clear sensorium.   Skin shows numerous tattoos no lesions or rashes and good turgor.   Head is normocephalic and atraumatic.    Extremities show no edema.  The right leg is in a soft cast at the present time.    Labs reviewed:  Results for orders placed or performed in visit on 09/16/24   CBC with platelets     Status: Abnormal   Result Value Ref Range    WBC Count 8.2 4.0 - 11.0 10e3/uL    RBC Count 2.95 (L) 3.80 - 5.20 10e6/uL    Hemoglobin 8.6 (L) 11.7 - 15.7 g/dL    Hematocrit 26.8 (L) 35.0 - 47.0 %    MCV 91 78 - 100 fL    MCH 29.2 26.5 - 33.0 pg    MCHC 32.1 31.5 - 36.5 g/dL    RDW 15.2 (H) 10.0 - 15.0 %    Platelet Count 212 150 - 450 10e3/uL   Basic metabolic panel     Status: Abnormal   Result Value Ref Range    Sodium 138 135 - 145 mmol/L    Potassium 3.5 3.4 - 5.3 mmol/L    Chloride 102 98 - 107 mmol/L    Carbon Dioxide (CO2) 25 22 - 29 mmol/L    Anion Gap 11 7 - 15 mmol/L    Urea Nitrogen 6.6 6.0 - 20.0 mg/dL    Creatinine 0.65 0.51 - 0.95 mg/dL    GFR Estimate >90 >60 mL/min/1.73m2    Calcium 9.1 8.8 - 10.4 mg/dL    Glucose 408 (H) 70 - 99 mg/dL   Hepatic function  panel     Status: Abnormal   Result Value Ref Range    Protein Total 6.3 (L) 6.4 - 8.3 g/dL    Albumin 3.3 (L) 3.5 - 5.2 g/dL    Bilirubin Total 0.6 <=1.2 mg/dL    Alkaline Phosphatase 95 40 - 150 U/L    AST 29 0 - 45 U/L    ALT 6 0 - 50 U/L    Bilirubin Direct 0.32 (H) 0.00 - 0.30 mg/dL   INR     Status: Abnormal   Result Value Ref Range    INR 1.20 (H) 0.85 - 1.15   IRON AND IRON BINDING CAPACITY     Status: Abnormal   Result Value Ref Range    Iron 25 (L) 37 - 145 ug/dL    Iron Binding Capacity 302 240 - 430 ug/dL    Iron Sat Index 8 (L) 15 - 46 %   FERRITIN     Status: Normal   Result Value Ref Range    Ferritin 41 6 - 175 ng/mL         ASSESSMENT  1 Alcohol use disorder  2 History Alcoholic hepatitis  3 Chronic recurrent pancreatitis  4 Diabetes related to above we will start metformin  5 Hypertension  6 history recurrent pleural effusion  7 possible complex regional pain syndrome right leg  8 iron deficiency anemia    Plan  In light of her elevated blood sugar today organ to get a fasting blood sugar and a C-peptide level.  In light of her leg pain needs triple phase bone scan looking for evidence of a complex regional pain syndrome.  For the blood pressure will assess her response to spironolactone in 2 weeks with follow-up.  For iron deficiency we will start her on supplemental daily iron.  This note was completed using Dragon voice recognition software.      Domingo Peñaloza MD  General Internal Medicine  [facility]  [phone]

## 2024-09-16 NOTE — PROGRESS NOTES
Maribell is a 29 year old that presents in clinic today for the following:     Chief Complaint   Patient presents with    Establish Care     Pain management.  Medication review/refill.  Elevated BP    Hospital F/U     Pneumonia  Liver disease.           9/16/2024     6:16 PM   Additional Questions   Roomed by KTR     Screenings as of today     PHQ-2 Total Score (Adult) - Positive if 3 or more points; Administer   PHQ-9 if positive 0        Eleazar Almazan, EMT at 6:19 PM on 9/16/2024    Answers submitted by the patient for this visit:  General Questionnaire (Submitted on 9/16/2024)  Chief Complaint: Chronic problems general questions HPI Form  What is the reason for your visit today? : to establish primary care/pain management  How many days per week do you miss taking your medication?: 0

## 2024-09-16 NOTE — PROGRESS NOTES
[facility] Hepatology    New Patient Visit    Referring provider:  Noreen Nevarez    29 year old female    Chief complaint:  ?cirrhosis    HPI:  Patient comes to clinic this morning with her mother for further evaluation and management of ?alcohol-related cirrhosis.  The patient has a history of multiple admissions for acute alcohol related pancreatitis most recently in September 2023.  Since July 2024, the patient has been admitted to the hospital 4 times for alcohol-related liver disease.  The last 2 admissions were to [facility].  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    Patient is feeling well today.  She continues to experience right foot pain.  This pain is throbbing and tingling in character, is mild to moderate in severity and it is more noticeable at nighttime.  Patient is currently taking duloxetine and gabapentin for this pain.    Patient reports that her lower extremity edema has almost resolved.  She is currently on a low-sodium diet and furosemide 20 mg/day.  Patient denies cough or dyspnea.  She presented to the ER recently for a therapeutic thoracentesis.    Patient denies jaundice, lethargy or confusion.    Patient denies any history of melena, hematemesis or hematochezia.  She has not had an upper endoscopy since getting sick.    Patient denies any fevers, sweats or chills.    Weight has fluctuated due to fluid status.  Patient's mother notices that she has had some mild muscle wasting.  Appetite is good and improving now.    Patient last drank alcohol in June 2024.  At that time she was drinking 4-5 beers 3-4 times per week.  Patient has had a DUI in 2020.  Patient denies any issues with alcohol withdrawal seizures.  She is currently trying to get enrolled in an intensive outpatient program for treatment of AUD.  She has done one-on-one counseling in  the past for AUD but has not attended any support groups such as AA and has never attended inpatient residential treatment for AUD.    Patient quit smoking in July 2024.  She was smoking just under half a pack of cigarettes per day for the past 4 to 5 years.    Patient denies any recreational or illicit substance use including marijuana, IN or IV drugs.    Medical hx Surgical hx   Past Medical History:   Diagnosis Date    Acute pancreatitis     ADHD (attention deficit hyperactivity disorder)     Alcohol-induced chronic pancreatitis (H)     Anxiety       Past Surgical History:   Procedure Laterality Date    POSTERIOR SPINAL FUSION      L5/S1    SPINE HARDWARE REMOVAL      TONSILLECTOMY      8/05    WISDOM TOOTH EXTRACTION            Medications  Current Outpatient Medications   Medication Sig Dispense Refill    acetaminophen (TYLENOL) 325 MG tablet Take 2 tablets (650 mg) by mouth 3 times daily. 90 tablet 0    cyanocobalamin (VITAMIN B-12) 1000 MCG tablet Take 1,000 mcg by mouth daily      diclofenac (VOLTAREN) 1 % topical gel Apply 4 g topically 4 times daily. (Patient taking differently: Apply 4 g topically 4 times daily as needed.) 100 g 0    DULoxetine (CYMBALTA) 30 MG capsule Take 1 capsule (30 mg) by mouth daily. 30 capsule 0    folic acid (FOLVITE) 1 MG tablet Take 1 tablet (1 mg) by mouth daily 30 tablet 0    furosemide (LASIX) 20 MG tablet Take 1 tablet (20 mg) by mouth daily. 30 tablet 0    gabapentin (NEURONTIN) 300 MG capsule Take 1 capsule (300 mg) by mouth 3 times daily. 90 capsule 0    hydrOXYzine HCl (ATARAX) 25 MG tablet Take 1 tablet (25 mg) by mouth every 6 hours as needed for other (adjuvant pain). (Patient taking differently: Take 25 mg by mouth every 6 hours as needed for other (adjuvant pain).) 120 tablet 0    methocarbamol (ROBAXIN) 500 MG tablet Take 1 tablet (500 mg) by mouth 4 times daily as needed for muscle spasms 30 tablet 0    multivitamin w/minerals (THERA-VIT-M) tablet Take 1 tablet  by mouth daily. 30 tablet 0    senna-docusate (SENOKOT-S/PERICOLACE) 8.6-50 MG tablet Take 1 tablet by mouth 2 times daily as needed for constipation. 15 tablet 0    thiamine (B-1) 100 MG tablet Take 1 tablet (100 mg) by mouth daily. 30 tablet 0    magnesium 250 MG tablet Take 1 tablet by mouth daily (Patient not taking: Reported on 9/16/2024)      naloxone (NARCAN) 4 MG/0.1ML nasal spray Spray 1 spray (4 mg) into one nostril alternating nostrils as needed for opioid reversal. every 2-3 minutes until assistance arrives 2 each 0       Allergies  No Known Allergies    Family hx Social hx   Family History   Problem Relation Age of Onset    Allergy (Severe) Mother         Allergies,Environmental allergies, celiac disease    Family History Negative Father         Good Health    Family History Negative Sister         Good Health    Family History Negative Sister         Good Health    Asthma Brother         Asthma,history of intermittent asthma    Other - See Comments Brother         Enuresis    Diabetes Maternal Grandmother         Diabetes,Type 2    Cancer Maternal Grandfather         Cancer,Kidney cancer    Breast Cancer Paternal Grandmother     Heart Disease Paternal Grandfather         Heart Disease,Cardiomyopathy    Colon Cancer No family hx of     Liver Disease No family hx of       Social History     Tobacco Use    Smoking status: Former     Current packs/day: 0.10     Types: Cigarettes    Smokeless tobacco: Never    Tobacco comments:     Quit smoking June 2024   Substance Use Topics    Alcohol use: No    Drug use: Not Currently     Types: Other     Comment: Drug use: No     Patient is currently living with her mother and [place], [place].  She previously lived in [place], [place].  Patient is .  She does not have any children.  Patient has 3 siblings (2 sisters and 1 brother) were all healthy.  Patient is not currently working but previously worked as a .     Review of systems  A  10-point review of systems was negative.    Examination  BP (!) 148/103 (BP Location: Right arm, Patient Position: Sitting, Cuff Size: Adult Small)   Pulse 101   Temp 98.1  F (36.7  C) (Oral)   Resp 18   Ht 1.524 m (5')   Wt 45.3 kg (99 lb 12.8 oz)   SpO2 98%   BMI 19.49 kg/m    Body mass index is 19.49 kg/m .    Gen- well, NAD, A+Ox3, normal color  Eye- EOMI  ENT- MMM, normal oropharynx  Lym- no palpable lymphadenopathy  CVS- S1, S2 normal, no added sounds, RRR  RS- CTA  Abd- soft, non-tender, palpable hepatomegaly, no ascites or splenomegaly on palpation or percussion, BS+  Extr- pulses good, no KATELYN  MS- hands normal- no clubbing  Neuro- A+Ox3, no asterixis  Skin- no rash or jaundice  Psych- normal mood    Laboratory  Lab Results   Component Value Date     09/06/2024     01/22/2013    POTASSIUM 3.2 09/06/2024    POTASSIUM 2.6 08/14/2024    POTASSIUM 4.3 01/22/2013    CHLORIDE 111 09/06/2024    CHLORIDE 103 01/22/2013    CO2 19 09/06/2024    CO2 25 01/22/2013    BUN 4.5 09/06/2024    BUN 9 01/22/2013    CR 0.58 09/06/2024    CR 0.62 01/22/2013       Lab Results   Component Value Date    BILITOTAL 0.9 09/06/2024    ALT 8 09/06/2024    AST 39 09/06/2024    ALKPHOS 98 09/06/2024       Lab Results   Component Value Date    ALBUMIN 3.5 09/06/2024    ALBUMIN 4.4 01/22/2013    PROTTOTAL 6.6 09/06/2024    PROTTOTAL 7.4 01/22/2013        Lab Results   Component Value Date    WBC 6.8 09/06/2024    HGB 8.5 09/06/2024    HGB 14.1 03/30/2015    MCV 95 09/06/2024    MCV 90 03/30/2015     09/06/2024     03/30/2015       Lab Results   Component Value Date    INR 1.74 08/12/2024         Radiology  CT A/P with IV contrast 8/12/2024 reviewed  Abd U/S 8/9/2024 reviewed    Assessment  29 year old female who presents to establish care for alcohol-related liver disease complicated with pleural effusion/hydrothorax.  Last ETOH = June 2024.  Physical examination, imaging and blood work are not completely  consistent with cirrhosis.  Patient's initial presentation in July 2024 is consistent with severe alcohol-related hepatitis.  Will check blood work today and add spironolactone for pleural effusion if BMP ok.  Will continue to monitor clinically with ongoing sobriety.  If the patient's symptoms do not fully resolve after a prolonged period of sobriety, would consider liver biopsy to definitively rule in or out cirrhosis.  No indication for upper endoscopy to screen for esophageal varices while the diagnosis of cirrhosis is currently in doubt.    Plan  Complete abstinence from alcohol  Start chemical dependency treatment as planned  Low Na diet  Check CMP, INR, CBC, iron studies  Continue furosemide 20mg PO Q24  Add spironolactone 50mg PO Q24 if BMP ok  Follow-up with me in 3 months    Rick Carranza MD  Hepatology  [facility]    I spent 60 minutes on the date of the encounter doing chart review, history and exam, documentation and further activities as noted above.

## 2024-09-16 NOTE — LETTER
9/16/2024      Maribell Leon  [address]  [place] [address]      Dear Colleague,    Thank you for referring your patient, Maribell Leon, to the [facility] [place]. Please see a copy of my visit note below.    [facility] Hepatology    New Patient Visit    Referring provider:  Noreen Nevarez    29 year old female    Chief complaint:  ?cirrhosis    HPI:  Patient comes to clinic this morning with her mother for further evaluation and management of ?alcohol-related cirrhosis.  The patient has a history of multiple admissions for acute alcohol related pancreatitis most recently in September 2023.  Since July 2024, the patient has been admitted to the hospital 4 times for alcohol-related liver disease.  The last 2 admissions were to [facility].  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    Patient is feeling well today.  She continues to experience right foot pain.  This pain is throbbing and tingling in character, is mild to moderate in severity and it is more noticeable at nighttime.  Patient is currently taking duloxetine and gabapentin for this pain.    Patient reports that her lower extremity edema has almost resolved.  She is currently on a low-sodium diet and furosemide 20 mg/day.  Patient denies cough or dyspnea.  She presented to the ER recently for a therapeutic thoracentesis.    Patient denies jaundice, lethargy or confusion.    Patient denies any history of melena, hematemesis or hematochezia.  She has not had an upper endoscopy since getting sick.    Patient denies any fevers, sweats or chills.    Weight has fluctuated due to fluid status.  Patient's mother notices that she has had some mild muscle wasting.  Appetite is good and improving now.    Patient last drank alcohol in June 2024.  At that time she was drinking 4-5 beers 3-4  times per week.  Patient has had a DUI in 2020.  Patient denies any issues with alcohol withdrawal seizures.  She is currently trying to get enrolled in an intensive outpatient program for treatment of AUD.  She has done one-on-one counseling in the past for AUD but has not attended any support groups such as AA and has never attended inpatient residential treatment for AUD.    Patient quit smoking in July 2024.  She was smoking just under half a pack of cigarettes per day for the past 4 to 5 years.    Patient denies any recreational or illicit substance use including marijuana, IN or IV drugs.    Medical hx Surgical hx   Past Medical History:   Diagnosis Date     Acute pancreatitis      ADHD (attention deficit hyperactivity disorder)      Alcohol-induced chronic pancreatitis (H)      Anxiety       Past Surgical History:   Procedure Laterality Date     POSTERIOR SPINAL FUSION      L5/S1     SPINE HARDWARE REMOVAL       TONSILLECTOMY      8/05     WISDOM TOOTH EXTRACTION            Medications  Current Outpatient Medications   Medication Sig Dispense Refill     acetaminophen (TYLENOL) 325 MG tablet Take 2 tablets (650 mg) by mouth 3 times daily. 90 tablet 0     cyanocobalamin (VITAMIN B-12) 1000 MCG tablet Take 1,000 mcg by mouth daily       diclofenac (VOLTAREN) 1 % topical gel Apply 4 g topically 4 times daily. (Patient taking differently: Apply 4 g topically 4 times daily as needed.) 100 g 0     DULoxetine (CYMBALTA) 30 MG capsule Take 1 capsule (30 mg) by mouth daily. 30 capsule 0     folic acid (FOLVITE) 1 MG tablet Take 1 tablet (1 mg) by mouth daily 30 tablet 0     furosemide (LASIX) 20 MG tablet Take 1 tablet (20 mg) by mouth daily. 30 tablet 0     gabapentin (NEURONTIN) 300 MG capsule Take 1 capsule (300 mg) by mouth 3 times daily. 90 capsule 0     hydrOXYzine HCl (ATARAX) 25 MG tablet Take 1 tablet (25 mg) by mouth every 6 hours as needed for other (adjuvant pain). (Patient taking differently: Take 25 mg by  mouth every 6 hours as needed for other (adjuvant pain).) 120 tablet 0     methocarbamol (ROBAXIN) 500 MG tablet Take 1 tablet (500 mg) by mouth 4 times daily as needed for muscle spasms 30 tablet 0     multivitamin w/minerals (THERA-VIT-M) tablet Take 1 tablet by mouth daily. 30 tablet 0     senna-docusate (SENOKOT-S/PERICOLACE) 8.6-50 MG tablet Take 1 tablet by mouth 2 times daily as needed for constipation. 15 tablet 0     thiamine (B-1) 100 MG tablet Take 1 tablet (100 mg) by mouth daily. 30 tablet 0     magnesium 250 MG tablet Take 1 tablet by mouth daily (Patient not taking: Reported on 9/16/2024)       naloxone (NARCAN) 4 MG/0.1ML nasal spray Spray 1 spray (4 mg) into one nostril alternating nostrils as needed for opioid reversal. every 2-3 minutes until assistance arrives 2 each 0       Allergies  No Known Allergies    Family hx Social hx   Family History   Problem Relation Age of Onset     Allergy (Severe) Mother         Allergies,Environmental allergies, celiac disease     Family History Negative Father         Good Health     Family History Negative Sister         Good Health     Family History Negative Sister         Good Health     Asthma Brother         Asthma,history of intermittent asthma     Other - See Comments Brother         Enuresis     Diabetes Maternal Grandmother         Diabetes,Type 2     Cancer Maternal Grandfather         Cancer,Kidney cancer     Breast Cancer Paternal Grandmother      Heart Disease Paternal Grandfather         Heart Disease,Cardiomyopathy     Colon Cancer No family hx of      Liver Disease No family hx of       Social History     Tobacco Use     Smoking status: Former     Current packs/day: 0.10     Types: Cigarettes     Smokeless tobacco: Never     Tobacco comments:     Quit smoking June 2024   Substance Use Topics     Alcohol use: No     Drug use: Not Currently     Types: Other     Comment: Drug use: No     Patient is currently living with her mother and Gin  [place].  She previously lived in [place], [place].  Patient is .  She does not have any children.  Patient has 3 siblings (2 sisters and 1 brother) were all healthy.  Patient is not currently working but previously worked as a .     Review of systems  A 10-point review of systems was negative.    Examination  BP (!) 148/103 (BP Location: Right arm, Patient Position: Sitting, Cuff Size: Adult Small)   Pulse 101   Temp 98.1  F (36.7  C) (Oral)   Resp 18   Ht 1.524 m (5')   Wt 45.3 kg (99 lb 12.8 oz)   SpO2 98%   BMI 19.49 kg/m    Body mass index is 19.49 kg/m .    Gen- well, NAD, A+Ox3, normal color  Eye- EOMI  ENT- MMM, normal oropharynx  Lym- no palpable lymphadenopathy  CVS- S1, S2 normal, no added sounds, RRR  RS- CTA  Abd- soft, non-tender, palpable hepatomegaly, no ascites or splenomegaly on palpation or percussion, BS+  Extr- pulses good, no KATELYN  MS- hands normal- no clubbing  Neuro- A+Ox3, no asterixis  Skin- no rash or jaundice  Psych- normal mood    Laboratory  Lab Results   Component Value Date     09/06/2024     01/22/2013    POTASSIUM 3.2 09/06/2024    POTASSIUM 2.6 08/14/2024    POTASSIUM 4.3 01/22/2013    CHLORIDE 111 09/06/2024    CHLORIDE 103 01/22/2013    CO2 19 09/06/2024    CO2 25 01/22/2013    BUN 4.5 09/06/2024    BUN 9 01/22/2013    CR 0.58 09/06/2024    CR 0.62 01/22/2013       Lab Results   Component Value Date    BILITOTAL 0.9 09/06/2024    ALT 8 09/06/2024    AST 39 09/06/2024    ALKPHOS 98 09/06/2024       Lab Results   Component Value Date    ALBUMIN 3.5 09/06/2024    ALBUMIN 4.4 01/22/2013    PROTTOTAL 6.6 09/06/2024    PROTTOTAL 7.4 01/22/2013        Lab Results   Component Value Date    WBC 6.8 09/06/2024    HGB 8.5 09/06/2024    HGB 14.1 03/30/2015    MCV 95 09/06/2024    MCV 90 03/30/2015     09/06/2024     03/30/2015       Lab Results   Component Value Date    INR 1.74 08/12/2024         Radiology  CT A/P with IV contrast  8/12/2024 reviewed  Abd U/S 8/9/2024 reviewed    Assessment  29 year old female who presents to establish care for alcohol-related liver disease complicated with pleural effusion/hydrothorax.  Last ETOH = June 2024.  Physical examination, imaging and blood work are not completely consistent with cirrhosis.  Patient's initial presentation in July 2024 is consistent with severe alcohol-related hepatitis.  Will check blood work today and add spironolactone for pleural effusion if BMP ok.  Will continue to monitor clinically with ongoing sobriety.  If the patient's symptoms do not fully resolve after a prolonged period of sobriety, would consider liver biopsy to definitively rule in or out cirrhosis.  No indication for upper endoscopy to screen for esophageal varices while the diagnosis of cirrhosis is currently in doubt.    Plan  Complete abstinence from alcohol  Start chemical dependency treatment as planned  Low Na diet  Check CMP, INR, CBC, iron studies  Continue furosemide 20mg PO Q24  Add spironolactone 50mg PO Q24 if BMP ok  Follow-up with me in 3 months    Rick Carranza MD  Hepatology  [facility]    I spent 60 minutes on the date of the encounter doing chart review, history and exam, documentation and further activities as noted above.       Again, thank you for allowing me to participate in the care of your patient.        Sincerely,        Rick Carranza MD

## 2024-09-16 NOTE — NURSING NOTE
Chief Complaint   Patient presents with    Consult     abnormal liver imaging, history of elevated LFT's     Vital signs:  Temp: 98.1  F (36.7  C) Temp src: Oral BP: (!) 148/103 (provider notified, seeing PCP tonight) Pulse: 101   Resp: 18 SpO2: 98 %     Height: 152.4 cm (5') Weight: 45.3 kg (99 lb 12.8 oz)  Estimated body mass index is 19.49 kg/m  as calculated from the following:    Height as of this encounter: 1.524 m (5').    Weight as of this encounter: 45.3 kg (99 lb 12.8 oz).      Pam Workman, [facility]  9/16/2024 8:29 AM

## 2024-09-17 ENCOUNTER — PATIENT OUTREACH (OUTPATIENT)
Dept: GASTROENTEROLOGY | Facility: CLINIC | Age: 29
End: 2024-09-17
Payer: COMMERCIAL

## 2024-09-17 DIAGNOSIS — K70.31 ALCOHOLIC CIRRHOSIS OF LIVER WITH ASCITES (H): Primary | ICD-10-CM

## 2024-09-17 DIAGNOSIS — J90 PLEURAL EFFUSION ON RIGHT: ICD-10-CM

## 2024-09-17 PROBLEM — E79.0 ELEVATED URIC ACID IN BLOOD: Status: RESOLVED | Noted: 2024-07-31 | Resolved: 2024-09-17

## 2024-09-17 PROBLEM — R53.1 WEAKNESS GENERALIZED: Status: RESOLVED | Noted: 2024-07-31 | Resolved: 2024-09-17

## 2024-09-17 PROBLEM — K70.9 ALCOHOLIC LIVER DISEASE (H): Status: ACTIVE | Noted: 2024-09-17

## 2024-09-17 PROBLEM — K74.69 OTHER CIRRHOSIS OF LIVER (H): Status: RESOLVED | Noted: 2024-08-02 | Resolved: 2024-09-17

## 2024-09-17 PROBLEM — L03.115 CELLULITIS OF RIGHT LOWER EXTREMITY: Status: RESOLVED | Noted: 2024-08-02 | Resolved: 2024-09-17

## 2024-09-17 PROBLEM — N17.9 AKI (ACUTE KIDNEY INJURY) (H): Status: RESOLVED | Noted: 2024-07-31 | Resolved: 2024-09-17

## 2024-09-17 PROBLEM — D72.829 LEUKOCYTOSIS, UNSPECIFIED TYPE: Status: RESOLVED | Noted: 2024-07-31 | Resolved: 2024-09-17

## 2024-09-17 RX ORDER — FERROUS GLUCONATE 324(38)MG
324 TABLET ORAL
Qty: 90 TABLET | Refills: 3 | Status: CANCELLED | OUTPATIENT
Start: 2024-09-17

## 2024-09-17 NOTE — PROGRESS NOTES
Left message for patient asking for call back to discuss the following information and instructions per Dr. Carranza:  - Labs reviewed.  MELD 3.0= 9.  Iron deficiency anemia.  BMP normal.   - Start ferrous gluconate 324mg PO Q24, PCP has already started patient on ferrous sulfate, ok to continue this per Dr. Carranza.  - Start spironolactone 50mg PO Q24.    - Order PRN thoracentesis orders

## 2024-09-18 ENCOUNTER — THERAPY VISIT (OUTPATIENT)
Dept: PHYSICAL THERAPY | Facility: CLINIC | Age: 29
End: 2024-09-18
Payer: COMMERCIAL

## 2024-09-18 DIAGNOSIS — J90 PLEURAL EFFUSION ON RIGHT: Primary | ICD-10-CM

## 2024-09-18 DIAGNOSIS — G59 MONONEUROPATHY DUE TO UNDERLYING DISEASE: ICD-10-CM

## 2024-09-18 DIAGNOSIS — M79.604 PAIN OF RIGHT LOWER EXTREMITY: ICD-10-CM

## 2024-09-18 DIAGNOSIS — R53.1 WEAKNESS GENERALIZED: ICD-10-CM

## 2024-09-18 DIAGNOSIS — J18.9 PNEUMONIA OF BOTH LUNGS DUE TO INFECTIOUS ORGANISM, UNSPECIFIED PART OF LUNG: ICD-10-CM

## 2024-09-18 PROCEDURE — 97110 THERAPEUTIC EXERCISES: CPT | Mod: GP | Performed by: PHYSICAL THERAPIST

## 2024-09-18 RX ORDER — SPIRONOLACTONE 50 MG/1
50 TABLET, FILM COATED ORAL DAILY
Qty: 90 TABLET | Refills: 3 | Status: SHIPPED | OUTPATIENT
Start: 2024-09-18

## 2024-09-18 NOTE — PROGRESS NOTES
Second attempt to reach patient,  no answer and detailed message left. Will also send My Chart message with information about scheduling future thoracentesis. Call back number provided and encouraged pt to call with any questions.

## 2024-09-19 ENCOUNTER — THERAPY VISIT (OUTPATIENT)
Dept: OCCUPATIONAL THERAPY | Facility: CLINIC | Age: 29
End: 2024-09-19
Payer: COMMERCIAL

## 2024-09-19 DIAGNOSIS — K70.31 ALCOHOLIC CIRRHOSIS OF LIVER WITH ASCITES (H): ICD-10-CM

## 2024-09-19 DIAGNOSIS — G59 MONONEUROPATHY DUE TO UNDERLYING DISEASE: ICD-10-CM

## 2024-09-19 DIAGNOSIS — I89.0 LYMPHEDEMA: Primary | ICD-10-CM

## 2024-09-19 PROCEDURE — 97140 MANUAL THERAPY 1/> REGIONS: CPT | Mod: GO | Performed by: OCCUPATIONAL THERAPIST

## 2024-09-20 ENCOUNTER — LAB (OUTPATIENT)
Dept: LAB | Facility: CLINIC | Age: 29
End: 2024-09-20
Payer: COMMERCIAL

## 2024-09-20 DIAGNOSIS — R73.09 ELEVATED GLUCOSE: ICD-10-CM

## 2024-09-20 LAB
C PEPTIDE SERPL-MCNC: 2.7 NG/ML (ref 0.9–6.9)
FASTING STATUS PATIENT QL REPORTED: ABNORMAL
GLUCOSE SERPL-MCNC: 277 MG/DL (ref 70–99)

## 2024-09-20 PROCEDURE — 36415 COLL VENOUS BLD VENIPUNCTURE: CPT | Performed by: PATHOLOGY

## 2024-09-20 PROCEDURE — 99000 SPECIMEN HANDLING OFFICE-LAB: CPT | Performed by: PATHOLOGY

## 2024-09-20 PROCEDURE — 84681 ASSAY OF C-PEPTIDE: CPT | Performed by: INTERNAL MEDICINE

## 2024-09-20 PROCEDURE — 82947 ASSAY GLUCOSE BLOOD QUANT: CPT | Performed by: PATHOLOGY

## 2024-09-21 ENCOUNTER — HEALTH MAINTENANCE LETTER (OUTPATIENT)
Age: 29
End: 2024-09-21

## 2024-09-25 ENCOUNTER — TELEPHONE (OUTPATIENT)
Dept: GASTROENTEROLOGY | Facility: CLINIC | Age: 29
End: 2024-09-25

## 2024-09-25 NOTE — TELEPHONE ENCOUNTER
"Spoke with patient's mother at length regarding patient's current condition as well as recent health events. In the past few days patient has been complaining of nausea, lack of appetite, dizziness, abdominal pain, and sleeping a lot. She denies chest pain, shortness of breath, fever/chills. She also states she is having leg pains from the lymphedema wraps, and reports there is \"absolutely no swelling at all\" in the lower extremities. Patient was recently started on spironolactone 50 mg, but reports the bottle they picked up was for spironolactone 25 mg, so that is what she has been taking for about 1 week. Patient has noted that she is not experiencing any symptoms of needing a thoracentesis, and this is the longest she has gone without needing one. She was also prescribed metformin for elevated blood sugars, 500 mg twice per day, along with ferrous sulfate daily.  Patient and mother were surprised by diagnosis of diabetes, and had understood her elevated sugars to be related to the prednisone she was getting up until her recent discharge from the hospital. Gabriella states they feel the current symptoms may be related to the metformin, and will talk to the prescribing doctor, but she also wanted to run this by Dr. Carranza, as they both trust his opinion the most. They also wonder if she should continue lymphedema therapy/wraps, or if this can be discontinued.   "

## 2024-09-25 NOTE — TELEPHONE ENCOUNTER
Health Call Center    Phone Message    May a detailed message be left on voicemail: yes     Reason for Call: Symptoms or Concerns     If patient has red-flag symptoms, warm transfer to triage line    Current symptom or concern: Weight loss (down to 92lbs), nausea, dizziness, stomach pains, sleeping a lot    Symptoms have been present for:  5 day(s)    Has patient previously been seen for this? No  Mother, Leonel concerned that this may be due to new medication, metformin that was recently prescribed after high blood sugar lab.     Mother aware that she needs to reach out to that prescribing provider as well but wanted to run it by Dr Dillon campbell the high blood sugar levels could be from pancreas or liver adjusting to coming off the steroids she had recently been on and not necessarily diabetes. Mom would like Dr Carranza's opinion as well.    Mom, advised pt not to take medication today until they hear back form clinics.     Are there any new or worsening symptoms? Yes: New    Action Taken: Message routed to:  [facility] [[id number]]    Travel Screening: Not Applicable     Date of Service:

## 2024-09-27 ENCOUNTER — DOCUMENTATION ONLY (OUTPATIENT)
Dept: GASTROENTEROLOGY | Facility: CLINIC | Age: 29
End: 2024-09-27

## 2024-09-28 ENCOUNTER — HOSPITAL ENCOUNTER (INPATIENT)
Facility: CLINIC | Age: 29
LOS: 3 days | Discharge: HOME | End: 2024-10-01
Attending: EMERGENCY MEDICINE | Admitting: STUDENT IN AN ORGANIZED HEALTH CARE EDUCATION/TRAINING PROGRAM
Payer: COMMERCIAL

## 2024-09-28 ENCOUNTER — LAB (OUTPATIENT)
Dept: LAB | Facility: CLINIC | Age: 29
End: 2024-09-28
Payer: COMMERCIAL

## 2024-09-28 ENCOUNTER — NURSE TRIAGE (OUTPATIENT)
Dept: NURSING | Facility: CLINIC | Age: 29
End: 2024-09-28

## 2024-09-28 ENCOUNTER — TELEPHONE (OUTPATIENT)
Dept: MULTI SPECIALTY CLINIC | Facility: CLINIC | Age: 29
End: 2024-09-28

## 2024-09-28 DIAGNOSIS — R73.9 HYPERGLYCEMIA: ICD-10-CM

## 2024-09-28 DIAGNOSIS — E13.69 OTHER SPECIFIED DIABETES MELLITUS WITH OTHER SPECIFIED COMPLICATION, UNSPECIFIED WHETHER LONG TERM INSULIN USE (H): ICD-10-CM

## 2024-09-28 DIAGNOSIS — K70.9 ALCOHOLIC LIVER DISEASE (H): Primary | ICD-10-CM

## 2024-09-28 DIAGNOSIS — E86.0 DEHYDRATION: ICD-10-CM

## 2024-09-28 DIAGNOSIS — K70.9 ALCOHOLIC LIVER DISEASE (H): ICD-10-CM

## 2024-09-28 DIAGNOSIS — R11.0 NAUSEA: ICD-10-CM

## 2024-09-28 LAB
ALBUMIN SERPL BCG-MCNC: 4.3 G/DL (ref 3.5–5.2)
ALBUMIN SERPL BCG-MCNC: 4.4 G/DL (ref 3.5–5.2)
ALP SERPL-CCNC: 139 U/L (ref 40–150)
ALP SERPL-CCNC: 140 U/L (ref 40–150)
ALT SERPL W P-5'-P-CCNC: 9 U/L (ref 0–50)
ALT SERPL W P-5'-P-CCNC: 9 U/L (ref 0–50)
AMMONIA PLAS-SCNC: 18 UMOL/L (ref 11–51)
AMPHETAMINES UR QL SCN: NORMAL
ANION GAP SERPL CALCULATED.3IONS-SCNC: 14 MMOL/L (ref 7–15)
ANION GAP SERPL CALCULATED.3IONS-SCNC: 14 MMOL/L (ref 7–15)
ANION GAP SERPL CALCULATED.3IONS-SCNC: 15 MMOL/L (ref 7–15)
ANION GAP SERPL CALCULATED.3IONS-SCNC: 16 MMOL/L (ref 7–15)
AST SERPL W P-5'-P-CCNC: 26 U/L (ref 0–45)
AST SERPL W P-5'-P-CCNC: 27 U/L (ref 0–45)
B-OH-BUTYR SERPL-SCNC: <0.18 MMOL/L
BARBITURATES UR QL SCN: NORMAL
BASE EXCESS BLDV CALC-SCNC: -0.1 MMOL/L (ref -3–3)
BASE EXCESS BLDV CALC-SCNC: -0.5 MMOL/L (ref -3–3)
BASE EXCESS BLDV CALC-SCNC: -1 MMOL/L (ref -3–3)
BASOPHILS # BLD AUTO: 0 10E3/UL (ref 0–0.2)
BASOPHILS NFR BLD AUTO: 1 %
BENZODIAZ UR QL SCN: NORMAL
BILIRUB SERPL-MCNC: 0.6 MG/DL
BILIRUB SERPL-MCNC: 0.7 MG/DL
BUN SERPL-MCNC: 19.4 MG/DL (ref 6–20)
BUN SERPL-MCNC: 21.5 MG/DL (ref 6–20)
BUN SERPL-MCNC: 23.1 MG/DL (ref 6–20)
BUN SERPL-MCNC: 23.9 MG/DL (ref 6–20)
BZE UR QL SCN: NORMAL
CALCIUM SERPL-MCNC: 10 MG/DL (ref 8.8–10.4)
CALCIUM SERPL-MCNC: 10.3 MG/DL (ref 8.8–10.4)
CALCIUM SERPL-MCNC: 9.4 MG/DL (ref 8.8–10.4)
CALCIUM SERPL-MCNC: 9.8 MG/DL (ref 8.8–10.4)
CANNABINOIDS UR QL SCN: NORMAL
CHLORIDE SERPL-SCNC: 77 MMOL/L (ref 98–107)
CHLORIDE SERPL-SCNC: 81 MMOL/L (ref 98–107)
CHLORIDE SERPL-SCNC: 89 MMOL/L (ref 98–107)
CHLORIDE SERPL-SCNC: 98 MMOL/L (ref 98–107)
CREAT SERPL-MCNC: 0.67 MG/DL (ref 0.51–0.95)
CREAT SERPL-MCNC: 0.7 MG/DL (ref 0.51–0.95)
CREAT SERPL-MCNC: 0.73 MG/DL (ref 0.51–0.95)
CREAT SERPL-MCNC: 0.75 MG/DL (ref 0.51–0.95)
EGFRCR SERPLBLD CKD-EPI 2021: >90 ML/MIN/1.73M2
EOSINOPHIL # BLD AUTO: 0.1 10E3/UL (ref 0–0.7)
EOSINOPHIL NFR BLD AUTO: 1 %
ERYTHROCYTE [DISTWIDTH] IN BLOOD BY AUTOMATED COUNT: 15.4 % (ref 10–15)
EST. AVERAGE GLUCOSE BLD GHB EST-MCNC: 226 MG/DL
ETHANOL SERPL-MCNC: <0.01 G/DL
FENTANYL UR QL: NORMAL
GLUCOSE BLD-MCNC: 654 MG/DL (ref 70–99)
GLUCOSE BLDC GLUCOMTR-MCNC: 152 MG/DL (ref 70–99)
GLUCOSE BLDC GLUCOMTR-MCNC: 160 MG/DL (ref 70–99)
GLUCOSE BLDC GLUCOMTR-MCNC: 161 MG/DL (ref 70–99)
GLUCOSE BLDC GLUCOMTR-MCNC: 178 MG/DL (ref 70–99)
GLUCOSE BLDC GLUCOMTR-MCNC: 206 MG/DL (ref 70–99)
GLUCOSE BLDC GLUCOMTR-MCNC: 243 MG/DL (ref 70–99)
GLUCOSE BLDC GLUCOMTR-MCNC: 280 MG/DL (ref 70–99)
GLUCOSE BLDC GLUCOMTR-MCNC: 380 MG/DL (ref 70–99)
GLUCOSE BLDC GLUCOMTR-MCNC: 548 MG/DL (ref 70–99)
GLUCOSE BLDC GLUCOMTR-MCNC: >600 MG/DL (ref 70–99)
GLUCOSE SERPL-MCNC: 1147 MG/DL (ref 70–99)
GLUCOSE SERPL-MCNC: 189 MG/DL (ref 70–99)
GLUCOSE SERPL-MCNC: 729 MG/DL (ref 70–99)
GLUCOSE SERPL-MCNC: 914 MG/DL (ref 70–99)
HBA1C MFR BLD: 9.5 %
HCO3 BLDV-SCNC: 25 MMOL/L (ref 21–28)
HCO3 BLDV-SCNC: 25 MMOL/L (ref 21–28)
HCO3 BLDV-SCNC: 26 MMOL/L (ref 21–28)
HCO3 SERPL-SCNC: 19 MMOL/L (ref 22–29)
HCO3 SERPL-SCNC: 22 MMOL/L (ref 22–29)
HCO3 SERPL-SCNC: 23 MMOL/L (ref 22–29)
HCO3 SERPL-SCNC: 24 MMOL/L (ref 22–29)
HCT VFR BLD AUTO: 36.6 % (ref 35–47)
HGB BLD-MCNC: 11.8 G/DL (ref 11.7–15.7)
IMM GRANULOCYTES # BLD: 0 10E3/UL
IMM GRANULOCYTES NFR BLD: 0 %
INR PPP: 1.19 (ref 0.85–1.15)
LACTATE SERPL-SCNC: 2.2 MMOL/L (ref 0.7–2)
LACTATE SERPL-SCNC: 2.5 MMOL/L (ref 0.7–2)
LIPASE SERPL-CCNC: 31 U/L (ref 13–60)
LYMPHOCYTES # BLD AUTO: 2.2 10E3/UL (ref 0.8–5.3)
LYMPHOCYTES NFR BLD AUTO: 31 %
MAGNESIUM SERPL-MCNC: 2 MG/DL (ref 1.7–2.3)
MCH RBC QN AUTO: 28.6 PG (ref 26.5–33)
MCHC RBC AUTO-ENTMCNC: 32.2 G/DL (ref 31.5–36.5)
MCV RBC AUTO: 89 FL (ref 78–100)
MONOCYTES # BLD AUTO: 0.5 10E3/UL (ref 0–1.3)
MONOCYTES NFR BLD AUTO: 7 %
NEUTROPHILS # BLD AUTO: 4.3 10E3/UL (ref 1.6–8.3)
NEUTROPHILS NFR BLD AUTO: 61 %
NRBC # BLD AUTO: 0 10E3/UL
NRBC BLD AUTO-RTO: 0 /100
O2/TOTAL GAS SETTING VFR VENT: 0 %
O2/TOTAL GAS SETTING VFR VENT: 100 %
O2/TOTAL GAS SETTING VFR VENT: 21 %
OPIATES UR QL SCN: NORMAL
OXYHGB MFR BLDV: 36 % (ref 70–75)
OXYHGB MFR BLDV: 40 % (ref 70–75)
OXYHGB MFR BLDV: 78 % (ref 70–75)
PCO2 BLDV: 43 MM HG (ref 40–50)
PCO2 BLDV: 47 MM HG (ref 40–50)
PCO2 BLDV: 49 MM HG (ref 40–50)
PCP QUAL URINE (ROCHE): NORMAL
PH BLDV: 7.34 [PH] (ref 7.32–7.43)
PH BLDV: 7.34 [PH] (ref 7.32–7.43)
PH BLDV: 7.37 [PH] (ref 7.32–7.43)
PLATELET # BLD AUTO: 220 10E3/UL (ref 150–450)
PO2 BLDV: 26 MM HG (ref 25–47)
PO2 BLDV: 27 MM HG (ref 25–47)
PO2 BLDV: 46 MM HG (ref 25–47)
POTASSIUM SERPL-SCNC: 3 MMOL/L (ref 3.4–5.3)
POTASSIUM SERPL-SCNC: 3.9 MMOL/L (ref 3.4–5.3)
POTASSIUM SERPL-SCNC: 4.1 MMOL/L (ref 3.4–5.3)
POTASSIUM SERPL-SCNC: 4.4 MMOL/L (ref 3.4–5.3)
PROT SERPL-MCNC: 8.1 G/DL (ref 6.4–8.3)
PROT SERPL-MCNC: 8.4 G/DL (ref 6.4–8.3)
RBC # BLD AUTO: 4.13 10E6/UL (ref 3.8–5.2)
SAO2 % BLDV: 36.5 % (ref 70–75)
SAO2 % BLDV: 40.9 % (ref 70–75)
SAO2 % BLDV: 79.7 % (ref 70–75)
SODIUM SERPL-SCNC: 114 MMOL/L (ref 135–145)
SODIUM SERPL-SCNC: 119 MMOL/L (ref 135–145)
SODIUM SERPL-SCNC: 123 MMOL/L (ref 135–145)
SODIUM SERPL-SCNC: 136 MMOL/L (ref 135–145)
WBC # BLD AUTO: 7.1 10E3/UL (ref 4–11)

## 2024-09-28 PROCEDURE — 80307 DRUG TEST PRSMV CHEM ANLYZR: CPT

## 2024-09-28 PROCEDURE — 96375 TX/PRO/DX INJ NEW DRUG ADDON: CPT | Performed by: EMERGENCY MEDICINE

## 2024-09-28 PROCEDURE — S5010 5% DEXTROSE AND 0.45% SALINE: HCPCS

## 2024-09-28 PROCEDURE — 36415 COLL VENOUS BLD VENIPUNCTURE: CPT

## 2024-09-28 PROCEDURE — 83036 HEMOGLOBIN GLYCOSYLATED A1C: CPT | Performed by: EMERGENCY MEDICINE

## 2024-09-28 PROCEDURE — 93005 ELECTROCARDIOGRAM TRACING: CPT | Performed by: EMERGENCY MEDICINE

## 2024-09-28 PROCEDURE — 250N000009 HC RX 250: Performed by: EMERGENCY MEDICINE

## 2024-09-28 PROCEDURE — 99207 PR APP CREDIT; MD BILLING SHARED VISIT: CPT | Mod: FS

## 2024-09-28 PROCEDURE — 83605 ASSAY OF LACTIC ACID: CPT | Performed by: EMERGENCY MEDICINE

## 2024-09-28 PROCEDURE — 82010 KETONE BODYS QUAN: CPT

## 2024-09-28 PROCEDURE — 258N000003 HC RX IP 258 OP 636: Performed by: EMERGENCY MEDICINE

## 2024-09-28 PROCEDURE — 99291 CRITICAL CARE FIRST HOUR: CPT | Performed by: EMERGENCY MEDICINE

## 2024-09-28 PROCEDURE — 999N000128 HC STATISTIC PERIPHERAL IV START W/O US GUIDANCE

## 2024-09-28 PROCEDURE — 82805 BLOOD GASES W/O2 SATURATION: CPT | Performed by: EMERGENCY MEDICINE

## 2024-09-28 PROCEDURE — 96361 HYDRATE IV INFUSION ADD-ON: CPT | Performed by: EMERGENCY MEDICINE

## 2024-09-28 PROCEDURE — 99223 1ST HOSP IP/OBS HIGH 75: CPT | Mod: FS | Performed by: STUDENT IN AN ORGANIZED HEALTH CARE EDUCATION/TRAINING PROGRAM

## 2024-09-28 PROCEDURE — 84681 ASSAY OF C-PEPTIDE: CPT

## 2024-09-28 PROCEDURE — 83690 ASSAY OF LIPASE: CPT | Performed by: EMERGENCY MEDICINE

## 2024-09-28 PROCEDURE — 96368 THER/DIAG CONCURRENT INF: CPT | Performed by: EMERGENCY MEDICINE

## 2024-09-28 PROCEDURE — 85025 COMPLETE CBC W/AUTO DIFF WBC: CPT | Performed by: EMERGENCY MEDICINE

## 2024-09-28 PROCEDURE — 82805 BLOOD GASES W/O2 SATURATION: CPT

## 2024-09-28 PROCEDURE — 250N000011 HC RX IP 250 OP 636: Performed by: EMERGENCY MEDICINE

## 2024-09-28 PROCEDURE — 36415 COLL VENOUS BLD VENIPUNCTURE: CPT | Performed by: STUDENT IN AN ORGANIZED HEALTH CARE EDUCATION/TRAINING PROGRAM

## 2024-09-28 PROCEDURE — 250N000013 HC RX MED GY IP 250 OP 250 PS 637

## 2024-09-28 PROCEDURE — 80321 ALCOHOLS BIOMARKERS 1OR 2: CPT

## 2024-09-28 PROCEDURE — 36415 COLL VENOUS BLD VENIPUNCTURE: CPT | Performed by: EMERGENCY MEDICINE

## 2024-09-28 PROCEDURE — 250N000009 HC RX 250

## 2024-09-28 PROCEDURE — 250N000011 HC RX IP 250 OP 636

## 2024-09-28 PROCEDURE — 250N000009 HC RX 250: Performed by: INTERNAL MEDICINE

## 2024-09-28 PROCEDURE — 258N000003 HC RX IP 258 OP 636

## 2024-09-28 PROCEDURE — 82962 GLUCOSE BLOOD TEST: CPT

## 2024-09-28 PROCEDURE — 96365 THER/PROPH/DIAG IV INF INIT: CPT | Performed by: EMERGENCY MEDICINE

## 2024-09-28 PROCEDURE — 36415 COLL VENOUS BLD VENIPUNCTURE: CPT | Performed by: PATHOLOGY

## 2024-09-28 PROCEDURE — 84155 ASSAY OF PROTEIN SERUM: CPT | Performed by: EMERGENCY MEDICINE

## 2024-09-28 PROCEDURE — 82077 ASSAY SPEC XCP UR&BREATH IA: CPT | Performed by: EMERGENCY MEDICINE

## 2024-09-28 PROCEDURE — 83735 ASSAY OF MAGNESIUM: CPT | Performed by: EMERGENCY MEDICINE

## 2024-09-28 PROCEDURE — 85610 PROTHROMBIN TIME: CPT | Performed by: EMERGENCY MEDICINE

## 2024-09-28 PROCEDURE — 86341 ISLET CELL ANTIBODY: CPT | Performed by: STUDENT IN AN ORGANIZED HEALTH CARE EDUCATION/TRAINING PROGRAM

## 2024-09-28 PROCEDURE — 120N000003 HC R&B IMCU UMMC

## 2024-09-28 PROCEDURE — 82140 ASSAY OF AMMONIA: CPT | Performed by: EMERGENCY MEDICINE

## 2024-09-28 PROCEDURE — 82010 KETONE BODYS QUAN: CPT | Performed by: EMERGENCY MEDICINE

## 2024-09-28 PROCEDURE — 99291 CRITICAL CARE FIRST HOUR: CPT | Mod: 25 | Performed by: EMERGENCY MEDICINE

## 2024-09-28 PROCEDURE — 93010 ELECTROCARDIOGRAM REPORT: CPT | Performed by: EMERGENCY MEDICINE

## 2024-09-28 PROCEDURE — 80053 COMPREHEN METABOLIC PANEL: CPT | Performed by: PATHOLOGY

## 2024-09-28 RX ORDER — PROCHLORPERAZINE MALEATE 10 MG
10 TABLET ORAL EVERY 6 HOURS PRN
Status: DISCONTINUED | OUTPATIENT
Start: 2024-09-28 | End: 2024-10-01 | Stop reason: HOSPADM

## 2024-09-28 RX ORDER — ONDANSETRON 2 MG/ML
4 INJECTION INTRAMUSCULAR; INTRAVENOUS EVERY 6 HOURS PRN
Status: DISCONTINUED | OUTPATIENT
Start: 2024-09-28 | End: 2024-10-01 | Stop reason: HOSPADM

## 2024-09-28 RX ORDER — DEXTROSE MONOHYDRATE 100 MG/ML
INJECTION, SOLUTION INTRAVENOUS CONTINUOUS PRN
Status: DISCONTINUED | OUTPATIENT
Start: 2024-09-28 | End: 2024-09-30

## 2024-09-28 RX ORDER — AMOXICILLIN 250 MG
2 CAPSULE ORAL 2 TIMES DAILY PRN
Status: DISCONTINUED | OUTPATIENT
Start: 2024-09-28 | End: 2024-10-01 | Stop reason: HOSPADM

## 2024-09-28 RX ORDER — HYDROXYZINE HYDROCHLORIDE 25 MG/1
25 TABLET, FILM COATED ORAL EVERY 6 HOURS PRN
Status: DISCONTINUED | OUTPATIENT
Start: 2024-09-28 | End: 2024-10-01 | Stop reason: HOSPADM

## 2024-09-28 RX ORDER — GABAPENTIN 300 MG/1
300 CAPSULE ORAL 3 TIMES DAILY
Status: DISCONTINUED | OUTPATIENT
Start: 2024-09-28 | End: 2024-10-01 | Stop reason: HOSPADM

## 2024-09-28 RX ORDER — CALCIUM CARBONATE 500 MG/1
1000 TABLET, CHEWABLE ORAL 4 TIMES DAILY PRN
Status: DISCONTINUED | OUTPATIENT
Start: 2024-09-28 | End: 2024-10-01 | Stop reason: HOSPADM

## 2024-09-28 RX ORDER — TIZANIDINE 2 MG/1
2 TABLET ORAL 3 TIMES DAILY PRN
Status: DISCONTINUED | OUTPATIENT
Start: 2024-09-28 | End: 2024-10-01 | Stop reason: HOSPADM

## 2024-09-28 RX ORDER — DEXTROSE MONOHYDRATE AND SODIUM CHLORIDE 5; .45 G/100ML; G/100ML
1000 INJECTION, SOLUTION INTRAVENOUS CONTINUOUS PRN
Status: DISCONTINUED | OUTPATIENT
Start: 2024-09-28 | End: 2024-09-30

## 2024-09-28 RX ORDER — FUROSEMIDE 20 MG
20 TABLET ORAL DAILY
Status: DISCONTINUED | OUTPATIENT
Start: 2024-09-28 | End: 2024-10-01 | Stop reason: HOSPADM

## 2024-09-28 RX ORDER — POLYETHYLENE GLYCOL 3350 17 G/17G
17 POWDER, FOR SOLUTION ORAL 2 TIMES DAILY PRN
Status: DISCONTINUED | OUTPATIENT
Start: 2024-09-28 | End: 2024-10-01 | Stop reason: HOSPADM

## 2024-09-28 RX ORDER — ACETAMINOPHEN 325 MG/1
650 TABLET ORAL EVERY 4 HOURS PRN
Status: DISCONTINUED | OUTPATIENT
Start: 2024-09-28 | End: 2024-10-01 | Stop reason: HOSPADM

## 2024-09-28 RX ORDER — KETOROLAC TROMETHAMINE 15 MG/ML
15 INJECTION, SOLUTION INTRAMUSCULAR; INTRAVENOUS ONCE
Status: COMPLETED | OUTPATIENT
Start: 2024-09-28 | End: 2024-09-28

## 2024-09-28 RX ORDER — PROCHLORPERAZINE 25 MG
25 SUPPOSITORY, RECTAL RECTAL EVERY 12 HOURS PRN
Status: DISCONTINUED | OUTPATIENT
Start: 2024-09-28 | End: 2024-10-01 | Stop reason: HOSPADM

## 2024-09-28 RX ORDER — ONDANSETRON 2 MG/ML
4 INJECTION INTRAMUSCULAR; INTRAVENOUS ONCE
Status: COMPLETED | OUTPATIENT
Start: 2024-09-28 | End: 2024-09-28

## 2024-09-28 RX ORDER — AMOXICILLIN 250 MG
1 CAPSULE ORAL 2 TIMES DAILY PRN
Status: DISCONTINUED | OUTPATIENT
Start: 2024-09-28 | End: 2024-10-01 | Stop reason: HOSPADM

## 2024-09-28 RX ORDER — SPIRONOLACTONE 50 MG/1
50 TABLET, FILM COATED ORAL DAILY
Status: DISCONTINUED | OUTPATIENT
Start: 2024-09-28 | End: 2024-10-01 | Stop reason: HOSPADM

## 2024-09-28 RX ORDER — ONDANSETRON 4 MG/1
4 TABLET, ORALLY DISINTEGRATING ORAL EVERY 6 HOURS PRN
Status: DISCONTINUED | OUTPATIENT
Start: 2024-09-28 | End: 2024-10-01 | Stop reason: HOSPADM

## 2024-09-28 RX ORDER — POTASSIUM CHLORIDE 7.45 MG/ML
10 INJECTION INTRAVENOUS ONCE
Status: COMPLETED | OUTPATIENT
Start: 2024-09-28 | End: 2024-09-28

## 2024-09-28 RX ORDER — DEXTROSE MONOHYDRATE 25 G/50ML
25-50 INJECTION, SOLUTION INTRAVENOUS
Status: DISCONTINUED | OUTPATIENT
Start: 2024-09-28 | End: 2024-10-01

## 2024-09-28 RX ORDER — ACETAMINOPHEN 650 MG/1
650 SUPPOSITORY RECTAL EVERY 4 HOURS PRN
Status: DISCONTINUED | OUTPATIENT
Start: 2024-09-28 | End: 2024-10-01 | Stop reason: HOSPADM

## 2024-09-28 RX ORDER — NICOTINE POLACRILEX 4 MG
15-30 LOZENGE BUCCAL
Status: DISCONTINUED | OUTPATIENT
Start: 2024-09-28 | End: 2024-10-01 | Stop reason: HOSPADM

## 2024-09-28 RX ORDER — DEXTROSE MONOHYDRATE 25 G/50ML
25-50 INJECTION, SOLUTION INTRAVENOUS
Status: DISCONTINUED | OUTPATIENT
Start: 2024-09-28 | End: 2024-10-01 | Stop reason: HOSPADM

## 2024-09-28 RX ORDER — DULOXETIN HYDROCHLORIDE 30 MG/1
30 CAPSULE, DELAYED RELEASE ORAL DAILY
Status: DISCONTINUED | OUTPATIENT
Start: 2024-09-29 | End: 2024-10-01 | Stop reason: HOSPADM

## 2024-09-28 RX ORDER — LIDOCAINE 40 MG/G
CREAM TOPICAL
Status: DISCONTINUED | OUTPATIENT
Start: 2024-09-28 | End: 2024-10-01 | Stop reason: HOSPADM

## 2024-09-28 RX ADMIN — PROCHLORPERAZINE EDISYLATE 10 MG: 5 INJECTION INTRAMUSCULAR; INTRAVENOUS at 18:22

## 2024-09-28 RX ADMIN — HYDROXYZINE HYDROCHLORIDE 25 MG: 25 TABLET ORAL at 21:35

## 2024-09-28 RX ADMIN — GABAPENTIN 300 MG: 300 CAPSULE ORAL at 20:15

## 2024-09-28 RX ADMIN — ACETAMINOPHEN 650 MG: 325 TABLET ORAL at 16:07

## 2024-09-28 RX ADMIN — ONDANSETRON 4 MG: 2 INJECTION INTRAMUSCULAR; INTRAVENOUS at 12:44

## 2024-09-28 RX ADMIN — HYDROXYZINE HYDROCHLORIDE 25 MG: 25 TABLET ORAL at 17:00

## 2024-09-28 RX ADMIN — INSULIN HUMAN 5.5 UNITS/HR: 1 INJECTION, SOLUTION INTRAVENOUS at 14:07

## 2024-09-28 RX ADMIN — ACETAMINOPHEN 650 MG: 325 TABLET ORAL at 21:35

## 2024-09-28 RX ADMIN — KETOROLAC TROMETHAMINE 15 MG: 15 INJECTION, SOLUTION INTRAMUSCULAR; INTRAVENOUS at 14:41

## 2024-09-28 RX ADMIN — DEXTROSE AND SODIUM CHLORIDE 1000 ML: 5; 450 INJECTION, SOLUTION INTRAVENOUS at 23:36

## 2024-09-28 RX ADMIN — ONDANSETRON 4 MG: 2 INJECTION INTRAMUSCULAR; INTRAVENOUS at 17:09

## 2024-09-28 RX ADMIN — SODIUM CHLORIDE 1000 ML: 9 INJECTION, SOLUTION INTRAVENOUS at 12:28

## 2024-09-28 RX ADMIN — DEXTROSE AND SODIUM CHLORIDE 1000 ML: 5; 450 INJECTION, SOLUTION INTRAVENOUS at 18:34

## 2024-09-28 RX ADMIN — SODIUM CHLORIDE 1000 ML: 9 INJECTION, SOLUTION INTRAVENOUS at 15:05

## 2024-09-28 RX ADMIN — GABAPENTIN 300 MG: 300 CAPSULE ORAL at 16:08

## 2024-09-28 RX ADMIN — INSULIN HUMAN 2.5 UNITS/HR: 1 INJECTION, SOLUTION INTRAVENOUS at 22:48

## 2024-09-28 RX ADMIN — INSULIN HUMAN 1 UNITS/HR: 1 INJECTION, SOLUTION INTRAVENOUS at 18:28

## 2024-09-28 RX ADMIN — INSULIN HUMAN 1 UNITS/HR: 1 INJECTION, SOLUTION INTRAVENOUS at 19:47

## 2024-09-28 RX ADMIN — INSULIN HUMAN 1 UNITS/HR: 1 INJECTION, SOLUTION INTRAVENOUS at 23:41

## 2024-09-28 RX ADMIN — POTASSIUM CHLORIDE 10 MEQ: 7.46 INJECTION, SOLUTION INTRAVENOUS at 15:06

## 2024-09-28 ASSESSMENT — ACTIVITIES OF DAILY LIVING (ADL)
ADLS_ACUITY_SCORE: 38

## 2024-09-28 ASSESSMENT — COLUMBIA-SUICIDE SEVERITY RATING SCALE - C-SSRS
2. HAVE YOU ACTUALLY HAD ANY THOUGHTS OF KILLING YOURSELF IN THE PAST MONTH?: NO
6. HAVE YOU EVER DONE ANYTHING, STARTED TO DO ANYTHING, OR PREPARED TO DO ANYTHING TO END YOUR LIFE?: NO
1. IN THE PAST MONTH, HAVE YOU WISHED YOU WERE DEAD OR WISHED YOU COULD GO TO SLEEP AND NOT WAKE UP?: NO

## 2024-09-28 NOTE — PROGRESS NOTES
Endocrinology and Diabetes    Chart Reviewed.     Patient being admitted for symptomatic hyperglycemia, new diagnosis of diabetes. Laboratory studies without ketoacidosis. Calculated osmolality of 300.     Agree with Non DKA Insulin Protocol. Full consult to follow in AM.    Given presentation with severe hyperglycemia, anticipate she will require a basal bolus insulin regimen on discharge. Will enter consult for Diabetes Educator so she can start education on Monday. Entering order for pharmacy to review outpatient insulin and relevant supplies coverage.     Galindo Garcia MD  Endocrinology Fellow

## 2024-09-28 NOTE — H&P
[facility]    History and Physical - Hospitalist Service, GOLD TEAM        Date of Admission:  9/28/2024    Assessment & Plan      Maribell Leon is a 29 year old female who has PMH notable for recently diagnosed liver cirrhosis, h/o alcohol use disorder, h/o chronic pancreatitis admitted on 9/28/2024 for severe hyperglycemia WITHOUT e/o DKA. Etiology of hyperglycemia uncertain, admitted for insulin gtt, further workup, evaluation by Endocrinology.     #Severe hyperglycemia, w/o DKA  #Diabetes mellitus, subtype uncertain (T2DM, pancreatogenic)  #H/o chronic pancreatitis  #Pseudohyponatremia 2/2 hyperglycemia (114 corrects to WNL)  Initially noted to have elevated BG during August hospitalization, though attributed to steroids at the time. Required 16U NPH, 1:20 CHO coverage, and high intensity sliding scale insulin during hospitalization, but not sent home w/ insulin or other DM meds. Was not discharged on steroids, yet her BG has remained elevated on multiple checks. She was seen by Dr. Toledo on (PCP) on 9/16 and started on metformin. Impressively, her A1c has nearly doubled in ~1 mo. 8/18, A1c was 5.3% and on 9/28, A1c was 9.5%. C peptide checked 9/20 was 2.7, indicating pt is still making insulin.   Presented [facility]/28 w/ BG 1200 w/o evidence of ketoacidosis and no notable electrolyte abnormalities-- pseudohyponatremia (114), normal lipase, Cl 77, normal K & Mg, lactic acid 2.2, normal venous pH/ venous pCO2, unremarkable LFTs, unremarkable CBC. Started on non-DKA insulin gtt, s/p 2L NS in ED.   Question if her uncontrolled BG is related to pancreatic dysfunction (possible Islet cell destruction?) given her rapidly increased A1c in 1 month. She was recently diagnosed w/ cirrhosis 2/2 alcohol use, but given her young age, it is possible there is an additional contributor-- genetic, autoimmune, etc that may contribute to other organ dysfunction (see below).   -  Endocrine consult, appreciate recs  - Continue insulin gtt (non DKA protocol)  - Repeat BMP, VBG, ketones this evening to monitor for development of acidosis/electrolyte abnormalities  - Would recommend HHF testing if pt agreeable (can present w/ liver dysfunction and pancreatic insufficiency)  - Hold PTA metformin  - Glutamic acid decarboxylase isael, Islet cell isael IgG pending  - Repeat lactate   - C peptide    #Cirrhosis  Diagnosed in June 2024 after presenting to OSH w/ decompensated cirrhosis. Attributed to alcohol use, though given patient's young age, wonder if there is additional contributor leading to liver dysfunction, especially given pancreatic dysfunction as above.   No hx of liver disease in family, but there is an autoimmune hx of lupus, DM. No known family members with hemochromatosis w/ iron overload. Negative hepatitis panel. She did have some rheumatologic/autoimmune labs checked in June/July 2024 (cold agglutinin titer, SCL 70 IgG isael, MIKEY, autoimmune liver disease panel, RF, CCP isael) all unremarkable. She had an elevated alpha 1 antitrysin of 217 (ULN= 200) and low ceruloplasmin of 19.2 (LLN 20). 24H copper urine was ordered given low ceruloplasmin by [facility] GI provider in July 2024, does not appear this has been done. Pt has also not been tested for hemochromatosis. Ferritin checked 7/31 elevated to 254, but WNL at 41 on 9/16 (pt had been started on ferrous gluconate). Other iron studies from 9/16 (compared to 7/31) w/ iron 25/ low (60/ wnl), TIBC 302/ wnl (141/ low), TSAT 8/ low (43/ wnl). High folate and B12. This admission, INR 1.19 (stable compared to 9/16) but normal AST/ALT, alk phos, and bili. HILDA not detectable. Pt reporting last drink in June 2024, denies other substance use.   MELD 3.0: 19 at 9/28/2024  3:26 PM  MELD-Na: 8 at 9/28/2024  3:26 PM  Calculated from:  Serum Creatinine: 0.70 mg/dL (Using min of 1 mg/dL) at 9/28/2024  3:26 PM  Serum Sodium: 123 mmol/L (Using min of 125  mmol/L) at 9/28/2024  3:26 PM  Total Bilirubin: 0.6 mg/dL (Using min of 1 mg/dL) at 9/28/2024 12:30 PM  Serum Albumin: 4.4 g/dL (Using max of 3.5 g/dL) at 9/28/2024 12:30 PM  INR(ratio): 1.19 at 9/28/2024 12:30 PM  Age at listing (hypothetical): 29 years  Sex: Female at 9/28/2024  3:26 PM    - PeTH, UDS  - Hold PTA lasix and spironolactone in setting of hyperglycemia and IVF resuscitation   - Daily MELD labs  - Consider GI consult if indicated  - Social work consult to ensure pt is set up w/ resources to ensure support for sobriety, mental health, etc as we want to set her up for success in the future if/when she gets a transplant    #R foot, ankle, knee pain   Etiology unclear. Has been bothersome since June 2024, does report someone stepping on her foot but not sure if this is related. Has had multiple imaging studies (XR, CT, MRI) w/o remarkable abnormalities to explain pain. PCP questioned if pt has CRPS-- ordered triple phase bone scan to eval, does not appear to have been completed. Uric acid checked 7/31 was elevated to 11, was WNL in June 2024. Exam not c/w gout (no significant erythema, minimal swelling, able to tolerate palpation), though this is a possibility to consider if colchicine or allopurinol have not been trialed. She does have a hx of lumbar fusion and noted possible foot drop in Juley 2024-- MRI lumbar spine w/  mild to mod R neural foraminal stenossi @ L4-5. On exam, no objective weakness noted.   Requesting something stronger than ketorolac for pain. Given unremarkable objective workup, do not have strong indication to provide opioids for pain, especially given it is not acutely changed. CRPS is certainly a reasonable differential, though w/o further information/data, difficult to justify opioids for treatment. I do find her focus on her foot pain in light of her severe liver disease and very high BG/potential for pancreatic dysfunction to be somewhat strange and unexpected. No overt signs to  suggest drug seeking behavior, but her behavior is somewhat odd nonetheless; want to be judicious in evaluation of pain and use of opioids given she does have a hx of AUD, suggesting potential tendency for addictive behavior. If she is being worked up for a transplant, would like to prevent any complications that would prevent candidacy/success.   - Continue PTA duloxetine  - Continue PTA gabapentin 300mg TID  - Continue PTA tizanidine PRN  - Recommend non-opioid pain management   - Consider colchicine vs allopurinol if c/f gout  - Consider high dose Vitamin C if high suspicion for CRPS  :: Obtain triple phase bone scan as recommended by PCP in OP setting    #Prolonged QTc (511 on 9/28): Caution w/ QTc prolonging medications  #H/o spinal fusion: Reporting some R sided foot drop, no objective weakness on exam (though did not walk patient). MRI lumbar spine within last few months did show mild to mod spinal stenosis.           Diet: NPO for Medical/Clinical Reasons Except for: Meds  DVT Prophylaxis: Pneumatic Compression Devices  [place] Catheter: Not present  Lines: None     Cardiac Monitoring: None  Code Status: Full Code    Clinically Significant Risk Factors Present on Admission         # Hyponatremia: Lowest Na = 114 mmol/L in last 2 days, will monitor as appropriate         # Coagulation Defect: INR = 1.19 (Ref range: 0.85 - 1.15) and/or PTT = 37 Seconds (Ref range: 22 - 38 Seconds), will monitor for bleeding           # DMII: A1C = 9.5 % (Ref range: <5.7 %) within past 6 months        # Financial/Environmental Concerns:           Disposition Plan     Medically Ready for Discharge: Anticipated in 2-4 Days         The patient's care was discussed with the Attending Physician, Dr. Ch, Patient, and Patient's Family.    Toni Syed PA-C  Hospitalist Service, [facility]  Securely message with [employer] (more info)  Text page via [employer] Paging/Directory   See  "signed in provider for up to date coverage information    ______________________________________________________________________    Chief Complaint   Elevated BG on labs, told to come in & \"my R foot and ankle are still really bothering me\"    History is obtained from the patient, electronic health record, and patient's father    History of Present Illness   Maribell Leon is a 29 year old female who has PMH notable for recently diagnosed liver cirrhosis, h/o alcohol use disorder, h/o chronic pancreatitis.    Presenting after labs drawn showed very high glucose and instructed to come in by provider reviewing labs. She started metformin ~10 days ago and has noted some mild nausea, occasional dry heaving, and had an episode of pre-syncopal symptoms 3-4 days PTA. Because of these symptoms, her provider wanted her to have labs done, leading to current presentation. Pt w/ very few hyperglycemic symptoms-- denies acute nausea, vomiting, abdominal pain. Endorses some very mild epigastric/RUQ pain, but very minimal. Her biggest complaint is her R foot/ankle/knee pain. States this started in June, no preceeding trauma-- although later father stated someone did step on her foot. Has gotten xrays, cts, and MRIs of her foot which have not shown abnormalities to explain pain. She notes some mild swelling, intermittent redness and severe pain with light touch. Hx of lumbar fusion, some increased low back pain recently but no falls. Does endorse some foot drop.         Past Medical History    Past Medical History:   Diagnosis Date    Acute pancreatitis     ADHD (attention deficit hyperactivity disorder)     Alcohol-induced chronic pancreatitis (H)     Anxiety        Past Surgical History   Past Surgical History:   Procedure Laterality Date    POSTERIOR SPINAL FUSION      L5/S1    SPINE HARDWARE REMOVAL      TONSILLECTOMY      8/05    WISDOM TOOTH EXTRACTION         Prior to Admission Medications   Prior to Admission Medications "   Prescriptions Last Dose Informant Patient Reported? Taking?   DULoxetine (CYMBALTA) 30 MG capsule   No No   Sig: Take 1 capsule (30 mg) by mouth daily.   acetaminophen (TYLENOL) 325 MG tablet   No No   Sig: Take 2 tablets (650 mg) by mouth 3 times daily.   cyanocobalamin (VITAMIN B-12) 1000 MCG tablet   No No   Sig: Take 1 tablet (1,000 mcg) by mouth daily.   diclofenac (VOLTAREN) 1 % topical gel   No No   Sig: Apply 4 g topically 4 times daily.   Patient taking differently: Apply 4 g topically 4 times daily as needed.   ferrous sulfate (FEROSUL) 325 (65 Fe) MG tablet   No No   Sig: Take 1 tablet (325 mg) by mouth daily (with breakfast).   folic acid (FOLVITE) 1 MG tablet   No No   Sig: Take 1 tablet (1 mg) by mouth daily.   furosemide (LASIX) 20 MG tablet   No No   Sig: Take 1 tablet (20 mg) by mouth daily.   gabapentin (NEURONTIN) 300 MG capsule   No No   Sig: Take 1 capsule (300 mg) by mouth 3 times daily.   hydrOXYzine HCl (ATARAX) 25 MG tablet   No No   Sig: Take 1 tablet (25 mg) by mouth every 6 hours as needed for other (adjuvant pain).   magnesium 250 MG tablet   Yes No   Sig: Take 1 tablet by mouth daily   Patient not taking: Reported on 9/16/2024   metFORMIN (GLUCOPHAGE) 500 MG tablet   No No   Sig: Take 1 tablet (500 mg) by mouth 2 times daily (with meals).   multivitamin w/minerals (THERA-VIT-M) tablet   No No   Sig: Take 1 tablet by mouth daily.   naloxone (NARCAN) 4 MG/0.1ML nasal spray   No No   Sig: Spray 1 spray (4 mg) into one nostril alternating nostrils as needed for opioid reversal. every 2-3 minutes until assistance arrives   senna-docusate (SENOKOT-S/PERICOLACE) 8.6-50 MG tablet   No No   Sig: Take 1 tablet by mouth 2 times daily as needed for constipation.   spironolactone (ALDACTONE) 50 MG tablet   No No   Sig: Take 1 tablet (50 mg) by mouth daily.   thiamine (B-1) 100 MG tablet   No No   Sig: Take 1 tablet (100 mg) by mouth daily.   tiZANidine (ZANAFLEX) 2 MG tablet   No No   Sig: Take 1  tablet (2 mg) by mouth 3 times daily as needed for muscle spasms.      Facility-Administered Medications: None        Review of Systems    The 10 point Review of Systems is negative other than noted in the HPI or here.     Allergies   No Known Allergies  ------------------------------------------------------------------------     Physical Exam   Vital Signs: Temp: 97.6  F (36.4  C) Temp src: Oral BP: 105/73 Pulse: 89   Resp: 14 SpO2: 100 %      Weight: 0 lbs 0 oz    General Appearance: Pt is very thin appearing, NAD.   Respiratory: Lungs CTAB, breathing comfortably on room air.   Cardiovascular: RRR, no murmur appreciated.   GI: Abdomen is non distended, soft, non tender.   Skin: Warm and dry.   Other: Alert and oriented.      Medical Decision Making       75 MINUTES SPENT BY ME on the date of service doing chart review, history, exam, documentation & further activities per the note.      Data   ------------------------- PAST 24 HR DATA REVIEWED -----------------------------------------------    I have personally reviewed the following data over the past 24 hrs:    7.1  \   11.8   / 220     123 (L) 89 (L) 21.5 (H) /  548 (HH)   4.1 19 (L) 0.70 \     ALT: 9 AST: 27 AP: 140 TBILI: 0.6   ALB: 4.4 TOT PROTEIN: 8.4 (H) LIPASE: 31     TSH: N/A T4: N/A A1C: 9.5 (H)     Procal: N/A CRP: N/A Lactic Acid: 2.5 (H)       INR:  1.19 (H) PTT:  N/A   D-dimer:  N/A Fibrinogen:  N/A       Imaging results reviewed over the past 24 hrs:   No results found for this or any previous visit (from the past 24 hour(s)).

## 2024-09-28 NOTE — TELEPHONE ENCOUNTER
Pts mother (C2C on file) Calls regarding critical labs, Sodium of 119 and Blood Sugar of 914. Writer attempted to find MD to page regarding situation as Pt is largely not symptomatic but is fatigued.     During this process, Pt was called on her own phone by ordering providers team. They suggested ED visit, writer advised the same for recheck of labs and treatment if indicated. Caller agreeable to plan       Reason for Disposition   Lab result questions   [1] Follow-up call from patient regarding patient's clinical status AND [2] information urgent    Protocols used: Information Only Call - No Triage-A-AH, PCP Call - No Triage-A-AH

## 2024-09-28 NOTE — TELEPHONE ENCOUNTER
Received page for critical lab result from a BMP that was ordered in the hepatology clinic.  Sodium 119.  Glucose 914.  Patient was assessed in hepatology clinic on 9/16 for concerns of alcohol-related liver disease.    Corrected sodium 132.  Patient states that she has felt considerably fatigued recently but does not have other acute concerns.  With her degree of hyperglycemia, I think she warrants in person evaluation by emergency medicine.  She stated that she was already on her way to the emergency department as she had been notified of the lab result already.    Indra Harp  Gastroenterology Fellow, PGY4

## 2024-09-28 NOTE — ED PROVIDER NOTES
ED PROVIDER NOTE  September 28, 2024  History     Chief Complaint   Patient presents with    Abnormal Labs     HPI  Maribell Leon is a 29 year old female who has a history significant for alcoholic induced chronic pancreatitis arriving today to the emergency department from clinic today due to concern of critical elevation in glucose.  The patient reports over the past several days she has noted increased thirst and urinary frequency.  She reports no other symptoms such as change in vision, headache, difficulty with ambulation, no unilateral change in strength or sensation.  She has not had any nausea, vomiting, diarrhea.  No dysuria.  No abdominal pain at this time.  She reports she is a nondiabetic.      Past Medical History  Past Medical History:   Diagnosis Date    Acute pancreatitis     ADHD (attention deficit hyperactivity disorder)     Alcohol-induced chronic pancreatitis (H)     Anxiety      Past Surgical History:   Procedure Laterality Date    POSTERIOR SPINAL FUSION      L5/S1    SPINE HARDWARE REMOVAL      TONSILLECTOMY      8/05    WISDOM TOOTH EXTRACTION       acetaminophen (TYLENOL) 325 MG tablet  cyanocobalamin (VITAMIN B-12) 1000 MCG tablet  diclofenac (VOLTAREN) 1 % topical gel  DULoxetine (CYMBALTA) 30 MG capsule  ferrous sulfate (FEROSUL) 325 (65 Fe) MG tablet  folic acid (FOLVITE) 1 MG tablet  furosemide (LASIX) 20 MG tablet  gabapentin (NEURONTIN) 300 MG capsule  hydrOXYzine HCl (ATARAX) 25 MG tablet  magnesium 250 MG tablet  metFORMIN (GLUCOPHAGE) 500 MG tablet  multivitamin w/minerals (THERA-VIT-M) tablet  naloxone (NARCAN) 4 MG/0.1ML nasal spray  senna-docusate (SENOKOT-S/PERICOLACE) 8.6-50 MG tablet  spironolactone (ALDACTONE) 50 MG tablet  thiamine (B-1) 100 MG tablet  tiZANidine (ZANAFLEX) 2 MG tablet      No Known Allergies  Family History  Family History   Problem Relation Age of Onset    Allergy (Severe) Mother         Allergies,Environmental allergies, celiac disease    Family  History Negative Father         Good Health    Family History Negative Sister         Good Health    Family History Negative Sister         Good Health    Asthma Brother         Asthma,history of intermittent asthma    Other - See Comments Brother         Enuresis    Diabetes Maternal Grandmother         Diabetes,Type 2    Cancer Maternal Grandfather         Cancer,Kidney cancer    Breast Cancer Paternal Grandmother     Heart Disease Paternal Grandfather         Heart Disease,Cardiomyopathy    Colon Cancer No family hx of     Liver Disease No family hx of      Social History   Social History     Tobacco Use    Smoking status: Former     Current packs/day: 0.10     Types: Cigarettes    Smokeless tobacco: Never    Tobacco comments:     Quit smoking June 2024   Substance Use Topics    Alcohol use: No    Drug use: Not Currently     Types: Other     Comment: Drug use: No         A medically appropriate review of systems was performed with pertinent positives and negatives noted in the HPI, and all other systems negative.      Physical Exam   BP: 105/73  Pulse: 89  Temp: 97.6  F (36.4  C)  Resp: 14  SpO2: 100 %      Physical Exam  Vitals and nursing note reviewed.   Constitutional:       General: She is in acute distress.      Appearance: Normal appearance. She is not ill-appearing, toxic-appearing or diaphoretic.   HENT:      Head: Normocephalic and atraumatic.      Right Ear: External ear normal.      Left Ear: External ear normal.      Nose: Nose normal. No congestion.      Mouth/Throat:      Mouth: Mucous membranes are moist.      Pharynx: Oropharynx is clear. No oropharyngeal exudate.   Eyes:      Extraocular Movements: Extraocular movements intact.      Conjunctiva/sclera: Conjunctivae normal.      Pupils: Pupils are equal, round, and reactive to light.   Cardiovascular:      Rate and Rhythm: Normal rate.      Pulses: Normal pulses.      Heart sounds: Normal heart sounds. No murmur heard.     No friction rub.    Pulmonary:      Effort: Pulmonary effort is normal. No respiratory distress.      Breath sounds: No stridor. No wheezing, rhonchi or rales.   Abdominal:      General: Abdomen is flat. There is no distension.      Tenderness: There is no abdominal tenderness. There is no guarding or rebound.   Musculoskeletal:         General: No deformity or signs of injury. Normal range of motion.      Cervical back: Normal range of motion.      Right lower leg: No edema.      Left lower leg: No edema.   Skin:     General: Skin is warm.      Capillary Refill: Capillary refill takes less than 2 seconds.      Coloration: Skin is not pale.      Findings: No bruising or erythema.   Neurological:      General: No focal deficit present.      Mental Status: She is alert and oriented to person, place, and time.      Cranial Nerves: No cranial nerve deficit.      Sensory: No sensory deficit.      Motor: No weakness.   Psychiatric:         Mood and Affect: Mood normal.         Behavior: Behavior normal.         ED Course        Procedures         Results for orders placed or performed during the hospital encounter of 09/28/24 (from the past 24 hour(s))   Glucose by meter   Result Value Ref Range    GLUCOSE BY METER POCT >600 (HH) 70 - 99 mg/dL   CBC with platelets differential    Narrative    The following orders were created for panel order CBC with platelets differential.  Procedure                               Abnormality         Status                     ---------                               -----------         ------                     CBC with platelets and d...[[id number]]  Abnormal            Final result                 Please view results for these tests on the individual orders.   INR   Result Value Ref Range    INR 1.19 (H) 0.85 - 1.15   Comprehensive metabolic panel   Result Value Ref Range    Sodium 114 (LL) 135 - 145 mmol/L    Potassium 4.4 3.4 - 5.3 mmol/L    Carbon Dioxide (CO2) 23 22 - 29 mmol/L    Anion Gap 14 7 - 15  mmol/L    Urea Nitrogen 23.9 (H) 6.0 - 20.0 mg/dL    Creatinine 0.73 0.51 - 0.95 mg/dL    GFR Estimate >90 >60 mL/min/1.73m2    Calcium 10.0 8.8 - 10.4 mg/dL    Chloride 77 (L) 98 - 107 mmol/L    Glucose 1,147 (HH) 70 - 99 mg/dL    Alkaline Phosphatase 140 40 - 150 U/L    AST 27 0 - 45 U/L    ALT 9 0 - 50 U/L    Protein Total 8.4 (H) 6.4 - 8.3 g/dL    Albumin 4.4 3.5 - 5.2 g/dL    Bilirubin Total 0.6 <=1.2 mg/dL   Lipase   Result Value Ref Range    Lipase 31 13 - 60 U/L   Lactic acid whole blood with 1x repeat in 2 hr when >2   Result Value Ref Range    Lactic Acid, Initial 2.2 (H) 0.7 - 2.0 mmol/L   Ethyl Alcohol Level   Result Value Ref Range    Alcohol ethyl <0.01 <=0.01 g/dL   Blood gas venous   Result Value Ref Range    pH Venous 7.34 7.32 - 7.43    pCO2 Venous 49 40 - 50 mm Hg    pO2 Venous 26 25 - 47 mm Hg    Bicarbonate Venous 26 21 - 28 mmol/L    Base Excess/Deficit Venous -0.1 -3.0 - 3.0 mmol/L    FIO2 100     Oxyhemoglobin Venous 36 (L) 70 - 75 %    O2 Sat, Venous 36.5 (L) 70.0 - 75.0 %    Narrative    In healthy individuals, oxyhemoglobin (O2Hb) and oxygen saturation (SO2) are approximately equal. In the presence of dyshemoglobins, oxyhemoglobin can be considerably lower than oxygen saturation.   Ketone Beta-Hydroxybutyrate Quantitative   Result Value Ref Range    Ketone (Beta-Hydroxybutyrate) Quantitative <0.18 <=0.30 mmol/L   Magnesium   Result Value Ref Range    Magnesium 2.0 1.7 - 2.3 mg/dL   Ammonia   Result Value Ref Range    Ammonia 18 11 - 51 umol/L   CBC with platelets and differential   Result Value Ref Range    WBC Count 7.1 4.0 - 11.0 10e3/uL    RBC Count 4.13 3.80 - 5.20 10e6/uL    Hemoglobin 11.8 11.7 - 15.7 g/dL    Hematocrit 36.6 35.0 - 47.0 %    MCV 89 78 - 100 fL    MCH 28.6 26.5 - 33.0 pg    MCHC 32.2 31.5 - 36.5 g/dL    RDW 15.4 (H) 10.0 - 15.0 %    Platelet Count 220 150 - 450 10e3/uL    % Neutrophils 61 %    % Lymphocytes 31 %    % Monocytes 7 %    % Eosinophils 1 %    % Basophils 1  %    % Immature Granulocytes 0 %    NRBCs per 100 WBC 0 <1 /100    Absolute Neutrophils 4.3 1.6 - 8.3 10e3/uL    Absolute Lymphocytes 2.2 0.8 - 5.3 10e3/uL    Absolute Monocytes 0.5 0.0 - 1.3 10e3/uL    Absolute Eosinophils 0.1 0.0 - 0.7 10e3/uL    Absolute Basophils 0.0 0.0 - 0.2 10e3/uL    Absolute Immature Granulocytes 0.0 <=0.4 10e3/uL    Absolute NRBCs 0.0 10e3/uL   EKG 12-lead, tracing only   Result Value Ref Range    Systolic Blood Pressure  mmHg    Diastolic Blood Pressure  mmHg    Ventricular Rate 79 BPM    Atrial Rate 79 BPM    WI Interval 162 ms    QRS Duration 72 ms     ms    QTc 511 ms    P Axis 51 degrees    R AXIS 60 degrees    T Axis 52 degrees    Interpretation ECG       Sinus rhythm  Nonspecific T wave abnormality  Abnormal ECG       Medications   sodium chloride 0.9% BOLUS 1,000 mL (has no administration in time range)   dextrose 5% and 0.45% NaCl infusion (has no administration in time range)   dextrose 50 % injection 25-50 mL (has no administration in time range)   insulin regular (MYXREDLIN) 1 unit/mL infusion (has no administration in time range)   potassium chloride 10 mEq in 100 mL sterile water infusion (has no administration in time range)   ketorolac (TORADOL) injection 15 mg (has no administration in time range)   sodium chloride 0.9% BOLUS 1,000 mL (0 mLs Intravenous Stopped 9/28/24 1407)   ondansetron (ZOFRAN) injection 4 mg (4 mg Intravenous $Given 9/28/24 1244)             Critical Care Addendum  My initial assessment, based on my review of prehospital provider report, review of nursing observations, review of vital signs, focused history, physical exam, review of cardiac rhythm monitor, 12 lead ECG analysis, and discussion with hospitalist , established a high suspicion that Maribell Leon has  severe hyperglycemia requiring insulin drip , which requires immediate intervention, and therefore she is critically ill.     After the initial assessment, the care team initiated  multiple lab tests, initiated IV fluid administration, and initiated medication therapy with insulin drip  to provide stabilization care. Due to the critical nature of this patient, I reassessed nursing observations, vital signs, physical exam, review of cardiac rhythm monitor, 12 lead ECG analysis, mental status, neurologic status, and respiratory status multiple times prior to her disposition.     Time also spent performing documentation, discussion with family to obtain medical information for decision making, reviewing test results, discussion with consultants, and coordination of care.     Critical care time (excluding teaching time and procedures): 45 minutes.       Assessments & Plan (with Medical Decision Making)     Maribell Leon is a 29 year old female who has a history significant for alcoholic induced chronic pancreatitis arriving today to the emergency department from clinic today due to concern of critical elevation in glucose.  Upon arrival patient ovular.  Present afebrile and he medically stable.  GCS 15.  She is no evidence clinically of cerebral edema do not believe she requires CT imaging of the head.  No neurovascular deficit.  I did review outside laboratory studies with note of severe elevation in glucose above 900.  Patient clinically and subjectively I am concerned for dehydration initiate IV fluids.  Initial potassium 3.9.  Sodium 119 concerning for pseudohyponatremia but would plan for judicious fluids.  Will plan to confirm and assess for elevation of DKA by further laboratory studies to include beta hydroxybutyrate and VBG with likely needed admission, endocrinology consultation and insulin drip.  I would be concerned with history of prior alcohol use induced pancreatitis for concern of pancreas failure to produce insulin.    EG obtained demonstrated no sign of significant ST change, interval abnormality or PVCs.  Electrolytes obtained reassuringly demonstrating no potassium abnormality.   Patient does have severe hyponatremia corrected to be slightly low and consistent with pseudohyponatremia in setting of severe hyperglycemia over 1000.  She has received IV fluid rehydration and initiation of an insulin drip.  She will likely require endocrinology consultation.  She was remained hemodynamically stable in the emergency department with plan for acute hospitalization.    I have reviewed the nursing notes.    I have reviewed the findings, diagnosis, plan and need for follow up with the patient.    New Prescriptions    No medications on file       Final Diagnosis:  Severe Hyperglycemia  Nausea  Dehydration    NEGRO OLEA MD    9/28/2024   [facility] EMERGENCY DEPARTMENT     Negro Olea MD  09/28/24 9000

## 2024-09-28 NOTE — ED TRIAGE NOTES
PT arrives from clinic where she had labs drawn resulting in Na of 119 and glucose of 914. PT endorses polyuria and polydipsia. Also takes two home diuretic medications.      Triage Assessment (Adult)       Row Name 09/28/24 1136          Triage Assessment    Airway WDL WDL        Respiratory WDL    Respiratory WDL WDL        Skin Circulation/Temperature WDL    Skin Circulation/Temperature WDL WDL        Cardiac WDL    Cardiac WDL WDL        Peripheral/Neurovascular WDL    Peripheral Neurovascular WDL WDL        Cognitive/Neuro/Behavioral WDL    Cognitive/Neuro/Behavioral WDL WDL

## 2024-09-29 LAB
ALBUMIN SERPL BCG-MCNC: 3.6 G/DL (ref 3.5–5.2)
ALP SERPL-CCNC: 110 U/L (ref 40–150)
ALT SERPL W P-5'-P-CCNC: 8 U/L (ref 0–50)
ANION GAP SERPL CALCULATED.3IONS-SCNC: 10 MMOL/L (ref 7–15)
AST SERPL W P-5'-P-CCNC: 32 U/L (ref 0–45)
ATRIAL RATE - MUSE: 79 BPM
BILIRUB SERPL-MCNC: 0.6 MG/DL
BUN SERPL-MCNC: 18.1 MG/DL (ref 6–20)
CALCIUM SERPL-MCNC: 9.2 MG/DL (ref 8.8–10.4)
CHLORIDE SERPL-SCNC: 102 MMOL/L (ref 98–107)
CREAT SERPL-MCNC: 0.85 MG/DL (ref 0.51–0.95)
DIASTOLIC BLOOD PRESSURE - MUSE: NORMAL MMHG
EGFRCR SERPLBLD CKD-EPI 2021: >90 ML/MIN/1.73M2
ERYTHROCYTE [DISTWIDTH] IN BLOOD BY AUTOMATED COUNT: 15.3 % (ref 10–15)
GLUCOSE BLDC GLUCOMTR-MCNC: 115 MG/DL (ref 70–99)
GLUCOSE BLDC GLUCOMTR-MCNC: 115 MG/DL (ref 70–99)
GLUCOSE BLDC GLUCOMTR-MCNC: 120 MG/DL (ref 70–99)
GLUCOSE BLDC GLUCOMTR-MCNC: 132 MG/DL (ref 70–99)
GLUCOSE BLDC GLUCOMTR-MCNC: 156 MG/DL (ref 70–99)
GLUCOSE BLDC GLUCOMTR-MCNC: 185 MG/DL (ref 70–99)
GLUCOSE BLDC GLUCOMTR-MCNC: 202 MG/DL (ref 70–99)
GLUCOSE BLDC GLUCOMTR-MCNC: 203 MG/DL (ref 70–99)
GLUCOSE BLDC GLUCOMTR-MCNC: 209 MG/DL (ref 70–99)
GLUCOSE BLDC GLUCOMTR-MCNC: 226 MG/DL (ref 70–99)
GLUCOSE BLDC GLUCOMTR-MCNC: 254 MG/DL (ref 70–99)
GLUCOSE BLDC GLUCOMTR-MCNC: 267 MG/DL (ref 70–99)
GLUCOSE BLDC GLUCOMTR-MCNC: 311 MG/DL (ref 70–99)
GLUCOSE BLDC GLUCOMTR-MCNC: 370 MG/DL (ref 70–99)
GLUCOSE BLDC GLUCOMTR-MCNC: 96 MG/DL (ref 70–99)
GLUCOSE SERPL-MCNC: 175 MG/DL (ref 70–99)
HCO3 SERPL-SCNC: 21 MMOL/L (ref 22–29)
HCT VFR BLD AUTO: 28.6 % (ref 35–47)
HGB BLD-MCNC: 9.9 G/DL (ref 11.7–15.7)
INR PPP: 1.15 (ref 0.85–1.15)
INTERPRETATION ECG - MUSE: NORMAL
MCH RBC QN AUTO: 29.7 PG (ref 26.5–33)
MCHC RBC AUTO-ENTMCNC: 34.6 G/DL (ref 31.5–36.5)
MCV RBC AUTO: 86 FL (ref 78–100)
P AXIS - MUSE: 51 DEGREES
PLATELET # BLD AUTO: 180 10E3/UL (ref 150–450)
POTASSIUM SERPL-SCNC: 3.6 MMOL/L (ref 3.4–5.3)
POTASSIUM SERPL-SCNC: 4.2 MMOL/L (ref 3.4–5.3)
PR INTERVAL - MUSE: 162 MS
PROT SERPL-MCNC: 6.8 G/DL (ref 6.4–8.3)
QRS DURATION - MUSE: 72 MS
QT - MUSE: 446 MS
QTC - MUSE: 511 MS
R AXIS - MUSE: 60 DEGREES
RBC # BLD AUTO: 3.33 10E6/UL (ref 3.8–5.2)
SODIUM SERPL-SCNC: 133 MMOL/L (ref 135–145)
SYSTOLIC BLOOD PRESSURE - MUSE: NORMAL MMHG
T AXIS - MUSE: 52 DEGREES
VENTRICULAR RATE- MUSE: 79 BPM
WBC # BLD AUTO: 9 10E3/UL (ref 4–11)

## 2024-09-29 PROCEDURE — S5010 5% DEXTROSE AND 0.45% SALINE: HCPCS

## 2024-09-29 PROCEDURE — 250N000013 HC RX MED GY IP 250 OP 250 PS 637

## 2024-09-29 PROCEDURE — 250N000013 HC RX MED GY IP 250 OP 250 PS 637: Performed by: INTERNAL MEDICINE

## 2024-09-29 PROCEDURE — 36415 COLL VENOUS BLD VENIPUNCTURE: CPT

## 2024-09-29 PROCEDURE — 120N000003 HC R&B IMCU UMMC

## 2024-09-29 PROCEDURE — 258N000003 HC RX IP 258 OP 636

## 2024-09-29 PROCEDURE — 85027 COMPLETE CBC AUTOMATED: CPT

## 2024-09-29 PROCEDURE — 250N000011 HC RX IP 250 OP 636: Performed by: STUDENT IN AN ORGANIZED HEALTH CARE EDUCATION/TRAINING PROGRAM

## 2024-09-29 PROCEDURE — 250N000011 HC RX IP 250 OP 636

## 2024-09-29 PROCEDURE — 250N000012 HC RX MED GY IP 250 OP 636 PS 637: Performed by: INTERNAL MEDICINE

## 2024-09-29 PROCEDURE — 250N000009 HC RX 250: Performed by: INTERNAL MEDICINE

## 2024-09-29 PROCEDURE — 99233 SBSQ HOSP IP/OBS HIGH 50: CPT | Performed by: INTERNAL MEDICINE

## 2024-09-29 PROCEDURE — 99223 1ST HOSP IP/OBS HIGH 75: CPT | Mod: GC | Performed by: INTERNAL MEDICINE

## 2024-09-29 PROCEDURE — 84132 ASSAY OF SERUM POTASSIUM: CPT

## 2024-09-29 PROCEDURE — 85610 PROTHROMBIN TIME: CPT

## 2024-09-29 RX ORDER — HYDROMORPHONE HYDROCHLORIDE 2 MG/1
2 TABLET ORAL EVERY 4 HOURS PRN
Status: DISCONTINUED | OUTPATIENT
Start: 2024-09-29 | End: 2024-10-01 | Stop reason: HOSPADM

## 2024-09-29 RX ORDER — DEXTROSE MONOHYDRATE 25 G/50ML
25-50 INJECTION, SOLUTION INTRAVENOUS
Status: DISCONTINUED | OUTPATIENT
Start: 2024-09-29 | End: 2024-09-29

## 2024-09-29 RX ORDER — NALOXONE HYDROCHLORIDE 0.4 MG/ML
0.4 INJECTION, SOLUTION INTRAMUSCULAR; INTRAVENOUS; SUBCUTANEOUS
Status: DISCONTINUED | OUTPATIENT
Start: 2024-09-29 | End: 2024-10-01 | Stop reason: HOSPADM

## 2024-09-29 RX ORDER — POTASSIUM CHLORIDE 1500 MG/1
40 TABLET, EXTENDED RELEASE ORAL ONCE
Status: COMPLETED | OUTPATIENT
Start: 2024-09-29 | End: 2024-09-29

## 2024-09-29 RX ORDER — NALOXONE HYDROCHLORIDE 0.4 MG/ML
0.2 INJECTION, SOLUTION INTRAMUSCULAR; INTRAVENOUS; SUBCUTANEOUS
Status: DISCONTINUED | OUTPATIENT
Start: 2024-09-29 | End: 2024-10-01 | Stop reason: HOSPADM

## 2024-09-29 RX ORDER — KETOROLAC TROMETHAMINE 15 MG/ML
15 INJECTION, SOLUTION INTRAMUSCULAR; INTRAVENOUS ONCE
Status: COMPLETED | OUTPATIENT
Start: 2024-09-29 | End: 2024-09-29

## 2024-09-29 RX ORDER — NICOTINE POLACRILEX 4 MG
15-30 LOZENGE BUCCAL
Status: DISCONTINUED | OUTPATIENT
Start: 2024-09-29 | End: 2024-09-29

## 2024-09-29 RX ORDER — HYDROMORPHONE HYDROCHLORIDE 2 MG/1
2 TABLET ORAL
Status: DISCONTINUED | OUTPATIENT
Start: 2024-09-29 | End: 2024-09-29

## 2024-09-29 RX ADMIN — POTASSIUM CHLORIDE 40 MEQ: 1500 TABLET, EXTENDED RELEASE ORAL at 01:01

## 2024-09-29 RX ADMIN — HYDROMORPHONE HYDROCHLORIDE 2 MG: 2 TABLET ORAL at 13:48

## 2024-09-29 RX ADMIN — KETOROLAC TROMETHAMINE 15 MG: 15 INJECTION, SOLUTION INTRAMUSCULAR; INTRAVENOUS at 01:01

## 2024-09-29 RX ADMIN — ACETAMINOPHEN 650 MG: 325 TABLET ORAL at 18:23

## 2024-09-29 RX ADMIN — THIAMINE HCL TAB 100 MG 100 MG: 100 TAB at 07:46

## 2024-09-29 RX ADMIN — INSULIN GLARGINE 15 UNITS: 100 INJECTION, SOLUTION SUBCUTANEOUS at 12:08

## 2024-09-29 RX ADMIN — INSULIN ASPART 7 UNITS: 100 INJECTION, SOLUTION INTRAVENOUS; SUBCUTANEOUS at 12:08

## 2024-09-29 RX ADMIN — INSULIN ASPART 8 UNITS: 100 INJECTION, SOLUTION INTRAVENOUS; SUBCUTANEOUS at 17:07

## 2024-09-29 RX ADMIN — PROCHLORPERAZINE EDISYLATE 10 MG: 5 INJECTION INTRAMUSCULAR; INTRAVENOUS at 23:02

## 2024-09-29 RX ADMIN — GABAPENTIN 300 MG: 300 CAPSULE ORAL at 20:01

## 2024-09-29 RX ADMIN — TIZANIDINE 2 MG: 2 TABLET ORAL at 23:10

## 2024-09-29 RX ADMIN — HYDROXYZINE HYDROCHLORIDE 25 MG: 25 TABLET ORAL at 11:07

## 2024-09-29 RX ADMIN — INSULIN HUMAN 3 UNITS/HR: 1 INJECTION, SOLUTION INTRAVENOUS at 07:13

## 2024-09-29 RX ADMIN — ONDANSETRON 4 MG: 2 INJECTION INTRAMUSCULAR; INTRAVENOUS at 17:52

## 2024-09-29 RX ADMIN — ACETAMINOPHEN 650 MG: 325 TABLET ORAL at 05:13

## 2024-09-29 RX ADMIN — TIZANIDINE 2 MG: 2 TABLET ORAL at 07:55

## 2024-09-29 RX ADMIN — GABAPENTIN 300 MG: 300 CAPSULE ORAL at 13:48

## 2024-09-29 RX ADMIN — PROCHLORPERAZINE MALEATE 10 MG: 10 TABLET ORAL at 17:09

## 2024-09-29 RX ADMIN — INSULIN ASPART 6 UNITS: 100 INJECTION, SOLUTION INTRAVENOUS; SUBCUTANEOUS at 07:47

## 2024-09-29 RX ADMIN — GABAPENTIN 300 MG: 300 CAPSULE ORAL at 07:46

## 2024-09-29 RX ADMIN — HYDROXYZINE HYDROCHLORIDE 25 MG: 25 TABLET ORAL at 05:13

## 2024-09-29 RX ADMIN — DEXTROSE AND SODIUM CHLORIDE 1000 ML: 5; 450 INJECTION, SOLUTION INTRAVENOUS at 05:16

## 2024-09-29 RX ADMIN — ACETAMINOPHEN 650 MG: 325 TABLET ORAL at 11:07

## 2024-09-29 RX ADMIN — HYDROMORPHONE HYDROCHLORIDE 2 MG: 2 TABLET ORAL at 23:07

## 2024-09-29 RX ADMIN — HYDROXYZINE HYDROCHLORIDE 25 MG: 25 TABLET ORAL at 20:01

## 2024-09-29 RX ADMIN — HYDROMORPHONE HYDROCHLORIDE 2 MG: 2 TABLET ORAL at 18:23

## 2024-09-29 RX ADMIN — DULOXETINE HYDROCHLORIDE 30 MG: 30 CAPSULE, DELAYED RELEASE ORAL at 07:46

## 2024-09-29 RX ADMIN — INSULIN HUMAN 1.5 UNITS/HR: 1 INJECTION, SOLUTION INTRAVENOUS at 02:28

## 2024-09-29 ASSESSMENT — ACTIVITIES OF DAILY LIVING (ADL)
ADLS_ACUITY_SCORE: 29
ADLS_ACUITY_SCORE: 29
DIFFICULTY_COMMUNICATING: NO
DEPENDENT_IADLS:: INDEPENDENT
ADLS_ACUITY_SCORE: 29
ADLS_ACUITY_SCORE: 29
ADLS_ACUITY_SCORE: 38
ADLS_ACUITY_SCORE: 29
ADLS_ACUITY_SCORE: 29
ADLS_ACUITY_SCORE: 28
DOING_ERRANDS_INDEPENDENTLY_DIFFICULTY: NO
HEARING_DIFFICULTY_OR_DEAF: NO
ADLS_ACUITY_SCORE: 44
CHANGE_IN_FUNCTIONAL_STATUS_SINCE_ONSET_OF_CURRENT_ILLNESS/INJURY: NO
ADLS_ACUITY_SCORE: 38
WALKING_OR_CLIMBING_STAIRS_DIFFICULTY: NO
CONCENTRATING,_REMEMBERING_OR_MAKING_DECISIONS_DIFFICULTY: NO
ADLS_ACUITY_SCORE: 29
VISION_MANAGEMENT: YES
ADLS_ACUITY_SCORE: 29
ADLS_ACUITY_SCORE: 29
ADLS_ACUITY_SCORE: 28
FALL_HISTORY_WITHIN_LAST_SIX_MONTHS: NO
ADLS_ACUITY_SCORE: 28
ADLS_ACUITY_SCORE: 29
ADLS_ACUITY_SCORE: 29
WEAR_GLASSES_OR_BLIND: YES
ADLS_ACUITY_SCORE: 29
ADLS_ACUITY_SCORE: 29
DRESSING/BATHING_DIFFICULTY: NO
DIFFICULTY_EATING/SWALLOWING: NO
ADLS_ACUITY_SCORE: 29
ADLS_ACUITY_SCORE: 29
ADLS_ACUITY_SCORE: 38
ADLS_ACUITY_SCORE: 44
TOILETING_ISSUES: NO

## 2024-09-29 NOTE — CONSULTS
Care Management Initial Consult    General Information  Assessment completed with: Patient, Family,    Type of CM/SW Visit: Initial Assessment    Primary Care Provider verified and updated as needed: Yes (Domingo Peñaloza [phone] [facility] [id number])   Readmission within the last 30 days: current reason for admission unrelated to previous admission   Return Category: New Diagnosis  Reason for Consult: discharge planning  Advance Care Planning: Advance Care Planning Reviewed: no concerns identified        Communication Assessment  Patient's communication style: spoken language (English or Bilingual)    Hearing Difficulty or Deaf: no   Wear Glasses or Blind: yes    Cognitive  Cognitive/Neuro/Behavioral: unchanged from my previous assessment                      Living Environment:   People in home: parent(s)     Current living Arrangements: house      Able to return to prior arrangements: yes       Family/Social Support:  Care provided by: self  Provides care for: no one  Marital Status: Single  Support system: Parent(s)          Description of Support System: Supportive, Involved    Support Assessment: Adequate family and caregiver support, Adequate social supports    Current Resources:   Patient receiving home care services: No     Community Resources: None  Equipment currently used at home: shower chair  Supplies currently used at home: None    Employment/Financial:  Employment Status: unemployed        Does the patient's insurance plan have a 3 day qualifying hospital stay waiver?  No    Lifestyle & Psychosocial Needs:  Social Determinants of Health     Food Insecurity: Low Risk  (9/16/2024)    Food Insecurity     Within the past 12 months, did you worry that your food would run out before you got money to buy more?: No     Within the past 12 months, did the food you bought just not last and you didn t have money to get more?: No   Depression: Not at risk (9/16/2024)    PHQ-2     PHQ-2  Score: 0   Housing Stability: Low Risk  (9/16/2024)    Housing Stability     Do you have housing? : Yes     Are you worried about losing your housing?: No   Tobacco Use: Medium Risk (9/16/2024)    Patient History     Smoking Tobacco Use: Former     Smokeless Tobacco Use: Never     Passive Exposure: Not on file   Financial Resource Strain: Low Risk  (9/16/2024)    Financial Resource Strain     Within the past 12 months, have you or your family members you live with been unable to get utilities (heat, electricity) when it was really needed?: No   Alcohol Use: Not At Risk (7/10/2024)    Received from     [facility]     Frequency of Alcohol Consumption: Never     Average Number of Drinks: 3 or 4     Frequency of Binge Drinking: Never   Transportation Needs: Low Risk  (9/16/2024)    Transportation Needs     Within the past 12 months, has lack of transportation kept you from medical appointments, getting your medicines, non-medical meetings or appointments, work, or from getting things that you need?: No   Physical Activity: Not on file   Interpersonal Safety: Low Risk  (9/29/2024)    Interpersonal Safety     Do you feel physically and emotionally safe where you currently live?: Yes     Within the past 12 months, have you been hit, slapped, kicked or otherwise physically hurt by someone?: No     Within the past 12 months, have you been humiliated or emotionally abused in other ways by your partner or ex-partner?: No   Stress: Not on file   Social Connections: Unknown (4/11/2023)    Received from [facility]    Social Connections     Frequency of Communication with Friends and Family: Not on file   Health Literacy: Not on file       Functional Status:  Prior to admission patient needed assistance:   Dependent ADLs:: Independent  Dependent IADLs:: Independent  Assesssment of Functional Status: At functional baseline    Mental Health Status:  Mental Health Status: No  Current Concerns       Chemical Dependency Status:  Chemical Dependency Status: No Current Concerns           Values/Beliefs:  Spiritual, Cultural Beliefs, Restorationism Practices, Values that affect care: no               Discussed  Partnership in Safe Discharge Planning  document with patient/family: No    Additional Information:  Patient with history Alcohol Use Disorder, acute pancreatitis and alcohol related liver disease. Admitted on 9/28/2024 for hyperglycemia (blood sugar 914 on admission).     Met with patient and parent's at bedside to complete CMA and discuss discharge planning. Patient lives with her parent's. Endocrine consult and pharmacy consult placed by Endocrine. Currently there are no home care needs indicated. RNCC will sign off at this time. Please re-consult Care Management for any additional discharge needs.    Next Steps: Endocrine consult and pharmacy consult.    Carolina Conley RN, BSN  Care Coordinator, [facility]  Phone [phone]

## 2024-09-29 NOTE — PLAN OF CARE
Goal Outcome Evaluation:      Plan of Care Reviewed With: patient    Overall Patient Progress: improvingOverall Patient Progress: improving    Outcome Evaluation: Endocrine consult and pharmacy consult placed. Plan for discharge to home with family once medically stable.

## 2024-09-29 NOTE — PROVIDER NOTIFICATION
1910 Dr. Reyes paged through [facility] regarding patient's arrival. Also asked if patient can eat per her request and if she needs to be switched from Adult DKA algorithm to Adult Continuous Algorithm

## 2024-09-29 NOTE — PHARMACY-ADMISSION MEDICATION HISTORY
Admission Medication History status for the 9/28/2024 admission is complete.  See EPIC admission navigator for Prior to Admission medications.    Medication history sources:  patient and dispense report.    Medication history source reliability: Good    Medication adherence:  Good    Changes made to PTA medication list (reason)  Added: n/a  Deleted: n/a  Changed: n/a    Additional medication history information (including reliability of information, actions taken by pharmacist): None    Time spent in this activity: 10 minutes     Medication history completed by: Caroline Mcqueen Pharm.D     Prior to Admission medications    Medication Sig Last Dose Taking? Auth Provider Long Term End Date   acetaminophen (TYLENOL) 325 MG tablet Take 2 tablets (650 mg) by mouth 3 times daily. 9/28/2024 Yes Noreen Nevarez MD     cyanocobalamin (VITAMIN B-12) 1000 MCG tablet Take 1 tablet (1,000 mcg) by mouth daily. 9/28/2024 Yes Domingo Peñaloza MD     DULoxetine (CYMBALTA) 30 MG capsule Take 1 capsule (30 mg) by mouth daily. 9/28/2024 Yes Domingo Peñaloza MD Yes    ferrous sulfate (FEROSUL) 325 (65 Fe) MG tablet Take 1 tablet (325 mg) by mouth daily (with breakfast). 9/28/2024 Yes Domingo Peñaloza MD     folic acid (FOLVITE) 1 MG tablet Take 1 tablet (1 mg) by mouth daily. 9/28/2024 Yes Domingo Peñaloza MD     furosemide (LASIX) 20 MG tablet Take 1 tablet (20 mg) by mouth daily. 9/28/2024 Yes Domingo Peñaloza MD Yes    hydrOXYzine HCl (ATARAX) 25 MG tablet Take 1 tablet (25 mg) by mouth every 6 hours as needed for other (adjuvant pain). 9/28/2024 Yes Domingo Peñaloza MD     magnesium 250 MG tablet Take 1 tablet by mouth daily. OTC 9/28/2024 Yes Reported, Patient No    metFORMIN (GLUCOPHAGE) 500 MG tablet Take 1 tablet (500 mg) by mouth 2 times daily (with meals). 9/27/2024 Yes Domingo Peñaloza MD Yes    senna-docusate (SENOKOT-S/PERICOLACE) 8.6-50 MG tablet Take 1  suture/staple removal tablet by mouth 2 times daily as needed for constipation. 9/28/2024 Yes Noreen Nevarez MD     spironolactone (ALDACTONE) 50 MG tablet Take 1 tablet (50 mg) by mouth daily. 9/28/2024 Yes Rick Talavera MD Yes    thiamine (B-1) 100 MG tablet Take 1 tablet (100 mg) by mouth daily. 9/28/2024 Yes Domingo Peñaloza MD     diclofenac (VOLTAREN) 1 % topical gel Apply 4 g topically 4 times daily.  Patient taking differently: Apply 4 g topically 4 times daily as needed. 9/25/2024  Noreen Nevarez MD     gabapentin (NEURONTIN) 300 MG capsule Take 1 capsule (300 mg) by mouth 3 times daily. 9/26/2024  Domingo Peñaloza MD Yes    multivitamin w/minerals (THERA-VIT-M) tablet Take 1 tablet by mouth daily. 9/27/2024  Domingo Peñaloza MD     naloxone (NARCAN) 4 MG/0.1ML nasal spray Spray 1 spray (4 mg) into one nostril alternating nostrils as needed for opioid reversal. every 2-3 minutes until assistance arrives   Noreen Nevarez MD Yes    tiZANidine (ZANAFLEX) 2 MG tablet Take 1 tablet (2 mg) by mouth 3 times daily as needed for muscle spasms. 9/27/2024  Domingo Peñaloza MD No

## 2024-09-29 NOTE — PLAN OF CARE
Goal Outcome Evaluation:   Admission Note    Reason for admission: Hyperglycemia  Primary team notified of pt arrival.  Admitted from: [place]  Via: EMS  Accompanied by: None  Belongings: Placed in closet; valuables Remine with patient. Admission Required Doc Completed: Yes  Teaching: Orientation to unit and call light- call light within reach, use of console, meal times, when to call for the RN, and enforced importance of safety.  IV Access: Yes  Telemetry: Yes/No  Ht./Wt.: Completed  Code Status verified on armband: Yes  2 RN Skin Assessment Completed with: Bre  Suction/Ambu bag/Flowmeter at bedside: Yes/No    Pt status:     Temp:  [97.6  F (36.4  C)-98.3  F (36.8  C)] 97.8  F (36.6  C)  Pulse:  [] 100  Resp:  [14-16] 16  BP: ()/() 141/100  SpO2:  [90 %-100 %] 99 %

## 2024-09-29 NOTE — CONSULTS
Inpatient Diabetes Management Service : New Consult Note     Patient: Maribell Leon   YOB: 1995    MRN: [id number]     Date of Consult : 09/29/2024   Date of Admission: 9/28/2024     Reason for Consult: New Onset Diabetes   Consult Requestor: Toni Syed           History of Present Illness:     Maribell Leon is a 29 year old with history of Alcohol Use Disorder complicated by prior episodes of acute pancreatitis and alcohol related liver disease,  who was admitted on 9/28/2024 for symptomatic hyperglycemia. Inpatient Diabetes Service consulted for Diabetes Management.     History obtained via the patient, chart review and discussion with primary team.       She had been feeling lightheaded, feeling more thirsty than usual for a couple days to a week. She had experience nausea but no abdominal pain. She lost about 5-7 lbs in the the last 2 weeks.     She had been noted to be hyperglycemic since August. She had had an A1c done on 8/18 which was  9.5 . She was seen by PCP and was started on Metformin for 1 1/2 weeks. 500 mg BID.    Patient is not known to the Inpatient Diabetes Service from past admission(s).    There is no history of diabetes in family.      Latest Reference Range & Units 09/20/24 09:13   C-Peptide 0.9 - 6.9 ng/mL 2.7      Latest Reference Range & Units 08/18/24 02:00 09/28/24 12:30   Hemoglobin A1C <5.7 % 5.3 9.5 (H)   (H): Data is abnormally high    Last dose insulin PTA: NA   Current inpatient regimen:  IV insulin      Other Active/Contributory Medical Problems: Alcohol Use   Planned Procedures/Surgeries: None    Relevant Labs on Admission:  if contributory, otherwise see labs below  Recent Labs   Lab Test 09/29/24  1132 09/29/24  1103 09/29/24  0606 09/29/24  0508 09/29/24  0500 09/28/24  2131 09/28/24 2053   NA  --   --   --  133*  --   --  136   POTASSIUM  --   --   --  3.6 4.2  --  3.0*   CHLORIDE  --   --   --  102  --   --  98   CO2  --   --   --  21*  --   --  22    ANIONGAP  --   --   --  10  --   --  16*   GLC 96 115*   < > 175*  --    < > 189*   BUN  --   --   --  18.1  --   --  19.4   CR  --   --   --  0.85  --   --  0.67   SOSA  --   --   --  9.2  --   --  9.8    < > = values in this interval not displayed.       Inpatient Glucose Trends:           Diabetes History:   Diabetes Type and Duration: New Onset    PTA Medication Regimen: Metformin 500 mg BID    ------------------------         Diet/Nutrition:    Orders Placed This Encounter      Regular Diet Adult            Review of Systems:    Weight loss, polyuria, polydypsia         Past Medical History:       Past Medical History:   Diagnosis Date    Acute pancreatitis     ADHD (attention deficit hyperactivity disorder)     Alcohol-induced chronic pancreatitis (H)     Anxiety              Past Surgical History:      Past Surgical History:   Procedure Laterality Date    POSTERIOR SPINAL FUSION      L5/S1    SPINE HARDWARE REMOVAL      TONSILLECTOMY      8/05    WISDOM TOOTH EXTRACTION               Social History:      Social History     Tobacco Use    Smoking status: Former     Current packs/day: 0.10     Types: Cigarettes    Smokeless tobacco: Never    Tobacco comments:     Quit smoking June 2024   Substance Use Topics    Alcohol use: No        History   Sexual Activity    Sexual activity: Yes    Partners: Male          Etoh: Yes           Family History:    Reviewed and non-contributory.    Family History of Diabetes: None    Family History   Problem Relation Age of Onset    Allergy (Severe) Mother         Allergies,Environmental allergies, celiac disease    Family History Negative Father         Good Health    Family History Negative Sister         Good Health    Family History Negative Sister         Good Health    Asthma Brother         Asthma,history of intermittent asthma    Other - See Comments Brother         Enuresis    Diabetes Maternal Grandmother         Diabetes,Type 2    Cancer Maternal Grandfather          Cancer,Kidney cancer    Breast Cancer Paternal Grandmother     Heart Disease Paternal Grandfather         Heart Disease,Cardiomyopathy    Colon Cancer No family hx of     Liver Disease No family hx of              Physical Exam:    BP (!) 87/54 (BP Location: Right arm)   Pulse 100   Temp 98  F (36.7  C) (Oral)   Resp 18   Wt 42.7 kg (94 lb 1.6 oz)   SpO2 99%   BMI 18.38 kg/m       GENERAL: alert and no distress  EYES: Eyes grossly normal to inspection.  No discharge or erythema, or obvious scleral/conjunctival abnormalities.  RESP: No audible wheeze, cough, or visible cyanosis.    SKIN: Visible skin clear. No significant rash, abnormal pigmentation or lesions.  NEURO: Cranial nerves grossly intact.  Mentation and speech appropriate for age.  PSYCH: Appropriate affect, tone, and pace of words             Laboratory Data:      Lab Results   Component Value Date    A1C 9.5 (H) 09/28/2024    A1C 5.3 08/18/2024     Recent Labs   Lab Test 09/29/24  0508   WBC 9.0   RBC 3.33*   HGB 9.9*   HCT 28.6*   MCV 86   MCH 29.7   MCHC 34.6   RDW 15.3*        Recent Labs   Lab Test 09/29/24  0801 09/29/24  0714 09/29/24  0606 09/29/24  0508 09/29/24  0500 09/28/24  2131 09/28/24 2053   NA  --   --   --  133*  --   --  136   POTASSIUM  --   --   --  3.6 4.2  --  3.0*   CHLORIDE  --   --   --  102  --   --  98   CO2  --   --   --  21*  --   --  22   ANIONGAP  --   --   --  10  --   --  16*   * 267*   < > 175*  --    < > 189*   BUN  --   --   --  18.1  --   --  19.4   CR  --   --   --  0.85  --   --  0.67   SOSA  --   --   --  9.2  --   --  9.8    < > = values in this interval not displayed.     Recent Labs   Lab Test 09/29/24  0508   PROTTOTAL 6.8   ALBUMIN 3.6   BILITOTAL 0.6   ALKPHOS 110   AST 32   ALT 8            Assessment and Recommendations:       Assessment:   New Onset Diabetes Mellitus, .   (A1c 9.5 %)      Plan/Recommendations:    - Lantus 15 units q 24 hrs at 0900   - Novolog Meal Coverage: 1 units per  12 g CHO, TID AC and PRN with snacks/supplements   - Novolog Correction Scale: medium resistance (ISF: 50)  TID AC / HS / 0200   - BG monitoring: TID AC, HS, 0200 / Every 1-2 hours on insulin drip, will transition to AC and HS once off IV insulin    - Hypoglycemia protocol    - Carb counting protocol     Labs Pending to be resulted  IVAN Ab  Islet Cell Antibody  C Peptide    Discussion:  She is presenting with new onset diabetes without ketosis, with testing in recent weeks showing a preserved c peptide.     Given that she presented with severe hyperglycemia, reviewed she will need to be discharged on a basal bolus insulin regimen.     While her insulin requirements were about 1 unit per hour close to midnight, we will start with MDI based on 0.6 units for TDD.      Discharge Planning: (tentative)    Medications: TBD    Test Claims: Pharmacy Consult ordered  Education: CDE consult ordered  Outpatient Follow-up: [facility] Endocrinology  referral on Discharge      Thank you for this consult. IDS will continue to follow.      Please notify Inpatient Diabetes Service if changes are planned to steroids, nutrition, TPN/TF and anticipated procedures requiring prolonged NPO status.     Galindo Garcia MD  Endocrinology Fellow     To contact Inpatient Diabetes Service:     7 AM - 5 PM: Page the IDS SABRINA following the patient that day (see filed or incomplete progress notes/consult notes under Endocrinology)    OR if uncertain of provider assignment: page job code [phone]    5 PM - 7 AM: First call after hours is to primary service.    For urgent after-hours questions, page job code for on call fellow: [phone]

## 2024-09-29 NOTE — PLAN OF CARE
Goal Outcome Evaluation:      Plan of Care Reviewed With: patient    End of shift Summary: See flowsheet for VS and detail assessments.     Changes this Shift:      Pulmonary: Pt denies SOB & chest pain. Pt is on RA   Diet: Regular diet, medication whole with thin liquids.    Output: Pt is voiding adequately using toilet  Activity: Standby assist     Skin: No new skin issues.     Pain: Pt reported pain 8/10 Neuro/CMS: Pt is alert and oriented x 4. Denies numbness and tingling.     Dressings/Drains: None.     IV: None.     Additional info: No significant changes on shift.     Plan:  Plan of care ongoing.

## 2024-09-29 NOTE — PROVIDER NOTIFICATION
0736 Dr. Plata paged through Ushah[facility] to clarify if patient should be on continues D5 & 0/45 NS. No response back and no new orders    0925 Endocrinologist (Franco Canela) paged through [facility] to clarify if patient should be on continues D5 & 0/45 NS. Per provider, okay to discontinue.    1011 Dr. Plata paged through Ushahidi regarding pateint's reported of significant pain even with current pain medication regiment and pateint's request for stronger pain medication. New orders for PO dilaudid obtained later in shift    1140 Endocrinologist (Franco Canela) paged through Ushahidi regarding high blood sugar drop    1203 Endocrinologist (Franco Canela) paged through Ushahidi regarding blood sugar recheck and lantus administration    1436 Dr. Plata paged through Ushahidi to ask if pateint should stay IMC as insulin gtt has been discontinued. Per provider, he would like to keep patient IMC for today    1807 Dr Hernandez paged through Ushahidi regarding patient's request for one time IV dose of pain medication. Per provider, no change in orders. Will reassess again if patient's nausea and vomiting worsens

## 2024-09-29 NOTE — PLAN OF CARE
5 Med/Surg Admission Note    Reason for admission: hyperglycemia  Primary team notified of pt arrival.  Admitted from:  ED  Via: EMS  Accompanied by: EMS  Belongings: clothing with patient  Admission Required Doc Completed: No  Teaching: Orientation to unit and call light- call light within reach, use of console, meal times, when to call for the RN, and enforced importance of safety.  IV Access: L and R PIV  Telemetry: Yes  Ht./Wt.: Completed  Code Status verified on armband: Yes/No  2 RN Skin Assessment Completed with: no  Suction/Ambu bag/Flowmeter at bedside: Yes    Pt status:     Temp:  [97.6  F (36.4  C)-98.3  F (36.8  C)] 98.3  F (36.8  C)  Pulse:  [] 110  Resp:  [14-16] 16  BP: ()/(66-86) 95/66  SpO2:  [90 %-100 %] 98 %RA

## 2024-09-29 NOTE — PLAN OF CARE
Goal Outcome Evaluation:      Plan of Care Reviewed With: patient    Overall Patient Progress: improvingOverall Patient Progress: improving    Outcome Evaluation: insulin gtt disconinued, new orders for oral dilaudid    Nursing Assessment:  VT: BP 92/64 (BP Location: Right arm)   Pulse 100   Temp 98.9  F (37.2  C) (Oral)   Resp 18   Wt 44.7 kg (98 lb 8 oz)   SpO2 99%   BMI 19.24 kg/m       GI/: nausea reported after dinner and she reported that compazine worked better for her nausea. Oral compazine administered. She later reported that she vomited in the bathroom. IV zofran administered.     Endocrine: insulin drip stopped after Lantus administration and clearance from Endocrinology    Edema: Right foot had trace edema    Activity: patient IND in room    Pain Management:  Significant pain reported even after scheduled and PRN medication administration. Provider notified. New orders for oral dilaudid. Patient later requested one time IV dilaudid d/t nausea while writer was administering IV zofran. Provider aware, plan is to continue oral pain medication and reassess nausea & vomiting    Nursing Plan:  Continue POC    Discharge Disposition:  pending

## 2024-09-30 ENCOUNTER — TELEPHONE (OUTPATIENT)
Dept: INTERNAL MEDICINE | Facility: CLINIC | Age: 29
End: 2024-09-30

## 2024-09-30 LAB
C PEPTIDE SERPL-MCNC: 1.2 NG/ML (ref 0.9–6.9)
GLUCOSE BLDC GLUCOMTR-MCNC: 124 MG/DL (ref 70–99)
GLUCOSE BLDC GLUCOMTR-MCNC: 187 MG/DL (ref 70–99)
GLUCOSE BLDC GLUCOMTR-MCNC: 221 MG/DL (ref 70–99)
GLUCOSE BLDC GLUCOMTR-MCNC: 225 MG/DL (ref 70–99)
GLUCOSE BLDC GLUCOMTR-MCNC: 231 MG/DL (ref 70–99)
GLUCOSE BLDC GLUCOMTR-MCNC: 77 MG/DL (ref 70–99)
INR PPP: 1.12 (ref 0.85–1.15)
POTASSIUM SERPL-SCNC: 4.2 MMOL/L (ref 3.4–5.3)

## 2024-09-30 PROCEDURE — 99233 SBSQ HOSP IP/OBS HIGH 50: CPT

## 2024-09-30 PROCEDURE — 99232 SBSQ HOSP IP/OBS MODERATE 35: CPT | Performed by: INTERNAL MEDICINE

## 2024-09-30 PROCEDURE — 250N000011 HC RX IP 250 OP 636: Performed by: STUDENT IN AN ORGANIZED HEALTH CARE EDUCATION/TRAINING PROGRAM

## 2024-09-30 PROCEDURE — 84132 ASSAY OF SERUM POTASSIUM: CPT | Performed by: INTERNAL MEDICINE

## 2024-09-30 PROCEDURE — 250N000013 HC RX MED GY IP 250 OP 250 PS 637: Performed by: INTERNAL MEDICINE

## 2024-09-30 PROCEDURE — 250N000013 HC RX MED GY IP 250 OP 250 PS 637

## 2024-09-30 PROCEDURE — 120N000002 HC R&B MED SURG/OB UMMC

## 2024-09-30 PROCEDURE — 85610 PROTHROMBIN TIME: CPT

## 2024-09-30 PROCEDURE — 36415 COLL VENOUS BLD VENIPUNCTURE: CPT

## 2024-09-30 RX ORDER — KETOROLAC TROMETHAMINE 30 MG/ML
30 INJECTION, SOLUTION INTRAMUSCULAR; INTRAVENOUS ONCE
Status: COMPLETED | OUTPATIENT
Start: 2024-09-30 | End: 2024-09-30

## 2024-09-30 RX ADMIN — HYDROMORPHONE HYDROCHLORIDE 2 MG: 2 TABLET ORAL at 12:29

## 2024-09-30 RX ADMIN — DULOXETINE HYDROCHLORIDE 30 MG: 30 CAPSULE, DELAYED RELEASE ORAL at 07:54

## 2024-09-30 RX ADMIN — TIZANIDINE 2 MG: 2 TABLET ORAL at 16:07

## 2024-09-30 RX ADMIN — GABAPENTIN 300 MG: 300 CAPSULE ORAL at 14:21

## 2024-09-30 RX ADMIN — HYDROMORPHONE HYDROCHLORIDE 2 MG: 2 TABLET ORAL at 17:44

## 2024-09-30 RX ADMIN — GABAPENTIN 300 MG: 300 CAPSULE ORAL at 20:59

## 2024-09-30 RX ADMIN — THIAMINE HCL TAB 100 MG 100 MG: 100 TAB at 07:54

## 2024-09-30 RX ADMIN — KETOROLAC TROMETHAMINE 30 MG: 30 INJECTION, SOLUTION INTRAMUSCULAR at 23:50

## 2024-09-30 RX ADMIN — HYDROMORPHONE HYDROCHLORIDE 2 MG: 2 TABLET ORAL at 05:18

## 2024-09-30 RX ADMIN — TIZANIDINE 2 MG: 2 TABLET ORAL at 07:54

## 2024-09-30 RX ADMIN — GABAPENTIN 300 MG: 300 CAPSULE ORAL at 07:54

## 2024-09-30 RX ADMIN — INSULIN ASPART: 100 INJECTION, SOLUTION INTRAVENOUS; SUBCUTANEOUS at 12:30

## 2024-09-30 RX ADMIN — INSULIN ASPART 8 UNITS: 100 INJECTION, SOLUTION INTRAVENOUS; SUBCUTANEOUS at 07:56

## 2024-09-30 RX ADMIN — INSULIN ASPART: 100 INJECTION, SOLUTION INTRAVENOUS; SUBCUTANEOUS at 17:45

## 2024-09-30 ASSESSMENT — ACTIVITIES OF DAILY LIVING (ADL)
ADLS_ACUITY_SCORE: 28

## 2024-09-30 NOTE — CONSULTS
Patient has Medical Assistance through [facility].     Insulins: Covered for $0/mo  Humalog pens & Humalog Mix pens  Novolog pens & Novolog Mix pens     Humulin N, vial only  Humulin mixes pens  Novolin N, vial only  Novolin mixes pens     Lantus pens    Glucometers: Covered for $0/mo  Accu-Chek Moni Plus, Guide, and SmartView   Contour and Contour Next   Dexcom G7  Freestyle Naila 3    Ayesha Lu  Pharmacy Technician/Liaison, Discharge Pharmacy   [phone] (voice or text)  [email or web address]  Available on Vocera and Teams

## 2024-09-30 NOTE — TELEPHONE ENCOUNTER
[facility] Call Center    Phone Message    May a detailed message be left on voicemail: yes     Reason for Call: Other: Patient keeps ending up back in hospital due to diabetes, she has appts and then she is in the hospital again so she can't make them, next appt scheduled isn't until 11/1/24.   Can you see her sooner. CARLITOS?  Please call.  She is dicharging tomorrow.          Action Taken: Message routed to:  [facility] PCC    Travel Screening: Not Applicable     Date of Service:

## 2024-09-30 NOTE — PROGRESS NOTES
"                       Inpatient Diabetes Management Service: Daily Progress Note     HPI: Maribell Leon is a 29 year old with history of Alcohol Use Disorder complicated by prior episodes of acute pancreatitis and alcohol related liver disease,  who was admitted on 9/28/2024 for symptomatic hyperglycemia. Inpatient Diabetes Service consulted for Diabetes Management.          Assessment/Plan:     Assessment:   New Onset Diabetes Mellitus, with preserved C-peptide. 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 Glucose control improved but remains above target. Nausea and vomiting have improved and patient tolerating regular diet. Anticipating discharge tomorrow. Will plan to meet with diabetic educators and nutrition before discharge. Would like her to be \"carb aware\" for insulin dosing, though anticipate fixed meal dosing of Novolog. Will adjust lantus and ICR slightly today for BGs out of target.     Discharge Planning: (tentative)  Medications: Basal + Bolus regimen anticipated  Test Claims: sent 9/29 see SOFI Lu note. All insulins and glucometer covered. Sent 9/30 for CGM   Education: Ordered 9/29- new to insulin and glucometer. Nutrition consulted 9/30    Outpatient Follow-up:  recommend [facility] Endocrinology vs PCP     Inpatient Diabetes Service Recommendations for Discharge:    Blood Glucose Checks: Three times " daily before meals, and at bedtime.  or resume your CGM    Medications:     Long Acting Insulin   Lantus ____ units once daily at 1200    Short Acting Meal Insulin:    Novolog ___ units three times daily with meals plus correction scale:   Novolog correction scale three times a day before meals and at bedtime. Do not take more frequently than every 4 hours. See scale below:     Diabetes Outpatient Follow-up: Follow up with  your PCP,  in 1-2 weeks after discharge for your diabetes. Follow up sooner if blood glucose runs consistently greater than 180 mg/dL or any episodes less than 70 mg/dL.     Hypoglycemia (Low Blood Glucose) Management:  If blood glucose is 51 to 69:   Eat or drink 15 grams of carbohydrate. Examples:   1/2 cup (4 ounces) apple juice or regular soda pop, or   1 cup (8 ounces) milk, or   15 skittles, or   3 to 4 glucose tablets.   Re-check your blood glucose in 15 minutes.   Repeat these steps every 15 minutes until your blood glucose is above 80.       If blood glucose is 50 or less:   Eat or drink 30 grams of carbohydrate. Examples:   1 cup (8 ounces) apple juice or regular soda pop, or   2 cups (16 ounces) milk, or   1 banana, or   6 to 8 glucose tablets.   Re-check your blood glucose in 15 minutes.   Repeat these steps every 15 minutes until your blood glucose is above 80.     Thank you for letting the Diabetes Management Team be involved in your care!     Instructions to patient were posted in AVS and discussed     Primary Team to order Medications and Supplies:   Medications and supplies are to be ordered by primary service on discharge.   Contour Next glucose meter   Contour Next  test strips   Contour Microlet lancets   Sharps container   Alcohol wipes   B-D 4 mm tiffanie pen needles   Lantus Solostar pens   Novolog Flexpens   Dexcom G7  *please use the DIAB non-branded discharge supply order set ([id number])* If there are problems or issues with ordering for discharge, you may contact the  pharmacist directly for assistance      Please notify Inpatient Diabetes Service if changes are planned to steroids, nutrition, TPN/TF and anticipated procedures requiring prolonged NPO status.         Interval History/Review of Systems :   The last 24 hours progress and nursing notes reviewed.  Feeling well today. Having a late breakfast on assessment. We discussed carb counting and overall dietary preferences. Patient having Croatian toast with syrup and cereal this morning. Says she normally does not eat like this however and is reaching to comfort foods. Describes some typical meals. For breakfast will have yogurt and granola or honey nut cheerios. Lunch is typically snack type foods, lunch meats, fruit, veggies, rarely crab snacks like bread or chips. For dinner she tends to have a larger meal: typically pasta, hot dish or some other cooked food.     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 She lives with her parents who are supportive. Mother would like to be present for education when able. Discussed likely plan for long acting insulin and meal time bolus insulin. Questions answered.     Planned Procedures/Surgeries: none    Inpatient Glucose Control:       Recent Labs   Lab 09/30/24  0240 09/29/24  2158 09/29/24  1637 09/29/24  1356 09/29/24  1305 09/29/24  1159   * 209* 120* 156* 226* 115*             Medications Impacting Glycemia:   Steroids:  none   D5W-containing solutions/medications: none   Other medications impacting glucose: none         Nutrition:   Orders Placed This Encounter      Regular Diet Adult    Supplements: none   TF: none   TPN: none         Diabetes History: see full consult note for complete diabetes history   Diabetes Type and Duration: New onset- A1c 9.5% on 9/28/24  C-peptide 2.7 on 9/20/24    PTA Medication Regimen: Metformin 500 mg BID  Historical Diabetes Medications: none    Glucose monitoring device and  frequency: none  Outpatient Diabetes Provider: none        Physical Exam:   BP 97/62 (BP Location: Right arm)   Pulse 100   Temp 98.3  F (36.8  C) (Oral)   Resp 18   Wt 44.7 kg (98 lb 8 oz)   SpO2 99%   BMI 19.24 kg/m    General Appearance: No acute distress, resting comfortably  Alert and interactive   HEENT: Normocephalic, atraumatic. Anicteric, conjunctiva clear. Mucous membranes moist, without exudates or ulcers.    Heart: Regular rate and rhythm.    Lungs:  Breathing comfortably   Abdomen: Round, nondistended. Soft, nontender.   Extremities:  No significant lower extremity edema. Fluid movement of extremities.   Skin:  Warm and dry.   Neuro:  Oriented x3, able to speak clearly.    Psych:  Calm, appropriate mood and affect.           Data:     Lab Results   Component Value Date    A1C 9.5 (H) 09/28/2024    A1C 5.3 08/18/2024       ROUTINE IP LABS (Last four results)  BMP  Recent Labs   Lab 09/30/24  0240 09/29/24  2158 09/29/24  1637 09/29/24  1356 09/29/24  0606 09/29/24  0508 09/29/24  0500 09/28/24  2131 09/28/24  2053 09/28/24  1612 09/28/24  1526 09/28/24  1406 09/28/24  1230   NA  --   --   --   --   --  133*  --   --  136  --  123*  --  114*   POTASSIUM  --   --   --   --   --  3.6 4.2  --  3.0*  --  4.1  --  4.4   CHLORIDE  --   --   --   --   --  102  --   --  98  --  89*  --  77*   SOSA  --   --   --   --   --  9.2  --   --  9.8  --  9.4  --  10.0   CO2  --   --   --   --   --  21*  --   --  22  --  19*  --  23   BUN  --   --   --   --   --  18.1  --   --  19.4  --  21.5*  --  23.9*   CR  --   --   --   --   --  0.85  --   --  0.67  --  0.70  --  0.73   * 209* 120* 156*   < > 175*  --    < > 189*   < > 654*  729*   < > 1,147*    < > = values in this interval not displayed.     CBC  Recent Labs   Lab 09/29/24  0508 09/28/24  1230   WBC 9.0 7.1   RBC 3.33* 4.13   HGB 9.9* 11.8   HCT 28.6* 36.6   MCV 86 89   MCH 29.7 28.6   MCHC 34.6 32.2   RDW 15.3* 15.4*    220     INR  Recent Labs    Lab 09/29/24  0508 09/28/24  1230   INR 1.15 1.19*       Inpatient Diabetes Service will continue to follow, please don't hesitate to contact the team with any questions or concerns.     JAVIER Coleman, CNP   Inpatient Diabetes Management Service   Pager: [phone]   Available on Vocera      Plan discussed with patient, bedside RN, and primary team via this note.    To contact Inpatient Diabetes Service:     7 AM - 5 PM: Page the [employer] SABRINA following the patient that day (see filed or incomplete progress notes/consult notes under Endocrinology)    OR if uncertain of provider assignment: page job code [phone]    5 PM - 7 AM: First call after hours is to primary service.    For urgent after-hours questions, page job code for on call fellow: [phone]     I spent a total of 50 minutes on the date of the encounter doing prep/post-work, chart review, history and exam, documentation and further activities per the note including lab review, multidisciplinary communication, counseling the patient and/or coordinating care regarding acute hyper/hypoglycemic management, as well as discharge management and planning/communication.

## 2024-09-30 NOTE — CONSULTS
Consulted to run a test claim for CGM.    Patient has pharmacy benefits through [facility]. Per insurance, the following are covered and preferred under the patient's plans:     Freestyle Naila 1/2/3 reader/sensors - $0  Dexcom G7 /sensors - $0  Dexcom G6 /transmitter/sensors - $0      Please feel free to contact me with any other test claims, prior authorizations, or insurance questions regarding outpatient medications.     Thanks!      Etta Appiah [facility]  Discharge Pharmacy Liaison  [facility] Discharge Pharmacy  Pronouns: She/Her/Hers    Securely message with ImmuneXcite, Epic Secure Chat, or Cerevo  Phone: [phone]  Fax: [phone]  [email or web address]

## 2024-09-30 NOTE — PROGRESS NOTES
[facility]    Medicine Progress Note - Hospitalist Service, GOLD TEAM 17    Date of Admission:  9/28/2024    Assessment & Plan   29 year old female who has PMH notable for recently diagnosed liver cirrhosis, h/o alcohol use disorder, h/o chronic pancreatitis admitted on 9/28/2024 for severe hyperglycemia WITHOUT e/o DKA. Etiology of hyperglycemia uncertain, admitted for insulin gtt, further workup, evaluation by Endocrinology.     Updates   -No acute events overnight  -Hyperglycemia is improving.  Insulin drip was stopped and patient was transitioned to subcutaneous regimen. Endocrinology following the patient.   -Foot pain stable today.      #Severe hyperglycemia, w/o DKA  #Diabetes mellitus, subtype uncertain (T2DM, pancreatogenic)  #H/o chronic pancreatitis  #Pseudohyponatremia 2/2 hyperglycemia (114 corrects to WNL)  Endocrinology following the patient and recommended:    Plan/Recommendations:   - Increase Lantus 15--> 18 units q 24 hrs at 1100  - Adjust Novolog Meal Coverage: 1 unit per 12--> 1 unit per 10 grams CHO, TID AC and 1 unit per 12 grams CHO PRN with snacks/supplements  - Novolog Correction Scale: Medium resistance (ISF: 50) TID AC / HS / 0200   - BG Monitoring: TID AC / HS / 0200  - Hypoglycemia protocol  - Carb counting protocol   - IVAN, Islet cell antibody and c-peptide send 9/28- pending      #Cirrhosis  Diagnosed in June 2024 after presenting to OSH w/ decompensated cirrhosis. Attributed to alcohol use, though given patient's young age, wonder if there is additional contributor leading to liver dysfunction, especially given pancreatic dysfunction as above.   No hx of liver disease in family, but there is an autoimmune hx of lupus, DM. No known family members with hemochromatosis w/ iron overload. Negative hepatitis panel. She did have some rheumatologic/autoimmune labs checked in June/July 2024 (cold agglutinin titer, SCL 70 IgG isael, MIKEY, autoimmune  liver disease panel, RF, CCP isael) all unremarkable. She had an elevated alpha 1 antitrysin of 217 (ULN= 200) and low ceruloplasmin of 19.2 (LLN 20). 24H copper urine was ordered given low ceruloplasmin by [facility] GI provider in July 2024, does not appear this has been done. Pt has also not been tested for hemochromatosis. Ferritin checked 7/31 elevated to 254, but WNL at 41 on 9/16 (pt had been started on ferrous gluconate). Other iron studies from 9/16 (compared to 7/31) w/ iron 25/ low (60/ wnl), TIBC 302/ wnl (141/ low), TSAT 8/ low (43/ wnl). High folate and B12. This admission, INR 1.19 (stable compared to 9/16) but normal AST/ALT, alk phos, and bili. HILDA not detectable. Pt reporting last drink in June 2024, denies other substance use.      - PeTH, UDS  - Restart PTA lasix and spironolactone   - Daily MELD labs  - Social work consult to ensure pt is set up w/ resources to ensure support for sobriety, mental health, etc as we want to set her up for success in the future if/when she gets a transplant     #R foot, ankle, knee pain   Etiology unclear. Has been bothersome since June 2024, does report someone stepping on her foot but not sure if this is related. Has had multiple imaging studies (XR, CT, MRI) w/o remarkable abnormalities to explain pain. PCP questioned if pt has CRPS-- ordered triple phase bone scan to eval, does not appear to have been completed. Uric acid checked 7/31 was elevated to 11, was WNL in June 2024. Exam not c/w gout (no significant erythema, minimal swelling, able to tolerate palpation), though this is a possibility to consider if colchicine or allopurinol have not been trialed. She does have a hx of lumbar fusion and noted possible foot drop in Juley 2024-- MRI lumbar spine w/  mild to mod R neural foraminal stenossi @ L4-5. On exam, no objective weakness noted.   Requesting something stronger than ketorolac for pain. Given unremarkable objective workup, do not have strong indication to  provide opioids for pain, especially given it is not acutely changed. CRPS is certainly a reasonable differential, though w/o further information/data, difficult to justify opioids for treatment. I do find her focus on her foot pain in light of her severe liver disease and very high BG/potential for pancreatic dysfunction to be somewhat strange and unexpected. No overt signs to suggest drug seeking behavior, but her behavior is somewhat odd nonetheless; want to be judicious in evaluation of pain and use of opioids given she does have a hx of AUD, suggesting potential tendency for addictive behavior. If she is being worked up for a transplant, would like to prevent any complications that would prevent candidacy/success.   - Continue PTA duloxetine  - Continue PTA gabapentin 300mg TID  - Continue PTA tizanidine PRN  - Consider colchicine vs allopurinol if c/f gout  - Consider high dose Vitamin C if high suspicion for CRPS  - Complaining of severe pain. Short course of PO Dilaudid for now.   :: Obtain triple phase bone scan as recommended by PCP in OP setting     #Prolonged QTc (511 on 9/28): Caution w/ QTc prolonging medications  #H/o spinal fusion: Reporting some R sided foot drop, no objective weakness on exam (though did not walk patient). MRI lumbar spine within last few months did show mild to mod spinal stenosis.          Diet: Regular Diet Adult    DVT Prophylaxis: Pneumatic Compression Devices and Anti-embolisim stockings (TEDs)  [place] Catheter: Not present  Lines: None     Cardiac Monitoring: None  Code Status: Full Code      Clinically Significant Risk Factors        # Hypokalemia: Lowest K = 3 mmol/L in last 2 days, will replace as needed  # Hyponatremia: Lowest Na = 123 mmol/L in last 2 days, will monitor as appropriate                   # DMII: A1C = 9.5 % (Ref range: <5.7 %) within past 6 months, PRESENT ON ADMISSION        # Financial/Environmental Concerns:           Disposition Plan     Medically  Ready for Discharge: Anticipated in 2-4 Days             aTe Berry MD  Hospitalist Service, GOLD TEAM 17  [facility]  Securely message with [employer] (more info)  Text page via [employer] Paging/Directory   See signed in provider for up to date coverage information  ______________________________________________________________________    Interval History     No acute events overnight.   Pleasant.   No fever, chills, diaphoresis, chest pain, palpitations or shortness of breath.     Physical Exam   Vital Signs: Temp: 98.1  F (36.7  C) Temp src: Oral BP: 95/55 Pulse: 60   Resp: 16 SpO2: 100 % O2 Device: None (Room air)    Weight: 98 lbs 8 oz    General Appearance:  Alert, room air, moderate distress. Multiple tattoos.   Respiratory: Lungs CTAB, breathing comfortably on room air.   Cardiovascular: RRR, no murmur appreciated.   GI: Abdomen is non distended, soft, non tender.   Skin: Warm and dry.   Other:  Alert and oriented. Moving all extremities.      Medical Decision Making       45 MINUTES SPENT BY ME on the date of service doing chart review, history, exam, documentation & further activities per the note.      Data     I have personally reviewed the following data over the past 24 hrs:    N/A  \   N/A   / N/A     N/A N/A N/A /  187 (H)   4.2 N/A N/A \     INR:  1.12 PTT:  N/A   D-dimer:  N/A Fibrinogen:  N/A       Imaging results reviewed over the past 24 hrs:   No results found for this or any previous visit (from the past 24 hour(s)).

## 2024-09-30 NOTE — PLAN OF CARE
Goal Outcome Evaluation:  End of shift Summary: See flowsheet for VS and detail assessments.     Changes this Shift:      Pulmonary: Pt denies SOB & chest pain. Pt is on RA  Diet: Regular diet, medication whole with thin liquids.    Output: Pt is voiding adequately using toilet   NO BM  this shift  Activity: Pt is independent in the room     Skin: No new skin issues.     Pain: Pt pain at 7-8/10     Neuro/CMS: Pt is alert and oriented x 4. Denies numbness and tingling.     Dressings/Drains: None.     IV: X2 SL     Additional info: No significant changes on shift.     Plan:  Plan of care ongoing.

## 2024-09-30 NOTE — PROGRESS NOTES
"CLINICAL NUTRITION SERVICES  Reason for Assessment:  Received nutrition education consult for \"carb counts. Please help with being \"carb aware\" identify foods, low vs high carb meals for insulin dosing\"  Diet History: Met with pt. She reports no hx of formal nutrition education in the past.  Nutrition Diagnosis:  Food- and nutrition-related knowledge deficit r/t no previous knowledge of carbohydrate counting.  Interventions:  Provided instruction on carb containing foods, portions of carb containing foods, recommended carb portions at each meal.  Provided handouts: Carbohydrate Counting for People with Diabetes, Plate Method for Diabetes  Goals:   Patient will verbalize understanding of carb containing foods, recommended carb portions at each meal.  Follow-up:    Patient to ask any further nutrition-related questions before discharge.  In addition, pt may request outpatient RD appointment.    Wicho Love RD, LD  Available on [employer]  Weekend/Holiday LIONEL Estrada - \"Weekend Clinical Dietitian\"  No longer available by paging   "

## 2024-09-30 NOTE — PLAN OF CARE
Pt was taken off IMC this shift. Pt received PRN dilaudid once this shift for pain. Pt was able to make needs known. Pt IV are patent. Pt is calm and cooperative. Continue POC.

## 2024-09-30 NOTE — TELEPHONE ENCOUNTER
Left Voicemail (1st Attempt) for the patient to call back and schedule the following:    Appointment type: [facility] RETURN/HOSP FOLLOW UP  Provider: PCP  Return date: 10/2/24 @ 10:30am  Specialty phone number: [phone]    Additonal Notes: CARLITOS GALEANO, currently on hold for pt

## 2024-10-01 ENCOUNTER — TELEPHONE (OUTPATIENT)
Dept: INTERNAL MEDICINE | Facility: CLINIC | Age: 29
End: 2024-10-01
Payer: COMMERCIAL

## 2024-10-01 VITALS
SYSTOLIC BLOOD PRESSURE: 113 MMHG | OXYGEN SATURATION: 100 % | WEIGHT: 101.6 LBS | TEMPERATURE: 97.6 F | HEART RATE: 60 BPM | DIASTOLIC BLOOD PRESSURE: 83 MMHG | RESPIRATION RATE: 16 BRPM | BODY MASS INDEX: 19.84 KG/M2

## 2024-10-01 LAB
GLUCOSE BLDC GLUCOMTR-MCNC: 172 MG/DL (ref 70–99)
GLUCOSE BLDC GLUCOMTR-MCNC: 177 MG/DL (ref 70–99)
HOLD SPECIMEN: NORMAL
INR PPP: 1.05 (ref 0.85–1.15)
LABORATORY REPORT: NORMAL
PETH INTERPRETATION: NORMAL
PLPETH BLD-MCNC: <10 NG/ML
POPETH BLD-MCNC: <10 NG/ML
POTASSIUM SERPL-SCNC: 4 MMOL/L (ref 3.4–5.3)

## 2024-10-01 PROCEDURE — 99239 HOSP IP/OBS DSCHRG MGMT >30: CPT | Performed by: INTERNAL MEDICINE

## 2024-10-01 PROCEDURE — 85610 PROTHROMBIN TIME: CPT

## 2024-10-01 PROCEDURE — 250N000013 HC RX MED GY IP 250 OP 250 PS 637

## 2024-10-01 PROCEDURE — 84132 ASSAY OF SERUM POTASSIUM: CPT | Performed by: INTERNAL MEDICINE

## 2024-10-01 PROCEDURE — 99232 SBSQ HOSP IP/OBS MODERATE 35: CPT | Performed by: NURSE PRACTITIONER

## 2024-10-01 PROCEDURE — 250N000013 HC RX MED GY IP 250 OP 250 PS 637: Performed by: INTERNAL MEDICINE

## 2024-10-01 PROCEDURE — 36415 COLL VENOUS BLD VENIPUNCTURE: CPT

## 2024-10-01 RX ORDER — CONTAINER,EMPTY
EACH MISCELLANEOUS
Qty: 1 EACH | Refills: 1 | Status: SHIPPED | OUTPATIENT
Start: 2024-10-01

## 2024-10-01 RX ORDER — BLOOD PRESSURE TEST KIT
KIT MISCELLANEOUS
Qty: 200 EACH | Refills: 1 | Status: SHIPPED | OUTPATIENT
Start: 2024-10-01 | End: 2024-10-01

## 2024-10-01 RX ORDER — BLOOD PRESSURE TEST KIT
KIT MISCELLANEOUS
Qty: 200 EACH | Refills: 1 | Status: SHIPPED | OUTPATIENT
Start: 2024-10-01

## 2024-10-01 RX ORDER — CONTAINER,EMPTY
EACH MISCELLANEOUS
Qty: 1 EACH | Refills: 1 | Status: SHIPPED | OUTPATIENT
Start: 2024-10-01 | End: 2024-10-01

## 2024-10-01 RX ORDER — NICOTINE POLACRILEX 4 MG
15-30 LOZENGE BUCCAL
Qty: 30 G | Refills: 0 | Status: SHIPPED | OUTPATIENT
Start: 2024-10-01 | End: 2024-10-01

## 2024-10-01 RX ORDER — ACYCLOVIR 400 MG/1
TABLET ORAL
Qty: 3 EACH | Refills: 5 | OUTPATIENT
Start: 2024-10-01

## 2024-10-01 RX ORDER — NICOTINE POLACRILEX 4 MG
15-30 LOZENGE BUCCAL
Qty: 30 G | Refills: 0 | Status: SHIPPED | OUTPATIENT
Start: 2024-10-01

## 2024-10-01 RX ADMIN — HYDROMORPHONE HYDROCHLORIDE 2 MG: 2 TABLET ORAL at 10:14

## 2024-10-01 RX ADMIN — SPIRONOLACTONE 50 MG: 50 TABLET ORAL at 07:51

## 2024-10-01 RX ADMIN — GABAPENTIN 300 MG: 300 CAPSULE ORAL at 07:51

## 2024-10-01 RX ADMIN — HYDROMORPHONE HYDROCHLORIDE 2 MG: 2 TABLET ORAL at 00:56

## 2024-10-01 RX ADMIN — DULOXETINE HYDROCHLORIDE 30 MG: 30 CAPSULE, DELAYED RELEASE ORAL at 07:51

## 2024-10-01 RX ADMIN — HYDROXYZINE HYDROCHLORIDE 25 MG: 25 TABLET ORAL at 00:56

## 2024-10-01 RX ADMIN — HYDROMORPHONE HYDROCHLORIDE 2 MG: 2 TABLET ORAL at 06:11

## 2024-10-01 RX ADMIN — FUROSEMIDE 20 MG: 20 TABLET ORAL at 07:51

## 2024-10-01 RX ADMIN — TIZANIDINE 2 MG: 2 TABLET ORAL at 06:11

## 2024-10-01 RX ADMIN — THIAMINE HCL TAB 100 MG 100 MG: 100 TAB at 07:51

## 2024-10-01 ASSESSMENT — ACTIVITIES OF DAILY LIVING (ADL)
ADLS_ACUITY_SCORE: 28

## 2024-10-01 NOTE — PLAN OF CARE
Goal Outcome Evaluation:       Alert and oriented X4.    Independent in the room.    Pain managed with toradol, oral dilaudid.     2 PIVs saline locked.    BS checks.    Continue plan of care

## 2024-10-01 NOTE — CONSULTS
"Diabetes CNS/Educator Consult    Maribell Leon is a 29 year old female with history of Alcohol Use Disorder complicated by prior episodes of acute pancreatitis and alcohol related liver disease, who was admitted on 9/28/2024 for symptomatic hyperglycemia.     Diabetes Educator consulted for new onset of diabetes.     Inpatient Diabetes Service is following for glycemic management. Please see their notes for plan. Per notes, considering possible pancreatogenic vs type 2 (A1c 9.5% on 9/28/24). PTA she was on Metformin 500 mg BID, but was not checking BGs.    Met with Maribell and Mom at bedside for education. Maribell will be managing diabetes cares at home independently, but will have family for support. Maribell says she has work experience with monitoring BGs and doing insulin injections.    Diet: Regular Diet Adult       Barriers to diabetes management: None    Diabetes Education Provided:  Discussed reason for requiring blood glucose monitoring and insulin with type 3c versus type 2 DM. Discussed A1c and BG goals. Provided \"Understanding Diabetes\" handout.   Reviewed insulin glargine and aspart action, dose, onset, peak, and duration. Discussed proper insulin pen storage, preparation, and injection technique. Discussed pen needle disposal in sharps container. Discussed site selection and rotation. Maribell successfully demonstrated administration with demo pen and injection pad. Provided \"4 mm pen needle - tips for good injection practice\" handout.   Discussed frequency and dosing of insulin administration.  Frequency of testing discussed. Opened and set up Contour Next Glucose Meter. Reviewed lancing device set up, safe use, and lancet disposal in sharps. Reviewed meter set up, memory storage, and testing. Reviewed fingertip site selection and hygiene. Maribell successfully demonstrated blood glucose check with Contour Next Glucose Meter. Reviewed Dexcom G7 CGM system with patient. Discussed setting up Dexcom G7 sunita " (phone compatible). Discussed 10-day use, site selection, applying sensor, pairing sensor with phone, warm up period. Discussed reading BG values, viewing trends, setting alarms. Discussed interfering substances, compression lows, and when to check a fingerstick BG.  Discussed signs and symptoms of hypoglycemia. Reviewed treating blood glucose below 70 mg/dL with 15 grams of carbohydrates. Discussed examples of 15 grams of CHO. Reviewed treating blood glucose less than or equal to 50 mg/dL with 30 grams of CHO. Discussed rechecking BG 15 minutes after treatment and retreating if necessary.   Reviewed calling provider for consistently elevated BGs and/or any lows  Discussed following up with PCP in 1-2 weeks after discharge.     Diabetes Plan Reviewed with Patient:  Blood glucose monitoring:   Three times daily before meals, and at bedtime.  Blood glucose (BG) goal:      Glucose Control Regimen:  Long-acting insulin: glargine (Lantus)  Take 20 units once daily at 9:00 AM. Take at same time each day.     Rapid-acting insulin; aspart (Novolog)  Carb Coverage: to cover the carbohydrates in your foods and drinks, take 8 units for bigger meals (60 grams of carbohydrates) and 4 units for smaller snacks (25-30 grams of carbohydrates)     Correction: take if BG is 140 or higher before meals, and if  or higher at bedtime. You may take this dose even if you skip a meal. Do not take this correction dose more frequently than every 4 hours.     Pre-Meal Correction Scale   Do Not give Correction Insulin if Pre-Meal BG less than 140.   For Pre-Meal  - 189 give 1 unit.   For Pre-Meal  - 239 give 2 units.   For Pre-Meal  - 289 give 3 units.   For Pre-Meal  - 339 give 4 units.   For Pre-Meal  - 389 give 5 units.   For Pre-Meal  - 439 give 6 units   For Pre-Meal BG greater than or equal to 440 give 7 units.     Bedtime Correction Scale   Do Not give Correction Insulin if BG less than 200.    For  - 249 give 1 unit.   For  - 299 give 2 units.   For  - 349 give 3 units.   For  - 399 give 4 units.   For BG greater than or equal to 400 give 5 units.     Hypoglycemia (Low Blood Glucose) Management:  If blood glucose is 51 to 70:   Eat or drink 15 grams of carbohydrate. Examples:   1/2 cup (4 ounces) apple juice or regular soda pop, or   1 cup (8 ounces) milk, or   15 skittles, or   3 to 4 glucose tablets.   Re-check your blood glucose in 15 minutes.   Repeat these steps every 15 minutes until your blood glucose is above 80.       If blood glucose is 50 or less:   Eat or drink 30 grams of carbohydrate. Examples:   1 cup (8 ounces) apple juice or regular soda pop, or   2 cups (16 ounces) milk, or   1 banana, or   6 to 8 glucose tablets.   Re-check your blood glucose in 15 minutes.   Repeat these steps every 15 minutes until your blood glucose is above 80.     Outpatient Follow-Up:   Follow up with your PCP, in 1-2 weeks after discharge for your diabetes. Follow up sooner if blood glucose runs consistently greater than 180 mg/dL or any episodes less than 70 mg/dL.      Your target A1c value is less than 7%. Your most recent A1c is 9.5%.      Thank you for letting the Diabetes Management Team be involved in your care!    Supplies Needed at Discharge: please use the DIAB non-branded discharge supply order set ([id number])   Contour Next glucose meter   Contour Next  test strips   Contour Microlet lancets   Sharps container   Alcohol wipes   B-D 4 mm tiffanie pen needles   Lantus Solostar pens   Novolog Flexpens   Dexcom G7 sensors    Diabetes education completed. Maribell was engaged and stated that plan is manageable. No concerns All questions answered. Discussed with RN. Notified Endocrinology and Medicine.      JAVIER Navarro, [facility]  Diabetes Educator  Pager: [phone]  Office: [phone]  Securely message with [employer]

## 2024-10-01 NOTE — DISCHARGE INSTRUCTIONS
Inpatient Diabetes Service Recommendations for Discharge:     Blood Glucose Checks: Three times daily before meals, and at bedtime.  or resume your CGM     Medications:      Long Acting Insulin   Lantus 20 units once daily at 1200     Short Acting Meal Insulin:    Novolog 8 unit(s) for bigger meals (60 g cho) and 4 unit(s) for smaller snacks (25-30 g cho) plus correction scale:   Novolog correction scale three times a day before meals and at bedtime. Do not take more frequently than every 4 hours. See scale below:      Diabetes Outpatient Follow-up: Follow up with  your PCP,  in 1-2 weeks after discharge for your diabetes. Follow up sooner if blood glucose runs consistently greater than 180 mg/dL or any episodes less than 70 mg/dL.      Hypoglycemia (Low Blood Glucose) Management:  If blood glucose is 51 to 69:   Eat or drink 15 grams of carbohydrate. Examples:   1/2 cup (4 ounces) apple juice or regular soda pop, or   1 cup (8 ounces) milk, or   15 skittles, or   3 to 4 glucose tablets.   Re-check your blood glucose in 15 minutes.   Repeat these steps every 15 minutes until your blood glucose is above 80.       If blood glucose is 50 or less:   Eat or drink 30 grams of carbohydrate. Examples:   1 cup (8 ounces) apple juice or regular soda pop, or   2 cups (16 ounces) milk, or   1 banana, or   6 to 8 glucose tablets.   Re-check your blood glucose in 15 minutes.   Repeat these steps every 15 minutes until your blood glucose is above 80.      Thank you for letting the Diabetes Management Team be involved in your care!

## 2024-10-01 NOTE — TELEPHONE ENCOUNTER
Left Voicemail (2nd Attempt) for the patient to call back and schedule the following:    Appointment type: [facility]MP RETURN  Provider: PCP  Return date: 10/2/2024 @10:30am  Specialty phone number: [phone]  Additional appointment(s) needed: -  Additonal Notes: CARLITOS GALEANO spot on hold for pt until 4:00pm on 10/1/2024

## 2024-10-01 NOTE — PLAN OF CARE
Pt. discharged at 1230 to home, and left with personal belongings. Pt. received complete discharge paperwork. Pt. was given times of last dose for all discharge medications in writing on discharge medication sheets. Discharge teaching included medication, pain management, activity restrictions, dressing changes, and signs and symptoms of infection. Pt. to follow up with PCP in 6 weeks. Pt. had no further questions at the time of discharge and no unmet needs were identified.

## 2024-10-01 NOTE — PLAN OF CARE
Goal Outcome Evaluation:      Plan of Care Reviewed With: patient    Overall Patient Progress: improving     Pt is alert and Oriented x 4. Able to make needs known.  Denies SOB, CP, and n/v.   Pt is calm and cooperative.   Rated pain 8-9's, managed pain with PRN dilaudid.    Call light within reach. No acute events.  Continue POC.

## 2024-10-01 NOTE — TELEPHONE ENCOUNTER
Left Voicemail (1st Attempt) for the patient to call back and schedule the following:     Appointment type: [facility] RETURN/HOSP FOLLOW UP  Provider: PCP  Return date: 10/2/24 @ 10:30am  Specialty phone number: [phone]     Additonal Notes: CARLITOS GALEANO, currently on hold for pt until 10/1/2024 @ 4:00pm

## 2024-10-01 NOTE — PROGRESS NOTES
"                       Inpatient Diabetes Management Service: Daily Progress Note     HPI: Maribell Leon is a 29 year old with history of Alcohol Use Disorder complicated by prior episodes of acute pancreatitis and alcohol related liver disease,  who was admitted on 9/28/2024 for symptomatic hyperglycemia. Inpatient Diabetes Service consulted for Diabetes Management.             Assessment/Plan:     Assessment:   New Onset Diabetes Mellitus, with preserved C-peptide. 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 BG low prior to dinner. Continue 1 per 10 with breakfast. Denies any nausea and vomiting. Meeting with both Diabetes educator and Dietician today. Would like her to be \"carb aware\" for insulin dosing, though anticipate fixed meal dosing of Novolog. Will adjust lantus and ICR slightly today for BGs out of target.      Discharge Planning: (tentative)  Medications: Basal + Bolus regimen anticipated  Test Claims: sent 9/29 see SOFI Lu note. All insulins and glucometer covered. CGM covered   Education: Ordered 9/29- new to insulin and glucometer. Nutrition consulted 9/30     Outpatient Follow-up:  recommend [facility] vs PCP      Inpatient Diabetes Service Recommendations for Discharge:     Blood Glucose Checks: Three times daily before meals, and at bedtime.  " or resume your CGM     Medications:      Long Acting Insulin   Lantus 20 units once daily at 1200     Short Acting Meal Insulin:    Novolog 8 unit(s) for bigger meals (60 g cho) and 4 unit(s) for smaller snacks (25-30 g cho)with meals plus correction scale:   Novolog correction scale three times a day before meals and at bedtime. Do not take more frequently than every 4 hours. See scale below:      Diabetes Outpatient Follow-up: Follow up with  your PCP,  in 1-2 weeks after discharge for your diabetes. Follow up sooner if blood glucose runs consistently greater than 180 mg/dL or any episodes less than 70 mg/dL.      Hypoglycemia (Low Blood Glucose) Management:  If blood glucose is 51 to 69:   Eat or drink 15 grams of carbohydrate. Examples:   1/2 cup (4 ounces) apple juice or regular soda pop, or   1 cup (8 ounces) milk, or   15 skittles, or   3 to 4 glucose tablets.   Re-check your blood glucose in 15 minutes.   Repeat these steps every 15 minutes until your blood glucose is above 80.       If blood glucose is 50 or less:   Eat or drink 30 grams of carbohydrate. Examples:   1 cup (8 ounces) apple juice or regular soda pop, or   2 cups (16 ounces) milk, or   1 banana, or   6 to 8 glucose tablets.   Re-check your blood glucose in 15 minutes.   Repeat these steps every 15 minutes until your blood glucose is above 80.      Thank you for letting the Diabetes Management Team be involved in your care!      Instructions to patient were posted in AVS and discussed      Primary Team to order Medications and Supplies:   Medications and supplies are to be ordered by primary service on discharge.   Contour Next glucose meter   Contour Next  test strips   Contour Microlet lancets   Sharps container   Alcohol wipes   B-D 4 mm tiffanie pen needles   Lantus Solostar pens   Novolog Flexpens   Dexcom G7  *please use the DIAB non-branded discharge supply order set ([id number])* If there are problems or issues with ordering for discharge,  you may contact the pharmacist directly for assistance       Please notify Inpatient Diabetes Service if changes are planned to steroids, nutrition, TPN/TF and anticipated procedures requiring prolonged NPO status.         Interval History/Review of Systems :   The last 24 hours progress and nursing notes reviewed.  Denies any nausea or vomiting     Planned Procedures/Surgeries: none    Inpatient Glucose Control:       Recent Labs   Lab 10/01/24  0206 09/30/24  2154 09/30/24  1736 09/30/24  1634 09/30/24  1128 09/30/24  0715   * 124* 231* 77 187* 221*             Medications Impacting Glycemia:   Steroids:  none   D5W-containing solutions/medications: none   Other medications impacting glucose: none            Nutrition:   Orders Placed This Encounter      Regular Diet Adult    Supplements: none   TF: none   TPN: none         Diabetes History: see full consult note for complete diabetes history   Diabetes Type and Duration: New onset- A1c 9.5% on 9/28/24  C-peptide 2.7 on 9/20/24     PTA Medication Regimen: Metformin 500 mg BID  Historical Diabetes Medications: none     Glucose monitoring device and frequency: none  Outpatient Diabetes Provider: none           Physical Exam:   BP (!) 144/108 (BP Location: Right arm, Patient Position: Sitting, Cuff Size: Adult Small)   Pulse 60   Temp 98.4  F (36.9  C) (Oral)   Resp 17   Wt 44.7 kg (98 lb 8 oz)   SpO2 100%   BMI 19.24 kg/m    General Appearance: No acute distress, resting comfortably  Alert and interactive   HEENT: Normocephalic, atraumatic. Anicteric, conjunctiva clear. Mucous membranes moist, without exudates or ulcers.    Heart: Regular rate and rhythm.    Lungs:  Breathing comfortably   Abdomen: Round, nondistended. Soft, nontender.   Extremities:  No significant lower extremity edema. Fluid movement of extremities.   Skin:  Warm and dry.   Neuro:  Oriented x3, able to speak clearly.    Psych:  Calm, appropriate mood and affect.             Data:      Lab Results   Component Value Date    A1C 9.5 (H) 09/28/2024    A1C 5.3 08/18/2024       ROUTINE IP LABS (Last four results)  BMP  Recent Labs   Lab 10/01/24  0206 09/30/24  2154 09/30/24  1736 09/30/24  1634 09/30/24  1128 09/30/24  0803 09/29/24  0606 09/29/24  0508 09/29/24  0500 09/28/24  2131 09/28/24  2053 09/28/24  1612 09/28/24  1526 09/28/24  1406 09/28/24  1230   NA  --   --   --   --   --   --   --  133*  --   --  136  --  123*  --  114*   POTASSIUM  --   --   --   --   --  4.2  --  3.6 4.2  --  3.0*  --  4.1  --  4.4   CHLORIDE  --   --   --   --   --   --   --  102  --   --  98  --  89*  --  77*   SOSA  --   --   --   --   --   --   --  9.2  --   --  9.8  --  9.4  --  10.0   CO2  --   --   --   --   --   --   --  21*  --   --  22  --  19*  --  23   BUN  --   --   --   --   --   --   --  18.1  --   --  19.4  --  21.5*  --  23.9*   CR  --   --   --   --   --   --   --  0.85  --   --  0.67  --  0.70  --  0.73   * 124* 231* 77   < >  --    < > 175*  --    < > 189*   < > 654*  729*   < > 1,147*    < > = values in this interval not displayed.     CBC  Recent Labs   Lab 09/29/24  0508 09/28/24  1230   WBC 9.0 7.1   RBC 3.33* 4.13   HGB 9.9* 11.8   HCT 28.6* 36.6   MCV 86 89   MCH 29.7 28.6   MCHC 34.6 32.2   RDW 15.3* 15.4*    220     INR  Recent Labs   Lab 09/30/24  0803 09/29/24  0508 09/28/24  1230   INR 1.12 1.15 1.19*       Inpatient Diabetes Service will continue to follow, please don't hesitate to contact the team with any questions or concerns.     JAVIER Puri CNP    Plan discussed with patient, bedside RN, and primary team via this note.    To contact Inpatient Diabetes Service:     7 AM - 5 PM: Page the NEON Concierge SABRINA following the patient that day (see filed or incomplete progress notes/consult notes under Endocrinology)    OR if uncertain of provider assignment: page job code [phone]    5 PM - 7 AM: First call after hours is to primary service.    For urgent after-hours questions,  page job code for on call fellow: [phone]     I spent a total of 45 minutes on the date of the encounter doing prep/post-work, chart review, history and exam, documentation and further activities per the note including lab review, multidisciplinary communication, counseling the patient and/or coordinating care regarding acute hyper/hypoglycemic management, as well as discharge management and planning/communication.

## 2024-10-02 LAB
GAD65 AB SER IA-ACNC: <5 IU/ML
PANC ISLET CELL AB TITR SER: NORMAL {TITER}

## 2024-10-03 ENCOUNTER — THERAPY VISIT (OUTPATIENT)
Dept: PHYSICAL THERAPY | Facility: CLINIC | Age: 29
End: 2024-10-03
Payer: COMMERCIAL

## 2024-10-03 DIAGNOSIS — G59 MONONEUROPATHY DUE TO UNDERLYING DISEASE: ICD-10-CM

## 2024-10-03 DIAGNOSIS — J18.9 PNEUMONIA OF BOTH LUNGS DUE TO INFECTIOUS ORGANISM, UNSPECIFIED PART OF LUNG: Primary | ICD-10-CM

## 2024-10-03 DIAGNOSIS — R53.1 WEAKNESS GENERALIZED: ICD-10-CM

## 2024-10-03 DIAGNOSIS — M79.604 PAIN OF RIGHT LOWER EXTREMITY: ICD-10-CM

## 2024-10-03 DIAGNOSIS — J90 PLEURAL EFFUSION ON RIGHT: ICD-10-CM

## 2024-10-03 PROCEDURE — 97110 THERAPEUTIC EXERCISES: CPT | Mod: GP | Performed by: PHYSICAL THERAPIST

## 2024-10-07 ENCOUNTER — PATIENT OUTREACH (OUTPATIENT)
Dept: CARE COORDINATION | Facility: CLINIC | Age: 29
End: 2024-10-07
Payer: COMMERCIAL

## 2024-10-07 ENCOUNTER — TELEPHONE (OUTPATIENT)
Dept: INTERNAL MEDICINE | Facility: CLINIC | Age: 29
End: 2024-10-07
Payer: COMMERCIAL

## 2024-10-07 DIAGNOSIS — E13.69 OTHER SPECIFIED DIABETES MELLITUS WITH OTHER SPECIFIED COMPLICATION, UNSPECIFIED WHETHER LONG TERM INSULIN USE (H): ICD-10-CM

## 2024-10-07 NOTE — TELEPHONE ENCOUNTER
Patient confirmed scheduled appointment:  Date: 10/11/24  Time: 8:00am  Visit type: Video Return  Provider: Dr. Anderson PCP unavailable    Additional notes: urgent medication discussion per Jessie Warner      Patient declined scheduling any available appointments on 10/8

## 2024-10-07 NOTE — PROGRESS NOTES
Clinic Care Coordination Contact  [facility]/Voicemail    Clinical Data: Care Coordinator Outreach    Outreach Documentation Number of Outreach Attempt   10/7/2024  10:03 AM 1       Left message on patient's voicemail with call back information and requested return call.    Plan: Care Coordinator will try to reach patient again in 1-2 business days.    CARLA SALEH RN on 10/7/2024 at 10:03 AM

## 2024-10-07 NOTE — PROGRESS NOTES
"Clinic Care Coordination Contact  Transitions of Care Outreach    Chief Complaint   Patient presents with    Clinic Care Coordination - Initial    Clinic Care Coordination - Post Hospital       Most Recent Admission Date: 9/28/2024   Most Recent Admission Diagnosis: Nausea - R11.0  Hyperglycemia - R73.9     Most Recent Discharge Date: 10/1/2024   Most Recent Discharge Diagnosis: Hyperglycemia - R73.9  Nausea - R11.0  Alcoholic liver disease (H) - K70.9  Dehydration - E86.0  Other specified diabetes mellitus with other specified complication, unspecified whether long term insulin use (H) - E13.69     Transitions of Care Assessment    Discharge Assessment  How are you doing now that you are home?: RN called and spoke with patient. Patient shares that she is out of insulin. She has a \"tiny bit left\" but not enough for a day. She states that she reached out the pharmacy and they have been trying to reach the hospitalists that prescribed it, but they have been unable to get a response back. Per patient, the maximum insulin doseage is needed. RN called [facility] as well to see what the concern is and they again stated that the \"maximum doseage\" is needed on the insulin to run it through prescription. She also states that they were supposed to call in a dexcom sensor to the pharmacy, which was not ordered by hospital providers. RN informed pharmacy that would likely have to send new prescription from the clinic which pharmacy agreed. RN also informed patient that Dr. Peñaloza was on vacation currently and stated we would try to get the prescription sent in as soon as possible. Pt last saw Dr. Peñaloza on 9/16. Pt has no needs for care coordination beyond this at this time.  How are your symptoms? (Red Flag symptoms escalate to triage hotline per guidelines): Improved  Do you know how to contact your clinic care team if you have future questions or changes to your health status? : Yes  Does the patient have their discharge " instructions? : Yes  Does the patient have questions regarding their discharge instructions? : No  Were you started on any new medications or were there changes to any of your previous medications? : Yes  Does the patient have all of their medications?: Yes  Do you have questions regarding any of your medications? : No  Do you have all of your needed medical supplies or equipment (DME)?  (i.e. oxygen tank, CPAP, cane, etc.): Yes              Follow up Plan     Discharge Follow-Up  Discharge follow up appointment scheduled in alignment with recommended follow up timeframe or Transitions of Risk Category? (Low = within 30 days; Moderate= within 14 days; High= within 7 days): Yes  Discharge Follow Up Appointment Date: 11/01/24  Discharge Follow Up Appointment Scheduled with?: Primary Care Provider (Dr. Carlton)    RN routed encounter to Dr. Peñaloza and Amanda Yeh RN. RN also placed MTM referral for help with medication management. RN also sent staff message to clinic coordinators to off pt virtual appt with available providers tomorrow.     Future Appointments   Date Time Provider Department [place]   10/8/2024  8:30 AM [address]   10/9/2024  3:45 PM Sophia Willett, PT [facility]PT [address]   10/16/2024 12:30 PM Britt Cruz MD [facility]   10/16/2024  2:15 PM Sophia Willett, PT [facility]PT [address]   10/18/2024  7:00 AM Ann Milan OTR [facility]OT [address]   10/24/2024  4:30 PM Sophia Willett, PT [facility]PT [address]   10/25/2024  7:00 AM Ann Milan OTR [facility]OT [address]   10/30/2024  7:00 AM Ann Milan OTR [facility]OT [address]   11/1/2024  3:30 PM Lakshmi Carlton MD [facility]   11/5/2024  4:40 PM [facility]   11/5/2024  5:00 PM [facility]   11/8/2024  7:00 AM Ann Milan OTR [facility]OT [address]   11/12/2024  9:00 AM  DONTRELL GREGORY [facility]   11/12/2024 10:30 AM Christian Chen MD [facility]   11/15/2024  7:00 AM  Ann Milan, OTR [facility]   12/20/2024 10:15 AM  [facility]RAW ON[facility]   12/20/2024 11:15 AM Cogan, Jacob, MD [facility]   12/23/2024  9:15 AM Rick Talavera MD [facility]       Outpatient Plan as outlined on AVS reviewed with patient.      For any urgent concerns, please contact our 24 hour nurse triage line: [phone]       CARLA SALEH RN

## 2024-10-07 NOTE — TELEPHONE ENCOUNTER
Pt returned call to the clinic, requesting call back. Pt also stated she will be out of insulin today and needs some help getting a script

## 2024-10-08 ENCOUNTER — HOSPITAL ENCOUNTER (OUTPATIENT)
Dept: NUCLEAR MEDICINE | Facility: CLINIC | Age: 29
Setting detail: NUCLEAR MEDICINE
Discharge: HOME | End: 2024-10-08
Attending: INTERNAL MEDICINE
Payer: COMMERCIAL

## 2024-10-08 ENCOUNTER — MYC MEDICAL ADVICE (OUTPATIENT)
Dept: INTERNAL MEDICINE | Facility: CLINIC | Age: 29
End: 2024-10-08

## 2024-10-08 DIAGNOSIS — G90.50 RSD (REFLEX SYMPATHETIC DYSTROPHY): ICD-10-CM

## 2024-10-08 PROCEDURE — 78315 BONE IMAGING 3 PHASE: CPT | Mod: 26 | Performed by: RADIOLOGY

## 2024-10-08 PROCEDURE — A9503 TC99M MEDRONATE: HCPCS | Performed by: INTERNAL MEDICINE

## 2024-10-08 PROCEDURE — 78315 BONE IMAGING 3 PHASE: CPT

## 2024-10-08 PROCEDURE — 343N000001 HC RX 343 MED OP 636: Performed by: INTERNAL MEDICINE

## 2024-10-08 RX ORDER — TC 99M MEDRONATE 20 MG/10ML
20-30 INJECTION, POWDER, LYOPHILIZED, FOR SOLUTION INTRAVENOUS ONCE
Status: COMPLETED | OUTPATIENT
Start: 2024-10-08 | End: 2024-10-08

## 2024-10-08 RX ADMIN — TC 99M MEDRONATE 25.4 MILLICURIE: 20 INJECTION, POWDER, LYOPHILIZED, FOR SOLUTION INTRAVENOUS at 08:23

## 2024-10-08 NOTE — TELEPHONE ENCOUNTER
[facility] Call Center    Phone Message    May a detailed message be left on voicemail: yes     Reason for Call: Medication Question or concern regarding medication   Prescription Clarification  Name of Medication:   insulin aspart (NOVOLOG PEN) 100 UNIT/ML pen       Prescribing Provider: Domingo Peñaloza MD         Pharmacy: [facility] - [facility]     What on the order needs clarification?     The patient said she tried to get them to change location but the directions are incorrect please review an follow up thank you.      Action Taken: Message routed to:  [facility] p[facility]    Travel Screening: Not Applicable     Date of Service:

## 2024-10-09 ENCOUNTER — THERAPY VISIT (OUTPATIENT)
Dept: PHYSICAL THERAPY | Facility: CLINIC | Age: 29
End: 2024-10-09
Payer: COMMERCIAL

## 2024-10-09 ENCOUNTER — TELEPHONE (OUTPATIENT)
Dept: INTERNAL MEDICINE | Facility: CLINIC | Age: 29
End: 2024-10-09

## 2024-10-09 DIAGNOSIS — G59 MONONEUROPATHY DUE TO UNDERLYING DISEASE: ICD-10-CM

## 2024-10-09 DIAGNOSIS — J90 PLEURAL EFFUSION ON RIGHT: ICD-10-CM

## 2024-10-09 DIAGNOSIS — M79.604 PAIN OF RIGHT LOWER EXTREMITY: ICD-10-CM

## 2024-10-09 DIAGNOSIS — R53.1 WEAKNESS GENERALIZED: ICD-10-CM

## 2024-10-09 DIAGNOSIS — J18.9 PNEUMONIA OF BOTH LUNGS DUE TO INFECTIOUS ORGANISM, UNSPECIFIED PART OF LUNG: Primary | ICD-10-CM

## 2024-10-09 PROCEDURE — 97112 NEUROMUSCULAR REEDUCATION: CPT | Mod: GP | Performed by: PHYSICAL THERAPIST

## 2024-10-09 PROCEDURE — 97110 THERAPEUTIC EXERCISES: CPT | Mod: GP | Performed by: PHYSICAL THERAPIST

## 2024-10-09 NOTE — TELEPHONE ENCOUNTER
MTM referral from: [facility] visit (referral by provider)    MT referral outreach attempt #2 on October 9, 2024 at 1:08 PM      Outcome: Patient not reachable after several attempts, sent [employer] message    Use ronel CARDONA  for the carrier/Plan on the flowsheet      eCullett Message Sent    JACQUE Price   [phone]

## 2024-10-11 ENCOUNTER — VIRTUAL VISIT (OUTPATIENT)
Dept: INTERNAL MEDICINE | Facility: CLINIC | Age: 29
End: 2024-10-11
Payer: COMMERCIAL

## 2024-10-11 DIAGNOSIS — R73.09 ELEVATED GLUCOSE: Primary | ICD-10-CM

## 2024-10-11 DIAGNOSIS — E87.1 HYPONATREMIA: ICD-10-CM

## 2024-10-11 PROCEDURE — 99213 OFFICE O/P EST LOW 20 MIN: CPT | Mod: 95 | Performed by: PEDIATRICS

## 2024-10-11 NOTE — PROGRESS NOTES
"Dear patient. Thank you for visiting with me. I want you to feel respected, understood, and empowered. \"Respect\" is valuing you as much as I would a close family member. \"Empowerment\" happens when you are fully informed, and can make the best possible decision for you.  Please ask me questions!  Challenge anything that is not clear.    Medical records are primarily used as memory aids for me and my colleagues. Things to know about my documentation style:  - The 'problem list' includes current symptoms or diagnoses, and some problems that are resolved but may return. I use the past medical history for problems not expected to return.  - I use single quotation marks for things that you or I said, when I want to clarify who was speaking.  - I use double quotation marks when copying a term from elsewhere in your records. Italics (besides here) may also denote a quotation.  If you have questions or concerns, please contact me; I will reply as soon as time allows.    Context    PCP: Domingo Peñaloza   Visit type: problem-oriented    Maribell Leon is a 29 year old woman, with concerns including:  Chief Complaint   Patient presents with    RECHECK       History, update, and/or problems         SUBJECTIVE:   The patient was unsure about the purpose of this visit, and I haven't received a message. She has a number of pending issues with consults in the works. Discharged from hospital 10/1/24 with hyperglycemia without DKA, Pancreatitis, Cirrhosis, and prolonged QT.  She also has right foot/ankle/knee pain and is under consideration for reflex sympathetic dystrophy.   I reviewed hospital records and consults.    She says she is feeling well. She checks BS at least 4/day. Highest is 272, lowest is 47. Treatment includes a long-acting lantus pen and novolog, fixed carb diet.    Reviewed labs. Hyponatremia was thought to be pseuohyponatremia due to hyperglycemia.  IVAN, Peth, and islet cell ab from hospital (pending as of " discharge) was negative.  There was some confusion about medication prescriptions/location but Maribell thinks these have been addressed.          OBJECTIVE: comfortable, interactive, alert         ASSESSMENT and PLAN: complex recent presentation with oddly high glucose.   Seems to be doing well now, watching blood sugar and treated.  In reviewing, I thought a repeat BMP would be argueta, and she agreed to do this.  -- continue with plan for hospital follow-up visit with Dr. Carlton              Time note (e3, 20'): The total time (on the date of service) for this service was 25 minutes, including discussion/face-to-face, chart review, interpretation not otherwise reported, documentation, and updating of the computerized record.    Start Time: 8:04 AM  End Time:8:18 AM      Maribell is a 29 year old who is being evaluated via a billable video visit.              Subjective   Maribell is a 29 year old, presenting for the following health issues:  RECHECK      History of Present Illness       Reason for visit:  Medicine follow up   She is taking medications regularly.                Objective    Vitals - Patient Reported  Pain Score: Severe Pain (7)  Pain Loc: Foot        Physical Exam             Video-Visit Details    Type of service:  Video Visit     Originating Location (pt. Location): Home    Distant Location (provider location):  Off-site  Platform used for Video Visit: Raul  Signed Electronically by: Boris Anderson MD

## 2024-10-14 ENCOUNTER — TELEPHONE (OUTPATIENT)
Dept: PALLIATIVE MEDICINE | Facility: CLINIC | Age: 29
End: 2024-10-14
Payer: COMMERCIAL

## 2024-10-14 NOTE — TELEPHONE ENCOUNTER
"Yaakov Nova is a 15 year old male who is being evaluated via a billable telephone visit.      The patient has been notified of following:     \"This telephone visit will be conducted via a call between you and your physician/provider. We have found that certain health care needs can be provided without the need for a physical exam.  This service lets us provide the care you need with a short phone conversation.  If a prescription is necessary we can send it directly to your pharmacy.  If lab work is needed we can place an order for that and you can then stop by our lab to have the test done at a later time.    If during the course of the call the physician/provider feels a telephone visit is not appropriate, you will not be charged for this service.\"     Yaakov Nova complains of    Chief Complaint   Patient presents with     RECHECK     medication recheck       I have reviewed and updated the patient's Past Medical History, Social History, Family History and Medication List.    ALLERGIES  Patient has no known allergies.    Yaakov has a history of depression and anxiety.  He is in therapy and continues that on-line.  We had initially treated him with 20mg of Fluoxetine and while this helped a little it was not enough.  On our follow up 1 month later (and 1 month ago) we increased his dose to 40 mg daily and asked to follow up today.  Our visit is being done by telephone because we are currently sheltering in place due to COVID-19 outbreak.      He feels that the medication is helpful with his anxiety but not his depression.  Overall his depressive symptoms are a bit better because he is no longer in physical school (all school is moving to distance learning).  Mom agrees that the medication has done some good but not enough.  Yaakov admits some suicidal thoughts but denies any plans.  He does have an action plan that he has devised with his therapist to address this.    He would like to try a different " LM reminding pt of sunita for 10/16.    Venus Dumont, JYOTI     medication.  He has not noted any side effects with the Fluoxetine.    Mom sometimes has to stay home and care for him when his symptoms become more severe.  She would like me to complete [facility] paperwork for her so that she can do this.  Paperwork competed, see telephone call (rather than telephone visit) documentation from today.    Assessment/Plan:  1. Current mild episode of major depressive disorder without prior episode (H)  start Lexampro and cross taper the Prozac.  So take 40 mg prozac every other day and 10 mg Lexampro daily for one week, then stop prozac and take 20 mg Lexampro daily    - escitalopram (LEXAPRO) 10 MG tablet; Take 1 tablet (10 mg) by mouth daily for 7 days, THEN 2 tablets (20 mg) daily for 21 days.  Dispense: 49 tablet; Refill: 0    Phone call duration:  17 minutes    Follow up in 1 month by virtual visit.  Continue therapy    Electronically signed by:  Sofia Esposito MD  Pediatrics  [facility]

## 2024-10-16 ENCOUNTER — OFFICE VISIT (OUTPATIENT)
Dept: ANESTHESIOLOGY | Facility: CLINIC | Age: 29
End: 2024-10-16
Attending: INTERNAL MEDICINE
Payer: COMMERCIAL

## 2024-10-16 ENCOUNTER — THERAPY VISIT (OUTPATIENT)
Dept: PHYSICAL THERAPY | Facility: CLINIC | Age: 29
End: 2024-10-16
Payer: COMMERCIAL

## 2024-10-16 VITALS — DIASTOLIC BLOOD PRESSURE: 89 MMHG | HEART RATE: 112 BPM | OXYGEN SATURATION: 100 % | SYSTOLIC BLOOD PRESSURE: 132 MMHG

## 2024-10-16 DIAGNOSIS — M79.604 PAIN OF RIGHT LOWER EXTREMITY: Primary | ICD-10-CM

## 2024-10-16 DIAGNOSIS — J90 PLEURAL EFFUSION ON RIGHT: ICD-10-CM

## 2024-10-16 DIAGNOSIS — G90.50 RSD (REFLEX SYMPATHETIC DYSTROPHY): ICD-10-CM

## 2024-10-16 DIAGNOSIS — J18.9 PNEUMONIA OF BOTH LUNGS DUE TO INFECTIOUS ORGANISM, UNSPECIFIED PART OF LUNG: Primary | ICD-10-CM

## 2024-10-16 DIAGNOSIS — G59 MONONEUROPATHY DUE TO UNDERLYING DISEASE: ICD-10-CM

## 2024-10-16 DIAGNOSIS — R53.1 WEAKNESS GENERALIZED: ICD-10-CM

## 2024-10-16 DIAGNOSIS — M79.604 PAIN OF RIGHT LOWER EXTREMITY: ICD-10-CM

## 2024-10-16 PROCEDURE — 97112 NEUROMUSCULAR REEDUCATION: CPT | Mod: GP | Performed by: PHYSICAL THERAPIST

## 2024-10-16 PROCEDURE — 99203 OFFICE O/P NEW LOW 30 MIN: CPT | Performed by: ANESTHESIOLOGY

## 2024-10-16 PROCEDURE — 97110 THERAPEUTIC EXERCISES: CPT | Mod: GP | Performed by: PHYSICAL THERAPIST

## 2024-10-16 RX ORDER — DULOXETIN HYDROCHLORIDE 30 MG/1
60 CAPSULE, DELAYED RELEASE ORAL DAILY
Qty: 60 CAPSULE | Refills: 1 | Status: SHIPPED | OUTPATIENT
Start: 2024-10-16

## 2024-10-16 RX ORDER — LIDOCAINE 40 MG/G
CREAM TOPICAL 3 TIMES DAILY PRN
Qty: 120 G | Refills: 3 | Status: SHIPPED | OUTPATIENT
Start: 2024-10-16

## 2024-10-16 ASSESSMENT — PAIN SCALES - GENERAL: PAINLEVEL: SEVERE PAIN (6)

## 2024-10-16 NOTE — PROGRESS NOTES
[facility] Consultation    Date of visit: 10/16/2024    Reason for consultation:    Maribell Leon is a 29 year old female who is seen in consultation today at the request of her provider, Dr Peñaloza.    Primary Care Provider is Domingo Peñaloza.  Pain medications are being prescribed by PCP.    Please see the [facility] health questionnaire which the patient completed and reviewed with me in detail.    Chief Complaint:    Chief Complaint   Patient presents with    Pain    Consult     New patient- right foot, right ankle, and back pain        Pain history:  Maribell Leon is a 29 year old female who first started having problems with pain in her right foot and ankle in June 2024. In June, someone stepped on her foot and she subsequently developed a constant dull/ache pain in her right foot and ankle with periodic sharp pain up to her knee. The sharp pains occur at rest and with movement several times an hour. The pain wakes her from her sleep and causes difficulty with falling asleep. She has tingling and numbness in the foot and ankle, decreased sensation, and allodynia. Notes that sometimes the right foot feels warmer and discoloration similar to a bruise. Denies any nail or hair changes on the affected limb. Previously had swelling but has not had any for the past 3 weeks. Notes that that foot is weaker and has difficulty flexing it. Walking is sometimes difficult due to the pain and decreased strength.     Pain rating: intensity ranges from 4/10 to 10/10, and Averages 7/10 on a 0-10 scale.  Aggravating factors include: movement,   Relieving factors include: nothing     Current treatments include:  Cymbalta   Gapabentin   Tylenol     Previous medication treatments included:  None    Other treatments have included:  Maribell Leon has not been seen at a pain clinic in the past.   PT: once weekly, with home exercises, beginning Sept  Acupuncture:  none  TENs Unit: at PT  Injections: none    Past Medical History:  Past Medical History:   Diagnosis Date    Acute pancreatitis     ADHD (attention deficit hyperactivity disorder)     Alcohol-induced chronic pancreatitis (H)     Anxiety      Patient Active Problem List    Diagnosis Date Noted    Nausea 09/28/2024     Priority: Medium    Hyperglycemia 09/28/2024     Priority: Medium    Alcoholic liver disease (H) 09/17/2024     Priority: Medium    Pleural effusion on right 08/12/2024     Priority: Medium    Pain of right lower extremity 07/31/2024     Priority: Medium    Anemia, unspecified type 07/31/2024     Priority: Medium    Surveillance of previously prescribed contraceptive pill 08/15/2011     Priority: Medium    Menorrhagia 08/17/2010     Priority: Medium       Past Surgical History:  Past Surgical History:   Procedure Laterality Date    POSTERIOR SPINAL FUSION      L5/S1    SPINE HARDWARE REMOVAL      TONSILLECTOMY      8/05    WISDOM TOOTH EXTRACTION       Medications:  Current Outpatient Medications   Medication Sig Dispense Refill    acetaminophen (TYLENOL) 325 MG tablet Take 2 tablets (650 mg) by mouth 3 times daily. 90 tablet 0    Alcohol Swabs PADS Use to swab the area of the injection or hi as directed Per insurance coverage 200 each 1    blood glucose (NO BRAND SPECIFIED) lancets standard To use to test glucose level in the blood Use to test blood sugar 4 times daily as directed. To accompany glucose monitor brands per insurance coverage. 200 each 1    blood glucose (NO BRAND SPECIFIED) test strip To use to test glucose level in the blood Use to test blood sugar 4 times daily as directed. To accompany glucose monitor brands per insurance coverage. 100 strip 0    blood glucose monitoring (NO BRAND SPECIFIED) meter device kit Use as directed Per insurance coverage 1 kit 0    cyanocobalamin (VITAMIN B-12) 1000 MCG tablet Take 1 tablet (1,000 mcg) by mouth daily. 100 tablet 3    diclofenac (VOLTAREN)  1 % topical gel Apply 4 g topically 4 times daily. 100 g 0    DULoxetine (CYMBALTA) 30 MG capsule Take 1 capsule (30 mg) by mouth daily. 90 capsule 2    ferrous sulfate (FEROSUL) 325 (65 Fe) MG tablet Take 1 tablet (325 mg) by mouth daily (with breakfast). 90 tablet 3    folic acid (FOLVITE) 1 MG tablet Take 1 tablet (1 mg) by mouth daily. 100 tablet 3    furosemide (LASIX) 20 MG tablet Take 1 tablet (20 mg) by mouth daily. 90 tablet 3    gabapentin (NEURONTIN) 300 MG capsule Take 1 capsule (300 mg) by mouth 3 times daily. 180 capsule 4    glucose 40 % (400 mg/mL) gel Take 15-30 g by mouth every 15 minutes as needed for low blood sugar. (Patient taking differently: Take by mouth every 15 minutes as needed for low blood sugar.) 30 g 0    hydrOXYzine HCl (ATARAX) 25 MG tablet Take 1 tablet (25 mg) by mouth every 6 hours as needed for other (adjuvant pain). 180 tablet 3    insulin aspart (NOVOLOG PEN) 100 UNIT/ML pen Novolog correction scale three times a day before meals and at bedtime. Do not take more frequently than every 4 hours. 15 mL 0    insulin aspart (NOVOLOG PEN) 100 UNIT/ML pen 1 unit per 10 with breakfast reduce-> 1 unit per 12 grams CHO, Lunch and dinner and 1 unit per 12 grams CHO PRN with snacks/supplements 15 mL 0    insulin aspart (NOVOLOG PEN) 100 UNIT/ML pen 1 unit per 10 with breakfast reduce-> 1 unit per 12 grams CHO, Lunch and dinner and 1 unit per 12 grams CHO PRN with snacks/supplements 15 mL 0    insulin aspart (NOVOLOG PEN) 100 UNIT/ML pen 1 unit per 10 with breakfast reduce-> 1 unit per 12 grams CHO, Lunch and dinner and 1 unit per 12 grams CHO PRN with snacks/supplements 15 mL 0    insulin aspart (NOVOLOG PEN) 100 UNIT/ML pen Novolog correction scale three times a day before meals and at bedtime. Do not take more frequently than every 4 hours. 15 mL 0    insulin aspart (NOVOLOG PEN) 100 UNIT/ML pen Novolog 8 unit(s) for bigger meals (60 g cho) and 4 unit(s) for smaller snacks (25-30 g cho)  with meals plus correction scale 15 mL 0    insulin glargine (LANTUS PEN) 100 UNIT/ML pen Inject 20 Units subcutaneously every 24 hours. 6 mL 0    insulin pen needle (32G X 4 MM) 32G X 4 MM miscellaneous Use as directed by provider Per insurance coverage 200 each 1    magnesium 250 MG tablet Take 1 tablet by mouth daily. OTC      multivitamin w/minerals (THERA-VIT-M) tablet Take 1 tablet by mouth daily. 100 tablet 3    naloxone (NARCAN) 4 MG/0.1ML nasal spray Spray 1 spray (4 mg) into one nostril alternating nostrils as needed for opioid reversal. every 2-3 minutes until assistance arrives 2 each 0    senna-docusate (SENOKOT-S/PERICOLACE) 8.6-50 MG tablet Take 1 tablet by mouth 2 times daily as needed for constipation. 15 tablet 0    Sharps Container MISC Use as directed to dispose of needles, lancets and other sharps 1 each 1    spironolactone (ALDACTONE) 50 MG tablet Take 1 tablet (50 mg) by mouth daily. 90 tablet 3    thiamine (B-1) 100 MG tablet Take 1 tablet (100 mg) by mouth daily. 100 tablet 11    tiZANidine (ZANAFLEX) 2 MG tablet Take 1 tablet (2 mg) by mouth 3 times daily as needed for muscle spasms. 90 tablet 3     Allergies:   No Known Allergies    Family history:  Family History   Problem Relation Age of Onset    Allergy (Severe) Mother         Allergies,Environmental allergies, celiac disease    Family History Negative Father         Good Health    Family History Negative Sister         Good Health    Family History Negative Sister         Good Health    Asthma Brother         Asthma,history of intermittent asthma    Other - See Comments Brother         Enuresis    Diabetes Maternal Grandmother         Diabetes,Type 2    Cancer Maternal Grandfather         Cancer,Kidney cancer    Breast Cancer Paternal Grandmother     Heart Disease Paternal Grandfather         Heart Disease,Cardiomyopathy    Colon Cancer No family hx of     Liver Disease No family hx of        Review of Systems:    POSTIVE IN  "BOLD  GENERAL: fever/chills, fatigue, general unwell feeling, weight gain/loss.  HEAD/EYES:  headache, dizziness, or vision changes.    EARS/NOSE/THROAT:  Nosebleeds, hearing loss, sinus infection, earache, tinnitus.  IMMUNE:  Allergies, cancer, immune deficiency, or infections.  SKIN:  Urticaria, rash, hives  HEME/Lymphatic:   anemia, easy bruising, easy bleeding.  RESPIRATORY:  cough, wheezing, or shortness of breath  CARDIOVASCULAR/Circulation:  Extremity edema, syncope, hypertension, tachycardia, or angina.  GASTROINTESTINAL:  abdominal pain, nausea/emesis, diarrhea, constipation,  hematochezia, or melena.  ENDOCRINE:  Diabetes, steroid use,  thyroid disease or osteoporosis.  MUSCULOSKELETAL: right foot and ankle pain, neck pain, back pain, arthralgia, arthritis, or gout.  GENITOURINARY:  frequency, urgency, dysuria, difficulty voiding, hematuria or incontinence.  NEUROLOGIC:  weakness, numbness, paresthesias, seizure, tremor, stroke or memory loss.  PSYCHIATRIC:  depression, anxiety, stress, suicidal thoughts or mood swings.     Physical Exam:  Vitals:    10/16/24 1215 10/16/24 1216   BP: (!) 136/97 132/89   Pulse: 112    SpO2: 100%      Exam:  Constitutional: healthy, alert, and no distress  Head: normocephalic. Atraumatic.   Eyes: no redness or jaundice noted   ENT: oropharnx normal.  MMM.  Neck supple.    Cardiovascular: RRR no m/g/r   Respiratory: clear   Gastrointestinal: soft, non-tender, normoactive bowel sounds   : deferred  Skin: no suspicious lesions or rashes  Psychiatric: mentation appears normal and affect normal/bright      Neurologic exam:  CN:  Cranial nerves 2-12 are normal  Motor:  5/5 UE and LE strength  Sensory:  (upper and lower extremities):   Light touch: normal   Allodynia: absent    Dysethesia: absent    Hyperalgesia: absent     Diagnostic tests:  CT of right foot was completed on 8/1/24 showing:  \"BONES:  -Anatomic alignment right foot. No acute fracture identified. No significant " "joint space narrowing. No evidence for gout. No erosion. No sizable joint effusion.     SOFT TISSUES:  -Moderate distal leg, ankle and dorsal foot soft tissue swelling.                                                                      IMPRESSION:  1.  No dual energy CT finding for right foot gout.  2.  Moderate distal leg, ankle and dorsal foot soft tissue edema. No drainable fluid collection.  3.  No acute fracture or dislocation.\"    Personally reviewed imaging     Other testing (labs, diagnostics) reviewed:  Labs none  EKG none    [place] Prescription Monitoring Program reviewed    Outside records reviewed          Assessment:  Right foot/ankle pain    Maribell Leon is a 29 year old female who presents with the complaints of right foot and ankle pain since June 2024. Given her symptoms and time since injury, discussed that a diagnosis of CRPS versus delayed injury healing is difficult. Discussed that if her symptoms persist over the next few months a diagnosis of CRPS is most likely. However, she may need more time to heal from her injury. Discussed CRPS PT in addition to her current PT. Also recommended increasing her Cymbalta and evening Gabapentin as well as trying lidocaine cream first before considering an injection.     Plan:  Diagnosis reviewed, treatment option addressed, and risk/benefits discussed.  Self-care instructions given.  I am recommending a multidisciplinary treatment plan to help this patient better manage her pain.      Physical Therapy: CRPS PT in addition to current therapy   Pain Psychologist to address issues of relaxation, behavioral change, coping style, and other factors important to improvement: none at this time   Diagnostic Studies: none  Medication Management: continue medications with dose adjustments and starting lidocaine cream  Further procedures recommended: none at this time   Other treatments:  Urine toxicology screen: none   Recommendations/follow-up for PCP:  Increase " Cymbalta to 60 mg and consider increasing evening Gabapentin dose to 600 mg.   Follow up: as needed    Total time spent was 50 minutes, and more than 50% of face to face time was spent in counseling and/or coordination of care regarding principles of multidisciplinary care, medication management, and therapeutic options.     Britt Cruz MD    Pain Medicine  Department of Anesthesiology  [facility]

## 2024-10-16 NOTE — PATIENT INSTRUCTIONS
Medications:    Increase Cymbalta to 60 mg per day. Discuss with your PCP about taking over this higher dose in the future. Consider increasing your bedtime dose of Gabapentin in the future.     Try Lidocaine cream for your right foot.     DULoxetine (CYMBALTA) 30 MG capsule. Take 2 capsules (60 mg) by mouth daily.       *Please provide the clinic with a minium of 1 week notice, on all prescription refills.       Referrals:    Physical Therapy Referral placed-   If you have not heard from the scheduling office within 2 business days, please call [phone] for all locations         Recommended Follow up:      Follow up as needed or if you would like to discuss     To speak with a nurse, schedule/reschedule/cancel a clinic appointment, or request a medication refill call: [phone]    You can also reach us by Tink: [email or web address]

## 2024-10-16 NOTE — NURSING NOTE
Patient presents with:  Pain  Consult: New patient- right foot, right ankle, and back pain       Severe Pain (6)     Pain Medications       Analgesics Other Refills Start End     acetaminophen (TYLENOL) 325 MG tablet 0 8/21/2024 --    Sig - Route: Take 2 tablets (650 mg) by mouth 3 times daily. - Oral    Class: E-Prescribe    Cosign for Ordering: Accepted by Noreen Nevarez MD on 8/21/2024 12:46 PM            What medications are you using for pain? Tylenol, Cymbalta, Gabapentin    Have you been seen by another pain clinic/ provider? no    Expectations: manage pain    Venus Dumont, CMA

## 2024-10-16 NOTE — LETTER
10/16/2024       RE: Maribell Leon  [address]  [place] [address]     Dear Colleague,    Thank you for referring your patient, Maribell Leon, to the [facility] [place] at [facility]. Please see a copy of my visit note below.                          [facility] Consultation    Date of visit: 10/16/2024    Reason for consultation:    Maribell Leon is a 29 year old female who is seen in consultation today at the request of her provider, Dr Peñaloza.    Primary Care Provider is Domingo Peñaloza.  Pain medications are being prescribed by PCP.    Please see the [facility] health questionnaire which the patient completed and reviewed with me in detail.    Chief Complaint:    Chief Complaint   Patient presents with     Pain     Consult     New patient- right foot, right ankle, and back pain        Pain history:  Maribell Leon is a 29 year old female who first started having problems with pain in her right foot and ankle in June 2024. In June, someone stepped on her foot and she subsequently developed a constant dull/ache pain in her right foot and ankle with periodic sharp pain up to her knee. The sharp pains occur at rest and with movement several times an hour. The pain wakes her from her sleep and causes difficulty with falling asleep. She has tingling and numbness in the foot and ankle, decreased sensation, and allodynia. Notes that sometimes the right foot feels warmer and discoloration similar to a bruise. Denies any nail or hair changes on the affected limb. Previously had swelling but has not had any for the past 3 weeks. Notes that that foot is weaker and has difficulty flexing it. Walking is sometimes difficult due to the pain and decreased strength.     Pain rating: intensity ranges from 4/10 to 10/10, and Averages 7/10 on a 0-10 scale.  Aggravating factors  include: movement,   Relieving factors include: nothing     Current treatments include:  Cymbalta   Gapabentin   Tylenol     Previous medication treatments included:  None    Other treatments have included:  Maribell Leon has not been seen at a pain clinic in the past.   PT: once weekly, with home exercises, beginning Sept  Acupuncture: none  TENs Unit: at PT  Injections: none    Past Medical History:  Past Medical History:   Diagnosis Date     Acute pancreatitis      ADHD (attention deficit hyperactivity disorder)      Alcohol-induced chronic pancreatitis (H)      Anxiety      Patient Active Problem List    Diagnosis Date Noted     Nausea 09/28/2024     Priority: Medium     Hyperglycemia 09/28/2024     Priority: Medium     Alcoholic liver disease (H) 09/17/2024     Priority: Medium     Pleural effusion on right 08/12/2024     Priority: Medium     Pain of right lower extremity 07/31/2024     Priority: Medium     Anemia, unspecified type 07/31/2024     Priority: Medium     Surveillance of previously prescribed contraceptive pill 08/15/2011     Priority: Medium     Menorrhagia 08/17/2010     Priority: Medium       Past Surgical History:  Past Surgical History:   Procedure Laterality Date     POSTERIOR SPINAL FUSION      L5/S1     SPINE HARDWARE REMOVAL       TONSILLECTOMY      8/05     WISDOM TOOTH EXTRACTION       Medications:  Current Outpatient Medications   Medication Sig Dispense Refill     acetaminophen (TYLENOL) 325 MG tablet Take 2 tablets (650 mg) by mouth 3 times daily. 90 tablet 0     Alcohol Swabs PADS Use to swab the area of the injection or hi as directed Per insurance coverage 200 each 1     blood glucose (NO BRAND SPECIFIED) lancets standard To use to test glucose level in the blood Use to test blood sugar 4 times daily as directed. To accompany glucose monitor brands per insurance coverage. 200 each 1     blood glucose (NO BRAND SPECIFIED) test strip To use to test glucose level in the blood Use to  test blood sugar 4 times daily as directed. To accompany glucose monitor brands per insurance coverage. 100 strip 0     blood glucose monitoring (NO BRAND SPECIFIED) meter device kit Use as directed Per insurance coverage 1 kit 0     cyanocobalamin (VITAMIN B-12) 1000 MCG tablet Take 1 tablet (1,000 mcg) by mouth daily. 100 tablet 3     diclofenac (VOLTAREN) 1 % topical gel Apply 4 g topically 4 times daily. 100 g 0     DULoxetine (CYMBALTA) 30 MG capsule Take 1 capsule (30 mg) by mouth daily. 90 capsule 2     ferrous sulfate (FEROSUL) 325 (65 Fe) MG tablet Take 1 tablet (325 mg) by mouth daily (with breakfast). 90 tablet 3     folic acid (FOLVITE) 1 MG tablet Take 1 tablet (1 mg) by mouth daily. 100 tablet 3     furosemide (LASIX) 20 MG tablet Take 1 tablet (20 mg) by mouth daily. 90 tablet 3     gabapentin (NEURONTIN) 300 MG capsule Take 1 capsule (300 mg) by mouth 3 times daily. 180 capsule 4     glucose 40 % (400 mg/mL) gel Take 15-30 g by mouth every 15 minutes as needed for low blood sugar. (Patient taking differently: Take by mouth every 15 minutes as needed for low blood sugar.) 30 g 0     hydrOXYzine HCl (ATARAX) 25 MG tablet Take 1 tablet (25 mg) by mouth every 6 hours as needed for other (adjuvant pain). 180 tablet 3     insulin aspart (NOVOLOG PEN) 100 UNIT/ML pen Novolog correction scale three times a day before meals and at bedtime. Do not take more frequently than every 4 hours. 15 mL 0     insulin aspart (NOVOLOG PEN) 100 UNIT/ML pen 1 unit per 10 with breakfast reduce-> 1 unit per 12 grams CHO, Lunch and dinner and 1 unit per 12 grams CHO PRN with snacks/supplements 15 mL 0     insulin aspart (NOVOLOG PEN) 100 UNIT/ML pen 1 unit per 10 with breakfast reduce-> 1 unit per 12 grams CHO, Lunch and dinner and 1 unit per 12 grams CHO PRN with snacks/supplements 15 mL 0     insulin aspart (NOVOLOG PEN) 100 UNIT/ML pen 1 unit per 10 with breakfast reduce-> 1 unit per 12 grams CHO, Lunch and dinner and 1  unit per 12 grams CHO PRN with snacks/supplements 15 mL 0     insulin aspart (NOVOLOG PEN) 100 UNIT/ML pen Novolog correction scale three times a day before meals and at bedtime. Do not take more frequently than every 4 hours. 15 mL 0     insulin aspart (NOVOLOG PEN) 100 UNIT/ML pen Novolog 8 unit(s) for bigger meals (60 g cho) and 4 unit(s) for smaller snacks (25-30 g cho) with meals plus correction scale 15 mL 0     insulin glargine (LANTUS PEN) 100 UNIT/ML pen Inject 20 Units subcutaneously every 24 hours. 6 mL 0     insulin pen needle (32G X 4 MM) 32G X 4 MM miscellaneous Use as directed by provider Per insurance coverage 200 each 1     magnesium 250 MG tablet Take 1 tablet by mouth daily. OTC       multivitamin w/minerals (THERA-VIT-M) tablet Take 1 tablet by mouth daily. 100 tablet 3     naloxone (NARCAN) 4 MG/0.1ML nasal spray Spray 1 spray (4 mg) into one nostril alternating nostrils as needed for opioid reversal. every 2-3 minutes until assistance arrives 2 each 0     senna-docusate (SENOKOT-S/PERICOLACE) 8.6-50 MG tablet Take 1 tablet by mouth 2 times daily as needed for constipation. 15 tablet 0     Sharps Container MISC Use as directed to dispose of needles, lancets and other sharps 1 each 1     spironolactone (ALDACTONE) 50 MG tablet Take 1 tablet (50 mg) by mouth daily. 90 tablet 3     thiamine (B-1) 100 MG tablet Take 1 tablet (100 mg) by mouth daily. 100 tablet 11     tiZANidine (ZANAFLEX) 2 MG tablet Take 1 tablet (2 mg) by mouth 3 times daily as needed for muscle spasms. 90 tablet 3     Allergies:   No Known Allergies    Family history:  Family History   Problem Relation Age of Onset     Allergy (Severe) Mother         Allergies,Environmental allergies, celiac disease     Family History Negative Father         Good Health     Family History Negative Sister         Good Health     Family History Negative Sister         Good Health     Asthma Brother         Asthma,history of intermittent asthma      Other - See Comments Brother         Enuresis     Diabetes Maternal Grandmother         Diabetes,Type 2     Cancer Maternal Grandfather         Cancer,Kidney cancer     Breast Cancer Paternal Grandmother      Heart Disease Paternal Grandfather         Heart Disease,Cardiomyopathy     Colon Cancer No family hx of      Liver Disease No family hx of        Review of Systems:    POSTIVE IN BOLD  GENERAL: fever/chills, fatigue, general unwell feeling, weight gain/loss.  HEAD/EYES:  headache, dizziness, or vision changes.    EARS/NOSE/THROAT:  Nosebleeds, hearing loss, sinus infection, earache, tinnitus.  IMMUNE:  Allergies, cancer, immune deficiency, or infections.  SKIN:  Urticaria, rash, hives  HEME/Lymphatic:   anemia, easy bruising, easy bleeding.  RESPIRATORY:  cough, wheezing, or shortness of breath  CARDIOVASCULAR/Circulation:  Extremity edema, syncope, hypertension, tachycardia, or angina.  GASTROINTESTINAL:  abdominal pain, nausea/emesis, diarrhea, constipation,  hematochezia, or melena.  ENDOCRINE:  Diabetes, steroid use,  thyroid disease or osteoporosis.  MUSCULOSKELETAL: right foot and ankle pain, neck pain, back pain, arthralgia, arthritis, or gout.  GENITOURINARY:  frequency, urgency, dysuria, difficulty voiding, hematuria or incontinence.  NEUROLOGIC:  weakness, numbness, paresthesias, seizure, tremor, stroke or memory loss.  PSYCHIATRIC:  depression, anxiety, stress, suicidal thoughts or mood swings.     Physical Exam:  Vitals:    10/16/24 1215 10/16/24 1216   BP: (!) 136/97 132/89   Pulse: 112    SpO2: 100%      Exam:  Constitutional: healthy, alert, and no distress  Head: normocephalic. Atraumatic.   Eyes: no redness or jaundice noted   ENT: oropharnx normal.  MMM.  Neck supple.    Cardiovascular: RRR no m/g/r   Respiratory: clear   Gastrointestinal: soft, non-tender, normoactive bowel sounds   : deferred  Skin: no suspicious lesions or rashes  Psychiatric: mentation appears normal and affect  "normal/bright      Neurologic exam:  CN:  Cranial nerves 2-12 are normal  Motor:  5/5 UE and LE strength  Sensory:  (upper and lower extremities):   Light touch: normal   Allodynia: absent    Dysethesia: absent    Hyperalgesia: absent     Diagnostic tests:  CT of right foot was completed on 8/1/24 showing:  \"BONES:  -Anatomic alignment right foot. No acute fracture identified. No significant joint space narrowing. No evidence for gout. No erosion. No sizable joint effusion.     SOFT TISSUES:  -Moderate distal leg, ankle and dorsal foot soft tissue swelling.                                                                      IMPRESSION:  1.  No dual energy CT finding for right foot gout.  2.  Moderate distal leg, ankle and dorsal foot soft tissue edema. No drainable fluid collection.  3.  No acute fracture or dislocation.\"    Personally reviewed imaging     Other testing (labs, diagnostics) reviewed:  Labs none  EKG none    [place] Prescription Monitoring Program reviewed    Outside records reviewed          Assessment:  Right foot/ankle pain    Maribell Leon is a 29 year old female who presents with the complaints of right foot and ankle pain since June 2024. Given her symptoms and time since injury, discussed that a diagnosis of CRPS versus delayed injury healing is difficult. Discussed that if her symptoms persist over the next few months a diagnosis of CRPS is most likely. However, she may need more time to heal from her injury. Discussed CRPS PT in addition to her current PT. Also recommended increasing her Cymbalta and evening Gabapentin as well as trying lidocaine cream first before considering an injection.     Plan:  Diagnosis reviewed, treatment option addressed, and risk/benefits discussed.  Self-care instructions given.  I am recommending a multidisciplinary treatment plan to help this patient better manage her pain.      Physical Therapy: CRPS PT in addition to current therapy   Pain Psychologist to address " issues of relaxation, behavioral change, coping style, and other factors important to improvement: none at this time   Diagnostic Studies: none  Medication Management: continue medications with dose adjustments and starting lidocaine cream  Further procedures recommended: none at this time   Other treatments:  Urine toxicology screen: none   Recommendations/follow-up for PCP:  Increase Cymbalta to 60 mg and consider increasing evening Gabapentin dose to 600 mg.   Follow up: as needed    Total time spent was 50 minutes, and more than 50% of face to face time was spent in counseling and/or coordination of care regarding principles of multidisciplinary care, medication management, and therapeutic options.     Britt Cruz MD    Pain Medicine  Department of Anesthesiology  [facility]          Again, thank you for allowing me to participate in the care of your patient.      Sincerely,    Britt Cruz MD

## 2024-10-17 ENCOUNTER — TELEPHONE (OUTPATIENT)
Dept: GASTROENTEROLOGY | Facility: CLINIC | Age: 29
End: 2024-10-17
Payer: COMMERCIAL

## 2024-10-17 NOTE — TELEPHONE ENCOUNTER
LVM & sent mychart for pt to reschedule appt on 12.23.24 with Dr. Carranza to be with a different provider - first attempt AN 10.17.24

## 2024-10-24 ENCOUNTER — THERAPY VISIT (OUTPATIENT)
Dept: PHYSICAL THERAPY | Facility: CLINIC | Age: 29
End: 2024-10-24
Payer: COMMERCIAL

## 2024-10-24 ENCOUNTER — MYC REFILL (OUTPATIENT)
Dept: GASTROENTEROLOGY | Facility: CLINIC | Age: 29
End: 2024-10-24

## 2024-10-24 DIAGNOSIS — M79.604 PAIN OF RIGHT LOWER EXTREMITY: ICD-10-CM

## 2024-10-24 DIAGNOSIS — R53.1 WEAKNESS GENERALIZED: ICD-10-CM

## 2024-10-24 DIAGNOSIS — J90 PLEURAL EFFUSION ON RIGHT: ICD-10-CM

## 2024-10-24 DIAGNOSIS — K70.31 ALCOHOLIC CIRRHOSIS OF LIVER WITH ASCITES (H): ICD-10-CM

## 2024-10-24 DIAGNOSIS — G59 MONONEUROPATHY DUE TO UNDERLYING DISEASE: ICD-10-CM

## 2024-10-24 DIAGNOSIS — J18.9 PNEUMONIA OF BOTH LUNGS DUE TO INFECTIOUS ORGANISM, UNSPECIFIED PART OF LUNG: Primary | ICD-10-CM

## 2024-10-24 PROCEDURE — 97110 THERAPEUTIC EXERCISES: CPT | Mod: GP | Performed by: PHYSICAL THERAPIST

## 2024-10-24 PROCEDURE — 97112 NEUROMUSCULAR REEDUCATION: CPT | Mod: GP | Performed by: PHYSICAL THERAPIST

## 2024-10-24 RX ORDER — SPIRONOLACTONE 50 MG/1
50 TABLET, FILM COATED ORAL DAILY
Qty: 90 TABLET | Refills: 2 | Status: SHIPPED | OUTPATIENT
Start: 2024-10-24

## 2024-10-29 NOTE — CONFIDENTIAL NOTE
RECORDS RECEIVED FROM: internal    DATE RECEIVED: 11.12.24    NOTES STATUS DETAILS   OFFICE NOTE from referring provider internal  Noreen Nevarez MD   OFFICE NOTE from other specialist internal  10.16.24 Tamie    DISCHARGE REPORT from the ER internal  9.28.24 Dima   8.12.24 Willow    MEDICATION LIST internal     IMAGING  (NEED IMAGES AND REPORTS)     CT SCAN internal  Scheduled 11.5.24     9.6.24, 8.12.24,    CHEST XRAY (CXR) internal  Scheduled 11.12.24   Scheduled 11.5.24     9.6.24, 8.18.23,    TESTS     PULMONARY FUNCTION TESTING (PFT) internal  Scheduled 11.12.24

## 2024-11-01 ENCOUNTER — LAB (OUTPATIENT)
Dept: LAB | Facility: CLINIC | Age: 29
End: 2024-11-01
Payer: COMMERCIAL

## 2024-11-01 ENCOUNTER — OFFICE VISIT (OUTPATIENT)
Dept: INTERNAL MEDICINE | Facility: CLINIC | Age: 29
End: 2024-11-01
Payer: COMMERCIAL

## 2024-11-01 VITALS
OXYGEN SATURATION: 100 % | DIASTOLIC BLOOD PRESSURE: 78 MMHG | TEMPERATURE: 98.6 F | SYSTOLIC BLOOD PRESSURE: 115 MMHG | WEIGHT: 120.3 LBS | RESPIRATION RATE: 16 BRPM | HEIGHT: 60 IN | BODY MASS INDEX: 23.62 KG/M2 | HEART RATE: 89 BPM

## 2024-11-01 DIAGNOSIS — Z11.59 NEED FOR HEPATITIS C SCREENING TEST: ICD-10-CM

## 2024-11-01 DIAGNOSIS — E13.69 OTHER SPECIFIED DIABETES MELLITUS WITH OTHER SPECIFIED COMPLICATION, UNSPECIFIED WHETHER LONG TERM INSULIN USE (H): ICD-10-CM

## 2024-11-01 DIAGNOSIS — R73.9 HYPERGLYCEMIA: ICD-10-CM

## 2024-11-01 DIAGNOSIS — R73.09 ELEVATED GLUCOSE: ICD-10-CM

## 2024-11-01 DIAGNOSIS — Z79.4 TYPE 2 DIABETES MELLITUS WITHOUT COMPLICATION, WITH LONG-TERM CURRENT USE OF INSULIN (H): Primary | ICD-10-CM

## 2024-11-01 DIAGNOSIS — Z11.4 SCREENING FOR HIV (HUMAN IMMUNODEFICIENCY VIRUS): ICD-10-CM

## 2024-11-01 DIAGNOSIS — E11.9 TYPE 2 DIABETES MELLITUS WITHOUT COMPLICATION, WITH LONG-TERM CURRENT USE OF INSULIN (H): Primary | ICD-10-CM

## 2024-11-01 DIAGNOSIS — E11.9 TYPE 2 DIABETES MELLITUS WITHOUT COMPLICATION, WITH LONG-TERM CURRENT USE OF INSULIN (H): ICD-10-CM

## 2024-11-01 DIAGNOSIS — Z79.4 TYPE 2 DIABETES MELLITUS WITHOUT COMPLICATION, WITH LONG-TERM CURRENT USE OF INSULIN (H): ICD-10-CM

## 2024-11-01 DIAGNOSIS — E87.1 HYPONATREMIA: ICD-10-CM

## 2024-11-01 LAB
ANION GAP SERPL CALCULATED.3IONS-SCNC: 10 MMOL/L (ref 7–15)
BUN SERPL-MCNC: 10 MG/DL (ref 6–20)
CALCIUM SERPL-MCNC: 9.2 MG/DL (ref 8.8–10.4)
CHLORIDE SERPL-SCNC: 103 MMOL/L (ref 98–107)
CHOLEST SERPL-MCNC: 186 MG/DL
CREAT SERPL-MCNC: 0.62 MG/DL (ref 0.51–0.95)
EGFRCR SERPLBLD CKD-EPI 2021: >90 ML/MIN/1.73M2
EST. AVERAGE GLUCOSE BLD GHB EST-MCNC: 169 MG/DL
FASTING STATUS PATIENT QL REPORTED: NO
FASTING STATUS PATIENT QL REPORTED: NO
GLUCOSE SERPL-MCNC: 105 MG/DL (ref 70–99)
HBA1C MFR BLD: 7.5 %
HCO3 SERPL-SCNC: 27 MMOL/L (ref 22–29)
HDLC SERPL-MCNC: 59 MG/DL
LDLC SERPL CALC-MCNC: 104 MG/DL
NONHDLC SERPL-MCNC: 127 MG/DL
POTASSIUM SERPL-SCNC: 3.5 MMOL/L (ref 3.4–5.3)
SODIUM SERPL-SCNC: 140 MMOL/L (ref 135–145)
TRIGL SERPL-MCNC: 114 MG/DL

## 2024-11-01 PROCEDURE — 83036 HEMOGLOBIN GLYCOSYLATED A1C: CPT | Performed by: INTERNAL MEDICINE

## 2024-11-01 PROCEDURE — 80048 BASIC METABOLIC PNL TOTAL CA: CPT | Performed by: PATHOLOGY

## 2024-11-01 PROCEDURE — 99000 SPECIMEN HANDLING OFFICE-LAB: CPT | Performed by: PATHOLOGY

## 2024-11-01 PROCEDURE — 36415 COLL VENOUS BLD VENIPUNCTURE: CPT | Performed by: PATHOLOGY

## 2024-11-01 PROCEDURE — 86803 HEPATITIS C AB TEST: CPT | Performed by: INTERNAL MEDICINE

## 2024-11-01 PROCEDURE — 99214 OFFICE O/P EST MOD 30 MIN: CPT | Performed by: INTERNAL MEDICINE

## 2024-11-01 PROCEDURE — 80061 LIPID PANEL: CPT | Performed by: PATHOLOGY

## 2024-11-01 PROCEDURE — 87389 HIV-1 AG W/HIV-1&-2 AB AG IA: CPT | Performed by: INTERNAL MEDICINE

## 2024-11-01 RX ORDER — BLOOD-GLUCOSE METER
EACH MISCELLANEOUS SEE ADMIN INSTRUCTIONS
COMMUNITY
Start: 2024-10-02

## 2024-11-01 RX ORDER — LANCETS
EACH MISCELLANEOUS
COMMUNITY
Start: 2024-10-27

## 2024-11-01 NOTE — PROGRESS NOTES
Assessment & Plan     Type 2 diabetes mellitus without complication, with long-term current use of insulin (H)  Discussed recent hospitalization and test results.  Recommend rechecking hemoglobin A1c given the data discrepancy between most recent outpatient hemoglobin A1c and hemoglobin A1c on admission.  Referrals to diabetes education as well as endocrinology were placed for the patient today.  Recommend to continue with close monitoring of blood glucose.  Patient will follow-up in approximately 2 weeks for review of glycemic log and potential adjustment of insulin regimen.  - blood glucose monitoring (SOFTCLIX) lancets; USE TO TEST 4 TIMES DAILY  - Blood Glucose Monitoring Suppl (ACCU-CHEK GUIDE ME) w/Device KIT; See Admin Instructions.  - Lipid panel reflex to direct LDL Non-fasting; Future  - Albumin Random Urine Quantitative with Creat Ratio; Future  - OPTOMETRY REFERRAL; Future  - Hemoglobin A1c; Future  - Adult Diabetes Education  Referral; Future  - insulin glargine (LANTUS PEN) 100 UNIT/ML pen; Inject 20 Units subcutaneously every 24 hours.  - insulin aspart (NOVOLOG PEN) 100 UNIT/ML pen; Novolog correction scale three times a day before meals and at bedtime. Do not take more frequently than every 4 hours. Max per day 45 units    Screening for HIV (human immunodeficiency virus)    - HIV Screening; Future    Need for hepatitis C screening test    - Hepatitis C Screen Reflex to HCV RNA Quant and Genotype; Future    Elevated glucose    - REVIEW OF HEALTH MAINTENANCE PROTOCOL ORDERS  - Adult Endocrinology  Referral; Future    Hyperglycemia  - blood glucose (NO BRAND SPECIFIED) test strip; To use to test glucose level in the blood Use to test blood sugar 4 times daily as directed. To accompany glucose monitor brands per insurance coverage.      I spent a total of 30 minutes on the day of the visit.   Time spent by me doing chart review, history and exam, documentation and further activities  per the note    MED REC REQUIRED  Post Medication Reconciliation Status: discharge medications reconciled, continue medications without change        No follow-ups on file.    Emili Reyna is a 29 year old, presenting for the following health issues:  Hospital F/U (Pt here for hospital follow up from 10/1/24 for diabetic issues at [facility] )      11/1/2024     3:18 PM   Additional Questions   Roomed by Vandana POTTER   Accompanied by mother     HPI     Patient is here for follow-up on recent hospitalization.  She reports that she has been using insulin as prescribed.  She is typically using both preprandial and correction insulin in addition to short acting insulin at bedtime as well as long-acting insulin at bedtime.  She has had a few low blood glucose values.  She also had follow-up with gastroenterology as well as with pain management.  She currently does not have an appointment with endocrinology or with diabetes education.      Objective    /78 (BP Location: Right arm, Patient Position: Sitting, Cuff Size: Adult Regular)   Pulse 89   Temp 98.6  F (37  C)   Resp 16   Ht 1.524 m (5')   Wt 54.6 kg (120 lb 4.8 oz)   SpO2 100%   BMI 23.49 kg/m    Body mass index is 23.49 kg/m .  Physical Exam   GENERAL: alert and no distress  EYES: Eyes grossly normal to inspection, PERRL and conjunctivae and sclerae normal  RESP: normal effort, no wheezing  CV: regular rates and rhythm and no peripheral edema  MS: no gross musculoskeletal defects noted, no edema  SKIN: no suspicious lesions or rashes  NEURO: Normal strength and tone, mentation intact and speech normal            Signed Electronically by: Lakshmi Carlton MD

## 2024-11-02 LAB
HCV AB SERPL QL IA: NONREACTIVE
HIV 1+2 AB+HIV1 P24 AG SERPL QL IA: NONREACTIVE

## 2024-11-05 ENCOUNTER — ANCILLARY PROCEDURE (OUTPATIENT)
Dept: GENERAL RADIOLOGY | Facility: CLINIC | Age: 29
End: 2024-11-05
Attending: INTERNAL MEDICINE
Payer: COMMERCIAL

## 2024-11-05 ENCOUNTER — ANCILLARY PROCEDURE (OUTPATIENT)
Dept: CT IMAGING | Facility: CLINIC | Age: 29
End: 2024-11-05
Payer: COMMERCIAL

## 2024-11-05 DIAGNOSIS — J18.9 PNEUMONIA OF BOTH LUNGS DUE TO INFECTIOUS ORGANISM, UNSPECIFIED PART OF LUNG: ICD-10-CM

## 2024-11-05 PROCEDURE — 71046 X-RAY EXAM CHEST 2 VIEWS: CPT | Performed by: RADIOLOGY

## 2024-11-05 PROCEDURE — 71250 CT THORAX DX C-: CPT | Performed by: RADIOLOGY

## 2024-11-12 ENCOUNTER — OFFICE VISIT (OUTPATIENT)
Dept: PULMONOLOGY | Facility: CLINIC | Age: 29
End: 2024-11-12
Payer: COMMERCIAL

## 2024-11-12 ENCOUNTER — PRE VISIT (OUTPATIENT)
Dept: PULMONOLOGY | Facility: CLINIC | Age: 29
End: 2024-11-12

## 2024-11-12 ENCOUNTER — TELEPHONE (OUTPATIENT)
Dept: PULMONOLOGY | Facility: CLINIC | Age: 29
End: 2024-11-12

## 2024-11-12 ENCOUNTER — OFFICE VISIT (OUTPATIENT)
Dept: PULMONOLOGY | Facility: CLINIC | Age: 29
End: 2024-11-12
Attending: INTERNAL MEDICINE
Payer: COMMERCIAL

## 2024-11-12 VITALS
SYSTOLIC BLOOD PRESSURE: 137 MMHG | HEIGHT: 60 IN | OXYGEN SATURATION: 100 % | BODY MASS INDEX: 24.9 KG/M2 | DIASTOLIC BLOOD PRESSURE: 89 MMHG | WEIGHT: 126.8 LBS | HEART RATE: 89 BPM

## 2024-11-12 DIAGNOSIS — R06.00 DYSPNEA, UNSPECIFIED TYPE: Primary | ICD-10-CM

## 2024-11-12 DIAGNOSIS — J18.9 PNEUMONIA OF BOTH LUNGS DUE TO INFECTIOUS ORGANISM, UNSPECIFIED PART OF LUNG: ICD-10-CM

## 2024-11-12 LAB
DLCOUNC-%PRED-PRE: 70 %
DLCOUNC-PRE: 13.95 ML/MIN/MMHG
DLCOUNC-PRED: 19.89 ML/MIN/MMHG
ERV-%PRED-PRE: 52 %
ERV-PRE: 0.57 L
ERV-PRED: 1.09 L
EXPTIME-PRE: 3.91 SEC
FEF2575-%PRED-PRE: 103 %
FEF2575-PRE: 3.21 L/SEC
FEF2575-PRED: 3.11 L/SEC
FEFMAX-%PRED-PRE: 101 %
FEFMAX-PRE: 6.48 L/SEC
FEFMAX-PRED: 6.4 L/SEC
FEV1-%PRED-PRE: 92 %
FEV1-PRE: 2.41 L
FEV1FEV6-PRE: 87 %
FEV1FEV6-PRED: 85 %
FEV1FVC-PRE: 87 %
FEV1FVC-PRED: 87 %
FEV1SVC-PRE: 85 %
FEV1SVC-PRED: 81 %
FIFMAX-PRE: 3.66 L/SEC
FRCPLETH-%PRED-PRE: 62 %
FRCPLETH-PRE: 1.53 L
FRCPLETH-PRED: 2.44 L
FVC-%PRED-PRE: 91 %
FVC-PRE: 2.77 L
FVC-PRED: 3.02 L
IC-%PRED-PRE: 103 %
IC-PRE: 2.26 L
IC-PRED: 2.18 L
RVPLETH-%PRED-PRE: 77 %
RVPLETH-PRE: 0.95 L
RVPLETH-PRED: 1.23 L
TLCPLETH-%PRED-PRE: 88 %
TLCPLETH-PRE: 3.79 L
TLCPLETH-PRED: 4.27 L
VA-%PRED-PRE: 86 %
VA-PRE: 3.65 L
VC-%PRED-PRE: 87 %
VC-PRE: 2.84 L
VC-PRED: 3.24 L

## 2024-11-12 PROCEDURE — 94729 DIFFUSING CAPACITY: CPT | Performed by: INTERNAL MEDICINE

## 2024-11-12 PROCEDURE — 99215 OFFICE O/P EST HI 40 MIN: CPT | Performed by: INTERNAL MEDICINE

## 2024-11-12 PROCEDURE — G0463 HOSPITAL OUTPT CLINIC VISIT: HCPCS | Performed by: INTERNAL MEDICINE

## 2024-11-12 PROCEDURE — 99417 PROLNG OP E/M EACH 15 MIN: CPT | Performed by: INTERNAL MEDICINE

## 2024-11-12 PROCEDURE — 94150 VITAL CAPACITY TEST: CPT | Performed by: INTERNAL MEDICINE

## 2024-11-12 PROCEDURE — 94726 PLETHYSMOGRAPHY LUNG VOLUMES: CPT | Performed by: INTERNAL MEDICINE

## 2024-11-12 PROCEDURE — 94375 RESPIRATORY FLOW VOLUME LOOP: CPT | Performed by: INTERNAL MEDICINE

## 2024-11-12 ASSESSMENT — PAIN SCALES - GENERAL: PAINLEVEL_OUTOF10: NO PAIN (0)

## 2024-11-12 NOTE — NURSING NOTE
Chief Complaint   Patient presents with    New Patient     New Pulmonary        Vitals were taken, medications reconciled.    Jael Atkinson, Clinic Assistant   9:44 AM     Yes

## 2024-11-12 NOTE — PROGRESS NOTES
"[facility]  Pulmonary Medicine  Visit Clinic Note  November 12, 2024    Dear patient. Thank you for visiting with me. I want you to feel respected, understood, and empowered. \"Respect\" is valuing you as much as I would a close family member. \"Empowerment\" happens when you are fully informed, and can make the best possible decision for you.  Please ask me questions!  Challenge anything that is not clear.       ASSESSMENT & PLAN     #History of Acute liver failure resolving less likely to be cirrhosis has a hepatology appointment  #Pleural effusion: S/ tap in September. Transudate  - Ct chest in november shows clear lung parenchyma.  - Isolated reduction in DLCO repeat is ordered.  -Chest x ray in 6 months.     RTC in 6 months  with pfts and chest x ray.       60 minutes excluding the time spent on cigarette cessation was  spent on the date of the encounter doing chart review, history and exam, documentation and further activities as noted above.    These conclusions are made at the best of one's knowledge and belief based on the provided evidence such as patient's history and allergy test results and they can change over time or can be incomplete because of missing information's.    I explained the lab values, imagings and findings to the patient.  Patient expressed understanding I did not recognize any barriers to the understanding of the patient.    The above note was dictated using voice recognition software and may include typographical errors. Please contact the author for any clarifications.    Christian Chen MD MPH FCCP  RN Coordinators: Lisa Frias/ Gisell: [phone]  ILD RN Coordinators: [phone]  Clinic Number: [phone]  Pager: [phone]       Today's visit note:     Chief Complaint: Maribell Leon is a 29 year old year old female who is being seen for No chief complaint on file.      HPI:   Patient is a 29-year-old female who has been  seen in pulmonary clinic for the follow up " after her september er visit.    -Her pleural effusion then has not recurred after the tap in septemebr 2024.   There was initial concern for liver cirrhosis but it appears there was an acute liver injury which seems to be resolving.   -She will be seen by the hepatologist.     -She has prior history of smoking but has stopped smoking since July 2024. Currently works in pharmacy.          Medications:     Current Outpatient Medications   Medication Sig Dispense Refill    acetaminophen (TYLENOL) 325 MG tablet Take 2 tablets (650 mg) by mouth 3 times daily. 90 tablet 0    Alcohol Swabs PADS Use to swab the area of the injection or hi as directed Per insurance coverage 200 each 1    blood glucose (NO BRAND SPECIFIED) lancets standard To use to test glucose level in the blood Use to test blood sugar 4 times daily as directed. To accompany glucose monitor brands per insurance coverage. 200 each 1    blood glucose (NO BRAND SPECIFIED) test strip To use to test glucose level in the blood Use to test blood sugar 4 times daily as directed. To accompany glucose monitor brands per insurance coverage. 100 strip 3    blood glucose monitoring (NO BRAND SPECIFIED) meter device kit Use as directed Per insurance coverage 1 kit 0    blood glucose monitoring (SOFTCLIX) lancets USE TO TEST 4 TIMES DAILY      Blood Glucose Monitoring Suppl (ACCU-CHEK GUIDE ME) w/Device KIT See Admin Instructions.      cyanocobalamin (VITAMIN B-12) 1000 MCG tablet Take 1 tablet (1,000 mcg) by mouth daily. 100 tablet 3    diclofenac (VOLTAREN) 1 % topical gel Apply 4 g topically 4 times daily. 100 g 0    DULoxetine (CYMBALTA) 30 MG capsule Take 2 capsules (60 mg) by mouth daily. 60 capsule 1    DULoxetine (CYMBALTA) 30 MG capsule Take 1 capsule (30 mg) by mouth daily. (Patient not taking: Reported on 11/1/2024) 90 capsule 2    ferrous sulfate (FEROSUL) 325 (65 Fe) MG tablet Take 1 tablet (325 mg) by mouth daily (with breakfast). 90 tablet 3    folic  acid (FOLVITE) 1 MG tablet Take 1 tablet (1 mg) by mouth daily. 100 tablet 3    furosemide (LASIX) 20 MG tablet Take 1 tablet (20 mg) by mouth daily. 90 tablet 3    gabapentin (NEURONTIN) 300 MG capsule Take 1 capsule (300 mg) by mouth 3 times daily. 180 capsule 4    glucose 40 % (400 mg/mL) gel Take 15-30 g by mouth every 15 minutes as needed for low blood sugar. 30 g 0    hydrOXYzine HCl (ATARAX) 25 MG tablet Take 1 tablet (25 mg) by mouth every 6 hours as needed for other (adjuvant pain). 180 tablet 3    insulin aspart (NOVOLOG PEN) 100 UNIT/ML pen Novolog correction scale three times a day before meals and at bedtime. Do not take more frequently than every 4 hours. Max per day 45 units 15 mL 5    insulin aspart (NOVOLOG PEN) 100 UNIT/ML pen 1 unit per 10 with breakfast reduce-> 1 unit per 12 grams CHO, Lunch and dinner and 1 unit per 12 grams CHO PRN with snacks/supplements (Patient not taking: Reported on 11/1/2024) 15 mL 0    insulin aspart (NOVOLOG PEN) 100 UNIT/ML pen 1 unit per 10 with breakfast reduce-> 1 unit per 12 grams CHO, Lunch and dinner and 1 unit per 12 grams CHO PRN with snacks/supplements (Patient not taking: Reported on 11/1/2024) 15 mL 0    insulin aspart (NOVOLOG PEN) 100 UNIT/ML pen 1 unit per 10 with breakfast reduce-> 1 unit per 12 grams CHO, Lunch and dinner and 1 unit per 12 grams CHO PRN with snacks/supplements (Patient not taking: Reported on 11/1/2024) 15 mL 0    insulin aspart (NOVOLOG PEN) 100 UNIT/ML pen Novolog correction scale three times a day before meals and at bedtime. Do not take more frequently than every 4 hours. (Patient not taking: Reported on 11/1/2024) 15 mL 0    insulin aspart (NOVOLOG PEN) 100 UNIT/ML pen Novolog 8 unit(s) for bigger meals (60 g cho) and 4 unit(s) for smaller snacks (25-30 g cho) with meals plus correction scale (Patient not taking: Reported on 11/1/2024) 15 mL 0    insulin glargine (LANTUS PEN) 100 UNIT/ML pen Inject 20 Units subcutaneously every 24  hours. 6 mL 4    insulin pen needle (32G X 4 MM) 32G X 4 MM miscellaneous Use as directed by provider Per insurance coverage 200 each 1    lidocaine (LMX4) 4 % external cream Apply topically 3 times daily as needed for mild pain or moderate pain. (Patient not taking: Reported on 11/1/2024) 120 g 3    magnesium 250 MG tablet Take 1 tablet by mouth daily. OTC      multivitamin w/minerals (THERA-VIT-M) tablet Take 1 tablet by mouth daily. 100 tablet 3    naloxone (NARCAN) 4 MG/0.1ML nasal spray Spray 1 spray (4 mg) into one nostril alternating nostrils as needed for opioid reversal. every 2-3 minutes until assistance arrives 2 each 0    senna-docusate (SENOKOT-S/PERICOLACE) 8.6-50 MG tablet Take 1 tablet by mouth 2 times daily as needed for constipation. (Patient not taking: Reported on 11/1/2024) 15 tablet 0    Sharps Container MISC Use as directed to dispose of needles, lancets and other sharps 1 each 1    spironolactone (ALDACTONE) 50 MG tablet Take 1 tablet (50 mg) by mouth daily. 90 tablet 2    thiamine (B-1) 100 MG tablet Take 1 tablet (100 mg) by mouth daily. 100 tablet 11    tiZANidine (ZANAFLEX) 2 MG tablet Take 1 tablet (2 mg) by mouth 3 times daily as needed for muscle spasms. 90 tablet 3     No current facility-administered medications for this visit.            Review of Systems:       A complete 10 point review of systems was otherwise negative except as noted in the HPI.        PHYSICAL EXAM:  There were no vitals taken for this visit.     General: Well developed, well nourished, No apparent distress  Eyes: Anicteric  Nose: Nasal mucosa with no edema or hyperemia.  No polyps  Ears: Hearing grossly normal  Mouth: Oral mucosa is moist, without any lesions. No oropharyngeal exudate.  Respiratory: Good air movement. No crackles. No rhonchi. No wheezes  Cardiac: RRR, normal S1, S2. No murmurs. No JVD  Abdomen: Soft, NT/ND  Musculoskeletal: Extremities normal. No clubbing. No cyanosis. No edema.  Skin: No rash  on limited exam  Neuro: Normal mentation. Normal speech.  Psych:Normal affect           Data:   All laboratory and imaging data reviewed.      PFT:       PFT Interpretation:  I personally reviewed and interpreted the PFTs.    CXR: I personally reviewed and interpreted the chest x-ray    Chest CT: I personally reviewed and interpreted the CT scan.    Recent Results (from the past week)   Pulmonary function test    Collection Time: 11/12/24  8:32 AM   Result Value Ref Range    FVC-Pred 3.02 L    FVC-Pre 2.77 L    FVC-%Pred-Pre 91 %    FEV1-Pre 2.41 L    FEV1-%Pred-Pre 92 %    FEV1FVC-Pred 87 %    FEV1FVC-Pre 87 %    FEFMax-Pred 6.40 L/sec    FEFMax-Pre 6.48 L/sec    FEFMax-%Pred-Pre 101 %    FEF2575-Pred 3.11 L/sec    FEF2575-Pre 3.21 L/sec    QDC6152-%Pred-Pre 103 %    ExpTime-Pre 3.91 sec    FIFMax-Pre 3.66 L/sec    VC-Pred 3.24 L    VC-Pre 2.84 L    VC-%Pred-Pre 87 %    IC-Pred 2.18 L    IC-Pre 2.26 L    IC-%Pred-Pre 103 %    ERV-Pred 1.09 L    ERV-Pre 0.57 L    ERV-%Pred-Pre 52 %    FEV1FEV6-Pred 85 %    FEV1FEV6-Pre 87 %    FRCPleth-Pred 2.44 L    FRCPleth-Pre 1.53 L    FRCPleth-%Pred-Pre 62 %    RVPleth-Pred 1.23 L    RVPleth-Pre 0.95 L    RVPleth-%Pred-Pre 77 %    TLCPleth-Pred 4.27 L    TLCPleth-Pre 3.79 L    TLCPleth-%Pred-Pre 88 %    DLCOunc-Pred 19.89 ml/min/mmHg    DLCOunc-Pre 13.95 ml/min/mmHg    DLCOunc-%Pred-Pre 70 %    VA-Pre 3.65 L    VA-%Pred-Pre 86 %    FEV1SVC-Pred 81 %    FEV1SVC-Pre 85 %

## 2024-11-12 NOTE — TELEPHONE ENCOUNTER
Left Voicemail (1st Attempt) and Sent Mychart (1st Attempt) for the patient to call back and schedule the following:    Appointment type: Imaging  Provider: Gustavo  Return date: 5/13/2025  Specialty phone number: [phone]  Additional appointment(s) needed: CXR, Full pft, echo   Additonal Notes: adding to appt with Dr. Chen

## 2024-11-12 NOTE — LETTER
"11/12/2024      Maribell Leon  [address]  [place] [address]      Dear Colleague,    Thank you for referring your patient, Maribell Leon, to the [facility]G [facility] AND [facility]. Please see a copy of my visit note below.    [facility]  Pulmonary Medicine  Visit Clinic Note  November 12, 2024    Dear patient. Thank you for visiting with me. I want you to feel respected, understood, and empowered. \"Respect\" is valuing you as much as I would a close family member. \"Empowerment\" happens when you are fully informed, and can make the best possible decision for you.  Please ask me questions!  Challenge anything that is not clear.       ASSESSMENT & PLAN     #History of Acute liver failure resolving less likely to be cirrhosis has a hepatology appointment  #Pleural effusion: S/ tap in September. Transudate  - Ct chest in november shows clear lung parenchyma.  - Isolated reduction in DLCO repeat is ordered.  -Chest x ray in 6 months.     RTC in 6 months  with pfts and chest x ray.       60 minutes excluding the time spent on cigarette cessation was  spent on the date of the encounter doing chart review, history and exam, documentation and further activities as noted above.    These conclusions are made at the best of one's knowledge and belief based on the provided evidence such as patient's history and allergy test results and they can change over time or can be incomplete because of missing information's.    I explained the lab values, imagings and findings to the patient.  Patient expressed understanding I did not recognize any barriers to the understanding of the patient.    The above note was dictated using voice recognition software and may include typographical errors. Please contact the author for any clarifications.    Christian Chen MD MPH FCCP  RN Coordinators: Lisa Frias/ Gisell: [phone]  ILD RN Coordinators: [phone]  Clinic Number: " [phone]  Pager: [phone]       Today's visit note:     Chief Complaint: Maribell Leon is a 29 year old year old female who is being seen for No chief complaint on file.      HPI:   Patient is a 29-year-old female who has been  seen in pulmonary clinic for the follow up after her september er visit.    -Her pleural effusion then has not recurred after the tap in septemebr 2024.   There was initial concern for liver cirrhosis but it appears there was an acute liver injury which seems to be resolving.   -She will be seen by the hepatologist.     -She has prior history of smoking but has stopped smoking since July 2024. Currently works in pharmacy.          Medications:     Current Outpatient Medications   Medication Sig Dispense Refill     acetaminophen (TYLENOL) 325 MG tablet Take 2 tablets (650 mg) by mouth 3 times daily. 90 tablet 0     Alcohol Swabs PADS Use to swab the area of the injection or hi as directed Per insurance coverage 200 each 1     blood glucose (NO BRAND SPECIFIED) lancets standard To use to test glucose level in the blood Use to test blood sugar 4 times daily as directed. To accompany glucose monitor brands per insurance coverage. 200 each 1     blood glucose (NO BRAND SPECIFIED) test strip To use to test glucose level in the blood Use to test blood sugar 4 times daily as directed. To accompany glucose monitor brands per insurance coverage. 100 strip 3     blood glucose monitoring (NO BRAND SPECIFIED) meter device kit Use as directed Per insurance coverage 1 kit 0     blood glucose monitoring (SOFTCLIX) lancets USE TO TEST 4 TIMES DAILY       Blood Glucose Monitoring Suppl (ACCU-CHEK GUIDE ME) w/Device KIT See Admin Instructions.       cyanocobalamin (VITAMIN B-12) 1000 MCG tablet Take 1 tablet (1,000 mcg) by mouth daily. 100 tablet 3     diclofenac (VOLTAREN) 1 % topical gel Apply 4 g topically 4 times daily. 100 g 0     DULoxetine (CYMBALTA) 30 MG capsule Take 2 capsules (60 mg) by  mouth daily. 60 capsule 1     DULoxetine (CYMBALTA) 30 MG capsule Take 1 capsule (30 mg) by mouth daily. (Patient not taking: Reported on 11/1/2024) 90 capsule 2     ferrous sulfate (FEROSUL) 325 (65 Fe) MG tablet Take 1 tablet (325 mg) by mouth daily (with breakfast). 90 tablet 3     folic acid (FOLVITE) 1 MG tablet Take 1 tablet (1 mg) by mouth daily. 100 tablet 3     furosemide (LASIX) 20 MG tablet Take 1 tablet (20 mg) by mouth daily. 90 tablet 3     gabapentin (NEURONTIN) 300 MG capsule Take 1 capsule (300 mg) by mouth 3 times daily. 180 capsule 4     glucose 40 % (400 mg/mL) gel Take 15-30 g by mouth every 15 minutes as needed for low blood sugar. 30 g 0     hydrOXYzine HCl (ATARAX) 25 MG tablet Take 1 tablet (25 mg) by mouth every 6 hours as needed for other (adjuvant pain). 180 tablet 3     insulin aspart (NOVOLOG PEN) 100 UNIT/ML pen Novolog correction scale three times a day before meals and at bedtime. Do not take more frequently than every 4 hours. Max per day 45 units 15 mL 5     insulin aspart (NOVOLOG PEN) 100 UNIT/ML pen 1 unit per 10 with breakfast reduce-> 1 unit per 12 grams CHO, Lunch and dinner and 1 unit per 12 grams CHO PRN with snacks/supplements (Patient not taking: Reported on 11/1/2024) 15 mL 0     insulin aspart (NOVOLOG PEN) 100 UNIT/ML pen 1 unit per 10 with breakfast reduce-> 1 unit per 12 grams CHO, Lunch and dinner and 1 unit per 12 grams CHO PRN with snacks/supplements (Patient not taking: Reported on 11/1/2024) 15 mL 0     insulin aspart (NOVOLOG PEN) 100 UNIT/ML pen 1 unit per 10 with breakfast reduce-> 1 unit per 12 grams CHO, Lunch and dinner and 1 unit per 12 grams CHO PRN with snacks/supplements (Patient not taking: Reported on 11/1/2024) 15 mL 0     insulin aspart (NOVOLOG PEN) 100 UNIT/ML pen Novolog correction scale three times a day before meals and at bedtime. Do not take more frequently than every 4 hours. (Patient not taking: Reported on 11/1/2024) 15 mL 0     insulin  aspart (NOVOLOG PEN) 100 UNIT/ML pen Novolog 8 unit(s) for bigger meals (60 g cho) and 4 unit(s) for smaller snacks (25-30 g cho) with meals plus correction scale (Patient not taking: Reported on 11/1/2024) 15 mL 0     insulin glargine (LANTUS PEN) 100 UNIT/ML pen Inject 20 Units subcutaneously every 24 hours. 6 mL 4     insulin pen needle (32G X 4 MM) 32G X 4 MM miscellaneous Use as directed by provider Per insurance coverage 200 each 1     lidocaine (LMX4) 4 % external cream Apply topically 3 times daily as needed for mild pain or moderate pain. (Patient not taking: Reported on 11/1/2024) 120 g 3     magnesium 250 MG tablet Take 1 tablet by mouth daily. OTC       multivitamin w/minerals (THERA-VIT-M) tablet Take 1 tablet by mouth daily. 100 tablet 3     naloxone (NARCAN) 4 MG/0.1ML nasal spray Spray 1 spray (4 mg) into one nostril alternating nostrils as needed for opioid reversal. every 2-3 minutes until assistance arrives 2 each 0     senna-docusate (SENOKOT-S/PERICOLACE) 8.6-50 MG tablet Take 1 tablet by mouth 2 times daily as needed for constipation. (Patient not taking: Reported on 11/1/2024) 15 tablet 0     Sharps Container MISC Use as directed to dispose of needles, lancets and other sharps 1 each 1     spironolactone (ALDACTONE) 50 MG tablet Take 1 tablet (50 mg) by mouth daily. 90 tablet 2     thiamine (B-1) 100 MG tablet Take 1 tablet (100 mg) by mouth daily. 100 tablet 11     tiZANidine (ZANAFLEX) 2 MG tablet Take 1 tablet (2 mg) by mouth 3 times daily as needed for muscle spasms. 90 tablet 3     No current facility-administered medications for this visit.            Review of Systems:       A complete 10 point review of systems was otherwise negative except as noted in the HPI.        PHYSICAL EXAM:  There were no vitals taken for this visit.     General: Well developed, well nourished, No apparent distress  Eyes: Anicteric  Nose: Nasal mucosa with no edema or hyperemia.  No polyps  Ears: Hearing  grossly normal  Mouth: Oral mucosa is moist, without any lesions. No oropharyngeal exudate.  Respiratory: Good air movement. No crackles. No rhonchi. No wheezes  Cardiac: RRR, normal S1, S2. No murmurs. No JVD  Abdomen: Soft, NT/ND  Musculoskeletal: Extremities normal. No clubbing. No cyanosis. No edema.  Skin: No rash on limited exam  Neuro: Normal mentation. Normal speech.  Psych:Normal affect           Data:   All laboratory and imaging data reviewed.      PFT:       PFT Interpretation:  I personally reviewed and interpreted the PFTs.    CXR: I personally reviewed and interpreted the chest x-ray    Chest CT: I personally reviewed and interpreted the CT scan.    Recent Results (from the past week)   Pulmonary function test    Collection Time: 11/12/24  8:32 AM   Result Value Ref Range    FVC-Pred 3.02 L    FVC-Pre 2.77 L    FVC-%Pred-Pre 91 %    FEV1-Pre 2.41 L    FEV1-%Pred-Pre 92 %    FEV1FVC-Pred 87 %    FEV1FVC-Pre 87 %    FEFMax-Pred 6.40 L/sec    FEFMax-Pre 6.48 L/sec    FEFMax-%Pred-Pre 101 %    FEF2575-Pred 3.11 L/sec    FEF2575-Pre 3.21 L/sec    OPP9823-%Pred-Pre 103 %    ExpTime-Pre 3.91 sec    FIFMax-Pre 3.66 L/sec    VC-Pred 3.24 L    VC-Pre 2.84 L    VC-%Pred-Pre 87 %    IC-Pred 2.18 L    IC-Pre 2.26 L    IC-%Pred-Pre 103 %    ERV-Pred 1.09 L    ERV-Pre 0.57 L    ERV-%Pred-Pre 52 %    FEV1FEV6-Pred 85 %    FEV1FEV6-Pre 87 %    FRCPleth-Pred 2.44 L    FRCPleth-Pre 1.53 L    FRCPleth-%Pred-Pre 62 %    RVPleth-Pred 1.23 L    RVPleth-Pre 0.95 L    RVPleth-%Pred-Pre 77 %    TLCPleth-Pred 4.27 L    TLCPleth-Pre 3.79 L    TLCPleth-%Pred-Pre 88 %    DLCOunc-Pred 19.89 ml/min/mmHg    DLCOunc-Pre 13.95 ml/min/mmHg    DLCOunc-%Pred-Pre 70 %    VA-Pre 3.65 L    VA-%Pred-Pre 86 %    FEV1SVC-Pred 81 %    FEV1SVC-Pre 85 %                                           Again, thank you for allowing me to participate in the care of your patient.        Sincerely,        Christian Chen MD

## 2024-11-14 NOTE — TELEPHONE ENCOUNTER
Left Voicemail (2nd Attempt) for the patient to call back and schedule the following:    Appointment type: Imaging  Provider: Gustavo  Return date: 5/13/2025  Specialty phone number: [phone]  Additional appointment(s) needed: CXR, Full pft, echo   Additonal Notes: adding to appt with Dr. Chen

## 2024-11-25 ENCOUNTER — TELEPHONE (OUTPATIENT)
Dept: ANESTHESIOLOGY | Facility: CLINIC | Age: 29
End: 2024-11-25
Payer: COMMERCIAL

## 2024-11-25 NOTE — TELEPHONE ENCOUNTER
Spoke with pharmacy clarifying where medication is placed. Advised that it was the right foot ankle area.    Bre Salcedo LPN

## 2024-11-25 NOTE — TELEPHONE ENCOUNTER
[facility] Call Center    Phone Message    May a detailed message be left on voicemail: yes     Reason for Call: Medication Question or concern regarding medication   Prescription Clarification  Name of Medication: Lidocain Patches  Prescribing Provider: Britt Cruz   Pharmacy: [facility]   What on the order needs clarification? Pharmacy is requesting a call back to let them know what part of the body this will be applied to.  Thanks.

## 2024-12-09 NOTE — TELEPHONE ENCOUNTER
RECORDS STATUS - ALL OTHER DIAGNOSIS      RECORDS RECEIVED FROM: [facility] - Internal records   DATE RECEIVED: 12/9

## 2024-12-19 DIAGNOSIS — D64.9 ANEMIA, UNSPECIFIED TYPE: Primary | ICD-10-CM

## 2024-12-20 ENCOUNTER — PRE VISIT (OUTPATIENT)
Dept: ONCOLOGY | Facility: CLINIC | Age: 29
End: 2024-12-20
Payer: COMMERCIAL

## 2025-02-09 ENCOUNTER — HEALTH MAINTENANCE LETTER (OUTPATIENT)
Age: 30
End: 2025-02-09

## 2025-05-17 ENCOUNTER — HEALTH MAINTENANCE LETTER (OUTPATIENT)
Age: 30
End: 2025-05-17

## 2025-08-30 ENCOUNTER — HEALTH MAINTENANCE LETTER (OUTPATIENT)
Age: 30
End: 2025-08-30

## (undated) RX ORDER — LIDOCAINE HYDROCHLORIDE 10 MG/ML
INJECTION, SOLUTION INFILTRATION; PERINEURAL
Status: DISPENSED
Start: 2022-02-17

## (undated) RX ORDER — LIDOCAINE HYDROCHLORIDE 10 MG/ML
INJECTION, SOLUTION EPIDURAL; INFILTRATION; INTRACAUDAL; PERINEURAL
Status: DISPENSED
Start: 2022-02-17

## (undated) RX ORDER — DEXAMETHASONE SODIUM PHOSPHATE 10 MG/ML
INJECTION, SOLUTION INTRAMUSCULAR; INTRAVENOUS
Status: DISPENSED
Start: 2022-02-17